# Patient Record
Sex: FEMALE | Race: WHITE | NOT HISPANIC OR LATINO | Employment: OTHER | URBAN - NONMETROPOLITAN AREA
[De-identification: names, ages, dates, MRNs, and addresses within clinical notes are randomized per-mention and may not be internally consistent; named-entity substitution may affect disease eponyms.]

---

## 2017-01-09 ENCOUNTER — TRANSFERRED RECORDS (OUTPATIENT)
Dept: HEALTH INFORMATION MANAGEMENT | Facility: OTHER | Age: 82
End: 2017-01-09

## 2017-06-12 ENCOUNTER — HISTORY (OUTPATIENT)
Dept: FAMILY MEDICINE | Facility: OTHER | Age: 82
End: 2017-06-12

## 2017-06-12 ENCOUNTER — OFFICE VISIT - GICH (OUTPATIENT)
Dept: FAMILY MEDICINE | Facility: OTHER | Age: 82
End: 2017-06-12

## 2017-06-12 DIAGNOSIS — D50.8 OTHER IRON DEFICIENCY ANEMIAS (CODE): ICD-10-CM

## 2017-06-12 DIAGNOSIS — L29.0 PRURITUS ANI: ICD-10-CM

## 2017-06-12 DIAGNOSIS — E78.00 PURE HYPERCHOLESTEROLEMIA: ICD-10-CM

## 2017-06-12 DIAGNOSIS — E28.39 OTHER PRIMARY OVARIAN FAILURE: ICD-10-CM

## 2017-06-12 DIAGNOSIS — M51.379 OTHER INTERVERTEBRAL DISC DEGENERATION, LUMBOSACRAL REGION: ICD-10-CM

## 2017-06-12 DIAGNOSIS — R29.890 LOSS OF HEIGHT: ICD-10-CM

## 2017-06-12 DIAGNOSIS — R68.89 OTHER GENERAL SYMPTOMS AND SIGNS: ICD-10-CM

## 2017-06-12 DIAGNOSIS — I10 ESSENTIAL (PRIMARY) HYPERTENSION: ICD-10-CM

## 2017-06-21 ENCOUNTER — HISTORY (OUTPATIENT)
Dept: RADIOLOGY | Facility: OTHER | Age: 82
End: 2017-06-21

## 2017-06-21 ENCOUNTER — HOSPITAL ENCOUNTER (OUTPATIENT)
Dept: RADIOLOGY | Facility: OTHER | Age: 82
End: 2017-06-21
Attending: FAMILY MEDICINE

## 2017-06-21 ENCOUNTER — AMBULATORY - GICH (OUTPATIENT)
Dept: LAB | Facility: OTHER | Age: 82
End: 2017-06-21

## 2017-06-21 DIAGNOSIS — I10 ESSENTIAL (PRIMARY) HYPERTENSION: ICD-10-CM

## 2017-06-21 DIAGNOSIS — D50.8 OTHER IRON DEFICIENCY ANEMIAS (CODE): ICD-10-CM

## 2017-06-21 DIAGNOSIS — E28.39 OTHER PRIMARY OVARIAN FAILURE: ICD-10-CM

## 2017-06-21 DIAGNOSIS — Z12.31 ENCOUNTER FOR SCREENING MAMMOGRAM FOR MALIGNANT NEOPLASM OF BREAST: ICD-10-CM

## 2017-06-21 DIAGNOSIS — M51.379 OTHER INTERVERTEBRAL DISC DEGENERATION, LUMBOSACRAL REGION: ICD-10-CM

## 2017-06-21 DIAGNOSIS — E78.00 PURE HYPERCHOLESTEROLEMIA: ICD-10-CM

## 2017-06-21 DIAGNOSIS — R68.89 OTHER GENERAL SYMPTOMS AND SIGNS: ICD-10-CM

## 2017-06-21 LAB
A/G RATIO - HISTORICAL: 1.1 (ref 1–2)
ALBUMIN SERPL-MCNC: 3.8 G/DL (ref 3.5–5.7)
ALP SERPL-CCNC: 63 IU/L (ref 34–104)
ALT (SGPT) - HISTORICAL: 19 IU/L (ref 7–52)
ANION GAP - HISTORICAL: 9 (ref 5–18)
AST SERPL-CCNC: 24 IU/L (ref 13–39)
BILIRUB SERPL-MCNC: 0.6 MG/DL (ref 0.3–1)
BUN SERPL-MCNC: 32 MG/DL (ref 7–25)
BUN/CREAT RATIO - HISTORICAL: 27
CALCIUM SERPL-MCNC: 9.9 MG/DL (ref 8.6–10.3)
CHLORIDE SERPLBLD-SCNC: 100 MMOL/L (ref 98–107)
CHOL/HDL RATIO - HISTORICAL: 3.35
CHOLESTEROL TOTAL: 201 MG/DL
CO2 SERPL-SCNC: 25 MMOL/L (ref 21–31)
CREAT SERPL-MCNC: 1.2 MG/DL (ref 0.7–1.3)
ERYTHROCYTE [DISTWIDTH] IN BLOOD BY AUTOMATED COUNT: 14 % (ref 11.5–15.5)
GFR IF NOT AFRICAN AMERICAN - HISTORICAL: 43 ML/MIN/1.73M2
GLOBULIN - HISTORICAL: 3.6 G/DL (ref 2–3.7)
GLUCOSE SERPL-MCNC: 96 MG/DL (ref 70–105)
HCT VFR BLD AUTO: 36.1 % (ref 33–51)
HDLC SERPL-MCNC: 60 MG/DL (ref 23–92)
HEMOGLOBIN: 12.1 G/DL (ref 12–16)
LDLC SERPL CALC-MCNC: 127 MG/DL
MCH RBC QN AUTO: 31.4 PG (ref 26–34)
MCHC RBC AUTO-ENTMCNC: 33.5 G/DL (ref 32–36)
MCV RBC AUTO: 94 FL (ref 80–100)
NON-HDL CHOLESTEROL - HISTORICAL: 141 MG/DL
PATIENT STATUS - HISTORICAL: ABNORMAL
PLATELET # BLD AUTO: 173 THOU/CU MM (ref 140–440)
PMV BLD: 12.2 FL (ref 6.5–11)
POTASSIUM SERPL-SCNC: 3.8 MMOL/L (ref 3.5–5.1)
PROT SERPL-MCNC: 7.4 G/DL (ref 6.4–8.9)
RED BLOOD COUNT - HISTORICAL: 3.85 MIL/CU MM (ref 4–5.2)
SODIUM SERPL-SCNC: 134 MMOL/L (ref 133–143)
TRIGL SERPL-MCNC: 72 MG/DL
TSH - HISTORICAL: 2.69 UIU/ML (ref 0.34–5.6)
WHITE BLOOD COUNT - HISTORICAL: 6.3 THOU/CU MM (ref 4.5–11)

## 2017-07-08 ENCOUNTER — COMMUNICATION - GICH (OUTPATIENT)
Dept: FAMILY MEDICINE | Facility: OTHER | Age: 82
End: 2017-07-08

## 2017-07-08 DIAGNOSIS — I10 ESSENTIAL (PRIMARY) HYPERTENSION: ICD-10-CM

## 2017-08-03 ENCOUNTER — OFFICE VISIT - GICH (OUTPATIENT)
Dept: INTERNAL MEDICINE | Facility: OTHER | Age: 82
End: 2017-08-03

## 2017-08-03 ENCOUNTER — HISTORY (OUTPATIENT)
Dept: INTERNAL MEDICINE | Facility: OTHER | Age: 82
End: 2017-08-03

## 2017-08-03 DIAGNOSIS — G89.29 OTHER CHRONIC PAIN: ICD-10-CM

## 2017-08-03 DIAGNOSIS — N18.30 CHRONIC KIDNEY DISEASE, STAGE III (MODERATE) (H): ICD-10-CM

## 2017-08-03 DIAGNOSIS — M85.89 OTHER SPECIFIED DISORDERS OF BONE DENSITY AND STRUCTURE, MULTIPLE SITES: ICD-10-CM

## 2017-08-03 DIAGNOSIS — R60.0 LOCALIZED EDEMA: ICD-10-CM

## 2017-08-03 DIAGNOSIS — M25.552 PAIN IN LEFT HIP: ICD-10-CM

## 2017-08-03 DIAGNOSIS — I10 ESSENTIAL (PRIMARY) HYPERTENSION: ICD-10-CM

## 2017-08-14 ENCOUNTER — COMMUNICATION - GICH (OUTPATIENT)
Dept: INTERNAL MEDICINE | Facility: OTHER | Age: 82
End: 2017-08-14

## 2017-08-14 DIAGNOSIS — I10 ESSENTIAL (PRIMARY) HYPERTENSION: ICD-10-CM

## 2017-09-14 ENCOUNTER — HISTORY (OUTPATIENT)
Dept: INTERNAL MEDICINE | Facility: OTHER | Age: 82
End: 2017-09-14

## 2017-09-14 ENCOUNTER — OFFICE VISIT - GICH (OUTPATIENT)
Dept: INTERNAL MEDICINE | Facility: OTHER | Age: 82
End: 2017-09-14

## 2017-09-14 DIAGNOSIS — G89.29 OTHER CHRONIC PAIN: ICD-10-CM

## 2017-09-14 DIAGNOSIS — M85.89 OTHER SPECIFIED DISORDERS OF BONE DENSITY AND STRUCTURE, MULTIPLE SITES: ICD-10-CM

## 2017-09-14 DIAGNOSIS — M25.552 PAIN IN LEFT HIP: ICD-10-CM

## 2017-09-14 DIAGNOSIS — N18.30 CHRONIC KIDNEY DISEASE, STAGE III (MODERATE) (H): ICD-10-CM

## 2017-09-14 DIAGNOSIS — I10 ESSENTIAL (PRIMARY) HYPERTENSION: ICD-10-CM

## 2017-09-14 LAB
ANION GAP - HISTORICAL: 11 (ref 5–18)
BUN SERPL-MCNC: 31 MG/DL (ref 7–25)
BUN/CREAT RATIO - HISTORICAL: 26
CALCIUM SERPL-MCNC: 10 MG/DL (ref 8.6–10.3)
CHLORIDE SERPLBLD-SCNC: 105 MMOL/L (ref 98–107)
CO2 SERPL-SCNC: 23 MMOL/L (ref 21–31)
CREAT SERPL-MCNC: 1.21 MG/DL (ref 0.7–1.3)
GFR IF NOT AFRICAN AMERICAN - HISTORICAL: 43 ML/MIN/1.73M2
GLUCOSE SERPL-MCNC: 108 MG/DL (ref 70–105)
POTASSIUM SERPL-SCNC: 4.4 MMOL/L (ref 3.5–5.1)
SODIUM SERPL-SCNC: 139 MMOL/L (ref 133–143)

## 2017-09-20 ENCOUNTER — COMMUNICATION - GICH (OUTPATIENT)
Dept: INTERNAL MEDICINE | Facility: OTHER | Age: 82
End: 2017-09-20

## 2017-09-20 ENCOUNTER — HISTORY (OUTPATIENT)
Dept: INTERNAL MEDICINE | Facility: OTHER | Age: 82
End: 2017-09-20

## 2017-09-20 DIAGNOSIS — Z79.899 OTHER LONG TERM (CURRENT) DRUG THERAPY: ICD-10-CM

## 2017-09-22 ENCOUNTER — AMBULATORY - GICH (OUTPATIENT)
Dept: LAB | Facility: OTHER | Age: 82
End: 2017-09-22

## 2017-09-22 DIAGNOSIS — Z79.899 OTHER LONG TERM (CURRENT) DRUG THERAPY: ICD-10-CM

## 2017-09-22 LAB
ANION GAP - HISTORICAL: 5 (ref 5–18)
BUN SERPL-MCNC: 33 MG/DL (ref 7–25)
BUN/CREAT RATIO - HISTORICAL: 26
CALCIUM SERPL-MCNC: 9.7 MG/DL (ref 8.6–10.3)
CHLORIDE SERPLBLD-SCNC: 108 MMOL/L (ref 98–107)
CO2 SERPL-SCNC: 25 MMOL/L (ref 21–31)
CREAT SERPL-MCNC: 1.25 MG/DL (ref 0.7–1.3)
GFR IF NOT AFRICAN AMERICAN - HISTORICAL: 41 ML/MIN/1.73M2
GLUCOSE SERPL-MCNC: 91 MG/DL (ref 70–105)
POTASSIUM SERPL-SCNC: 4.4 MMOL/L (ref 3.5–5.1)
SODIUM SERPL-SCNC: 138 MMOL/L (ref 133–143)

## 2017-11-06 ENCOUNTER — HISTORY (OUTPATIENT)
Dept: INTERNAL MEDICINE | Facility: OTHER | Age: 82
End: 2017-11-06

## 2017-11-06 ENCOUNTER — OFFICE VISIT - GICH (OUTPATIENT)
Dept: INTERNAL MEDICINE | Facility: OTHER | Age: 82
End: 2017-11-06

## 2017-11-06 DIAGNOSIS — I10 ESSENTIAL (PRIMARY) HYPERTENSION: ICD-10-CM

## 2017-11-06 DIAGNOSIS — G89.29 OTHER CHRONIC PAIN: ICD-10-CM

## 2017-11-06 DIAGNOSIS — M25.552 PAIN IN LEFT HIP: ICD-10-CM

## 2017-11-06 ASSESSMENT — PATIENT HEALTH QUESTIONNAIRE - PHQ9: SUM OF ALL RESPONSES TO PHQ QUESTIONS 1-9: 0

## 2017-11-29 ENCOUNTER — AMBULATORY - GICH (OUTPATIENT)
Dept: FAMILY MEDICINE | Facility: OTHER | Age: 82
End: 2017-11-29

## 2017-12-27 NOTE — PROGRESS NOTES
Patient Information     Patient Name MRN Sex Vicki Kaiser [id number] Female 1935      Progress Notes by Kirsten Guevara DO at 8/3/2017  9:00 AM     Author:  Kirsten Guevara DO Service:  (none) Author Type:  PHYS- Osteopathic     Filed:  8/3/2017  9:59 AM Encounter Date:  8/3/2017 Status:  Signed     :  Kirsten Guevara DO (PHYS- Osteopathic)            Chief Complaint     Patient presents with       Multiple Problems      lab results, left hip and back pain, edema bilat ankles        Subjective:   Ms. Mckeon is a 82 y.o. female  seen for several acute concerns.    She did receive results from her labs in the mail.  If there were several abnormalities that were discussed in depth today.  She did have slight elevation of her LDL at 127.  We did discuss that given her history and lack of coronary disease that this number is okay and no changes are needed.    She did have a decrease in her GFR at 43.  We did review her record and this has been chronic back to .  We did discuss chronic kidney disease in depth today.  She denies any symptoms of increased fatigue or urinary changes.  We did discuss that her hydrochlorothiazide may be contributing.    We did discuss her blood pressure medications.  She has been on blood pressure meds dating back to  or before.  She overall has had a low blood pressure here.  She also has a low pulse.  She does not check this at home.  She denies any obvious side effects from her medication.    She did recently undergo DEXA scan.  It did show osteopenia with a T score of negative 1.8.  Her fracture scores were significantly elevated.  We did discuss that medications may be indicated.  She denies any history of fracture in the past.    She does have left hip pain.  This has been worse over the last couple months.  She does have a history of arthritis.  Taking some Tylenol.  She did used to walk regularly and more recently have gotten out of this.  She has previously  "had injections into the right hip.    She  reports that she has never smoked. She has never used smokeless tobacco.    Past medical history reviewed as below:     Past Medical History:     Diagnosis  Date     ANEMIA 3/2/2011    likely related to NSAIDs, resolved.       BACK PAIN, LUMBAR, WITH RADICULOPATHY 3/2/2011    resolved after surgery      Hx of pregnancy      2, para 2 with 2 spontaneous vaginal deliveries      Scoliosis      Screening for osteoporosis  Central DEXA with normal results      Spinal stenosis    .      ROS:   Pertinent ROS was performed and was negative, including for fevers, chills, chest pain, palpitations. No other concerns, with exception of HPI above.      Objective:    /64  Pulse 56  Temp 97.8  F (36.6  C) (Tympanic)  Resp 16  Ht 1.607 m (5' 3.25\")  Wt 81.9 kg (180 lb 9 oz)  Breastfeeding? No  BMI 31.73 kg/m2  GEN: Vitals reviewed.  Patient is in no acute distress. Cooperative with exam.  HEENT: Normocephalic atraumatic.  Pupils equally round.  No scleral icterus, no conjunctival erythema. Oropharynx with no erythema or exudates. Dentition adequate.  MSK: Gait is normal.  Patient does move with ease.  No decreased range of motion.  SKIN: Warm and dry to touch.  No rash on face, arms and legs.  EXT: No clubbing or cyanosis.  No peripheral edema.     Assessment/Plan:   1. Chronic kidney disease (CKD), stage 3 (moderate)  - eGFR stable, recheck in 6 months  - electrolytes stable, check RFP, H/H, PTH, and urine albumin yearly  - monitor Vitamin D level for sufficiency  - avoid NSAIDS, need to renally dose medications    2. Hypertension  - at this time she will stop her hydrochlorothiazide.  - Blood pressure today of 116/64  Is at the goal of <150/90.  - Instructed to check BP at home.  - Cautioned patient to monitor with antibiotics, herbals and any OTC medications  - repeat creatinine in follow up  - would consider adding CCB and decrease in Beta blocker in " future    3. Osteopenia of multiple sites  - likely she will need medication.  - continue with vitamin D.  We will discuss this further in follow-up.    4. Chronic left hip pain  - Ice, elevation, gentle movement/rest as tolerated  - Tylenol as needed, avoid nonsteroidal anti-inflammatory medications  - offered PT, patient declined and will start exercising more.  We did discuss this.  She is to call should like referral to PT.  - patient is to call if she has additional problems with this or if new symptoms develop    5. Bilateral edema of lower extremity  - likely due to venous insufficiency, kidney disease may be contributing, we may need to repeat echocardiogram if she does continue to have worsening symptoms.  - If she does have worsening off of her hydrochlorothiazide I would consider as needed Lasix.  - Return/call as needed for follow-up should any new symptoms develop, for worsening of current symptoms or if symptoms do not resolve with above plan.  - Limit salt to 2000mg-2500mg daily.  Use other herbs and pepper to season food, no salt shaker, pay attention to how much cheese, canned soups and vegetables.    - Record heart rate, weight and blood pressure at various times daily and bring with to your next appt      Return in about 6 weeks (around 9/14/2017) for Recheck/Follow Up, Blood pressure, Establish care.     A total of 25 minutes spent with in face-to-face consultation of this patient with greater than 50% spent in counseling and care coordination of above listed medical problems including diagnosis, treatment options with emphasis on risks and benefits of each, prognosis and importance of compliance for each.      Patient Instructions   - Limit salt to 2000mg-2500mg daily.  Use other herbs and pepper to season food, no salt shaker, pay attention to how much cheese, canned soups and vegetables.      - elevate your legs for 45-60 minutes twice  daily    --------------------------------------------------------------------------------------------------------------------------------------    Ice every 3-4 hours, gentle exercise/rest as much as possible.    Caution with NSAIDS (ibuprofen, aspirin, naproxen, aleve, advil) due to risk for increased blood pressure, stomach pain/nausea/ulcers and kidney damage; use minimal amount necessary    The following is okay:  Acetaminophen (Tylenol) 1000mg (2- extra strength 500mg tablets or 3 regular strength 325mg tablets) three times a day as needed; Maximum of 4000mg daily    - Return/call as needed for follow-up should any new symptoms develop, for worsening of current symptoms or if symptoms do not resolve with above plan.    -------------------------------------------------------------------------------------------------------------------------------------    Stop your Hydrochlorothiazide and monitor your blood pressure.      Please check your blood pressure daily at various times, record this and bring it to your follow up appointment.  Your blood pressure goal is greater than 110/50 and less than 145/90.  Please call if it is consistently outside this range.       Index [place] All languages Related topics   High Blood Pressure: Essential Hypertension   ________________________________________________________________________  KEY POINTS    High blood pressure means that your blood pressure is higher than normal. It is called essential hypertension when no cause for it can be found.    Weight loss, changes in your diet, and exercise may be the only treatment you need. If lifestyle changes don t lower your blood pressure enough, your healthcare provider may prescribe medicine. Many people need to take 2 or more medicines to bring their blood pressure down to a healthy level.    Talk to your healthcare provider about your personal and family medical history and your lifestyle habits. This will help you know what you  "can do to lower your risk for high blood pressure.  ________________________________________________________________________  What is high blood pressure?  Blood pressure is the force of blood against artery walls as the heart pumps blood through the body. You may be told that you have high blood pressure (hypertension) if your blood pressure is higher than normal. Hypertension is called essential when no cause for it can be found. When the cause of hypertension is known, such as from kidney disease or a tumor, it is called secondary hypertension.  Blood pressure can rise and fall with exercise, rest, or emotions.    Normal resting blood pressure ranges up to 120/80 (\"120 over 80\"). The first number (120 in this example) is the pressure when the heart beats and pushes blood out to the rest of the body. The second number (80 in this example) is the pressure when the heart rests between beats.    Blood pressure is borderline high if it is 120/80 or higher but less than 140/90.    High blood pressure is 140/90 or higher for most people. If you have chronic kidney disease, 130/80 or higher is considered high blood pressure.    Why is high blood pressure a problem?  High blood pressure is a problem in many ways.    Your heart has to work harder to pump blood through your body. The added workload on the heart causes thickening of the heart muscle. Over time, the thickening damages the heart muscle so that it can no longer pump normally. This can lead to a disease called heart failure.    The higher pressure in your arteries may cause them to weaken and bleed, resulting in a stroke.    As you get older, blood vessels may become hardened. High blood pressure speeds up this process. Hardened or narrowed arteries may not be able to supply enough blood to all parts of your body.    High blood pressure may lead to atherosclerosis, which is when deposits of cholesterol, fatty substances, and blood cells clog up an artery. " Atherosclerosis is the leading cause of heart attacks. It can also cause strokes.    Your kidneys, brain, and eyes may be damaged.  You may need treatment for high blood pressure for the rest of your life. However, proper treatment can control your blood pressure and help prevent heart disease, heart attack, or stroke. It can also help prevent long-term health problems, such as heart failure, kidney failure, blindness, and dementia.  If you already have some complications, such as breathing problems or chest pain, lowering your blood pressure may make these problems less severe.    What is the cause?  There are no clear causes of essential hypertension. However, many things can increase blood pressure, such as:    Being overweight    Smoking    Eating a diet high in salt    Drinking a lot of alcohol  Other important factors include:    Race.  Americans are more likely to have high blood pressure.    Gender. Males have a greater chance of developing high blood pressure than women until age 55. After the age of 75, women are more likely to develop high blood pressure than men.    Heredity. If you have parents with high blood pressure, you are more at risk.    Age. The older you get, the more likely you are to have high blood pressure.  Also, some medicines increase blood pressure.  Stress and drinking caffeine can make blood pressure go up temporarily, but it s not clear that they have any long-term effects on blood pressure.    What are the symptoms?  You may have high blood pressure for a long time without symptoms. You may not be able to tell by the way you feel that your blood pressure is high. The only way to find out if your blood pressure is high is to have it measured. That's why it s important to have your blood pressure checked at least once a year.  When high blood pressure does cause symptoms, they may include:    Headaches    Nosebleeds    Getting tired easily    Blurred  vision    Dizziness    Lightheadedness    Feeling like your heart is racing or fluttering    Shortness of breath    Chest pain    Memory problems    Daytime sleepiness    How is it diagnosed?  Blood pressure is checked at most healthcare visits. High blood pressure is usually discovered during one of these visits. If your blood pressure is high, you will be asked to return for follow-up checks. Your healthcare provider will ask about your personal and family medical history and examine you.  Tests to look for a possible cause of high blood pressure may include:    Urine and blood tests    Chest X-ray    Electrocardiogram (ECG), which measures and records your heartbeat  You may be asked to use a portable blood-pressure measuring device, which will take your pressure at different times during day and night.    How is it treated?  If your blood pressure is borderline high, you may be able to bring it down to a normal level without medicine. Weight loss, changes in your diet, and exercise may be the only treatment you need.  If lifestyle changes don t lower your blood pressure enough, your healthcare provider may prescribe medicine. Many people need to take 2 or more medicines to bring their blood pressure down to a healthy level. It may take several weeks or months to find the best treatment for you.    How can I take care of myself?  If you have high blood pressure, there are things you can do now to take care of yourself and to prevent problems in the future:    Follow your treatment plan and know how to take your medicines.    Work with your healthcare provider to find what lifestyle changes and medicines are right for you.    Follow the directions that come with your medicine, including information about food or alcohol. Make sure you know how and when to take your medicine. Do not take more or less than you are supposed to take.    Many medicines have side effects. A side effect is a symptom or problem that is  caused by the medicine. Ask your healthcare provider or pharmacist what side effects your medicine may cause and what you should do if you have side effects. Ask if you should avoid some nonprescription medicines.    Be careful with nonprescription medicines or herbal supplements. Some can raise blood pressure. This includes diet pills, cold and pain medicines, and energy boosters. Read labels or ask your pharmacist if the medicine or supplement affects blood pressure. Some illegal drugs, like cocaine, can also affect blood pressure.    Check your blood pressure (or have it checked) as often as your provider advises. Keep a diary of the readings. A diary is also a good place to note your exercise, weight, salt intake, types of food you are eating, and your feelings. This can help you learn how these things can affect your blood pressure. Take your diary with you when you visit your provider. It may help you and your provider manage your blood pressure and adjust your medicines if needed.    Don t smoke.    Eat a healthy diet that is low in salt, saturated fat, trans fat, and cholesterol. Include lots of fruits, vegetables, and fat-free or low-fat milk and milk products.    Get regular exercise, according to your healthcare provider's advice. For example, you might walk, bike, or swim at least 30 minutes 3 to 5 times a week.    Limit the amount of alcohol you drink. Moderate drinking is up to 1 drink a day for women and up to 2 drinks for men.    Lose weight if you need to.    Try to reduce the stress in your life or learn how to deal better with situations that make you feel anxious.    Ask your healthcare provider:    How and when you will get your test results    How long it will take to recover    If there are activities you should avoid and when you can return to your normal activities    How to take care of yourself at home    What symptoms or problems you should watch for and what to do if you have  them    Make sure you know when you should come back for a checkup. Keep all appointments for provider visits or tests.    How can I help prevent high blood pressure?  You can help prevent this disease with a heart-healthy lifestyle:    Eat a healthy diet and keep a healthy weight.    Stay fit with the right kind of exercise for you.    Decrease stress.    Don t smoke.    Limit your use of alcohol.  Talk to your healthcare provider about your personal and family medical history and your lifestyle habits. This will help you know what you can do to lower your risk for high blood pressure.    Developed by [employer].  Adult Advisor 2016.3 published by [employer].  Last modified: 2016-04-18  Last reviewed: 2016-04-15  This content is reviewed periodically and is subject to change as new health information becomes available. The information is intended to inform and educate and is not a replacement for medical evaluation, advice, diagnosis or treatment by a healthcare professional.  References   Adult Advisor 2016.3 Index    Copyright   2016 [employer], a division of [employer] All rights reserved.              Index [place] Related topics   Chronic Kidney Disease   ________________________________________________________________________  KEY POINTS    Chronic kidney disease is a slow loss of kidney function over time.    Treatment may include medicine, changing your diet, dialysis, or a kidney transplant. Chronic kidney disease cannot be cured. You will have it for the rest of your life unless you have a kidney transplant.    Follow the full course of treatment prescribed by your healthcare provider. Ask your provider what medicines you need to avoid because they could damage your kidneys.  ________________________________________________________________________  What is chronic kidney disease?  Chronic kidney disease is a slow loss of kidney function over time.  The kidneys are inside your belly, on  either side of your spine just above your waist. They make urine by removing waste products, extra salt and other minerals, and water from the blood. As long as you have at least one kidney that is working, your body can filter enough blood and make enough urine to keep you healthy.  As kidney disease gets worse, you may develop kidney failure, which means:    Your body cannot get rid of wastes.    Your body cannot keep a healthy balance of water and minerals.    Your kidneys may make less urine, or no urine.  If not treated, kidney failure will cause death within a few days or weeks.  Chronic kidney disease can affect your health in other ways. For example, it can cause or worsen high blood pressure. You are also more likely to have blood vessel or heart disease, bone disease, anemia, and nutrition problems.    What is the cause?  Chronic kidney disease is more common in middle-aged and older people. It is caused by damage to the kidneys over the years by diseases such as:    High blood pressure    Diabetes    Heart or lung disease    Autoimmune disease, which is a disease that causes your body to mistakenly attack your own tissue such as with lupus    Injury to the kidneys, kidney infection, and other kidney problems  Using nonprescription painkillers, such as acetaminophen, aspirin, or ibuprofen, for a long time can also cause chronic kidney failure. A family history of chronic kidney disease is also a risk factor.    What are the symptoms?  Chronic kidney disease gets worse over time. In the early stages, you may not have any symptoms. If you do have symptoms, they may come on gradually over several months. Symptoms may include:    Feeling tired or weak    Trouble staying alert during the day    Trouble sleeping at night    Itchy, dry skin    Headaches    Loss of appetite, often with weight loss  As your kidneys get worse, other parts of your body may be damaged by excess waste and changes in the balance of  water, salt, and other minerals. Some of the common symptoms of kidney failure include:    Changes in urine, such as urinating more often, less often, or not at all    Swelling, especially in your legs, feet, or ankles    Muscle cramps or weakness    Bruising or bleeding    Skin that is yellow or a darker color than is usual for you    Unusual pain in one or both sides of your back    Nausea and vomiting    Shortness of breath    Confusion or seizures    How is it diagnosed?  Your healthcare provider will ask about your symptoms and medical history and examine you. Tests may include:     Blood and urine tests    Ultrasound scan, which uses sound waves to show pictures of the kidneys    CT scan, which uses X-rays and a computer to show detailed pictures of the kidneys    Needle biopsy, which is the removal of a small piece of tissue from your kidney with a needle      How is it treated?  Your healthcare provider may prescribe medicine to:    Treat health conditions that cause kidney disease such as diabetes, heart or lung disease, infection, or other kidney problems    Keep your blood pressure normal    Keep the balance of liquids and minerals in your body normal  You may need to change your diet. With the right diet, you can help keep kidney disease from getting worse and help prevent other problems. Follow your healthcare provider's advice carefully about what foods you should eat and how much fluid you can drink. Your provider may recommend working with a dietitian to help you plan meals that include the right foods.  If you develop kidney failure, you will need dialysis or a kidney transplant. Dialysis uses a machine to do the work of your kidneys. It removes waste products and extra water from your blood and can be life saving. Dialysis may help you live longer and improve your quality of life. Dialysis can be done in a hospital, dialysis center, or you may be able to do dialysis in your own home. Dialysis  usually needs to be done several times a week. Dialysis can be done by filtering the blood or with a tube that filters fluid from the abdomen.  Whether dialysis is a good option for you depends on the cause of your kidney disease, how well your kidneys are working, and your overall health.  If your health is good except for kidney failure, a kidney transplant may be an option. Talk with your healthcare provider about whether a transplant is an option for you.  Chronic kidney disease cannot be cured. You will have it for the rest of your life unless you have a kidney transplant. There are many choices to make about your care. Talk about the choices with your healthcare provider so that you can get all of your questions answered.     How can I take care of myself?  Follow the full course of treatment prescribed by your healthcare provider. Take all medicines exactly as directed by your provider. Ask your provider what medicines you need to avoid because they could damage your kidneys.  Other things you can do that may help include:    Eat a healthy diet. Ask your provider and dietitian what you need to eat, and what you should avoid.    Try to keep a healthy weight. If you are overweight, lose weight.    Stay fit with the right kind of exercise for you.    If you smoke, try to quit. Talk to your healthcare provider about ways to quit smoking.    If you want to drink alcohol, ask your healthcare provider how much is safe for you to drink.    Try to get at least 7 to 9 hours of sleep each night.    Keep up to date with recommended shots (immunizations). Get a flu shot every year and ask your healthcare provider about any other vaccinations you may need  Ask your provider:    How and when you will get your test results    How long it will take to recover    If there are activities you should avoid and when you can return to your normal activities    How to take care of yourself at home    What symptoms or problems you  should watch for and what to do if you have them  Make sure you know when you should come back for a checkup. Keep all appointments for provider visits or tests.    You can get more information from:    The National Kidney Foundation  014-297-8508  http://www.kidney.org  Developed by [employer].  Adult Advisor 2016.3 published by [employer].  Last modified: 2016-07-26  Last reviewed: 2016-07-25  This content is reviewed periodically and is subject to change as new health information becomes available. The information is intended to inform and educate and is not a replacement for medical evaluation, advice, diagnosis or treatment by a healthcare professional.  References   Adult Advisor 2016.3 Index    Copyright   2016 [employer] Devices, a division of [employer] All rights reserved.              CHRIS HORAN DO   8/3/2017 9:51 AM    This document was prepared using voice generated softwear. While every attempt was made for accuracy, grammatical errors may exist.

## 2017-12-27 NOTE — PROGRESS NOTES
Patient Information     Patient Name MRN Sex Vicki Kaiser [id number] Female 1935      Progress Notes by Kirsten Guevara DO at 2017  1:40 PM     Author:  Kirsten Guevara DO Service:  (none) Author Type:  PHYS- Osteopathic     Filed:  2017  2:18 PM Encounter Date:  2017 Status:  Signed     :  Kirsten Guevara DO (PHYS- Osteopathic)            Chief Complaint     Patient presents with       Follow Up      2 month hypertensive blood pressure         HPI: Ms. Mckeon is a 82 y.o. female who presents today for follow up of blood pressure.  She has been on lisinopril 10 mg daily.  She is not taking amlodipine.  Her blood pressure at home has ranged between 128/74 and 146/81.  Her heart rate at home is in the sixties.  She is also on metoprolol 25 mg twice daily.  Her blood pressure today is on the low normal side.  She denies any adverse reactions from the medication.    She was having some chronic left hip pain.  We did send her for physical therapy.  She reports that her pain overall has improved significantly.  She is not having as many problems at this time as she did prior.    She does report that she did get an upper respiratory infection after getting her flu shot.  This has resolved at this time.    She  reports that she quit smoking about 49 years ago. She has never used smokeless tobacco.    Past medical history reviewed as below:     Past Medical History:     Diagnosis  Date     Anemia 5/15/2013     Ankle fracture      Chronic left hip pain 2017     Essential hypertension      Hx of pregnancy      2, para 2 with 2 spontaneous vaginal deliveries      Mitral valve prolapse 2008    with normal echo and no residual prolapse       Osteopenia      Pure hypercholesterolemia      Renal insufficiency 2014    Stable. First noted when patient was taking NSAIDs regularly due to back pain.        Scoliosis      Spinal stenosis      Thyroid nodule 2012    Patient  "with multinodular goiter. Negative cytology and stable ultrasound findings.       .      ROS  Pertinent ROS was performed and was negative, including for fevers, chills, chest pain, shortness of breath, increased lower extremity edema, changes in bowel or bladder, blood in the stool, difficulty swallowing. No other concerns, with exception of HPI above.      EXAM:   /58  Pulse 60  Temp 97.4  F (36.3  C) (Tympanic)  Resp 20  Ht 1.613 m (5' 3.5\")  Wt 82.7 kg (182 lb 5 oz)  Breastfeeding? No  BMI 31.79 kg/m2    Estimated body mass index is 31.79 kg/(m^2) as calculated from the following:    Height as of this encounter: 1.613 m (5' 3.5\").    Weight as of this encounter: 82.7 kg (182 lb 5 oz).      GEN: Vitals reviewed.  Healthy appearing. Patient is in no acute distress. Cooperative with exam.  HEENT: Normocephalic atraumatic.  Pupils equally round.  No scleral icterus, no conjunctival erythema. Oropharynx with no erythema or exudates. Dentition adequate.  CV: Heart regular in rate and rhythm with no murmur.    LUNGS: Lungs clear to auscultation bilaterally.  Chest rise equal bilaterally.  No accessory muscle use.  ABD:  Obese.  SKIN: Warm and dry to touch.  No rash on face, arms and legs.  EXT: No clubbing or cyanosis.  No peripheral edema.  PSYCH: Mood is good.  Affect appropriate. Speech fluent. Answers questions appropriately and thought process normal.     ASSESSMENT AND PLAN:    1. Hypertension  - Blood pressure today of 112/58  is at the goal of <140/90.  - Continue current regimen at this time.  Instructed to check BP at home, she was also encouraged to bring her blood pressure cuff and compare it in nurse injection clinic.  - Cautioned patient to monitor with antibiotics, herbals and any OTC medications  - she is to continue to work on weight loss and we did discuss portion sizes, cutting out high carb and high sugar foods and regular activity.  - lisinopril (PRINIVIL; ZESTRIL) 10 mg tablet; Take 1 " tablet by mouth once daily.  Dispense: 90 tablet; Refill: 4    2. Chronic left hip pain  - resolved at this time.  She is to call if she has any recurrent symptoms.    She was reassured that it does happen that people get briefly sick after the flu shot however it is still worth getting these injections due to the potential risk for severe illness without them.         Return in about 6 months (around 5/6/2018) for Annual Exam.     Patient Instructions   Okay to take lisinopril any time of day        CHRIS HORAN DO   11/6/2017 2:15 PM    This document was prepared using voice generated softwear. While every attempt was made for accuracy, grammatical errors may exist.

## 2017-12-27 NOTE — PROGRESS NOTES
Patient Information     Patient Name MRN Sex Vicki Kaiser [id number] Female 1935      Progress Notes by Suzanne Zimmerman MD at 2017  4:39 PM     Author:  Suzanne Zimmerman MD Service:  (none) Author Type:  Physician     Filed:  2017  4:39 PM Date of Service:  2017  4:39 PM Status:  Signed     :  Suzanne Zimmerman MD (Physician)            Letter mailed to patient.

## 2017-12-28 NOTE — TELEPHONE ENCOUNTER
Patient Information     Patient Name MRN Sex Vicki Kaiser [id number] Female 1935      Telephone Encounter by Harini Long at 2017 11:17 AM     Author:  Harini Long Service:  (none) Author Type:  (none)     Filed:  2017 11:18 AM Encounter Date:  2017 Status:  Signed     :  Harini Long            Patient is coming in for lab appointment . Please place orders. Thank you.

## 2017-12-28 NOTE — TELEPHONE ENCOUNTER
Patient Information     Patient Name MRN Sex Vicki Kaiser [id number] Female 1935      Telephone Encounter by Binta Anderson at 2017  2:09 PM     Author:  Binta Anderson Service:  (none) Author Type:  (none)     Filed:  2017  2:13 PM Encounter Date:  2017 Status:  Signed     :  Binta Anderson            Patient states blood pressure Medication was stopped and was told if blood pressure went up to call and would send prescription for something else. Patient states Saturday blood pressure was 161 and felt like had band on hand, took a HCTZ and lowered 120s, and today was 121/65. Patient wondering if should have other Medication sent in.  Binta Anderson LPN .............2017  2:11 PM

## 2017-12-28 NOTE — PROGRESS NOTES
Patient Information     Patient Name MRN Sex Vicki Kaiser [id number] Female 1935      Progress Notes by Yomaira Velasquez R.T. (ARRT) at 2017  8:12 AM     Author:  Yomaira Velasquez R.T. (ARRT) Service:  (none) Author Type:  (none)     Filed:  2017  8:13 AM Date of Service:  2017  8:12 AM Status:  Signed     :  Yomaira Velasquez R.T. (ARRT) ([facility] - Registered Radiologic Technologist)            Falls Risk Criteria:    Age 65 and older or under age 4        Sensory deficits    Poor vision    Use of ambulatory aides    Impaired judgment    Unable to walk independently    Meets High Risk criteria for falls:  Yes             1.  Do you have dizziness or vertigo?    no                    2.  Do you need help standing or walking?   no                 3.  Have you fallen within the last 6 months?    no           4.  Has the patient been fasting?      no       If any risks are marked Yes, the following interventions are utilized:    Do not leave patient unattended     Assist patient in the dressing room and bathroom    Have ambulatory aides available throughout procedure    Involve patient s family if available

## 2017-12-28 NOTE — TELEPHONE ENCOUNTER
Patient Information     Patient Name MRN Vicki Boudreaux [id number] Female 1935      Telephone Encounter by Binta Anderson at 2017  2:26 PM     Author:  Binta Anderson Service:  (none) Author Type:  (none)     Filed:  2017  2:26 PM Encounter Date:  2017 Status:  Signed     :  Binta Anderson            Patient notified.   Binta Anderson LPN........2017  2:26 PM

## 2017-12-28 NOTE — PROGRESS NOTES
Patient Information     Patient Name MRN Sex Vicki Kaiser [id number] Female 1935      Progress Notes by Suzanne Zimmerman MD at 2017  4:39 PM     Author:  Suzanne Zimmerman MD Service:  (none) Author Type:  Physician     Filed:  2017  4:39 PM Date of Service:  2017  4:39 PM Status:  Signed     :  Suzanne Zimmerman MD (Physician)            Letter mailed to patient.

## 2017-12-28 NOTE — PATIENT INSTRUCTIONS
Patient Information     Patient Name MRN Vicki Boudreaux [id number] Female 1935      Patient Instructions by Kirsten Guevara DO at 2017 10:00 AM     Author:  Kirsten Guevara DO  Service:  (none) Author Type:  PHYS- Osteopathic     Filed:  2017 10:39 AM  Encounter Date:  2017 Status:  Addendum     :  Kirsten Guevara DO (PHYS- Osteopathic)        Related Notes: Original Note by Kirsten Gueavra DO (PHYS- Osteopathic) filed at 2017 10:35 AM            Consider starting medications for bone density - consider Fosamax (weekly pill) or Prolia (injection)    Recommend taking Vitamin D 1000 IU daily.    Also, Calcium 1200-1500mg daily from supplement and diet combined.  Be sure to take into account the amount of calcium you may be getting from food daily so as not to get too much calcium.    Recommend daily exercise with a focus on low-weight strengthening.  Yoga is a great way to do this.    Please let me know if you have any questions regarding this.       Index [place] Related topics   Calcium in the Diet   ________________________________________________________________________  KEY POINTS    Calcium is a mineral that is important for your heart, bones, teeth, and muscles to stay healthy.    Eating or drinking certain things can cause you to lose calcium.    You can get calcium from dairy products, calcium-fortified foods, or from supplements. If you can get enough calcium in your diet, you do not need to take calcium supplements.  ________________________________________________________________________  What is calcium?   Calcium is a mineral that is very important for:    Heart health    Bone health    Teeth    Nerves    Muscles    Blood clotting    How much calcium do I need?   Many food products list the amount of calcium per serving on the label. Food labels list calcium as a % of the Daily Value (DV) based on 1,000 mg of calcium per day. Look for foods that provide 10% or  more of the daily value for calcium.   The total amount of calcium you need, preferably from dairy foods, depends on your age and gender:      Group           Calcium/Day  --------------------------------------  Adults 19 to 50  1000 mg  Women 51 to 70   1200 mg  Men 51 to 70     1000 mg  Adults over 70   1200 mg  --------------------------------------  * mg = milligrams    What keeps me from getting enough calcium?   Here are some things that can make it harder for your body to get enough calcium:    Not getting enough vitamin D. Vitamin D increases the amount of calcium absorbed by your body. It s important to get enough sunlight to help your body make vitamin D and to choose foods that contain vitamin D. Milk contains vitamin D and some brands of cheese, yogurt, juice, and margarine have added vitamin D. Check labels for the amount of vitamin D per serving. Fish such as salmon, mackerel, and tuna are good natural sources of vitamin D. If your provider recommends that you take a calcium supplement, there are some that include vitamin D.    Too much fiber in the diet. This is more of a concern if you have low amounts of calcium in your diet. Take calcium supplements or eat calcium-fortified foods 2 hours before or after eating 100% bran products. Soaking beans in water and discarding the liquid before cooking can also help.    Soft drinks, energy drinks, tea, and coffee. People who drink these products instead of milk often don't get enough calcium.    Taking some medicines. Medicines such as some antibiotics, heartburn medicines that decrease stomach acid production, and antacids that contain aluminum can make it harder for your body to absorb calcium.  These things can cause you to lose calcium:    Eating a lot of protein foods, such as meats, poultry, and eggs. The more protein you eat, the more calcium you lose. As long as your diet is balanced and contains enough calcium, this should not be a  problem.    Eating a lot of salt. The more salt in your diet, the more calcium you lose. Limit the salt in your diet. Cutting back on salt and getting enough calcium can help lower blood pressure and help prevent fluid retention.  Milk products are one of the best sources of calcium. Calcium is also in many other foods such as vegetables, beans, nuts, seeds, and soy. However, the calcium in these foods is not absorbed as well as the calcium in milk products. Calcium has been added to some foods (fortified), which makes it easier to meet daily calcium needs, but it still can be hard for your body to get enough calcium if dairy foods are not a part of your diet.     Do I need a calcium supplement?   If you can get enough calcium in your diet, you do not need to take calcium supplements. People who get too much calcium have a higher risk for kidney stones and stroke. Men who take calcium supplements are at higher risk for a heart attack. Ask your healthcare provider if you should take calcium supplements, and which kind you should take.   Calcium supplements of 1000 mg or less per day do not prevent fractures in postmenopausal women. However, there may be some benefit from higher doses of calcium supplements. If you are a postmenopausal woman and you have never had a fracture, ask your healthcare provider if you should take calcium supplements.  You may need a supplement if you:    Have digestive problems that prevent you from absorbing calcium.    Avoid dairy products due to allergic or other reactions (such as lactose intolerance or milk allergy).    Don't eat any animal products.    Do not get enough calcium in your diet.    Are pregnant or breast-feeding.    Have osteoporosis or osteopenia (weakened bones).    Have a vitamin D deficiency.  There are many kinds of calcium supplements. The most common are calcium carbonate and calcium citrate.     Calcium carbonate is best absorbed with a meal.    Calcium citrate can  be taken on a full or empty stomach. Calcium citrate may be a better choice for older adults or younger people who have low levels of stomach acid.    Calcium phosphate, lactate, and gluconate are also well absorbed. However, the amount of calcium per pill is lower, so you may need to take many pills a day to meet your needs.  Your body absorbs calcium best if you take no more than 500 mg at a time, and take it two or more times per day as recommended by your healthcare provider. Look for calcium supplements that have the USP or Consumer Lab symbol on the label. Products with these labels have been tested to make sure they are absorbed by the body.    How can I eat the right amount of calcium?  Eat more calcium-rich foods. Here are some ideas for adding calcium to your diet.    Have low-fat or nonfat milk, cottage cheese with fruit, or yogurt for snacks.    Eat calcium-fortified breakfast cereals with rice, almond, or soy milk, or have calcium-fortified waffles or pancakes.    Cook hot cereals with milk instead of water.    Serve yogurt or milk smoothies instead of juice.    Add yogurt to lunches or use a dip made with yogurt when having a fruit snack.    Add lean shredded cheese to baked potatoes, vegetables, soups, and salads.    Use milk when making cream soups instead of water.    Serve flavored milk or hot chocolate for an evening treat.    Use Parmesan cheese topping for Italian dishes. A 2 tablespoon serving adds about 140 mg of calcium.    Serve a healthy vegetable pizza made with low-fat cheese.    Serve lean mozzarella string cheese with crackers and fruit for a snack.    Make puddings with milk.    Get plenty of exercise. Walk a mile a day if you can and do strength training exercise a few times a week. Exercise helps your body to use calcium to strengthen your bones.  Some people cannot digest milk products because their bodies lack the enzyme needed to break down milk sugar. This problem is called  lactose intolerance. If you have this problem, you can buy products such as Lactaid or Dairy Ease. These products contain lactase, which can help you digest milk products.    For more information contact:    The Academy of Nutrition and Dietetics  688.190.6422  http://www.eatright.org  Developed by [employer].  Adult Advisor 2016.3 published by [employer].  Last modified: 2015-04-17  Last reviewed: 2015-03-25  This content is reviewed periodically and is subject to change as new health information becomes available. The information is intended to inform and educate and is not a replacement for medical evaluation, advice, diagnosis or treatment by a healthcare professional.  References   Adult Advisor 2016.3 Index    Copyright   2016 [employer], a division of [employer] All rights reserved.

## 2017-12-28 NOTE — TELEPHONE ENCOUNTER
Patient Information     Patient Name MRN Vicki Boudreaux [id number] Female 1935      Telephone Encounter by Calista Patterson RN at 2017  8:14 AM     Author:  Calista Patterson RN Service:  (none) Author Type:  NURS- Registered Nurse     Filed:  2017  8:17 AM Encounter Date:  2017 Status:  Signed     :  Calista Patterson RN (NURS- Registered Nurse)            Refill request inappropriate. Filled 17 180 x 3. Pharmacy alerted. Unable to complete prescription refill per RN Medication Refill Policy.................... Calista Patterson RN ....................  2017   8:16 AM  Prescribing Provider: Samuel Zimmerman MD                      Order Date: 2017  Ordered by: SAMUEL ZIMMERMAN  Medication:metoprolol tartrate (LOPRESSOR) 25 mg tablet    Qty:180 tablet  Ref:3  Start:2017   End:              Route:Oral                  KIRA:No   Class:eRx    Sig:Take 1 tablet by mouth 2 times daily.    Pharmacy:[facility] [address] [place] [address] [address]              [address] [facility] [phone]    Refill request inappropriate. Filled 17 90 x 3. Pharmacy alerted. Unable to complete prescription refill per RN Medication Refill Policy.................... Calista Patterson RN ....................  2017   8:16 AM  Prescribing Provider: Samuel Zimmerman MD                      Order Date: 2017  Ordered by: SAMUEL ZIMMERMAN  Medication:hydroCHLOROthiazide 12.5 mg capsule    Qty:90 capsule  Ref:3  Start:2017   End:              Route:Oral                  KIRA:No   Class:eRx    Sig:Take 1 capsule by mouth once daily.    Pharmacy:[facility] [address] [place] [address] - [address] AT [address]              OF  & [facility] [phone]

## 2017-12-28 NOTE — PROGRESS NOTES
Patient Information     Patient Name MRN Sex Vicki Kaiser [id number] Female 1935      Progress Notes by Suzanne Zimmerman MD at 2017  4:39 PM     Author:  Suzanne Zimmerman MD Service:  (none) Author Type:  Physician     Filed:  2017  4:39 PM Encounter Date:  2017 Status:  Signed     :  Suzanne Zimmerman MD (Physician)            Letter mailed to patient.

## 2017-12-28 NOTE — PATIENT INSTRUCTIONS
Patient Information     Patient Name MRN Vicki Boudreaux [id number] Female 1935      Patient Instructions by Kirsten Guevara DO at 8/3/2017  9:00 AM     Author:  Kirsten Guevara DO  Service:  (none) Author Type:  PHYS- Osteopathic     Filed:  8/3/2017  9:46 AM  Encounter Date:  8/3/2017 Status:  Addendum     :  Kirsten Guevara DO (PHYS- Osteopathic)        Related Notes: Original Note by Kirsten Guevara DO (PHYS- Osteopathic) filed at 8/3/2017  9:32 AM            - Limit salt to 2000mg-2500mg daily.  Use other herbs and pepper to season food, no salt shaker, pay attention to how much cheese, canned soups and vegetables.      - elevate your legs for 45-60 minutes twice daily    --------------------------------------------------------------------------------------------------------------------------------------    Ice every 3-4 hours, gentle exercise/rest as much as possible.    Caution with NSAIDS (ibuprofen, aspirin, naproxen, aleve, advil) due to risk for increased blood pressure, stomach pain/nausea/ulcers and kidney damage; use minimal amount necessary    The following is okay:  Acetaminophen (Tylenol) 1000mg (2- extra strength 500mg tablets or 3 regular strength 325mg tablets) three times a day as needed; Maximum of 4000mg daily    - Return/call as needed for follow-up should any new symptoms develop, for worsening of current symptoms or if symptoms do not resolve with above plan.    -------------------------------------------------------------------------------------------------------------------------------------    Stop your Hydrochlorothiazide and monitor your blood pressure.      Please check your blood pressure daily at various times, record this and bring it to your follow up appointment.  Your blood pressure goal is greater than 110/50 and less than 145/90.  Please call if it is consistently outside this range.       Index [place] All languages Related topics   High Blood Pressure:  "Essential Hypertension   ________________________________________________________________________  KEY POINTS    High blood pressure means that your blood pressure is higher than normal. It is called essential hypertension when no cause for it can be found.    Weight loss, changes in your diet, and exercise may be the only treatment you need. If lifestyle changes don t lower your blood pressure enough, your healthcare provider may prescribe medicine. Many people need to take 2 or more medicines to bring their blood pressure down to a healthy level.    Talk to your healthcare provider about your personal and family medical history and your lifestyle habits. This will help you know what you can do to lower your risk for high blood pressure.  ________________________________________________________________________  What is high blood pressure?  Blood pressure is the force of blood against artery walls as the heart pumps blood through the body. You may be told that you have high blood pressure (hypertension) if your blood pressure is higher than normal. Hypertension is called essential when no cause for it can be found. When the cause of hypertension is known, such as from kidney disease or a tumor, it is called secondary hypertension.  Blood pressure can rise and fall with exercise, rest, or emotions.    Normal resting blood pressure ranges up to 120/80 (\"120 over 80\"). The first number (120 in this example) is the pressure when the heart beats and pushes blood out to the rest of the body. The second number (80 in this example) is the pressure when the heart rests between beats.    Blood pressure is borderline high if it is 120/80 or higher but less than 140/90.    High blood pressure is 140/90 or higher for most people. If you have chronic kidney disease, 130/80 or higher is considered high blood pressure.    Why is high blood pressure a problem?  High blood pressure is a problem in many ways.    Your heart has to " work harder to pump blood through your body. The added workload on the heart causes thickening of the heart muscle. Over time, the thickening damages the heart muscle so that it can no longer pump normally. This can lead to a disease called heart failure.    The higher pressure in your arteries may cause them to weaken and bleed, resulting in a stroke.    As you get older, blood vessels may become hardened. High blood pressure speeds up this process. Hardened or narrowed arteries may not be able to supply enough blood to all parts of your body.    High blood pressure may lead to atherosclerosis, which is when deposits of cholesterol, fatty substances, and blood cells clog up an artery. Atherosclerosis is the leading cause of heart attacks. It can also cause strokes.    Your kidneys, brain, and eyes may be damaged.  You may need treatment for high blood pressure for the rest of your life. However, proper treatment can control your blood pressure and help prevent heart disease, heart attack, or stroke. It can also help prevent long-term health problems, such as heart failure, kidney failure, blindness, and dementia.  If you already have some complications, such as breathing problems or chest pain, lowering your blood pressure may make these problems less severe.    What is the cause?  There are no clear causes of essential hypertension. However, many things can increase blood pressure, such as:    Being overweight    Smoking    Eating a diet high in salt    Drinking a lot of alcohol  Other important factors include:    Race.  Americans are more likely to have high blood pressure.    Gender. Males have a greater chance of developing high blood pressure than women until age 55. After the age of 75, women are more likely to develop high blood pressure than men.    Heredity. If you have parents with high blood pressure, you are more at risk.    Age. The older you get, the more likely you are to have high blood  pressure.  Also, some medicines increase blood pressure.  Stress and drinking caffeine can make blood pressure go up temporarily, but it s not clear that they have any long-term effects on blood pressure.    What are the symptoms?  You may have high blood pressure for a long time without symptoms. You may not be able to tell by the way you feel that your blood pressure is high. The only way to find out if your blood pressure is high is to have it measured. That's why it s important to have your blood pressure checked at least once a year.  When high blood pressure does cause symptoms, they may include:    Headaches    Nosebleeds    Getting tired easily    Blurred vision    Dizziness    Lightheadedness    Feeling like your heart is racing or fluttering    Shortness of breath    Chest pain    Memory problems    Daytime sleepiness    How is it diagnosed?  Blood pressure is checked at most healthcare visits. High blood pressure is usually discovered during one of these visits. If your blood pressure is high, you will be asked to return for follow-up checks. Your healthcare provider will ask about your personal and family medical history and examine you.  Tests to look for a possible cause of high blood pressure may include:    Urine and blood tests    Chest X-ray    Electrocardiogram (ECG), which measures and records your heartbeat  You may be asked to use a portable blood-pressure measuring device, which will take your pressure at different times during day and night.    How is it treated?  If your blood pressure is borderline high, you may be able to bring it down to a normal level without medicine. Weight loss, changes in your diet, and exercise may be the only treatment you need.  If lifestyle changes don t lower your blood pressure enough, your healthcare provider may prescribe medicine. Many people need to take 2 or more medicines to bring their blood pressure down to a healthy level. It may take several weeks or  months to find the best treatment for you.    How can I take care of myself?  If you have high blood pressure, there are things you can do now to take care of yourself and to prevent problems in the future:    Follow your treatment plan and know how to take your medicines.    Work with your healthcare provider to find what lifestyle changes and medicines are right for you.    Follow the directions that come with your medicine, including information about food or alcohol. Make sure you know how and when to take your medicine. Do not take more or less than you are supposed to take.    Many medicines have side effects. A side effect is a symptom or problem that is caused by the medicine. Ask your healthcare provider or pharmacist what side effects your medicine may cause and what you should do if you have side effects. Ask if you should avoid some nonprescription medicines.    Be careful with nonprescription medicines or herbal supplements. Some can raise blood pressure. This includes diet pills, cold and pain medicines, and energy boosters. Read labels or ask your pharmacist if the medicine or supplement affects blood pressure. Some illegal drugs, like cocaine, can also affect blood pressure.    Check your blood pressure (or have it checked) as often as your provider advises. Keep a diary of the readings. A diary is also a good place to note your exercise, weight, salt intake, types of food you are eating, and your feelings. This can help you learn how these things can affect your blood pressure. Take your diary with you when you visit your provider. It may help you and your provider manage your blood pressure and adjust your medicines if needed.    Don t smoke.    Eat a healthy diet that is low in salt, saturated fat, trans fat, and cholesterol. Include lots of fruits, vegetables, and fat-free or low-fat milk and milk products.    Get regular exercise, according to your healthcare provider's advice. For example, you  might walk, bike, or swim at least 30 minutes 3 to 5 times a week.    Limit the amount of alcohol you drink. Moderate drinking is up to 1 drink a day for women and up to 2 drinks for men.    Lose weight if you need to.    Try to reduce the stress in your life or learn how to deal better with situations that make you feel anxious.    Ask your healthcare provider:    How and when you will get your test results    How long it will take to recover    If there are activities you should avoid and when you can return to your normal activities    How to take care of yourself at home    What symptoms or problems you should watch for and what to do if you have them    Make sure you know when you should come back for a checkup. Keep all appointments for provider visits or tests.    How can I help prevent high blood pressure?  You can help prevent this disease with a heart-healthy lifestyle:    Eat a healthy diet and keep a healthy weight.    Stay fit with the right kind of exercise for you.    Decrease stress.    Don t smoke.    Limit your use of alcohol.  Talk to your healthcare provider about your personal and family medical history and your lifestyle habits. This will help you know what you can do to lower your risk for high blood pressure.    Developed by [employer].  Adult Advisor 2016.3 published by [employer].  Last modified: 2016-04-18  Last reviewed: 2016-04-15  This content is reviewed periodically and is subject to change as new health information becomes available. The information is intended to inform and educate and is not a replacement for medical evaluation, advice, diagnosis or treatment by a healthcare professional.  References   Adult Advisor 2016.3 Index    Copyright   2016 [employer], a division of [employer] All rights reserved.              Index [place] Related topics   Chronic Kidney Disease   ________________________________________________________________________  COLLINS  POINTS    Chronic kidney disease is a slow loss of kidney function over time.    Treatment may include medicine, changing your diet, dialysis, or a kidney transplant. Chronic kidney disease cannot be cured. You will have it for the rest of your life unless you have a kidney transplant.    Follow the full course of treatment prescribed by your healthcare provider. Ask your provider what medicines you need to avoid because they could damage your kidneys.  ________________________________________________________________________  What is chronic kidney disease?  Chronic kidney disease is a slow loss of kidney function over time.  The kidneys are inside your belly, on either side of your spine just above your waist. They make urine by removing waste products, extra salt and other minerals, and water from the blood. As long as you have at least one kidney that is working, your body can filter enough blood and make enough urine to keep you healthy.  As kidney disease gets worse, you may develop kidney failure, which means:    Your body cannot get rid of wastes.    Your body cannot keep a healthy balance of water and minerals.    Your kidneys may make less urine, or no urine.  If not treated, kidney failure will cause death within a few days or weeks.  Chronic kidney disease can affect your health in other ways. For example, it can cause or worsen high blood pressure. You are also more likely to have blood vessel or heart disease, bone disease, anemia, and nutrition problems.    What is the cause?  Chronic kidney disease is more common in middle-aged and older people. It is caused by damage to the kidneys over the years by diseases such as:    High blood pressure    Diabetes    Heart or lung disease    Autoimmune disease, which is a disease that causes your body to mistakenly attack your own tissue such as with lupus    Injury to the kidneys, kidney infection, and other kidney problems  Using nonprescription painkillers,  such as acetaminophen, aspirin, or ibuprofen, for a long time can also cause chronic kidney failure. A family history of chronic kidney disease is also a risk factor.    What are the symptoms?  Chronic kidney disease gets worse over time. In the early stages, you may not have any symptoms. If you do have symptoms, they may come on gradually over several months. Symptoms may include:    Feeling tired or weak    Trouble staying alert during the day    Trouble sleeping at night    Itchy, dry skin    Headaches    Loss of appetite, often with weight loss  As your kidneys get worse, other parts of your body may be damaged by excess waste and changes in the balance of water, salt, and other minerals. Some of the common symptoms of kidney failure include:    Changes in urine, such as urinating more often, less often, or not at all    Swelling, especially in your legs, feet, or ankles    Muscle cramps or weakness    Bruising or bleeding    Skin that is yellow or a darker color than is usual for you    Unusual pain in one or both sides of your back    Nausea and vomiting    Shortness of breath    Confusion or seizures    How is it diagnosed?  Your healthcare provider will ask about your symptoms and medical history and examine you. Tests may include:     Blood and urine tests    Ultrasound scan, which uses sound waves to show pictures of the kidneys    CT scan, which uses X-rays and a computer to show detailed pictures of the kidneys    Needle biopsy, which is the removal of a small piece of tissue from your kidney with a needle      How is it treated?  Your healthcare provider may prescribe medicine to:    Treat health conditions that cause kidney disease such as diabetes, heart or lung disease, infection, or other kidney problems    Keep your blood pressure normal    Keep the balance of liquids and minerals in your body normal  You may need to change your diet. With the right diet, you can help keep kidney disease from  getting worse and help prevent other problems. Follow your healthcare provider's advice carefully about what foods you should eat and how much fluid you can drink. Your provider may recommend working with a dietitian to help you plan meals that include the right foods.  If you develop kidney failure, you will need dialysis or a kidney transplant. Dialysis uses a machine to do the work of your kidneys. It removes waste products and extra water from your blood and can be life saving. Dialysis may help you live longer and improve your quality of life. Dialysis can be done in a hospital, dialysis center, or you may be able to do dialysis in your own home. Dialysis usually needs to be done several times a week. Dialysis can be done by filtering the blood or with a tube that filters fluid from the abdomen.  Whether dialysis is a good option for you depends on the cause of your kidney disease, how well your kidneys are working, and your overall health.  If your health is good except for kidney failure, a kidney transplant may be an option. Talk with your healthcare provider about whether a transplant is an option for you.  Chronic kidney disease cannot be cured. You will have it for the rest of your life unless you have a kidney transplant. There are many choices to make about your care. Talk about the choices with your healthcare provider so that you can get all of your questions answered.     How can I take care of myself?  Follow the full course of treatment prescribed by your healthcare provider. Take all medicines exactly as directed by your provider. Ask your provider what medicines you need to avoid because they could damage your kidneys.  Other things you can do that may help include:    Eat a healthy diet. Ask your provider and dietitian what you need to eat, and what you should avoid.    Try to keep a healthy weight. If you are overweight, lose weight.    Stay fit with the right kind of exercise for you.    If  you smoke, try to quit. Talk to your healthcare provider about ways to quit smoking.    If you want to drink alcohol, ask your healthcare provider how much is safe for you to drink.    Try to get at least 7 to 9 hours of sleep each night.    Keep up to date with recommended shots (immunizations). Get a flu shot every year and ask your healthcare provider about any other vaccinations you may need  Ask your provider:    How and when you will get your test results    How long it will take to recover    If there are activities you should avoid and when you can return to your normal activities    How to take care of yourself at home    What symptoms or problems you should watch for and what to do if you have them  Make sure you know when you should come back for a checkup. Keep all appointments for provider visits or tests.    You can get more information from:    The National Kid[employer] Foundation  [phone]  http://www.kidney.org  Developed by [employer].  Adult Advisor 2016.3 published by [employer].  Last modified: 2016-07-26  Last reviewed: 2016-07-25  This content is reviewed periodically and is subject to change as new health information becomes available. The information is intended to inform and educate and is not a replacement for medical evaluation, advice, diagnosis or treatment by a healthcare professional.  References   Adult Advisor 2016.3 Index    Copyright   2016 [employer], a division of [employer] All rights reserved.

## 2017-12-28 NOTE — PROGRESS NOTES
"Patient Information     Patient Name MRN Sex     Vicki Mckeon [id number] Female 1935      Progress Notes by Suzanne Zimmerman MD at 2017  2:15 PM     Author:  Suzanne Zimmerman MD Service:  (none) Author Type:  Physician     Filed:  2017  4:48 PM Encounter Date:  2017 Status:  Signed     :  Suzanne Zimmerman MD (Physician)            ANNUAL PHYSICAL - FEMALE    HPI: Vicki Mckeon is a 81 y.o. female who presents for a yearly exam.  Concerns include: patient with a history of back surgery and scoliosis, has noted recent height reduction. Notes \"soreness\" in her upper lumbar spine with activity. Does see a chiropractor. Patient generally noting more issues with arthritis. Notes rectal irritation/burning shortly after having a normal bowel movement, no anal leakage. No loose stools. No known hemorrhoids, bleeding.     No LMP recorded. Patient is postmenopausal.   Contraception: n/a  Risk for STI?: no  Last pap: remote  Any hx of abnormal paps:  no  FH of early CA?: n/a  Tobacco?: no  Calcium intake: yes  DEXA:   Last mammo: scheduled today  Colonoscopy:   Immunizations: up to date     Patient Active Problem List       Diagnosis  Date Noted     H/O bone density study  2016     Last DEXA  with normal bone density. No further studies planned.         Health care maintenance  2014     Renal insufficiency  2014     Stable. First noted when patient was taking NSAIDs regularly due to back pain.         ACP (advance care planning)  2014     Anemia  05/15/2013     THYROID NODULE, LEFT  2012     No further follow up on thyroid nodules planned. Patient with multinodular goiter. Negative cytology and stable ultrasound findings.           DEGENERATIVE DISC DISEASE, LUMBAR SPINE  03/10/2011     PALPITATIONS, HX OF       ALLERGIC RHINITIS       HYPERCHOLESTEROLEMIA       HYPERTENSION       MITRAL VALVE PROLAPSE, HX OF  2008     with normal echo and no residual " prolapse            Past Medical History:     Diagnosis  Date     ANEMIA 3/2/2011    likely related to NSAIDs, resolved.       BACK PAIN, LUMBAR, WITH RADICULOPATHY 3/2/2011    resolved after surgery      Hx of pregnancy      2, para 2 with 2 spontaneous vaginal deliveries      Scoliosis      Screening for osteoporosis  Central DEXA with normal results      Spinal stenosis        Past Surgical History:      Procedure  Laterality Date     ANKLE FRACTURE TREATMENT      ORIF left ankle w/ later removal of hardware        BACK SURGERY  10/2011    Back surgery, Dr. Linda, [facility]       BREAST BIOPSY      left benign       CATARACT REMOVAL      bilateral       COLONOSCOPY SCREENING  ,    F/U N/A       PELVIC LAPAROSCOPY      Laparoscopy for pelvic pain which was negative         Social History     Social History        Marital status:       Spouse name: N/A     Number of children:  N/A     Years of education:  N/A     Occupational History      Not on file.     Social History Main Topics       Smoking status: Never Smoker     Smokeless tobacco: Never Used     Alcohol use Yes     Drug use: No     Sexual activity: Yes     Other Topics  Concern     Not on file      Social History Narrative     Patient is .  She has two grown children.  She does exercise regularly.  She is retired.        Esequiel Mckeon -     Irvin Mckeon - Son, born , lives in [facility] Fabrice - Daughter, born , lives in [place]    One sister, two brothers    2 grandchildren                               Family History       Problem   Relation Age of Onset     Arthritis  Mother      rheumatoid arthritis, psoriasis       Cancer  Father      renal       Cancer-breast  Sister 66     essential tremor (treated with surgery)       Heart Disease  Brother      pacemaker placement       Other  Brother      back issues         Current Outpatient Prescriptions       Medication  Sig Dispense  "Refill     ASPIRIN LOW DOSE ORAL Take 1 Tab by mouth once daily.       cholecalciferol (VITAMIN D) 1,000 unit capsule Take 1 capsule by mouth once daily.  0     cyanocobalamin (VITAMIN B-12) 1,000 mcg tablet Take 1 tablet by mouth once daily.  0     hydrochlorothiazide 12.5 mg capsule Take 1 capsule by mouth once daily. 90 capsule 3     metoprolol tartrate (LOPRESSOR) 25 mg tablet Take 1 tablet by mouth 2 times daily. 180 tablet 3     No current facility-administered medications for this visit.      Medications have been reviewed by me and are current to the best of my knowledge and ability.       REVIEW OF SYSTEMS:  15 system ROS completed and negative other than: See HPI.    PHYSICAL EXAM:  /80  Pulse 64  Ht 1.607 m (5' 3.25\")  Wt 81.2 kg (179 lb)  BMI 31.46 kg/m2  CONSTITUTIONAL:  Alert, cooperative, NAD.  EYES: No scleral icterus.  PERRLA.  Conjunctiva clear.  ENT/MOUTH: External ears and nose normal.  TMs normal.  Moist mucous membranes. Oropharynx clear.    ENDO: No thyromegaly or thyroid nodules.  LYMPH:  No cervical or supraclavicular LA.    CARDIOVASCULAR: Regular, S1, S2.  No S3 or S4.  No murmur/gallop/rub.  No peripheral edema.  RESPIRATORY: CTA bilaterally, no wheezes, rhonchi or rales.  GI: Bowel sounds wnl.  Soft, nontender, nondistended.  No masses or HSM.  No rebound or guarding.  MSKEL: scoliosis present, previous surgical scar  INTEGUMENTARY:  Warm, dry.  No rash noted on exposed skin.  NEUROLOGIC: Facies symmetric.  Grossly normal movement and tone.  No tremor.  PSYCHIATRIC: Affect normal.  Speech fluent.  Though content linear.     ASSESSMENT/PLAN:    ICD-10-CM    1. HYPERCHOLESTEROLEMIA E78.00 LIPID PANEL   2. HYPERTENSION I10 COMPLETE METABOLIC PANEL      hydroCHLOROthiazide 12.5 mg capsule      metoprolol tartrate (LOPRESSOR) 25 mg tablet   3. Cold intolerance R68.89 TSH   4. DEGENERATIVE DISC DISEASE, LUMBAR SPINE M51.37 XR SPINE LUMBAR 3 VIEWS   5. Height loss R29.890    6. " Estrogen deficiency E28.39 XR DXA BONE DENSITY 2 SITES AXIAL   7. Other iron deficiency anemia D50.8 CBC W PLT NO DIFF   8. Rectal itching L29.0 hydrocortisone 2.5% cream     Trial of anusol for post evacuation anal discomfort. If no improvement, follow up for further exam.   Patient will return for mammogram, bone density scan, labs and XR of lumbar spine.   Strongly recommend water based physical therapy, patient will consider.   Did review importance of low impact exercise to manage arthritis symptoms.   Relevant cancer screening discussed.    Counseled on healthy diet, Calcium and vitamin D intake, and exercise.    Suzanne Zimmerman MD

## 2017-12-28 NOTE — TELEPHONE ENCOUNTER
Patient Information     Patient Name MRN Vicki Boudreaux [id number] Female 1935      Telephone Encounter by Kirsten Guevara DO at 2017  2:23 PM     Author:  Kirsten Guevara DO Service:  (none) Author Type:  PHYS- Osteopathic     Filed:  2017  2:25 PM Encounter Date:  2017 Status:  Signed     :  Kirsten Guevara DO (PHYS- Osteopathic)            I will send a new prescription for amlodipine.  She should start this daily.  If she has any side effects she should let me know.  She should record her blood pressures and bring with to her follow-up.  Her blood pressure remains uncontrolled i.e. greater than 140/90 despite the medication I will need to see her sooner then she is scheduled in September.

## 2017-12-29 NOTE — PATIENT INSTRUCTIONS
Patient Information     Patient Name MRN Sex Vicki Kaiser [id number] Female 1935      Patient Instructions by Kirsten Guevara DO at 2017  1:40 PM     Author:  Kirsten Guevara DO  Service:  (none) Author Type:  PHYS- Osteopathic     Filed:  2017  2:15 PM  Encounter Date:  2017 Status:  Addendum     :  Kirsten Guevara DO (PHYS- Osteopathic)        Related Notes: Original Note by Kirsten Guevara DO (PHYS- Osteopathic) filed at 2017  2:01 PM            Okay to take lisinopril any time of day

## 2017-12-30 NOTE — NURSING NOTE
Patient Information     Patient Name MRN Sex Vicki Kaiser [id number] Female 1935      Nursing Note by Anaih Grant at 8/3/2017  9:00 AM     Author:  Anahi Grant Service:  (none) Author Type:  (none)     Filed:  8/3/2017  9:10 AM Encounter Date:  8/3/2017 Status:  Signed     :  Anahi Grant            Patient presents to clinic for multiple issues such as edema to both ankles, review lab results, left hip and ongoing back pain rated at 5.    Anahi Grant LPN..................8/3/2017  9:06 AM

## 2017-12-30 NOTE — NURSING NOTE
Patient Information     Patient Name MRN Sex Vicki Kaiser [id number] Female 1935      Nursing Note by Bao Snyder RN at 2017  1:15 PM     Author:  Bao Snyder RN Service:  (none) Author Type:  NURS- Registered Nurse     Filed:  2017  1:33 PM Encounter Date:  2017 Status:  Signed     :  Bao Snyder RN (NURS- Registered Nurse)            AS pt sat BP went down and her reading done on her machine was 138/65, 68 and reading here was 136/62, 67. Pt did well with placement and had a correct size cuff. BP results sent to provider . Pt denies any SOB , or chest pain, or spot in vision or pulsating neck veins and tell me should she get any of these she would be seen. BAO SNYDER RN ....................  2017   1:33 PM

## 2017-12-30 NOTE — NURSING NOTE
Patient Information     Patient Name MRN Sex Vicki Kaiser [id number] Female 1935      Nursing Note by Anahi Grant at 2017  1:40 PM     Author:  Anahi Grant Service:  (none) Author Type:  (none)     Filed:  2017  1:46 PM Encounter Date:  2017 Status:  Signed     :  Anahi Grant            Patient presents to clinic for follow up with hypertension.  Anahi Grant LPN..................2017  1:41 PM

## 2017-12-30 NOTE — NURSING NOTE
Patient Information     Patient Name MRN Sex Vicki Kaiser [id number] Female 1935      Nursing Note by Mukund Anderson at 2017 10:00 AM     Author:  Mukund Anderson  Service:  (none) Author Type:  (none)     Filed:  2017 10:21 AM  Encounter Date:  2017 Status:  Addendum     :  Mukund Anderson        Related Notes: Original Note by Mukund Anderson filed at 2017 10:16 AM            Patient presents for an establish care visit and for a blood pressure check. Patient states that she has been taking her blood pressures daily with the aim to stay below 140/90. Patient brought her blood pressure log with today. Patient has taken her blood pressure medication today.     Mukund Anderson ....................  2017   10:16 AM

## 2018-01-17 PROBLEM — M85.80 OSTEOPENIA: Status: ACTIVE | Noted: 2018-01-17

## 2018-01-17 PROBLEM — N18.30 STAGE 3 CHRONIC KIDNEY DISEASE (H): Status: ACTIVE | Noted: 2017-09-14

## 2018-01-17 PROBLEM — I10 HYPERTENSION: Status: ACTIVE | Noted: 2017-09-14

## 2018-01-17 RX ORDER — ASPIRIN 81 MG/1
1 TABLET ORAL DAILY
COMMUNITY
End: 2018-04-09

## 2018-01-17 RX ORDER — HYDROCORTISONE 2.5 %
CREAM (GRAM) TOPICAL 2 TIMES DAILY
COMMUNITY
Start: 2017-06-12 | End: 2018-06-13

## 2018-01-17 RX ORDER — LISINOPRIL 10 MG/1
10 TABLET ORAL DAILY
COMMUNITY
Start: 2017-11-06 | End: 2018-06-13

## 2018-01-17 RX ORDER — METOPROLOL TARTRATE 25 MG/1
25 TABLET, FILM COATED ORAL 2 TIMES DAILY
COMMUNITY
Start: 2017-06-12 | End: 2018-04-09 | Stop reason: ALTCHOICE

## 2018-01-27 VITALS
HEART RATE: 60 BPM | DIASTOLIC BLOOD PRESSURE: 65 MMHG | HEART RATE: 64 BPM | HEIGHT: 64 IN | WEIGHT: 179 LBS | SYSTOLIC BLOOD PRESSURE: 138 MMHG | DIASTOLIC BLOOD PRESSURE: 80 MMHG | SYSTOLIC BLOOD PRESSURE: 138 MMHG | BODY MASS INDEX: 31.71 KG/M2 | TEMPERATURE: 98.5 F | HEIGHT: 63 IN | BODY MASS INDEX: 31.34 KG/M2 | WEIGHT: 183.6 LBS

## 2018-01-27 VITALS — HEART RATE: 67 BPM | DIASTOLIC BLOOD PRESSURE: 62 MMHG | SYSTOLIC BLOOD PRESSURE: 136 MMHG

## 2018-01-27 VITALS
HEIGHT: 64 IN | TEMPERATURE: 97.4 F | WEIGHT: 180.56 LBS | DIASTOLIC BLOOD PRESSURE: 64 MMHG | SYSTOLIC BLOOD PRESSURE: 116 MMHG | HEIGHT: 63 IN | BODY MASS INDEX: 31.12 KG/M2 | HEART RATE: 60 BPM | HEART RATE: 56 BPM | RESPIRATION RATE: 20 BRPM | TEMPERATURE: 97.8 F | SYSTOLIC BLOOD PRESSURE: 112 MMHG | WEIGHT: 182.31 LBS | BODY MASS INDEX: 31.99 KG/M2 | RESPIRATION RATE: 16 BRPM | DIASTOLIC BLOOD PRESSURE: 58 MMHG

## 2018-01-31 ASSESSMENT — PATIENT HEALTH QUESTIONNAIRE - PHQ9: SUM OF ALL RESPONSES TO PHQ QUESTIONS 1-9: 0

## 2018-04-09 ENCOUNTER — OFFICE VISIT (OUTPATIENT)
Dept: INTERNAL MEDICINE | Facility: OTHER | Age: 83
End: 2018-04-09
Attending: INTERNAL MEDICINE
Payer: COMMERCIAL

## 2018-04-09 VITALS
BODY MASS INDEX: 30.92 KG/M2 | DIASTOLIC BLOOD PRESSURE: 60 MMHG | SYSTOLIC BLOOD PRESSURE: 126 MMHG | TEMPERATURE: 98.5 F | WEIGHT: 181.13 LBS | RESPIRATION RATE: 18 BRPM | HEART RATE: 60 BPM | HEIGHT: 64 IN

## 2018-04-09 DIAGNOSIS — I10 BENIGN ESSENTIAL HYPERTENSION: Primary | ICD-10-CM

## 2018-04-09 DIAGNOSIS — R53.83 FATIGUE, UNSPECIFIED TYPE: ICD-10-CM

## 2018-04-09 DIAGNOSIS — M25.551 HIP PAIN, RIGHT: ICD-10-CM

## 2018-04-09 DIAGNOSIS — M85.89 OSTEOPENIA OF MULTIPLE SITES: ICD-10-CM

## 2018-04-09 PROCEDURE — G0463 HOSPITAL OUTPT CLINIC VISIT: HCPCS

## 2018-04-09 PROCEDURE — 99214 OFFICE O/P EST MOD 30 MIN: CPT | Performed by: INTERNAL MEDICINE

## 2018-04-09 ASSESSMENT — PAIN SCALES - GENERAL: PAINLEVEL: MILD PAIN (3)

## 2018-04-09 NOTE — NURSING NOTE
Patient experiencing fatigue and severe lethargy after eating meals for past 4 months  Anahi Grant LPN............4/9/2018 3:24 PM

## 2018-04-09 NOTE — PATIENT INSTRUCTIONS
"Stop Metoprolol     Please check your blood pressure daily at various times, record this and bring it to your follow up appointment.  Your blood pressure goal is greater than 110/50 and less than 140/90.  Please call if it is consistently outside this range.       High Blood Pressure: Essential Hypertension   ________________________________________________________________________  KEY POINTS    High blood pressure means that your blood pressure is higher than normal. It is called essential hypertension when no cause for it can be found.    Weight loss, changes in your diet, and exercise may be the only treatment you need. If lifestyle changes don t lower your blood pressure enough, yourhealthcare provider may prescribe medicine. Many people need to take 2 or more medicines to bring their blood pressure down to a healthy level.    Talk to your healthcare provider aboutyour personal and family medical history and your lifestyle habits. This will help you know what you can do to lower your risk for high blood pressure.  ________________________________________________________________________  What is high blood pressure?  Blood pressure is the force of bloodagainst artery walls as the heart pumps blood through the body. You may be told that you have high blood pressure (hypertension) if your blood pressure is higher than normal. Hypertension is called essential when no causefor it can be found. When the cause of hypertension is known, such as from kidney disease or a tumor, it is called secondary hypertension.  Blood pressure can rise and fall with exercise, rest, or emotions.    Normal resting blood pressure ranges up to 120/80 (\"120 over 80\"). The first number (120 in this example) is thepressure when the heart beats and pushes blood out to the rest of the body. The second number (80 in this example) is the pressure when the heart rests between beats.    Blood pressureis borderline high if it is 120/80 or higher but " less than 140/90.    High blood pressure is 140/90 or higher for most people. If you have chronic kidney disease, 130/80 or higher isconsidered high blood pressure.    Why is high blood pressure a problem?  High blood pressure is a problem in many ways.    Your heart has to work harder to pumpblood through your body. The added workload on the heart causes thickening of the heart muscle. Over time, the thickening damages the heart muscle so that it can no longer pump normally. This can lead to a disease calledheart failure.    The higher pressure in your arteries may cause them to weaken and bleed, resulting in a stroke.    As you get older, bloodvessels may become hardened. High blood pressure speeds up this process. Hardened or narrowed arteries may not be able to supply enough blood to all parts of your body.    High bloodpressure may lead to atherosclerosis, which is when deposits of cholesterol, fatty substances, and blood cells clog up an artery. Atherosclerosis is the leading cause of heart attacks. It can also cause strokes.    Your kidneys, brain, and eyes may be damaged.  You may need treatment for high blood pressure for the rest of your life.However, proper treatment can control your blood pressure and help prevent heart disease, heart attack, or stroke. It can also help prevent long-term health problems, such as heart failure, kidney failure, blindness, anddementia.  If you already have some complications, such as breathing problems or chest pain, lowering your blood pressure may make these problems less severe.    What is the cause?  There are no clear causes of essential hypertension. However, many things can increase blood pressure, such as:    Being overweight    Smoking    Eating a diet high in salt    Drinking a lot of alcohol  Other important factors include:    Race.  Americans are more likely to have high blood pressure.    Gender. Males have a greater chance of developing high blood  pressure thanwomen until age 55. After the age of 75, women are more likely to develop high blood pressure than men.    Heredity. If you have parents with high blood pressure, you are more at risk.    Age. The older you get, the more likely you are to have high blood pressure.  Also, some medicines increase bloodpressure.  Stress and drinking caffeine can make blood pressure go up temporarily, but it s not clear that they have any long-term effects on blood pressure.    What are the symptoms?  You may have high blood pressure for a long time without symptoms. You may not be able to tell by the way you feel that your bloodpressure is high. The only way to find out if your blood pressure is high is to have it measured. That's why it s important to have your blood pressure checked at least once a year.  When high blood pressure does cause symptoms, they may include:    Headaches    Nosebleeds    Getting tired easily    Blurred vision    Dizziness    Lightheadedness    Feeling like your heart is racing or fluttering    Shortness of breath    Chest pain    Memory problems    Daytime sleepiness    How is it diagnosed?  Blood pressure is checked at most healthcare visits. High blood pressure is usually discovered during one of these visits. Ifyour blood pressure is high, you will be asked to return for follow-up checks. Your healthcare provider will ask about your personal and family medical history and examine you.  Tests to look for a possible cause of highblood pressure may include:    Urine and blood tests    ChestX-ray    Electrocardiogram (ECG), which measures and records your heartbeat  You may be asked to use a portableblood-pressure measuring device, which will take your pressure at different times during day and night.    How is it treated?  If your bloodpressure is borderline high, you may be able to bring it down to a normal level without medicine. Weight loss, changes in your diet, and exercise may be the only  treatment you need.  If lifestyle changes don t lower your blood pressure enough, your healthcare provider may prescribe medicine. Many people need to take 2 or more medicines to bring their blood pressure down to a healthy level. It may takeseveral weeks or months to find the best treatment for you.    How can I take care of myself?  If you have high blood pressure, there are thingsyou can do now to take care of yourself and to prevent problems in the future:    Follow your treatmentplan and know how to take your medicines.    Work with your healthcare provider to find what lifestylechanges and medicines are right for you.    Follow the directions that come with your medicine, including information about food or alcohol. Make sure you know how and when to take yourmedicine. Do not take more or less than you are supposed to take.    Many medicines have side effects. A side effect is a symptom or problem that is caused by the medicine. Ask yourhealthcare provider or pharmacist what side effects your medicine may cause and what you should do if you have side effects. Ask if you should avoid some nonprescription medicines.    Be careful with nonprescription medicines or herbal supplements. Some can raise blood pressure. This includes diet pills, cold and pain medicines, and energy boosters. Read labels or ask your pharmacist if the medicine orsupplement affects blood pressure. Some illegal drugs, like cocaine, can also affect blood pressure.    Check your blood pressure (or have it checked) as often as your provider advises.Keep a diary of the readings. A diary is also a good place to note your exercise, weight, salt intake, types of food you are eating, and your feelings. This can help you learn how these things can affect your blood pressure.Take your diary with you when you visit your provider. It may help you and your provider manage your blood pressure and adjust your medicines if needed.    Don t smoke.    Eat a  healthy diet that is low in salt, saturated fat, trans fat, andcholesterol. Include lots of fruits, vegetables, and fat-free or low-fat milk and milk products.    Get regular exercise, according to your healthcare provider's advice. For example,you might walk, bike, or swim at least 30 minutes 3 to 5 times a week.    Limit the amount of alcohol you drink. Moderate drinking is up to 1 drink a day for women and up to 2 drinksfor men.    Lose weight if you need to.    Try to reduce the stress in your life or learn how to deal better with situations that make you feelanxious.    Ask your healthcare provider:    How and when youwill get your test results    How long it will take to recover    If there are activities you should avoid and when you can return to your normalactivities    How to take care of yourself at home    What symptoms or problems you should watch for and what to do if you have them    Make sure you know when you should come back for a checkup. Keep all appointments for provider visits or tests.    How can I help prevent high blood pressure?  You can helpprevent this disease with a heart-healthy lifestyle:    Eat a healthy diet and keep a healthy weight.    Stay fit with the right kind of exercise for you.    Decrease stress.    Don t smoke.    Limit your use of alcohol.  Talk to your healthcare provider about your personal and familymedical history and your lifestyle habits. This will help you know what you can do to lower your risk for high blood pressure.    Developed by [employer].  Adult Advisor 2016.3 published by [employer].  Lastmodified: 2016-04-18  Last reviewed: 2016-04-15  This content is reviewed periodically and is subject to change as new health information becomes available. The information is intended to inform and educate and is nota replacement for medical evaluation, advice, diagnosis or treatment by a healthcare professional.  References   Adult Advisor 2016.3 Index    Copyright    2016 [employer], a division of [employer]. All rights reserved.

## 2018-04-09 NOTE — MR AVS SNAPSHOT
After Visit Summary   4/9/2018    Vicki Mckeon    MRN: [id number]           Patient Information     Date Of Birth          1935        Visit Information        Provider Department      4/9/2018 3:20 PM Kirsten Guevara DO [facility]        Today's Diagnoses     Benign essential hypertension    -  1    Fatigue, unspecified type        Osteopenia of multiple sites        Hip pain, right          Care Instructions    Stop Metoprolol     Please check your blood pressure daily at various times, record this and bring it to your follow up appointment.  Your blood pressure goal is greater than 110/50 and less than 140/90.  Please call if it is consistently outside this range.       High Blood Pressure: Essential Hypertension   ________________________________________________________________________  KEY POINTS    High blood pressure means that your blood pressure is higher than normal. It is called essential hypertension when no cause for it can be found.    Weight loss, changes in your diet, and exercise may be the only treatment you need. If lifestyle changes don t lower your blood pressure enough, yourhealthcare provider may prescribe medicine. Many people need to take 2 or more medicines to bring their blood pressure down to a healthy level.    Talk to your healthcare provider aboutyour personal and family medical history and your lifestyle habits. This will help you know what you can do to lower your risk for high blood pressure.  ________________________________________________________________________  What is high blood pressure?  Blood pressure is the force of bloodagainst artery walls as the heart pumps blood through the body. You may be told that you have high blood pressure (hypertension) if your blood pressure is higher than normal. Hypertension is called essential when no causefor it can be found. When the cause of hypertension is known, such as from kidney disease or  "a tumor, it is called secondary hypertension.  Blood pressure can rise and fall with exercise, rest, or emotions.    Normal resting blood pressure ranges up to 120/80 (\"120 over 80\"). The first number (120 in this example) is thepressure when the heart beats and pushes blood out to the rest of the body. The second number (80 in this example) is the pressure when the heart rests between beats.    Blood pressureis borderline high if it is 120/80 or higher but less than 140/90.    High blood pressure is 140/90 or higher for most people. If you have chronic kidney disease, 130/80 or higher isconsidered high blood pressure.    Why is high blood pressure a problem?  High blood pressure is a problem in many ways.    Your heart has to work harder to pumpblood through your body. The added workload on the heart causes thickening of the heart muscle. Over time, the thickening damages the heart muscle so that it can no longer pump normally. This can lead to a disease calledheart failure.    The higher pressure in your arteries may cause them to weaken and bleed, resulting in a stroke.    As you get older, bloodvessels may become hardened. High blood pressure speeds up this process. Hardened or narrowed arteries may not be able to supply enough blood to all parts of your body.    High bloodpressure may lead to atherosclerosis, which is when deposits of cholesterol, fatty substances, and blood cells clog up an artery. Atherosclerosis is the leading cause of heart attacks. It can also cause strokes.    Your kidneys, brain, and eyes may be damaged.  You may need treatment for high blood pressure for the rest of your life.However, proper treatment can control your blood pressure and help prevent heart disease, heart attack, or stroke. It can also help prevent long-term health problems, such as heart failure, kidney failure, blindness, anddementia.  If you already have some complications, such as breathing problems or chest pain, " lowering your blood pressure may make these problems less severe.    What is the cause?  There are no clear causes of essential hypertension. However, many things can increase blood pressure, such as:    Being overweight    Smoking    Eating a diet high in salt    Drinking a lot of alcohol  Other important factors include:    Race.  Americans are more likely to have high blood pressure.    Gender. Males have a greater chance of developing high blood pressure thanwomen until age 55. After the age of 75, women are more likely to develop high blood pressure than men.    Heredity. If you have parents with high blood pressure, you are more at risk.    Age. The older you get, the more likely you are to have high blood pressure.  Also, some medicines increase bloodpressure.  Stress and drinking caffeine can make blood pressure go up temporarily, but it s not clear that they have any long-term effects on blood pressure.    What are the symptoms?  You may have high blood pressure for a long time without symptoms. You may not be able to tell by the way you feel that your bloodpressure is high. The only way to find out if your blood pressure is high is to have it measured. That's why it s important to have your blood pressure checked at least once a year.  When high blood pressure does cause symptoms, they may include:    Headaches    Nosebleeds    Getting tired easily    Blurred vision    Dizziness    Lightheadedness    Feeling like your heart is racing or fluttering    Shortness of breath    Chest pain    Memory problems    Daytime sleepiness    How is it diagnosed?  Blood pressure is checked at most healthcare visits. High blood pressure is usually discovered during one of these visits. Ifyour blood pressure is high, you will be asked to return for follow-up checks. Your healthcare provider will ask about your personal and family medical history and examine you.  Tests to look for a possible cause of highblood  pressure may include:    Urine and blood tests    ChestX-ray    Electrocardiogram (ECG), which measures and records your heartbeat  You may be asked to use a portableblood-pressure measuring device, which will take your pressure at different times during day and night.    How is it treated?  If your bloodpressure is borderline high, you may be able to bring it down to a normal level without medicine. Weight loss, changes in your diet, and exercise may be the only treatment you need.  If lifestyle changes don t lower your blood pressure enough, your healthcare provider may prescribe medicine. Many people need to take 2 or more medicines to bring their blood pressure down to a healthy level. It may takeseveral weeks or months to find the best treatment for you.    How can I take care of myself?  If you have high blood pressure, there are thingsyou can do now to take care of yourself and to prevent problems in the future:    Follow your treatmentplan and know how to take your medicines.    Work with your healthcare provider to find what lifestylechanges and medicines are right for you.    Follow the directions that come with your medicine, including information about food or alcohol. Make sure you know how and when to take yourmedicine. Do not take more or less than you are supposed to take.    Many medicines have side effects. A side effect is a symptom or problem that is caused by the medicine. Ask yourhealthcare provider or pharmacist what side effects your medicine may cause and what you should do if you have side effects. Ask if you should avoid some nonprescription medicines.    Be careful with nonprescription medicines or herbal supplements. Some can raise blood pressure. This includes diet pills, cold and pain medicines, and energy boosters. Read labels or ask your pharmacist if the medicine orsupplement affects blood pressure. Some illegal drugs, like cocaine, can also affect blood pressure.    Check your  blood pressure (or have it checked) as often as your provider advises.Keep a diary of the readings. A diary is also a good place to note your exercise, weight, salt intake, types of food you are eating, and your feelings. This can help you learn how these things can affect your blood pressure.Take your diary with you when you visit your provider. It may help you and your provider manage your blood pressure and adjust your medicines if needed.    Don t smoke.    Eat a healthy diet that is low in salt, saturated fat, trans fat, andcholesterol. Include lots of fruits, vegetables, and fat-free or low-fat milk and milk products.    Get regular exercise, according to your healthcare provider's advice. For example,you might walk, bike, or swim at least 30 minutes 3 to 5 times a week.    Limit the amount of alcohol you drink. Moderate drinking is up to 1 drink a day for women and up to 2 drinksfor men.    Lose weight if you need to.    Try to reduce the stress in your life or learn how to deal better with situations that make you feelanxious.    Ask your healthcare provider:    How and when youwill get your test results    How long it will take to recover    If there are activities you should avoid and when you can return to your normalactivities    How to take care of yourself at home    What symptoms or problems you should watch for and what to do if you have them    Make sure you know when you should come back for a checkup. Keep all appointments for provider visits or tests.    How can I help prevent high blood pressure?  You can helpprevent this disease with a heart-healthy lifestyle:    Eat a healthy diet and keep a healthy weight.    Stay fit with the right kind of exercise for you.    Decrease stress.    Don t smoke.    Limit your use of alcohol.  Talk to your healthcare provider about your personal and familymedical history and your lifestyle habits. This will help you know what you can do to lower your risk for  "high blood pressure.    Developed by [employer].  Adult Advisor 2016.3 published by [employer].  Lastmodified: 2016-04-18  Last reviewed: 2016-04-15  This content is reviewed periodically and is subject to change as new health information becomes available. The information is intended to inform and educate and is nota replacement for medical evaluation, advice, diagnosis or treatment by a healthcare professional.  References   Adult Advisor 2016.3 Index    Copyright   2016 [employer], a division of [employer]. All rights reserved.                 Follow-ups after your visit        Additional Services     PHYSICAL THERAPY REFERRAL       [facility] [place]    *This therapy referral will be filtered to a centralized scheduling office at [facility] and the patient will receive a call to schedule an appointment at a [place] location most convenient for them. *     [facility] provides Physical Therapy evaluation and treatment and many specialty services across the [place] system.  If requesting a specialty program, please choose from the list below.    If you have not heard from the scheduling office within 2 business days, please call [phone] for all locations, with the exception of [place], please call [phone] and [facility], please call [phone]  Treatment: Evaluation & Treatment    Special Programs: NA    Please be aware that coverage of these services is subject to the terms and limitations of your health insurance plan.  Call member services at your health plan with any benefit or coverage questions.      **Note to Provider:  If you are referring outside of [place] for the therapy appointment, please list the name of the location in the \"special instructions\" above, print the referral and give to the patient to schedule the appointment.                  Your next 10 appointments already scheduled     Jun 13, 2018  9:00 AM CDT   Office " "[facility] with Kirsten [facility]uevara DO   [facility]    [address]  [place] [address] [phone]           Bring a current list of meds and any records pertaining to this visit. For Physicals, please bring immunization records and any forms needing to be filled out. Please arrive 10 minutes early to complete paperwork.              Who to contact     If you have questions or need follow up information about today's clinic visit or your schedule please contact [facility] directly at [phone].  Normal or non-critical lab and imaging results will be communicated to you by [employer], letter or phone within 4 business days after the clinic has received the results. If you do not hear from us within 7 days, please contact the clinic through [employer] or phone. If you have a critical or abnormal lab result, we will notify you by phone as soon as possible.  Submit refill requests through [employer] or call your pharmacy and they will forward the refill request to us. Please allow 3 business days for your refill to be completed.          Additional Information About Your Visit        MyChart Information     [employer] lets you send messages to your doctor, view your test results, renew your prescriptions, schedule appointments and more. To sign up, go to [email or web address] . Click on \"Log in\" on the left side of the screen, which will take you to the Welcome page. Then click on \"Sign up Now\" on the right side of the page.     You will be asked to enter the access code listed below, as well as some personal information. Please follow the directions to create your username and password.     Your access code is: [email or web address]  Expires: 2018  3:55 PM     Your access code will  in 90 days. If you need help or a new code, please call your [place] clinic or [phone].        Care EveryWhere ID     This is your Care EveryWhere ID. " "This could be used by other organizations to access your [place] medical records  [id number]        Your Vitals Were     Pulse Temperature Respirations Height Breastfeeding? BMI (Body Mass Index)    60 98.5  F (36.9  C) (Tympanic) 18 5' 3.5\" (1.613 m) No 31.58 kg/m2       Blood Pressure from Last 3 Encounters:   04/09/18 126/60   11/29/17 136/62   11/06/17 112/58    Weight from Last 3 Encounters:   04/09/18 181 lb 2 oz (82.2 kg)   11/06/17 182 lb 5 oz (82.7 kg)   09/14/17 183 lb 9.6 oz (83.3 kg)              We Performed the Following     PHYSICAL THERAPY REFERRAL          Today's Medication Changes          These changes are accurate as of 4/9/18  3:55 PM.  If you have any questions, ask your nurse or doctor.               Stop taking these medicines if you haven't already. Please contact your care team if you have questions.     metoprolol tartrate 25 MG tablet   Commonly known as:  LOPRESSOR   Stopped by:  Kirsten Guevara DO                    Primary Care Provider Office Phone # Fax #    Kirsten Guevara [phone] [address] [facility] [address]        Equal Access to Services     ASHLEY SCHMIDT AH: Hadii aad ku hadasho Solauriali, waaxda luqadaha, qaybta kaalmada adeegyada, ray kauffman. So [facility] [phone].    ATENCIÓN: Si habla español, tiene a magallon disposición servicios gratuitos de asistencia lingüística. Llmaria fernanda al [phone].    We comply with applicable federal civil rights laws and [place] laws. We do not discriminate on the basis of race, color, national origin, age, disability, sex, sexual orientation, or gender identity.            Thank you!     Thank you for choosing [facility] AND [facility]  for your care. Our goal is always to provide you with excellent care. Hearing back from our patients is one way we can continue to improve our services. Please take a few minutes to complete the written survey that you may receive in the mail " after your visit with us. Thank you!             Your Updated Medication List - Protect others around you: Learn how to safely use, store and throw away your medicines at www.disposemymeds.org.          This list is accurate as of 4/9/18  3:55 PM.  Always use your most recent med list.                   Brand Name Dispense Instructions for use Diagnosis    hydrocortisone 2.5 % cream      Apply topically 2 times daily        lisinopril 10 MG tablet    PRINIVIL/ZESTRIL     Take 10 mg by mouth daily        RA VITAMIN B-12 TR 1000 MCG Tbcr   Generic drug:  cyanocobalamin      Take 1,000 mcg by mouth daily        vitamin D3 1000 UNITS Caps      Take 1,000 Units by mouth daily

## 2018-04-09 NOTE — PROGRESS NOTES
Chief Complaint   Patient presents with     Fatigue     severe lethargy after eating meals          Subjective:   Ms. Mckeon is a 82 year old female  seen for the acute concern today of if he can fatigue.  She does feel this off and on throughout the day.  She does feel like she could nap after eating a meal.  She does report that this has been going on for the last several months.  She does feel overall that she is sleeping very well.  It does not seem to matter what type of food she eats.  She denies any other symptoms such as shortness of breath, chest pain, fevers, weight changes, dry skin, dry hair, changes in bowel, decreased blood pressure or heart rate.    She does have a history of hypertension.  She is currently on lisinopril 10 mg daily and metoprolol tartrate 25 mg twice daily.  She did switch her lisinopril to taking it before bed and does feel that she is sleeping well with this.  She is no longer taking aspirin.    She does have a history of osteopenia and we have discussed medications in the past.  She is interested in possibly trying something however we did discuss waiting until we sort out her fatigue before doing this.    She has continued to have some right hip pain.  She has had similar pain on her left side and has found good benefit with physical therapy for this.  She is curious if she can repeat this for the right hip.    She  reports that she quit smoking about 50 years ago. She has never used smokeless tobacco.    Past medical history reviewed as below:     Past Medical History:   Diagnosis Date     Anemia 05/15/2013     Disorder of kidney and ureter     5/19/2014,Stable. First noted when patient was taking NSAIDs regularly due to back pain.     Essential (primary) hypertension      Nonrheumatic mitral valve prolapse     03/28/2008,with normal echo and no residual prolapse     Nontoxic single thyroid nodule     01/18/2012,Patient with multinodular goiter. Negative cytology and stable  "ultrasound findings.     Osteopenia      Other fracture of unspecified lower leg, initial encounter for closed fracture      Pain in left hip 2017     Personal history of other medical treatment (CODE)      2, para 2 with 2 spontaneous vaginal deliveries     Pure hypercholesterolemia      Scoliosis      Spinal stenosis    .      ROS:   Pertinent  ROS was performed and was negative, including for fevers, chills, chest pain, shortness of breath, increased lower extremity edema, changes in bowel or bladder, blood in the stool, difficulty swallowing, sores in the mouth. No other concerns, with exception of HPI above.      Objective:    /60 (BP Location: Right arm, Patient Position: Sitting, Cuff Size: Adult Large)  Pulse 60  Temp 98.5  F (36.9  C) (Tympanic)  Resp 18  Ht 5' 3.5\" (1.613 m)  Wt 181 lb 2 oz (82.2 kg)  Breastfeeding? No  BMI 31.58 kg/m2  GEN: Vitals reviewed.  Patient is in no acute distress. Cooperative with exam.  HEENT: Normocephalic atraumatic.  Pupils equally round.  No scleral icterus, no conjunctival erythema. Oropharynx with no erythema or exudates. Dentition adequate.  CV: Heart regular in rate and rhythm with no murmur.   LUNGS: Lungs clear to auscultation bilaterally.  Chest rise equal bilaterally.  No accessory muscle use.  SKIN: Warm and dry to touch.  No rash on face, arms and legs.  EXT: No clubbing or cyanosis.  No peripheral edema.     Assessment/Plan:   1. Benign essential hypertension  -She will need to continue to monitor her blood pressure was stopping the metoprolol.  She will continue on lisinopril and if we do need to increase the dose of this we will.  She is to call if her blood pressures are high.    2. Fatigue, unspecified type  -At this time we did discuss that her fatigue is most likely secondary to medications.  We will stop her metoprolol and monitor her blood pressure.  She will continue on her lisinopril at night.  She was informed that she needs " to let me know if there is any worsening symptoms as we may need to pursue further evaluation.  If she does not have improvement she is also to let me know.    3. Osteopenia of multiple sites  -We will wait until her fatigue is overall improved.  We will plan when I see her back in June 2 determine the appropriate medication for her    4. Hip pain, right  -Referral placed to physical therapy.  She can take Tylenol as needed.  She is to call with any questions or concerns   - PHYSICAL THERAPY REFERRAL    Follow-up in approximately 8 weeks for annual review as scheduled.      Patient Instructions   Stop Metoprolol     Please check your blood pressure daily at various times, record this and bring it to your follow up appointment.  Your blood pressure goal is greater than 110/50 and less than 140/90.  Please call if it is consistently outside this range.       High Blood Pressure: Essential Hypertension   ________________________________________________________________________  KEY POINTS    High blood pressure means that your blood pressure is higher than normal. It is called essential hypertension when no cause for it can be found.    Weight loss, changes in your diet, and exercise may be the only treatment you need. If lifestyle changes don t lower your blood pressure enough, yourhealthcare provider may prescribe medicine. Many people need to take 2 or more medicines to bring their blood pressure down to a healthy level.    Talk to your healthcare provider aboutyour personal and family medical history and your lifestyle habits. This will help you know what you can do to lower your risk for high blood pressure.  ________________________________________________________________________  What is high blood pressure?  Blood pressure is the force of bloodagainst artery walls as the heart pumps blood through the body. You may be told that you have high blood pressure (hypertension) if your blood pressure is higher than  "normal. Hypertension is called essential when no causefor it can be found. When the cause of hypertension is known, such as from kidney disease or a tumor, it is called secondary hypertension.  Blood pressure can rise and fall with exercise, rest, or emotions.    Normal resting blood pressure ranges up to 120/80 (\"120 over 80\"). The first number (120 in this example) is thepressure when the heart beats and pushes blood out to the rest of the body. The second number (80 in this example) is the pressure when the heart rests between beats.    Blood pressureis borderline high if it is 120/80 or higher but less than 140/90.    High blood pressure is 140/90 or higher for most people. If you have chronic kidney disease, 130/80 or higher isconsidered high blood pressure.    Why is high blood pressure a problem?  High blood pressure is a problem in many ways.    Your heart has to work harder to pumpblood through your body. The added workload on the heart causes thickening of the heart muscle. Over time, the thickening damages the heart muscle so that it can no longer pump normally. This can lead to a disease calledheart failure.    The higher pressure in your arteries may cause them to weaken and bleed, resulting in a stroke.    As you get older, bloodvessels may become hardened. High blood pressure speeds up this process. Hardened or narrowed arteries may not be able to supply enough blood to all parts of your body.    High bloodpressure may lead to atherosclerosis, which is when deposits of cholesterol, fatty substances, and blood cells clog up an artery. Atherosclerosis is the leading cause of heart attacks. It can also cause strokes.    Your kidneys, brain, and eyes may be damaged.  You may need treatment for high blood pressure for the rest of your life.However, proper treatment can control your blood pressure and help prevent heart disease, heart attack, or stroke. It can also help prevent long-term health problems, " such as heart failure, kidney failure, blindness, anddementia.  If you already have some complications, such as breathing problems or chest pain, lowering your blood pressure may make these problems less severe.    What is the cause?  There are no clear causes of essential hypertension. However, many things can increase blood pressure, such as:    Being overweight    Smoking    Eating a diet high in salt    Drinking a lot of alcohol  Other important factors include:    Race.  Americans are more likely to have high blood pressure.    Gender. Males have a greater chance of developing high blood pressure thanwomen until age 55. After the age of 75, women are more likely to develop high blood pressure than men.    Heredity. If you have parents with high blood pressure, you are more at risk.    Age. The older you get, the more likely you are to have high blood pressure.  Also, some medicines increase bloodpressure.  Stress and drinking caffeine can make blood pressure go up temporarily, but it s not clear that they have any long-term effects on blood pressure.    What are the symptoms?  You may have high blood pressure for a long time without symptoms. You may not be able to tell by the way you feel that your bloodpressure is high. The only way to find out if your blood pressure is high is to have it measured. That's why it s important to have your blood pressure checked at least once a year.  When high blood pressure does cause symptoms, they may include:    Headaches    Nosebleeds    Getting tired easily    Blurred vision    Dizziness    Lightheadedness    Feeling like your heart is racing or fluttering    Shortness of breath    Chest pain    Memory problems    Daytime sleepiness    How is it diagnosed?  Blood pressure is checked at most healthcare visits. High blood pressure is usually discovered during one of these visits. Ifyour blood pressure is high, you will be asked to return for follow-up checks. Your  healthcare provider will ask about your personal and family medical history and examine you.  Tests to look for a possible cause of highblood pressure may include:    Urine and blood tests    ChestX-ray    Electrocardiogram (ECG), which measures and records your heartbeat  You may be asked to use a portableblood-pressure measuring device, which will take your pressure at different times during day and night.    How is it treated?  If your bloodpressure is borderline high, you may be able to bring it down to a normal level without medicine. Weight loss, changes in your diet, and exercise may be the only treatment you need.  If lifestyle changes don t lower your blood pressure enough, your healthcare provider may prescribe medicine. Many people need to take 2 or more medicines to bring their blood pressure down to a healthy level. It may takeseveral weeks or months to find the best treatment for you.    How can I take care of myself?  If you have high blood pressure, there are thingsyou can do now to take care of yourself and to prevent problems in the future:    Follow your treatmentplan and know how to take your medicines.    Work with your healthcare provider to find what lifestylechanges and medicines are right for you.    Follow the directions that come with your medicine, including information about food or alcohol. Make sure you know how and when to take yourmedicine. Do not take more or less than you are supposed to take.    Many medicines have side effects. A side effect is a symptom or problem that is caused by the medicine. Ask yourhealthcare provider or pharmacist what side effects your medicine may cause and what you should do if you have side effects. Ask if you should avoid some nonprescription medicines.    Be careful with nonprescription medicines or herbal supplements. Some can raise blood pressure. This includes diet pills, cold and pain medicines, and energy boosters. Read labels or ask your  pharmacist if the medicine orsupplement affects blood pressure. Some illegal drugs, like cocaine, can also affect blood pressure.    Check your blood pressure (or have it checked) as often as your provider advises.Keep a diary of the readings. A diary is also a good place to note your exercise, weight, salt intake, types of food you are eating, and your feelings. This can help you learn how these things can affect your blood pressure.Take your diary with you when you visit your provider. It may help you and your provider manage your blood pressure and adjust your medicines if needed.    Don t smoke.    Eat a healthy diet that is low in salt, saturated fat, trans fat, andcholesterol. Include lots of fruits, vegetables, and fat-free or low-fat milk and milk products.    Get regular exercise, according to your healthcare provider's advice. For example,you might walk, bike, or swim at least 30 minutes 3 to 5 times a week.    Limit the amount of alcohol you drink. Moderate drinking is up to 1 drink a day for women and up to 2 drinksfor men.    Lose weight if you need to.    Try to reduce the stress in your life or learn how to deal better with situations that make you feelanxious.    Ask your healthcare provider:    How and when youwill get your test results    How long it will take to recover    If there are activities you should avoid and when you can return to your normalactivities    How to take care of yourself at home    What symptoms or problems you should watch for and what to do if you have them    Make sure you know when you should come back for a checkup. Keep all appointments for provider visits or tests.    How can I help prevent high blood pressure?  You can helpprevent this disease with a heart-healthy lifestyle:    Eat a healthy diet and keep a healthy weight.    Stay fit with the right kind of exercise for you.    Decrease stress.    Don t smoke.    Limit your use of alcohol.  Talk to your healthcare  provider about your personal and familymedical history and your lifestyle habits. This will help you know what you can do to lower your risk for high blood pressure.    Developed by [employer].  Adult Advisor 2016.3 published by [employer].  Lastmodified: 2016-04-18  Last reviewed: 2016-04-15  This content is reviewed periodically and is subject to change as new health information becomes available. The information is intended to inform and educate and is nota replacement for medical evaluation, advice, diagnosis or treatment by a healthcare professional.  References   Adult Advisor 2016.3 Index    Copyright   2016 [employer], a division of [employer]. All rights reserved.              CHRIS HORAN DO   4/9/2018 5:47 PM    This document was prepared using voice generated softwear. While every attempt was made for accuracy, grammatical errors may exist.

## 2018-04-25 ENCOUNTER — TRANSFERRED RECORDS (OUTPATIENT)
Dept: HEALTH INFORMATION MANAGEMENT | Facility: OTHER | Age: 83
End: 2018-04-25

## 2018-05-30 ENCOUNTER — TRANSFERRED RECORDS (OUTPATIENT)
Dept: HEALTH INFORMATION MANAGEMENT | Facility: OTHER | Age: 83
End: 2018-05-30

## 2018-06-13 ENCOUNTER — OFFICE VISIT (OUTPATIENT)
Dept: INTERNAL MEDICINE | Facility: OTHER | Age: 83
End: 2018-06-13
Attending: INTERNAL MEDICINE
Payer: MEDICARE

## 2018-06-13 VITALS
RESPIRATION RATE: 18 BRPM | BODY MASS INDEX: 30.4 KG/M2 | DIASTOLIC BLOOD PRESSURE: 82 MMHG | SYSTOLIC BLOOD PRESSURE: 148 MMHG | TEMPERATURE: 97.3 F | WEIGHT: 178.06 LBS | HEART RATE: 64 BPM | HEIGHT: 64 IN

## 2018-06-13 DIAGNOSIS — M85.89 OSTEOPENIA OF MULTIPLE SITES: ICD-10-CM

## 2018-06-13 DIAGNOSIS — I10 BENIGN ESSENTIAL HYPERTENSION: Primary | ICD-10-CM

## 2018-06-13 DIAGNOSIS — Z79.899 HIGH RISK MEDICATION USE: ICD-10-CM

## 2018-06-13 DIAGNOSIS — N90.89 VULVAR LESION: ICD-10-CM

## 2018-06-13 DIAGNOSIS — L98.9 SKIN LESION OF FACE: ICD-10-CM

## 2018-06-13 DIAGNOSIS — N18.30 STAGE 3 CHRONIC KIDNEY DISEASE (H): ICD-10-CM

## 2018-06-13 DIAGNOSIS — E78.5 BORDERLINE HYPERLIPIDEMIA: ICD-10-CM

## 2018-06-13 LAB
ANION GAP SERPL CALCULATED.3IONS-SCNC: 6 MMOL/L (ref 3–14)
BUN SERPL-MCNC: 32 MG/DL (ref 7–25)
CALCIUM SERPL-MCNC: 10.2 MG/DL (ref 8.6–10.3)
CHLORIDE SERPL-SCNC: 106 MMOL/L (ref 98–107)
CHOLEST SERPL-MCNC: 212 MG/DL
CO2 SERPL-SCNC: 27 MMOL/L (ref 21–31)
CREAT SERPL-MCNC: 1.16 MG/DL (ref 0.6–1.2)
GFR SERPL CREATININE-BSD FRML MDRD: 45 ML/MIN/1.7M2
GLUCOSE SERPL-MCNC: 91 MG/DL (ref 70–105)
HDLC SERPL-MCNC: 75 MG/DL (ref 23–92)
LDLC SERPL CALC-MCNC: 124 MG/DL
MAGNESIUM SERPL-MCNC: 2.2 MG/DL (ref 1.9–2.7)
NONHDLC SERPL-MCNC: 137 MG/DL
PHOSPHATE SERPL-MCNC: 3.3 MG/DL (ref 2.5–5)
POTASSIUM SERPL-SCNC: 4.5 MMOL/L (ref 3.5–5.1)
SODIUM SERPL-SCNC: 139 MMOL/L (ref 134–144)
TRIGL SERPL-MCNC: 64 MG/DL

## 2018-06-13 PROCEDURE — G0463 HOSPITAL OUTPT CLINIC VISIT: HCPCS

## 2018-06-13 PROCEDURE — 80061 LIPID PANEL: CPT | Performed by: INTERNAL MEDICINE

## 2018-06-13 PROCEDURE — 99215 OFFICE O/P EST HI 40 MIN: CPT | Performed by: INTERNAL MEDICINE

## 2018-06-13 PROCEDURE — 83735 ASSAY OF MAGNESIUM: CPT | Performed by: INTERNAL MEDICINE

## 2018-06-13 PROCEDURE — 36415 COLL VENOUS BLD VENIPUNCTURE: CPT | Performed by: INTERNAL MEDICINE

## 2018-06-13 PROCEDURE — 80048 BASIC METABOLIC PNL TOTAL CA: CPT | Performed by: INTERNAL MEDICINE

## 2018-06-13 PROCEDURE — 84100 ASSAY OF PHOSPHORUS: CPT | Performed by: INTERNAL MEDICINE

## 2018-06-13 RX ORDER — ALENDRONATE SODIUM 70 MG/1
TABLET ORAL
Qty: 12 TABLET | Refills: 1 | Status: SHIPPED | OUTPATIENT
Start: 2018-06-13 | End: 2018-10-22

## 2018-06-13 RX ORDER — LISINOPRIL 10 MG/1
10 TABLET ORAL DAILY
Qty: 90 TABLET | Refills: 4 | Status: SHIPPED | OUTPATIENT
Start: 2018-06-13 | End: 2019-06-14

## 2018-06-13 ASSESSMENT — ANXIETY QUESTIONNAIRES
3. WORRYING TOO MUCH ABOUT DIFFERENT THINGS: NOT AT ALL
5. BEING SO RESTLESS THAT IT IS HARD TO SIT STILL: NOT AT ALL
2. NOT BEING ABLE TO STOP OR CONTROL WORRYING: NOT AT ALL
IF YOU CHECKED OFF ANY PROBLEMS ON THIS QUESTIONNAIRE, HOW DIFFICULT HAVE THESE PROBLEMS MADE IT FOR YOU TO DO YOUR WORK, TAKE CARE OF THINGS AT HOME, OR GET ALONG WITH OTHER PEOPLE: NOT DIFFICULT AT ALL
GAD7 TOTAL SCORE: 0
6. BECOMING EASILY ANNOYED OR IRRITABLE: NOT AT ALL
7. FEELING AFRAID AS IF SOMETHING AWFUL MIGHT HAPPEN: NOT AT ALL
1. FEELING NERVOUS, ANXIOUS, OR ON EDGE: NOT AT ALL

## 2018-06-13 ASSESSMENT — PAIN SCALES - GENERAL: PAINLEVEL: NO PAIN (0)

## 2018-06-13 ASSESSMENT — PATIENT HEALTH QUESTIONNAIRE - PHQ9: 5. POOR APPETITE OR OVEREATING: NOT AT ALL

## 2018-06-13 NOTE — NURSING NOTE
Patient presents to clinic for medication management, discussion and review.  Anahi Grant LPN............6/13/2018 8:59 AM

## 2018-06-13 NOTE — PROGRESS NOTES
Chief Complaint   Patient presents with     Medication Follow-up     medication management, discussion and review         HPI: Ms. Mckeon is a 82 year old female who presents today for annual review of her medications.  She overall is feeling good.      She did stop her metoprolol.  She has found that overall she is feeling much better with this.  She is also sleeping better since taking her lisinopril at bedtime.  She is currently on 10 mg daily.  Her home blood pressures have been ranging between 112/62 and 132/75.  She denies any obvious side effects from the lisinopril.    She has a history of chronic kidney disease.  She is due for recheck.  She does avoid nonsteroidal anti-inflammatory medications.     She last had a DEXA scan 1 year ago.  At that time she was found to have osteopenia with a T score of -1.8 however her fracture scores were significantly elevated.  She is interested in trying medication for this at this time.  We discussed various options.    She has had borderline cholesterol in the past and would like it rechecked today.  She does try to monitor her diet overall.    She has an acute lesion on her face.  This is on the right superior forehead.  It showed up approximately 2 weeks ago.  She has been placing some creams on it with no significant improvement at this time.  She does not recall any specific injury.    She does have a history of a few lesions on her vulva.  This was previously seen by primary care and not felt to be an issue.  She would like these looked at again today.  She does not feel that they have changed at all.  She denies any bleeding, drainage or pain associated with them.    She is post menopausal.  She is up-to-date on her colonoscopy.  She is up-to-date on mammogram.  She is up-to-date on her vaccines at this time.    History is discussed and updated on 6/13/2018 with patient.  It is current to the best of my knowledge as below.    Past Medical History:   Diagnosis Date      Anemia 05/15/2013     CKD (chronic kidney disease) stage 3, GFR 30-59 ml/min 2014    Stable. First noted when patient was taking NSAIDs regularly due to back pain.     Essential (primary) hypertension      Nonrheumatic mitral valve prolapse 2008    with normal echo and no residual prolapse     Nontoxic single thyroid nodule 2012    Patient with multinodular goiter. Negative cytology and stable ultrasound findings.     Osteopenia      Other fracture of unspecified lower leg, initial encounter for closed fracture      Pain in left hip 2017     Pregnancy      2, para 2 with 2 spontaneous vaginal deliveries     Pure hypercholesterolemia      Scoliosis      Spinal stenosis         Past Surgical History:   Procedure Laterality Date     BACK SURGERY  10/2011    Back surgery, Dr. Linda, [facility]     BIOPSY BREAST Left     's, benign     CLOSED REDUCTION ANKLE  2001    ORIF left ankle w/ later removal of hardware      COLONOSCOPY      ,,F/U N/A     EXTRACAPSULAR CATARACT EXTRATION WITH INTRAOCULAR LENS IMPLANT Bilateral      LAPAROSCOPY DIAGNOSTIC (GYN)      Laparoscopy for pelvic pain which was negative         Current Outpatient Prescriptions   Medication Sig Dispense Refill     alendronate (FOSAMAX) 70 MG tablet Take 1 tablet (70 mg) by mouth with 8oz water every 7 days 30 minutes before breakfast and remain upright during this time. 12 tablet 1     Calcium Carb-Cholecalciferol (CALCIUM + D3) 600-200 MG-UNIT TABS Take 1 tablet by mouth daily       Cholecalciferol (VITAMIN D3) 1000 UNITS CAPS Take 1,000 Units by mouth daily       cyanocobalamin (RA VITAMIN B-12 TR) 1000 MCG TBCR Take 1,000 mcg by mouth daily       lisinopril (PRINIVIL/ZESTRIL) 10 MG tablet Take 1 tablet (10 mg) by mouth daily 90 tablet 4     [DISCONTINUED] lisinopril (PRINIVIL/ZESTRIL) 10 MG tablet Take 10 mg by mouth daily         Allergies   Allergen Reactions     Sulfa Drugs Other (See Comments)      Doesn't remember        Family History   Problem Relation Age of Onset     Arthritis Mother      rheumatoid arthritis, psoriasis     CANCER Father      Cancer,renal     No Known Problems Daughter      No Known Problems Son      Breast Cancer Sister 66     Cancer-breast,essential tremor (treated with surgery)     HEART DISEASE Brother      Heart Disease,pacemaker placement     Other - See Comments Brother      back issues     Parkinsonism Brother        Family Status   Relation Status     Mother  at age 68     Father  at age 77     of renal cancer     Daughter Alive     Son Alive     Sister Alive     Brother Alive     Brother Alive        Social History     Social History     Marital status:      Spouse name: N/A     Number of children: N/A     Years of education: N/A     Social History Main Topics     Smoking status: Former Smoker     Quit date: 1968     Smokeless tobacco: Never Used     Alcohol use Yes      Comment: Alcoholic Drinks/day: Occasional     Drug use: No      Comment: Drug use: No     Sexual activity: Yes     Partners: Male      Comment: Birth control method: monogamous     Other Topics Concern     None     Social History Narrative    Patient is , living with .  She has two grown children.  She does exercise regularly.  She is retired.    Esequiel Mckeon -   Irvin Mckeon - Son, born , lives in [facility] [place] - Daughter, born , lives in [place]  2 grandchildren              ROS  GEN: -fevers/-chills/-night sweats/-wt change  NEURO: -headaches/-vision changes  EARS: -hearing changes/-tinnitus  NOSE: -drainage/-congestion  MOUTH/THROAT: - sore throat/-dysphagia/-sores  LUNGS: -sob/-cough  CARDIOVASCULAR: -cp/-palpitations  GI: -pain/-diarrhea/-constipation/-bloody stools  :-dysuria/-hematuria/+ vaginal lesions present, no sores/-vaginal discharge or bleeding  ENDOCRINE: -skin or hair changes  HEMATOLOGIC/LYMPHATIC: -swollen nodes  SKIN:  "-rashes/-lesions/-breast or nipple changes  MSK/RHEUM: +joint pain/-swelling  NEURO: -weakness/-parasthesias  PSYCH: -anhedonia/-depression/-anxiety  SLEEP: -daytime somnolence         EXAM:   /82 (BP Location: Right arm, Patient Position: Sitting, Cuff Size: Adult Regular)  Pulse 64  Temp 97.3  F (36.3  C) (Tympanic)  Resp 18  Ht 5' 3.5\" (1.613 m)  Wt 178 lb 1 oz (80.8 kg)  Breastfeeding? No  BMI 31.05 kg/m2  Estimated body mass index is 31.05 kg/(m^2) as calculated from the following:    Height as of this encounter: 5' 3.5\" (1.613 m).    Weight as of this encounter: 178 lb 1 oz (80.8 kg).      GEN: Vitals reviewed. Healthy appearing. Patient is in no acute distress. Cooperative with exam.  HEENT: Normocephalic atraumatic.  Normal external eye, conjunctiva, lids, cornea with no scleral icterus or conjunctival erythema. Pupils equally round. Oropharynx with no erythema or exudates. Dentition adequate.    NECK: Supple; no thyromegaly or masses noted.  No cervical or supraclavicular lymphadenopathy.  CV: Heart regular in rate and rhythm with no murmur.    LUNGS: Lungs clear to auscultation bilaterally.  Chest rise equal bilaterally.  No accessory muscle use.  ABD:  Obese, Soft, nontender, and nondistended.  No rebound. Bowel sounds positive.  : Normal female external genitalia.  Lesions on the vulvar consistent with inclusion cyst.  No other abnormality noted.    SKIN: Warm and dry to touch.  No rash on face, arms and legs.  EXT: No clubbing or cyanosis.   Trace bilateral lower extremity peripheral edema.  NEURO: Alert and oriented to person, place, and time.  Cranial nerves II-XII grossly intact with no focal or lateralizing deficits.  Muscle tone normal.  Gait normal. No tremor. Sensation intact to light touch.  MSK: ROM of upper and lower ext symmetric and full.  PSYCH:Affect appropriate. Speech fluent. Answers questions appropriately and thought process normal.    LABS: 6/13/2018 - Personally " ordered/reviewed  Results for orders placed or performed in visit on 06/13/18   Basic metabolic panel   Result Value Ref Range    Sodium 139 134 - 144 mmol/L    Potassium 4.5 3.5 - 5.1 mmol/L    Chloride 106 98 - 107 mmol/L    Carbon Dioxide 27 21 - 31 mmol/L    Anion Gap 6 3 - 14 mmol/L    Glucose 91 70 - 105 mg/dL    Urea Nitrogen 32 (H) 7 - 25 mg/dL    Creatinine 1.16 0.60 - 1.20 mg/dL    GFR Estimate 45 (L) >60 mL/min/1.7m2    GFR Estimate If Black 54 (L) >60 mL/min/1.7m2    Calcium 10.2 8.6 - 10.3 mg/dL   Lipid Panel   Result Value Ref Range    Cholesterol 212 (H) <200 mg/dL    Triglycerides 64 <150 mg/dL    HDL Cholesterol 75 23 - 92 mg/dL    LDL Cholesterol Calculated 124 (H) <100 mg/dL    Non HDL Cholesterol 137 (H) <130 mg/dL   Phosphorus   Result Value Ref Range    Phosphorus 3.3 2.5 - 5.0 mg/dL   Magnesium   Result Value Ref Range    Magnesium 2.2 1.9 - 2.7 mg/dL             ASSESSMENT AND PLAN:    Benign essential hypertension  - Blood pressure today of 148/82  is at the goal of <150/90.  Additionally her blood pressures at home have been very well controlled.  - Continue current regimen at this time.  Instructed to check BP at home.  - Cautioned patient to monitor with antibiotics, herbals and any OTC medications  - electrolytes and renal function done and stable  - lisinopril (PRINIVIL/ZESTRIL) 10 MG tablet  Dispense: 90 tablet; Refill: 4  - Basic metabolic panel    Osteopenia of multiple sites  -We will plan at this time to start alendronate.  She is to call if she has any side effects.  - repeat Dexa in 2-3 years  - recommend Calcium 1200-1500mg daily betweendiet and supplement; handout provided on calculating this  - recommend Vitamin D 1000 IU daily  - recommend strengthening exercises  - work up for secondary causes includes normal TSH, blood sugar, malnutrition,chronic liver disease, premature menopause  - check vit D and Ca levels periodically  - start bisphosphonate if indicated; monitor renal  function  - alendronate (FOSAMAX) 70 MG tablet  Dispense: 12 tablet; Refill: 1  - Phosphorus    Stage 3 chronic kidney disease  - eGFR stable, recheck in 6 months  - electrolytes stable, check RFP, H/H, PTH, and urine albumin yearly   - monitor Vitamin D level for sufficiency  - avoid NSAIDS, need to renally dose medications  - Basic metabolic panel    Borderline hyperlipidemia  -Overall okay.  No clear indication for statin at this time given her age, comorbidities.  We will continue to monitor.  - Lipid Panel    Skin lesion of face  -Encouraged to use antibiotic ointment daily with cleaning and open air at night.  If this does continue for more than a couple more weeks she is to call and we can consider dermatology versus biopsy     High risk medication use  - Magnesium    Vulvar lesion  -Consistent with inclusion cyst.  She was reassured.  She is to call if she notices any change at any point.      Follow-up in approximately 6 months for recheck of medications and kidneys.          CHRIS HORAN,    6/13/2018 9:54 AM    This document was prepared using voice generated softwear. While every attempt was made for accuracy, spelling and grammatical errors may exist.

## 2018-06-13 NOTE — MR AVS SNAPSHOT
"              After Visit Summary   6/13/2018    Vicki Mckeon    MRN: [id number]           Patient Information     Date Of Birth          1935        Visit Information        Provider Department      6/13/2018 9:00 AM Kirsten Guevara DO [facility]        Today's Diagnoses     Benign essential hypertension    -  1    Osteopenia of multiple sites        Stage 3 chronic kidney disease        Borderline hyperlipidemia        Skin lesion of face           Follow-ups after your visit        Follow-up notes from your care team     Return in about 6 months (around 12/13/2018) for Recheck, medications, kidneys.      Your next 10 appointments already scheduled     Jun 26, 2018 10:00 AM CDT   (Arrive by 9:45 AM)   MA SCREENING BILATERAL W/ CASS with GHMA2   [facility] ([facility])    [address]  [place] [id number] [phone]           Three-dimensional (3D) mammograms are available at [place] locations in [facility], [place], [place], [facility], [place], [place], and [place]. -Health locations include [facility] in [place]. Benefits of 3D mammograms include: - Improved rate of cancer detection - Decreases your chance of having to go back for more tests, which means fewer: - \"False-positive\" results (This means that there is an abnormal area but it isn't cancer.) - Invasive testing procedures, such as a biopsy or surgery - Can provide clearer images of the breast if you have dense breast tissue. 3D mammography is an optional exam that anyone can have with a 2D mammogram. It doesn't replace or take the place of a 2D mammogram. 2D mammograms remain an effective screening test for all women.  Not all insurance companies cover the cost of a 3D mammogram. Check with your insurance.              Who to contact     If you have questions or need follow up information about today's clinic " "visit or your schedule please contact [facility] directly at [phone].  Normal or non-critical lab and imaging results will be communicated to you by MyChart, letter or phone within 4 business days after the clinic has received the results. If you do not hear from us within 7 days, please contact the clinic through [employer] or phone. If you have a critical or abnormal lab result, we will notify you by phone as soon as possible.  Submit refill requests through [employer] or call your pharmacy and they will forward the refill request to us. Please allow 3 business days for your refill to be completed.          Additional Information About Your Visit        MyChart Information     [employer] lets you send messages to your doctor, view your test results, renew your prescriptions, schedule appointments and more. To sign up, go to [email or web address] . Click on \"Log in\" on the left side of the screen, which will take you to the Welcome page. Then click on \"Sign up Now\" on the right side of the page.     You will be asked to enter the access code listed below, as well as some personal information. Please follow the directions to create your username and password.     Your access code is: [email or web address]  Expires: 2018  3:55 PM     Your access code will  in 90 days. If you need help or a new code, please call your [facility] clinic or [phone].        Care EveryWhere ID     This is your Care EveryWhere ID. This could be used by other organizations to access your [facility] medical records  [id number]        Your Vitals Were     Pulse Temperature Respirations Height Breastfeeding? BMI (Body Mass Index)    64 97.3  F (36.3  C) (Tympanic) 18 5' 3.5\" (1.613 m) No 31.05 kg/m2       Blood Pressure from Last 3 Encounters:   18 152/64   18 126/60   17 136/62    Weight from Last 3 Encounters:   18 178 lb 1 oz (80.8 kg)   18 181 lb 2 oz (82.2 kg)   17 182 lb 5 oz " (82.7 kg)              We Performed the Following     Basic metabolic panel     Lipid Panel     Phosphorus          Today's Medication Changes          These changes are accurate as of 6/13/18  9:42 AM.  If you have any questions, ask your nurse or doctor.               Start taking these medicines.        Dose/Directions    alendronate 70 MG tablet   Commonly known as:  FOSAMAX   Used for:  Osteopenia of multiple sites   Started by:  Kirsten Guevara DO        Take 1 tablet (70 mg) by mouth with 8oz water every 7 days 30 minutes before breakfast and remain upright during this time.   Quantity:  12 tablet   Refills:  1            Where to get your medicines      These medications were sent to [id number] - [place], [place] - [address] AT [address]  [address], [facility] [address]     Phone:  [phone]     alendronate 70 MG tablet    lisinopril 10 MG tablet                Primary Care Provider Office Phone # Fax #    Kirsten Guevara [phone]       [address]  [facility] [address]        Equal Access to Services     LAURA SCHMIDT AH: Hadii aad ku hadasho Soomaali, waaxda luqadaha, qaybta kaalmada adeegyada, waxanne musa hayrenetta ames . So [facility] [phone].    ATENCIÓN: Si habla español, tiene a magallon disposición servicios gratuitos de asistencia lingüística. Llame al [phone].    We comply with applicable federal civil rights laws and [place] laws. We do not discriminate on the basis of race, color, national origin, age, disability, sex, sexual orientation, or gender identity.            Thank you!     Thank you for choosing [facility]  for your care. Our goal is always to provide you with excellent care. Hearing back from our patients is one way we can continue to improve our services. Please take a few minutes to complete the written survey that you may receive in the mail after your visit with us. Thank  you!             Your Updated Medication List - Protect others around you: Learn how to safely use, store and throw away your medicines at www.disposemymeds.org.          This list is accurate as of 6/13/18  9:42 AM.  Always use your most recent med list.                   Brand Name Dispense Instructions for use Diagnosis    alendronate 70 MG tablet    FOSAMAX    12 tablet    Take 1 tablet (70 mg) by mouth with 8oz water every 7 days 30 minutes before breakfast and remain upright during this time.    Osteopenia of multiple sites       Calcium + D3 600-200 MG-UNIT Tabs      Take 1 tablet by mouth daily        lisinopril 10 MG tablet    PRINIVIL/ZESTRIL    90 tablet    Take 1 tablet (10 mg) by mouth daily    Benign essential hypertension       RA VITAMIN B-12 TR 1000 MCG Tbcr   Generic drug:  cyanocobalamin      Take 1,000 mcg by mouth daily        vitamin D3 1000 units Caps      Take 1,000 Units by mouth daily

## 2018-06-14 ASSESSMENT — PATIENT HEALTH QUESTIONNAIRE - PHQ9: SUM OF ALL RESPONSES TO PHQ QUESTIONS 1-9: 0

## 2018-06-14 ASSESSMENT — ANXIETY QUESTIONNAIRES: GAD7 TOTAL SCORE: 0

## 2018-07-02 RX ORDER — METOPROLOL TARTRATE 25 MG/1
TABLET, FILM COATED ORAL
Qty: 180 TABLET | Refills: 0 | OUTPATIENT
Start: 2018-07-02

## 2018-07-24 ENCOUNTER — TELEPHONE (OUTPATIENT)
Dept: INTERNAL MEDICINE | Facility: OTHER | Age: 83
End: 2018-07-24

## 2018-07-24 ENCOUNTER — OFFICE VISIT (OUTPATIENT)
Dept: INTERNAL MEDICINE | Facility: OTHER | Age: 83
End: 2018-07-24
Attending: NURSE PRACTITIONER
Payer: COMMERCIAL

## 2018-07-24 VITALS
WEIGHT: 176.1 LBS | SYSTOLIC BLOOD PRESSURE: 132 MMHG | BODY MASS INDEX: 30.71 KG/M2 | TEMPERATURE: 98.2 F | DIASTOLIC BLOOD PRESSURE: 72 MMHG

## 2018-07-24 DIAGNOSIS — M85.89 OSTEOPENIA OF MULTIPLE SITES: ICD-10-CM

## 2018-07-24 DIAGNOSIS — L98.9 SKIN LESION: Primary | ICD-10-CM

## 2018-07-24 PROCEDURE — G0463 HOSPITAL OUTPT CLINIC VISIT: HCPCS

## 2018-07-24 PROCEDURE — 99213 OFFICE O/P EST LOW 20 MIN: CPT | Performed by: NURSE PRACTITIONER

## 2018-07-24 ASSESSMENT — ANXIETY QUESTIONNAIRES
GAD7 TOTAL SCORE: 0
3. WORRYING TOO MUCH ABOUT DIFFERENT THINGS: NOT AT ALL
1. FEELING NERVOUS, ANXIOUS, OR ON EDGE: NOT AT ALL
2. NOT BEING ABLE TO STOP OR CONTROL WORRYING: NOT AT ALL
IF YOU CHECKED OFF ANY PROBLEMS ON THIS QUESTIONNAIRE, HOW DIFFICULT HAVE THESE PROBLEMS MADE IT FOR YOU TO DO YOUR WORK, TAKE CARE OF THINGS AT HOME, OR GET ALONG WITH OTHER PEOPLE: NOT DIFFICULT AT ALL
6. BECOMING EASILY ANNOYED OR IRRITABLE: NOT AT ALL
7. FEELING AFRAID AS IF SOMETHING AWFUL MIGHT HAPPEN: NOT AT ALL
5. BEING SO RESTLESS THAT IT IS HARD TO SIT STILL: NOT AT ALL

## 2018-07-24 ASSESSMENT — PAIN SCALES - GENERAL: PAINLEVEL: NO PAIN (0)

## 2018-07-24 ASSESSMENT — PATIENT HEALTH QUESTIONNAIRE - PHQ9: 5. POOR APPETITE OR OVEREATING: NOT AT ALL

## 2018-07-24 NOTE — PROGRESS NOTES
Subjective:  She is here today for follow-up on a skin lesion on her forehead.  She saw her primary doctor about a month ago and the lesion had been present a couple weeks.  She was directed to try Neosporin.  She did this twice daily without any improvement.  The lesion is unchanged.  It is located on the forehead and it is raised.  It does not hurt.  It does not drain and usually is dry.  She also was started on Fosamax recently for treatment of osteopenia.  She missed her dose on Saturday and is wondering if she should take it now or if it is okay for her to wait until next week.  Otherwise is tolerating Fosamax without any adverse effects.    Patient Active Problem List   Diagnosis     ACP (advance care planning)     Health care maintenance     Hypertension     Osteopenia     Stage 3 chronic kidney disease     Past Medical History:   Diagnosis Date     Anemia 05/15/2013     CKD (chronic kidney disease) stage 3, GFR 30-59 ml/min 2014    Stable. First noted when patient was taking NSAIDs regularly due to back pain.     Essential (primary) hypertension      Nonrheumatic mitral valve prolapse 2008    with normal echo and no residual prolapse     Nontoxic single thyroid nodule 2012    Patient with multinodular goiter. Negative cytology and stable ultrasound findings.     Osteopenia      Other fracture of unspecified lower leg, initial encounter for closed fracture      Pain in left hip 2017     Pregnancy      2, para 2 with 2 spontaneous vaginal deliveries     Pure hypercholesterolemia      Scoliosis      Spinal stenosis      Past Surgical History:   Procedure Laterality Date     BACK SURGERY  10/2011    Back surgery, Dr. Linda, [facility]     BIOPSY BREAST Left     1970's, benign     CLOSED REDUCTION ANKLE  2001    ORIF left ankle w/ later removal of hardware      COLONOSCOPY      ,,F/U N/A     EXTRACAPSULAR CATARACT EXTRATION WITH INTRAOCULAR LENS IMPLANT Bilateral       LAPAROSCOPY DIAGNOSTIC (GYN)      Laparoscopy for pelvic pain which was negative     Social History     Social History     Marital status:      Spouse name: N/A     Number of children: N/A     Years of education: N/A     Occupational History     Not on file.     Social History Main Topics     Smoking status: Former Smoker     Quit date: 1/1/1968     Smokeless tobacco: Never Used     Alcohol use Yes      Comment: Alcoholic Drinks/day: Occasional     Drug use: No      Comment: Drug use: No     Sexual activity: Yes     Partners: Male      Comment: Birth control method: monogamous     Other Topics Concern     Not on file     Social History Narrative    Patient is , living with .  She has two grown children.  She does exercise regularly.  She is retired.    Esequiel Mckeon -   Irvin Mckeon - Son, born 1968, lives in [facility] [place] - Daughter, born 1972, lives in [place]  2 grandchildren     Family History   Problem Relation Age of Onset     Arthritis Mother      rheumatoid arthritis, psoriasis     Cancer Father      Cancer,renal     No Known Problems Daughter      No Known Problems Son      Breast Cancer Sister 66     Cancer-breast,essential tremor (treated with surgery)     HEART DISEASE Brother      Heart Disease,pacemaker placement     Other - See Comments Brother      back issues     Parkinsonism Brother      Current Outpatient Prescriptions   Medication Sig Dispense Refill     alendronate (FOSAMAX) 70 MG tablet Take 1 tablet (70 mg) by mouth with 8oz water every 7 days 30 minutes before breakfast and remain upright during this time. 12 tablet 1     Calcium Carb-Cholecalciferol (CALCIUM + D3) 600-200 MG-UNIT TABS Take 1 tablet by mouth daily       Cholecalciferol (VITAMIN D3) 1000 UNITS CAPS Take 1,000 Units by mouth daily       cyanocobalamin (RA VITAMIN B-12 TR) 1000 MCG TBCR Take 1,000 mcg by mouth daily       lisinopril (PRINIVIL/ZESTRIL) 10 MG tablet Take 1 tablet (10 mg) by  mouth daily 90 tablet 4     Sulfa drugs      Review of Systems:  Review of Systems  See HPI    Objective:   /72 (BP Location: Right arm, Patient Position: Chair, Cuff Size: Adult Regular)  Temp 98.2  F (36.8  C) (Tympanic)  Wt 176 lb 1.6 oz (79.9 kg)  Breastfeeding? No  BMI 30.71 kg/m2  Physical Exam  Pleasant female, no acute distress.  Affect normal.  Alert and oriented ×4.  Along the left upper forehead there is a raised skin lesion that has an erythematous base and covered with scab.  This was cleansed with saline and the scab was easily lifted away.  No pustular drainage.  The lesion does have raised borders.  Neck supple and without adenopathy.  Lung fields clear to auscultation.  Cardiovascular regular rate and rhythm.    Assessment:    ICD-10-CM    1. Skin lesion L98.9 GENERAL SURG ADULT REFERRAL   2. Osteopenia of multiple sites M85.89        Plan:   Skin lesion along forehead is concerning for skin cancer.  She is referred to surgeon for removal.  In the meantime can keep the area clean and dry.  Also may take Fosamax next Saturday since she missed dose this Saturday.  Discussed potential adverse side effects from Fosamax use and if she has any problems she will discuss with PCP.    DESEAN Parker   7/24/2018  10:53 AM

## 2018-07-24 NOTE — PROGRESS NOTES
Patient Information     Patient Name  Vicki Mckeon MRN  [id number] Sex  Female   1935      Letter by Suzanne Zimmerman MD at      Author:  Suzanne Zimmerman MD Service:  (none) Author Type:  (none)    Filed:   Encounter Date:  2017 Status:  (Other)           Vicki Mckeon  [address] [place] [id number]          2017    Dear Ms. Mckeon:    Please see the copy of your lab results below:    Resulted Orders      LIPID PANEL (Collected: 2017  8:00 AM)      Result  Value Ref Range    CHOLESTEROL,TOTAL 201 (H) <200 mg/dL    TRIGLYCERIDES 72 <150 mg/dL    HDL CHOLESTEROL 60 23 - 92 mg/dL    NON-HDL CHOLESTEROL 141 <145 mg/dl    CHOL/HDL RATIO            3.35 <4.50                    LDL CHOLESTEROL 127 (H) <100 mg/dL    PATIENT STATUS            NOT GIVEN                   TSH (Collected: 2017  8:00 AM)      Result  Value Ref Range    TSH 2.69 0.34 - 5.60 uIU/mL   COMPLETE METABOLIC PANEL (Collected: 2017  8:00 AM)      Result  Value Ref Range    SODIUM 134 133 - 143 mmol/L    POTASSIUM 3.8 3.5 - 5.1 mmol/L    CHLORIDE 100 98 - 107 mmol/L    CO2,TOTAL 25 21 - 31 mmol/L    ANION GAP 9 5 - 18                    GLUCOSE 96 70 - 105 mg/dL    CALCIUM 9.9 8.6 - 10.3 mg/dL    BUN 32 (H) 7 - 25 mg/dL    CREATININE 1.20 0.70 - 1.30 mg/dL    BUN/CREAT RATIO           27                    GFR if African American 53 (L) >60 ml/min/1.73m2    GFR if not  43 (L) >60 ml/min/1.73m2    ALBUMIN 3.8 3.5 - 5.7 g/dL    PROTEIN,TOTAL 7.4 6.4 - 8.9 g/dL    GLOBULIN                  3.6 2.0 - 3.7 g/dL    A/G RATIO 1.1 1.0 - 2.0                    BILIRUBIN,TOTAL 0.6 0.3 - 1.0 mg/dL    ALK PHOSPHATASE 63 34 - 104 IU/L    ALT (SGPT) 19 7 - 52 IU/L    AST (SGOT) 24 13 - 39 IU/L   CBC W PLT NO DIFF (Collected: 2017  8:00 AM)      Result  Value Ref Range    WHITE BLOOD COUNT         6.3 4.5 - 11.0 thou/cu mm    RED BLOOD COUNT           3.85 (L) 4.00 - 5.20 mil/cu mm    HEMOGLOBIN                 12.1 12.0 - 16.0 g/dL    HEMATOCRIT                36.1 33.0 - 51.0 %    MCV                       94 80 - 100 fL    MCH                       31.4 26.0 - 34.0 pg    MCHC                      33.5 32.0 - 36.0 g/dL    RDW                       14.0 11.5 - 15.5 %    PLATELET COUNT            173 140 - 440 thou/cu mm    MPV                       12.2 (H) 6.5 - 11.0 fL     Your labs all look good.     Your back XR confirms that you have scoliosis and arthritis. There are some changes around one of the screws from your previous surgery that indicate a possibility of some loosening. This may correspond with the new symptoms that you are noting. I would continue to recommend starting with physical therapy. But if your symptoms progress, then we would refer you to meet with your back surgeon for further evaluation.    You do have some decrease in the bone density from before. Based on the calculations that are done, you could consider starting medication for this. If this is something that you would consider, please schedule an appointment so we can discuss this further.     Please don't hesitate to call if you have concerns or questions.       Sincerely,       SAMUEL CUNHA MD

## 2018-07-24 NOTE — PROGRESS NOTES
"Patient Information     Patient Name  Vicki Mckeon MRN  [id number] Sex  Female   1935      Letter by Suzanne Zimmerman MD at      Author:  Suzanne Zimmerman MD Service:  (none) Author Type:  (none)    Filed:   Encounter Date:  2017 Status:  (Other)           Vicki Mckeon  [address] [place] [id number]          2017    Dear Ms. Mckeon:    Welcome to [employer]!   Remember to activate your account quickly -  Your activation code expires in 45 days!    With [employer], you can view your health information, email your provider, schedule clinic appointments, get after visit instructions and more - online, anytime.     To activate your [employer] account, you will need:     to visit [email or web address]    your [employer] activation code: [email or web address]    Expires: 2017  2:16 PM     the last four digits of your Social Security number (SSN)     your date of birth.     Step-by-step instructions on how to set up your [employer] account are shown on the following page. If you requested access to your child/dependent s [employer] account or you have been granted access to another adult s [employer] account, instructions for viewing their information are also on the following page.     For questions or technical assistance, call [phone].    Thank you for choosing [employer] activation instructions     Activation code: [email or web address]  Expires: 2017  2:16 PM     Step 1: Go to [email or web address]     Step 2: Click on Enter your activation code.     Step 3: Type in your activation code (provided above), the last four digits of your Social Security number (SSN) and date of birth. This information is only required once. The next time you sign in to your account you will only need your username and password. If you receive an error that states \"Invalid Social Security number  or  Invalid date of birth\" that means the information we have on file does not match the information entered. " Please call [phone] for assistance.     Step 4: Choose a username that is easy for you to remember, but hard for others to guess. Your username must:     be between five and 24 characters     contain only letters and numbers (no symbols)   Once selected, your username cannot be changed.     Step 5: Choose a password. Your password must:     be at least eight characters     contain at least one uppercase letter     contain one lowercase letter     contain one number or symbol     be different than your username.     Step 6: Choose a security question. This will allow you to reset your password.     Step 7: Enter the answer to your security question. The answer cannot be the same as your password.     Step 8: Enter your email address. You will receive email notifications when new information is available in [employer]. You are now ready to start using [employer]!     If you requested proxy access for a child s or another adult s [employer] account, you will be able to access their health record by clicking on their name    Instructions for Child/Dependent Access  To view your child s record, click on your child's name under your name on the right hand side of the screen.   Instructions for Adult Proxy Access  To view your adult proxy record, click on the adult's name under your name on the right hand side of the screen.    In the event you have technical difficulties, please call   [employer] at [phone].

## 2018-07-24 NOTE — TELEPHONE ENCOUNTER
Patient was in seeing Maritza and mentioned that she had a random spell where it felt like she couldn't breathe.  Patient discussed it with Maritza and she told her to follow-up with Dr. Guevara.  Dr. Guevara isn't available until 8.24 and she is hoping to be worked in sooner then that.  Please call to discuss and advise.  Iona Woody on 7/24/2018 at 11:02 AM

## 2018-07-24 NOTE — MR AVS SNAPSHOT
After Visit Summary   7/24/2018    Vicki Mckeon    MRN: [id number]           Patient Information     Date Of Birth          1935        Visit Information        Provider Department      7/24/2018 10:40 AM Ronit Martinez NP [facility]        Today's Diagnoses     Skin lesion    -  1    Osteopenia of multiple sites           Follow-ups after your visit        Additional Services     GENERAL SURG ADULT REFERRAL       Dr Guzmán if within next 2 weeks otherwise soonest available.                  Your next 10 appointments already scheduled     Aug 14, 2018  1:50 PM CDT   PROCEDURE with Sabrina Quarles MD,   SURGERY   [facility] ([facility])    [address]  [place] [id number] [phone]            Dec 12, 2018 10:00 AM CST   SHORT with Kirsten Guevara DO   [facility]    [address]  [place] [id number] [phone]              Who to contact     If you have questions or need follow up information about today's clinic visit or your schedule please contact [facility] directly at [phone].  Normal or non-critical lab and imaging results will be communicated to you by MyChart, letter or phone within 4 business days after the clinic has received the results. If you do not hear from us within 7 days, please contact the clinic through MyChart or phone. If you have a critical or abnormal lab result, we will notify you by phone as soon as possible.  Submit refill requests through [employer] or call your pharmacy and they will forward the refill request to us. Please allow 3 business days for your refill to be completed.          Additional Information About Your Visit        Care EveryWhere ID     This is your Care EveryWhere ID. This could be used by other organizations to access your [facility]  records  [id number]        Your Vitals Were     Temperature Breastfeeding? BMI (Body Mass Index)             98.2  F (36.8  C) (Tympanic) No 30.71 kg/m2          Blood Pressure from Last 3 Encounters:   07/24/18 132/72   06/13/18 148/82   04/09/18 126/60    Weight from Last 3 Encounters:   07/24/18 79.9 kg (176 lb 1.6 oz)   06/13/18 80.8 kg (178 lb 1 oz)   04/09/18 82.2 kg (181 lb 2 oz)              We Performed the Following     GENERAL SURG ADULT REFERRAL        Primary Care Provider Office Phone # Fax #    Kirsten Guevara, [phone] [address] [facility] [address]        Equal Access to Services     ASHLEY SCHMIDT : Hadhomer mcleano [place], waaxda luqadaha, qaybta kaalmada adevamsiyayayo, ray ames . So [facility] [phone].    ATENCIÓN: Si habla español, tiene a magallon disposición servicios gratuitos de asistencia lingüística. Llame al [phone].    We comply with applicable federal civil rights laws and [place] laws. We do not discriminate on the basis of race, color, national origin, age, disability, sex, sexual orientation, or gender identity.            Thank you!     Thank you for choosing [facility] AND [facility]  for your care. Our goal is always to provide you with excellent care. Hearing back from our patients is one way we can continue to improve our services. Please take a few minutes to complete the written survey that you may receive in the mail after your visit with us. Thank you!             Your Updated Medication List - Protect others around you: Learn how to safely use, store and throw away your medicines at www.disposemymeds.org.          This list is accurate as of 7/24/18 10:57 AM.  Always use your most recent med list.                   Brand Name Dispense Instructions for use Diagnosis    alendronate 70 MG tablet    FOSAMAX    12 tablet    Take 1 tablet (70 mg) by mouth with 8oz water every 7 days 30 minutes before  breakfast and remain upright during this time.    Osteopenia of multiple sites       Calcium + D3 600-200 MG-UNIT Tabs      Take 1 tablet by mouth daily        lisinopril 10 MG tablet    PRINIVIL/ZESTRIL    90 tablet    Take 1 tablet (10 mg) by mouth daily    Benign essential hypertension       RA VITAMIN B-12 TR 1000 MCG Tbcr   Generic drug:  cyanocobalamin      Take 1,000 mcg by mouth daily        vitamin D3 1000 units Caps      Take 1,000 Units by mouth daily

## 2018-07-24 NOTE — NURSING NOTE
Patient is here for a sore on her forehead that has been present for a couple months.   Verenice Allen LPN...................7/24/2018   10:35 AM

## 2018-07-25 ENCOUNTER — OFFICE VISIT (OUTPATIENT)
Dept: INTERNAL MEDICINE | Facility: OTHER | Age: 83
End: 2018-07-25
Attending: INTERNAL MEDICINE
Payer: COMMERCIAL

## 2018-07-25 VITALS
WEIGHT: 175.5 LBS | RESPIRATION RATE: 18 BRPM | HEIGHT: 64 IN | HEART RATE: 66 BPM | DIASTOLIC BLOOD PRESSURE: 78 MMHG | BODY MASS INDEX: 29.96 KG/M2 | OXYGEN SATURATION: 94 % | TEMPERATURE: 97.7 F | SYSTOLIC BLOOD PRESSURE: 132 MMHG

## 2018-07-25 DIAGNOSIS — R06.02 SOB (SHORTNESS OF BREATH): Primary | ICD-10-CM

## 2018-07-25 DIAGNOSIS — R00.2 PALPITATIONS: ICD-10-CM

## 2018-07-25 DIAGNOSIS — R09.81 NASAL CONGESTION: ICD-10-CM

## 2018-07-25 DIAGNOSIS — I10 ESSENTIAL HYPERTENSION: ICD-10-CM

## 2018-07-25 PROCEDURE — G0463 HOSPITAL OUTPT CLINIC VISIT: HCPCS

## 2018-07-25 PROCEDURE — 99214 OFFICE O/P EST MOD 30 MIN: CPT | Performed by: INTERNAL MEDICINE

## 2018-07-25 RX ORDER — ALBUTEROL SULFATE 90 UG/1
1-2 AEROSOL, METERED RESPIRATORY (INHALATION) EVERY 4 HOURS PRN
Qty: 1 INHALER | Refills: 3 | Status: SHIPPED | OUTPATIENT
Start: 2018-07-25 | End: 2019-06-14

## 2018-07-25 ASSESSMENT — PAIN SCALES - GENERAL: PAINLEVEL: NO PAIN (0)

## 2018-07-25 ASSESSMENT — ANXIETY QUESTIONNAIRES
3. WORRYING TOO MUCH ABOUT DIFFERENT THINGS: NOT AT ALL
1. FEELING NERVOUS, ANXIOUS, OR ON EDGE: NOT AT ALL
6. BECOMING EASILY ANNOYED OR IRRITABLE: NOT AT ALL
IF YOU CHECKED OFF ANY PROBLEMS ON THIS QUESTIONNAIRE, HOW DIFFICULT HAVE THESE PROBLEMS MADE IT FOR YOU TO DO YOUR WORK, TAKE CARE OF THINGS AT HOME, OR GET ALONG WITH OTHER PEOPLE: NOT DIFFICULT AT ALL
2. NOT BEING ABLE TO STOP OR CONTROL WORRYING: NOT AT ALL
7. FEELING AFRAID AS IF SOMETHING AWFUL MIGHT HAPPEN: NOT AT ALL
GAD7 TOTAL SCORE: 0
GAD7 TOTAL SCORE: 0
5. BEING SO RESTLESS THAT IT IS HARD TO SIT STILL: NOT AT ALL

## 2018-07-25 ASSESSMENT — PATIENT HEALTH QUESTIONNAIRE - PHQ9
5. POOR APPETITE OR OVEREATING: NOT AT ALL
SUM OF ALL RESPONSES TO PHQ QUESTIONS 1-9: 0

## 2018-07-25 NOTE — PROGRESS NOTES
Chief Complaint   Patient presents with     Shortness of Breath     chest tightness, light headed          Subjective:   Ms. Mckeon is a 83 year old female  seen for the acute concern today of lightheadedness and chest tightness.  She has had 2 episodes of this.  The first was on June 24.  The second was just a few days ago.  She has had some congestion off and on but otherwise has felt okay.  It does seem that both of her occurrences occurred while at [employer].  It was hot and congested in the [employer].  She felt like she was unable to breathe as well.  She does have some rare palpitations but it only lasts a minute or so.  She has not had any nausea or vomiting.  She has had some dizziness with this.  She has been participating in bone builders class is in regular exercise and does feel good when she is doing this.  She is on lisinopril currently 10 mg daily to control her blood pressure although her blood pressure is slightly up on initial check today.  She denies any angioedema or swelling of the face.    Her medications are reviewed and she is on Fosamax for osteoporosis which was just started in June.  She is otherwise only taking some supplements along with her lisinopril.    She  reports that she quit smoking about 50 years ago. She has never used smokeless tobacco.    Past medical history reviewed as below:     Past Medical History:   Diagnosis Date     Anemia 05/15/2013     CKD (chronic kidney disease) stage 3, GFR 30-59 ml/min 05/19/2014    Stable. First noted when patient was taking NSAIDs regularly due to back pain.     Essential (primary) hypertension      Nonrheumatic mitral valve prolapse 03/28/2008    with normal echo and no residual prolapse     Nontoxic single thyroid nodule 01/18/2012    Patient with multinodular goiter. Negative cytology and stable ultrasound findings.     Osteopenia      Other fracture of unspecified lower leg, initial encounter for closed fracture      Pain in left hip 08/03/2017  "    Pregnancy      2, para 2 with 2 spontaneous vaginal deliveries     Pure hypercholesterolemia      Scoliosis      Spinal stenosis    .      ROS:   Pertinent  ROS was performed and was negative, including for fevers, chills, increased lower extremity edema, changes in bowel or bladder, blood in the stool, difficulty swallowing, sores in the mouth. No other concerns, with exception of HPI above.      Objective:    /78 (BP Location: Right arm, Patient Position: Sitting, Cuff Size: Adult Large)  Pulse 66  Temp 97.7  F (36.5  C) (Tympanic)  Resp 18  Ht 5' 3.5\" (1.613 m)  Wt 175 lb 8 oz (79.6 kg)  SpO2 94%  Breastfeeding? No  BMI 30.6 kg/m2  GEN: Vitals reviewed.  Patient is in no acute distress. Cooperative with exam.  HEENT: Normocephalic atraumatic.  Pupils equally round.  No scleral icterus, no conjunctival erythema. Oropharynx with no erythema or exudates. Dentition adequate.  CV: Heart regular in rate and rhythm with no murmur.   LUNGS: Lungs clear to auscultation bilaterally.  Chest rise equal bilaterally.  No accessory muscle use.  SKIN: Warm and dry to touch.  No rash on face, arms and legs.  EXT: No clubbing or cyanosis.   Trace bilateral lower extremity peripheral edema.     Assessment/Plan:   SOB (shortness of breath)  -Etiology is unknown at this time.  I suspect it is secondary to nasal congestion versus reactive airway given that it only occurs primarily at [employer].  She was provided an inhaler to use as needed to see if she has any improvement.  We also discussed that it may be related to her new medication as this was the only thing started in the last 2 months.  She will hold her Fosamax and see if she has any continued symptoms.  If it does appear to be the medication we will need to consider alternative treatment.  If she continues to have symptoms we will need to consider additional cardiovascular/pulmonary testing.  She is to call in approximately 2-3 weeks to update me  - " albuterol (PROAIR HFA) 108 (90 Base) MCG/ACT Inhaler  Dispense: 1 Inhaler; Refill: 3    Palpitations  -Etiology at this time is unknown.  We did discuss that it may be related to her Fosamax.  She will hold this and see how it goes.  If she continues to have symptoms we will need to consider cardiac monitoring plus/minus stress testing.    Nasal congestion  - conservative measures with OTC antihistamine as below  - Return/call as needed for follow-up should any new symptoms develop, for worsening of current symptoms or if symptoms do not resolve with above plan.    Essential hypertension  -Recheck is improved however she is to monitor her blood pressure.  At this time she will continue with her lisinopril.   If it does remain elevated we will need to consider increasing her medication      - Return/call as needed for follow-up should any new symptoms develop, for worsening of current symptoms or if symptoms do not resolve with above plan.        Patient Instructions   Hold your Fosamax for 3 weeks (7/29 and 8/5) and see if symptoms clear up    Can use inhaler if needed for symptoms    If congestion does not clear, can try one of the following:    - Loratadine (Claritin) 10mg daily for 3-4 weeks and then as needed (least sedating, least effective)  - Fexofenadine (Allegra) 180mg daily for 3-4 weeks and then as needed   - Cetirizine (Zyrtec) 10mg daily for 3-4 weeks and then as needed (most sedating,most effective)    Please note that these can take up to 3-4 weeks to see a difference.      - Return/call as needed for follow-up should any new symptoms develop, for worsening of current symptoms or if symptoms do not resolve with above plan.    Clinic : [phone]  Appointment line: [phone]            CHRIS HORAN DO   7/25/2018 5:01 PM    This document was prepared using voice generated softwear. While every attempt was made for accuracy, grammatical errors may exist.

## 2018-07-25 NOTE — MR AVS SNAPSHOT
After Visit Summary   7/25/2018    Vicki Mckeon    MRN: [id number]           Patient Information     Date Of Birth          1935        Visit Information        Provider Department      7/25/2018 11:20 AM Kirsten Guevara DO [facility]        Today's Diagnoses     SOB (shortness of breath)    -  1    Palpitations        Nasal congestion        Essential hypertension          Care Instructions    Hold your Fosamax for 3 weeks (7/29 and 8/5) and see if symptoms clear up    Can use inhaler if needed for symptoms    If congestion does not clear, can try one of the following:    - Loratadine (Claritin) 10mg daily for 3-4 weeks and then as needed (least sedating, least effective)  - Fexofenadine (Allegra) 180mg daily for 3-4 weeks and then as needed   - Cetirizine (Zyrtec) 10mg daily for 3-4 weeks and then as needed (most sedating,most effective)    Please note that these can take up to 3-4 weeks to see a difference.      - Return/call as needed for follow-up should any new symptoms develop, for worsening of current symptoms or if symptoms do not resolve with above plan.    Clinic : [phone]  Appointment line: [phone]               Follow-ups after your visit        Follow-up notes from your care team     Return if symptoms worsen or fail to improve.      Your next 10 appointments already scheduled     Aug 14, 2018  1:50 PM CDT   PROCEDURE with Sabrina Quarles MD,   SURGERY   [facility]    [address]  [facility] [id number] [phone]            Dec 12, 2018 10:00 AM CST   SHORT with Kirsten Guevara DO   [facility]    [address]  [place] [id number] [phone]              Who to contact     If you have questions or need follow up information about today's clinic visit or your schedule please contact [facility] directly at [phone].  Normal or non-critical lab and imaging results will be communicated to you by MyChart, letter or phone within 4 business days after the clinic has received the results. If you do not hear from us within 7 days, please contact the clinic through [employer] or phone. If you have a critical or abnormal lab result, we will notify you by phone as soon as possible.  Submit refill requests through [employer] or call your pharmacy and they will forward the refill request to us. Please allow 3 business days for your refill to be completed.          Additional Information About Your Visit        [employer] Information     [employer] lets you send messages to your doctor, view your test results, renew your prescriptions, schedule appointments and more. To sign up, go to [email or web address] . Click on \"Log in\" on the left side of the screen, which will take you to the Welcome page. Then click on \"Sign up Now\" on the right side of the page.     You will be asked to enter the access code listed below, as well as some personal information. Please follow the directions to create your username and password.     Your access code is: [email or web address]  Expires: 10/23/2018  8:19 AM     Your access code will  in 90 days. If you need help or a new code, please call your [place] clinic or [phone].        Care EveryWhere ID     This is your Care EveryWhere ID. This could be used by other organizations to access your [facility] medical records  [id number]        Your Vitals Were     Pulse Temperature Respirations Height Pulse Oximetry Breastfeeding?    66 97.7  F (36.5  C) (Tympanic) 18 5' 3.5\" (1.613 m) 94% No    BMI (Body Mass Index)                   30.6 kg/m2            Blood Pressure from Last 3 Encounters:   18 158/66   18 132/72   18 148/82    Weight from Last 3 Encounters:   18 175 lb 8 oz (79.6 kg)   18 176 lb 1.6 oz (79.9 kg)   18 178 lb 1 oz (80.8 " kg)              Today, you had the following     No orders found for display         Today's Medication Changes          These changes are accurate as of 7/25/18 11:53 AM.  If you have any questions, ask your nurse or doctor.               Start taking these medicines.        Dose/Directions    albuterol 108 (90 Base) MCG/ACT Inhaler   Commonly known as:  PROAIR HFA   Used for:  SOB (shortness of breath)   Started by:  Kirsten Guevara DO        Dose:  1-2 puff   Inhale 1-2 puffs into the lungs every 4 hours as needed for shortness of breath / dyspnea or wheezing   Quantity:  1 Inhaler   Refills:  3            Where to get your medicines      These medications were sent to [id number] - [place], [place] - [address]  [address] [facility] [address]     Phone:  [phone]     albuterol 108 (90 Base) MCG/ACT Inhaler                Primary Care Provider Office Phone # Fax #    Kirsten Guevara [phone]       [address] [facility] [address]        Equal Access to Services     [facility]: Hadii aad ku hadasho Soomaali, waaxda luqadaha, qaybta kaalmada adeegyada, ray ames . So [facility] [phone].    ATENCIÓN: Si habla español, tiene a magallon disposición servicios gratuitos de asistencia lingüística. Karuna al [phone].    We comply with applicable federal civil rights laws and [place] laws. We do not discriminate on the basis of race, color, national origin, age, disability, sex, sexual orientation, or gender identity.            Thank you!     Thank you for choosing [facility] AND [facility]  for your care. Our goal is always to provide you with excellent care. Hearing back from our patients is one way we can continue to improve our services. Please take a few minutes to complete the written survey that you may receive in the mail after your visit with us. Thank you!             Your Updated  Medication List - Protect others around you: Learn how to safely use, store and throw away your medicines at www.disposemymeds.org.          This list is accurate as of 7/25/18 11:53 AM.  Always use your most recent med list.                   Brand Name Dispense Instructions for use Diagnosis    albuterol 108 (90 Base) MCG/ACT Inhaler    PROAIR HFA    1 Inhaler    Inhale 1-2 puffs into the lungs every 4 hours as needed for shortness of breath / dyspnea or wheezing    SOB (shortness of breath)       alendronate 70 MG tablet    FOSAMAX    12 tablet    Take 1 tablet (70 mg) by mouth with 8oz water every 7 days 30 minutes before breakfast and remain upright during this time.    Osteopenia of multiple sites       Calcium + D3 600-200 MG-UNIT Tabs      Take 1 tablet by mouth daily        lisinopril 10 MG tablet    PRINIVIL/ZESTRIL    90 tablet    Take 1 tablet (10 mg) by mouth daily    Benign essential hypertension       RA VITAMIN B-12 TR 1000 MCG Tbcr   Generic drug:  cyanocobalamin      Take 1,000 mcg by mouth daily        vitamin D3 1000 units Caps      Take 1,000 Units by mouth daily

## 2018-07-25 NOTE — NURSING NOTE
Patient presents to clinic experiencing shortness of breath and light headedness for past month.  Anahi Grant LPN............7/25/2018 11:24 AM

## 2018-07-25 NOTE — TELEPHONE ENCOUNTER
"FYI    The symptoms started about 3 weeks ago.    It was a hot day, she was in [employer] and she had a \"spell\" where she couldn't catch her breath.  This then happened again in [employer].  It lasts only a few minutes.    Yesterday she was seeing [facility] where she had a little spell where she got very short of breath and felt weak.    Appointment made for today at 11:20    Marielle Malin LPN  7/25/2018  8:21 AM    "

## 2018-07-25 NOTE — TELEPHONE ENCOUNTER
Please call and clarify patient's symptoms, onset etc. as I just saw her a month ago    I can see her today at 1120 if my same day spot is still open.

## 2018-07-25 NOTE — PATIENT INSTRUCTIONS
Hold your Fosamax for 3 weeks (7/29 and 8/5) and see if symptoms clear up    Can use inhaler if needed for symptoms    If congestion does not clear, can try one of the following:    - Loratadine (Claritin) 10mg daily for 3-4 weeks and then as needed (least sedating, least effective)  - Fexofenadine (Allegra) 180mg daily for 3-4 weeks and then as needed   - Cetirizine (Zyrtec) 10mg daily for 3-4 weeks and then as needed (most sedating,most effective)    Please note that these can take up to 3-4 weeks to see a difference.      - Return/call as needed for follow-up should any new symptoms develop, for worsening of current symptoms or if symptoms do not resolve with above plan.    Clinic : [phone]  Appointment line: [phone]

## 2018-07-26 ASSESSMENT — ANXIETY QUESTIONNAIRES: GAD7 TOTAL SCORE: 0

## 2018-07-31 ENCOUNTER — TELEPHONE (OUTPATIENT)
Dept: INTERNAL MEDICINE | Facility: OTHER | Age: 83
End: 2018-07-31

## 2018-07-31 NOTE — TELEPHONE ENCOUNTER
Contacted the patient she states that she was seen on 7- for palpitations and what felt like hiccups. She states that  yesterday she was at [employer] and felt like she was going to pass out, got really shaky and had to head home. She states that she used her inhaler when she got home yesterday and felt a little better, but last night she did not take her lisinopril and feels pretty good this morning. She isn't sure if it could be the lisinopril making her feel this way and she reports she hasn't taken her fosamax since July 14th.  Meagan Morse, OG on 7/31/2018 at 10:14 AM

## 2018-07-31 NOTE — TELEPHONE ENCOUNTER
She can hold her lisinopril or decrease it to 5mg daily and monitor her blood pressure.  Her goal BP is < 140/90.    I would recommend follow up in 2 weeks for BP and meds.  I can see her on 8/15 at 1:40.

## 2018-07-31 NOTE — TELEPHONE ENCOUNTER
The patient was contacted and given the information below. She was also placed in Kirsten Guevara DO schedule on 8- at 1:40pm.  Meagan Morse LPN on 7/31/2018 at 1:17 PM

## 2018-07-31 NOTE — TELEPHONE ENCOUNTER
"Calling regarding recent visit and \"complications.\" wanting blood pressure medication checked.   "

## 2018-08-15 ENCOUNTER — HOSPITAL ENCOUNTER (OUTPATIENT)
Dept: GENERAL RADIOLOGY | Facility: OTHER | Age: 83
Discharge: HOME | End: 2018-08-15
Attending: INTERNAL MEDICINE | Admitting: INTERNAL MEDICINE
Payer: MEDICARE

## 2018-08-15 ENCOUNTER — OFFICE VISIT (OUTPATIENT)
Dept: INTERNAL MEDICINE | Facility: OTHER | Age: 83
End: 2018-08-15
Attending: INTERNAL MEDICINE
Payer: MEDICARE

## 2018-08-15 VITALS
BODY MASS INDEX: 31 KG/M2 | DIASTOLIC BLOOD PRESSURE: 68 MMHG | WEIGHT: 177.8 LBS | SYSTOLIC BLOOD PRESSURE: 146 MMHG | HEART RATE: 80 BPM

## 2018-08-15 DIAGNOSIS — R06.02 SOB (SHORTNESS OF BREATH): Primary | ICD-10-CM

## 2018-08-15 DIAGNOSIS — R00.2 PALPITATIONS: ICD-10-CM

## 2018-08-15 DIAGNOSIS — R06.02 SOB (SHORTNESS OF BREATH): ICD-10-CM

## 2018-08-15 DIAGNOSIS — R07.89 CHEST HEAVINESS: ICD-10-CM

## 2018-08-15 DIAGNOSIS — I10 ESSENTIAL HYPERTENSION: ICD-10-CM

## 2018-08-15 LAB — TSH SERPL DL<=0.05 MIU/L-ACNC: 1.99 IU/ML (ref 0.34–5.6)

## 2018-08-15 PROCEDURE — G0463 HOSPITAL OUTPT CLINIC VISIT: HCPCS | Mod: 25

## 2018-08-15 PROCEDURE — 99214 OFFICE O/P EST MOD 30 MIN: CPT | Mod: 25 | Performed by: INTERNAL MEDICINE

## 2018-08-15 PROCEDURE — 84443 ASSAY THYROID STIM HORMONE: CPT | Performed by: INTERNAL MEDICINE

## 2018-08-15 PROCEDURE — 93010 ELECTROCARDIOGRAM REPORT: CPT | Performed by: INTERNAL MEDICINE

## 2018-08-15 PROCEDURE — 36415 COLL VENOUS BLD VENIPUNCTURE: CPT | Performed by: INTERNAL MEDICINE

## 2018-08-15 PROCEDURE — 93005 ELECTROCARDIOGRAM TRACING: CPT | Performed by: INTERNAL MEDICINE

## 2018-08-15 PROCEDURE — G0463 HOSPITAL OUTPT CLINIC VISIT: HCPCS

## 2018-08-15 PROCEDURE — 71046 X-RAY EXAM CHEST 2 VIEWS: CPT

## 2018-08-15 ASSESSMENT — PAIN SCALES - GENERAL: PAINLEVEL: NO PAIN (0)

## 2018-08-15 NOTE — NURSING NOTE
Vicki presents to the clinic today for a follow up blood pressure check and a medication check.        Mukund Anderson LPN 08/15/18 1:41 PM

## 2018-08-15 NOTE — PROGRESS NOTES
Chief Complaint   Patient presents with     RECHECK     Follow up blood pressure and medication check         HPI: Ms. Mckeon is a 83 year old female who presents today for follow up of blood pressure and palpitations.    She does continue to have palpitations.  She is also had occasional shortness of breath.  She additionally describes some chest heaviness off and on.  She does not recall any specific pattern to this.  It does not seem to be affected by activity.  She did stop her Fosamax and has not noted any significant change off of this.  She did try using the albuterol inhaler and feels that perhaps it is a little better although she only used it once.  She denies any nausea, vomiting, left arm pain, neck pain, dizziness with her chest heaviness.    She has found that her palpitations are worse after drinking coffee.  She also has recently had a dental infection and had to have treatment for this.    Her blood pressure overall has been okay.  She is taking her lisinopril 10 mg daily.  She took it at night and did sleep better however stopped taking it for a period of time and found that her blood pressure increased.  Blood pressure readings from home have ranged between 122/70 and 146/74.  Her heart rate has been in the 70-79 range.    She  reports that she quit smoking about 50 years ago. She has never used smokeless tobacco.    Past medical history reviewed as below:     Past Medical History:   Diagnosis Date     Anemia 05/15/2013     CKD (chronic kidney disease) stage 3, GFR 30-59 ml/min 05/19/2014    Stable. First noted when patient was taking NSAIDs regularly due to back pain.     Essential (primary) hypertension      Nonrheumatic mitral valve prolapse 03/28/2008    with normal echo and no residual prolapse     Nontoxic single thyroid nodule 01/18/2012    Patient with multinodular goiter. Negative cytology and stable ultrasound findings.     Osteopenia      Other fracture of unspecified lower leg, initial  "encounter for closed fracture      Pain in left hip 2017     Pregnancy      2, para 2 with 2 spontaneous vaginal deliveries     Pure hypercholesterolemia      Scoliosis      Spinal stenosis    .  Review of Systems        ROS  Pertinent ROS was performed and was negative, including for fever, chills, increased lower extremity edema, changes in bowel or bladder, blood in the stool, difficulty swallowing, sores in the mouth. No other concerns, with exception of HPI above.      EXAM:   /68 (BP Location: Right arm, Patient Position: Sitting, Cuff Size: Adult Large)  Pulse 80  Wt 177 lb 12.8 oz (80.6 kg)  Breastfeeding? No  BMI 31 kg/m2    Estimated body mass index is 31 kg/(m^2) as calculated from the following:    Height as of 18: 5' 3.5\" (1.613 m).    Weight as of this encounter: 177 lb 12.8 oz (80.6 kg).      GEN: Vitals reviewed. Healthy appearing. Patient is in no acute distress. Cooperative with exam.  HEENT: Normocephalic atraumatic.  Pupils equally round.  No scleral icterus, no conjunctival erythema. Oropharynx with no erythema or exudates. Dentition adequate.  CV: Heart regular in rate and rhythm with no murmur.    LUNGS: Lungs clear to auscultation bilaterally.  Chest rise equal bilaterally.  No accessory muscle use.  ABD:  Obese.  SKIN: Warm and dry to touch.  No rash on face, arms and legs.  EXT: No clubbing or cyanosis.  No peripheral edema.  PSYCH: Mood is good.  Affect appropriate. Speech fluent. Answers questions appropriately and thought process normal.      ASSESSMENT AND PLAN:    SOB (shortness of breath)  -At this time given her slight improvement with her inhaler that she used once there is concerned that she has underlining COPD however cardiac disease is still possible.  Chest x-ray taken today and visualized.  No obvious abnormality.  If all of the testing is negative she may benefit from pulmonary function studies and more advanced imaging.  - XR Chest 2 Views  - Zio " "Patch Holter    Palpitations  TSH negative.  EKG done today and reviewed personally.  Normal sinus rhythm with no significant changes.  Plan at this time to pursue cardiac monitoring.  - EKG 12-lead, tracing only  - Zio Patch Holter  - TSH    Chest heaviness  -With her additional symptom of chest heaviness I do think she would benefit from a stress test.  She does believe that she can walk on a treadmill.  - Echo Stress Test With Definity    Essential hypertension  - Blood pressure today of 146/68   Is not at the goal of <140/90 with mild exacerbation.  - Continue current regimen at this time with lisinopril 10 mg daily.  Instructed to check BP at home.  - Cautioned patient to monitor with antibiotics, herbals and any OTC medications      Follow-up as needed ending above results.         Patient Instructions     * Heart Palpitations    Palpitations refers to the feeling that your heart is beating hard, fast or irregular. Some people describe it as \"pounding\" or \"skipped beats\". Palpitations may occur in persons with heart disease, but can also occur in healthy persons. Your doctor does not believe that anything dangerous is causing your symptoms at this time.  Heart-Related Causes:    Arrhythmia (a change from the heart's normal rhythm)    Disease of the heart valves  Non-Heart-Related Causes:    Certain medicines (such as asthma inhalers and decongestants)    Some herbal supplements, energy drinks and pills, and weight loss pills    Illegal stimulant drugs (such as cocaine, crank, methamphetamine, PCP)    Caffeine, alcohol and tobacco    Medical conditions such as thyroid disease, anemia, anxiety and panic disorder    Stress    Dehydration  Sometimes the cause cannot be found.  Home Care:  1. Avoid excess caffeine, alcohol, tobacco and any stimulant drugs.  Increase water intake.  2. Tell your doctor about any prescription or over-the-counter or herbal medicines you take.  Follow Up  with your doctor or as advised " by our staff.  Get Prompt Medical Attention  if any of the following occur together with palpitations:    Weakness, dizziness, light-headed or fainting    Chest pain or shortness of breath if worse    Rapid heart rate (over 120 beats per minute, at rest)    Palpitations that lasts over 20 minutes    Weakness of an arm or leg or one side of the face    Difficulty with speech or vision    4269-5613 [employer]. [address] [place] [address]. All rights reserved. This information is not intended as a substitute for professional medical care. Always follow your healthcare professional's instructions.  This information has been modified by your health care provider with permission from the publisher.              CHRIS HORAN DO   8/15/2018 5:19 PM    This document was prepared using voice generated softwear. While every attempt was made for accuracy, grammatical errors may exist.

## 2018-08-15 NOTE — PATIENT INSTRUCTIONS
"  * Heart Palpitations    Palpitations refers to the feeling that your heart is beating hard, fast or irregular. Some people describe it as \"pounding\" or \"skipped beats\". Palpitations may occur in persons with heart disease, but can also occur in healthy persons. Your doctor does not believe that anything dangerous is causing your symptoms at this time.  Heart-Related Causes:    Arrhythmia (a change from the heart's normal rhythm)    Disease of the heart valves  Non-Heart-Related Causes:    Certain medicines (such as asthma inhalers and decongestants)    Some herbal supplements, energy drinks and pills, and weight loss pills    Illegal stimulant drugs (such as cocaine, crank, methamphetamine, PCP)    Caffeine, alcohol and tobacco    Medical conditions such as thyroid disease, anemia, anxiety and panic disorder    Stress    Dehydration  Sometimes the cause cannot be found.  Home Care:  1. Avoid excess caffeine, alcohol, tobacco and any stimulant drugs.  Increase water intake.  2. Tell your doctor about any prescription or over-the-counter or herbal medicines you take.  Follow Up  with your doctor or as advised by our staff.  Get Prompt Medical Attention  if any of the following occur together with palpitations:    Weakness, dizziness, light-headed or fainting    Chest pain or shortness of breath if worse    Rapid heart rate (over 120 beats per minute, at rest)    Palpitations that lasts over 20 minutes    Weakness of an arm or leg or one side of the face    Difficulty with speech or vision    2153-2002 [employer]. [address] [place] [address]. All rights reserved. This information is not intended as a substitute for professional medical care. Always follow your healthcare professional's instructions.  This information has been modified by your health care provider with permission from the publisher.        "

## 2018-08-15 NOTE — MR AVS SNAPSHOT
"              After Visit Summary   8/15/2018    Vicki Mckeon    MRN: [id number]           Patient Information     Date Of Birth          1935        Visit Information        Provider Department      8/15/2018 1:40 PM Kirsten Guevara DO [facility]        Today's Diagnoses     SOB (shortness of breath)    -  1    Palpitations        Chest heaviness          Care Instructions      * Heart Palpitations    Palpitations refers to the feeling that your heart is beating hard, fast or irregular. Some people describe it as \"pounding\" or \"skipped beats\". Palpitations may occur in persons with heart disease, but can also occur in healthy persons. Your doctor does not believe that anything dangerous is causing your symptoms at this time.  Heart-Related Causes:    Arrhythmia (a change from the heart's normal rhythm)    Disease of the heart valves  Non-Heart-Related Causes:    Certain medicines (such as asthma inhalers and decongestants)    Some herbal supplements, energy drinks and pills, and weight loss pills    Illegal stimulant drugs (such as cocaine, crank, methamphetamine, PCP)    Caffeine, alcohol and tobacco    Medical conditions such as thyroid disease, anemia, anxiety and panic disorder    Stress    Dehydration  Sometimes the cause cannot be found.  Home Care:  1. Avoid excess caffeine, alcohol, tobacco and any stimulant drugs.  Increase water intake.  2. Tell your doctor about any prescription or over-the-counter or herbal medicines you take.  Follow Up  with your doctor or as advised by our staff.  Get Prompt Medical Attention  if any of the following occur together with palpitations:    Weakness, dizziness, light-headed or fainting    Chest pain or shortness of breath if worse    Rapid heart rate (over 120 beats per minute, at rest)    Palpitations that lasts over 20 minutes    Weakness of an arm or leg or one side of the face    Difficulty with speech or vision    [id number] [facility] " [employer]. [address] [place] [address]. All rights reserved. This information is not intended as a substitute for professional medical care. Always follow your healthcare professional's instructions.  This information has been modified by your health care provider with permission from the publisher.                Follow-ups after your visit        Follow-up notes from your care team     Return if symptoms worsen or fail to improve.      Your next 10 appointments already scheduled     Aug 16, 2018  8:50 AM CDT   PROCEDURE with John Anderson MD,   SURGERY   [facility] ([facility])    [address]  [place] [address]   [phone]            Dec 12, 2018 10:00 AM CST   SHORT with Kirsten Guevara DO   [facility]    [address]  [place] [address]   [phone]              Future tests that were ordered for you today     Open Future Orders        Priority Expected Expires Ordered    Zio Patch Holter Routine  9/29/2018 8/15/2018    TSH Routine  8/15/2019 8/15/2018    Echo Stress Test With Definity Routine  8/15/2019 8/15/2018    XR Chest 2 Views Routine 8/15/2018 8/15/2019 8/15/2018            Who to contact     If you have questions or need follow up information about today's clinic visit or your schedule please contact [facility] directly at [phone].  Normal or non-critical lab and imaging results will be communicated to you by MyChart, letter or phone within 4 business days after the clinic has received the results. If you do not hear from us within 7 days, please contact the clinic through [employer] or phone. If you have a critical or abnormal lab result, we will notify you by phone as soon as possible.  Submit refill requests through [employer] or call your pharmacy and they will forward the refill request to us. Please allow 3 business  "days for your refill to be completed.          Additional Information About Your Visit        MyChart Information     Baileyu lets you send messages to your doctor, view your test results, renew your prescriptions, schedule appointments and more. To sign up, go to [email or web address] . Click on \"Log in\" on the left side of the screen, which will take you to the Welcome page. Then click on \"Sign up Now\" on the right side of the page.     You will be asked to enter the access code listed below, as well as some personal information. Please follow the directions to create your username and password.     Your access code is: [email or web address]  Expires: 10/23/2018  8:19 AM     Your access code will  in 90 days. If you need help or a new code, please call your [place] clinic or [phone].        Care EveryWhere ID     This is your Care EveryWhere ID. This could be used by other organizations to access your [place] medical records  [id number]        Your Vitals Were     Pulse Breastfeeding? BMI (Body Mass Index)             80 No 31 kg/m2          Blood Pressure from Last 3 Encounters:   08/15/18 146/68   18 132/78   18 132/72    Weight from Last 3 Encounters:   08/15/18 177 lb 12.8 oz (80.6 kg)   18 175 lb 8 oz (79.6 kg)   18 176 lb 1.6 oz (79.9 kg)              We Performed the Following     EKG 12-lead, tracing only        Primary Care Provider Office Phone # Fax #    Kirsten CHEEMA DO Isma[place] [phone] [address] [facility] [address]        Equal Access to Services     [facility]: Hadii amina Montiel, wabobda luqadaha, qaybta kaalmaray weiss . So [facility] [phone].    ATENCIÓN: Si habla español, tiene a magallon disposición servicios gratuitos de asistencia lingüística. Llame al [phone].    We comply with applicable federal civil rights laws and [place] laws. We do not discriminate on the basis of " race, color, national origin, age, disability, sex, sexual orientation, or gender identity.            Thank you!     Thank you for choosing [facility] [place]  for your care. Our goal is always to provide you with excellent care. Hearing back from our patients is one way we can continue to improve our services. Please take a few minutes to complete the written survey that you may receive in the mail after your visit with us. Thank you!             Your Updated Medication List - Protect others around you: Learn how to safely use, store and throw away your medicines at www.disposemymeds.org.          This list is accurate as of 8/15/18  2:18 PM.  Always use your most recent med list.                   Brand Name Dispense Instructions for use Diagnosis    albuterol 108 (90 Base) MCG/ACT inhaler    PROAIR HFA    1 Inhaler    Inhale 1-2 puffs into the lungs every 4 hours as needed for shortness of breath / dyspnea or wheezing    SOB (shortness of breath)       alendronate 70 MG tablet    FOSAMAX    12 tablet    Take 1 tablet (70 mg) by mouth with 8oz water every 7 days 30 minutes before breakfast and remain upright during this time.    Osteopenia of multiple sites       Calcium + D3 600-200 MG-UNIT Tabs      Take 1 tablet by mouth daily        lisinopril 10 MG tablet    PRINIVIL/ZESTRIL    90 tablet    Take 1 tablet (10 mg) by mouth daily    Benign essential hypertension       RA VITAMIN B-12 TR 1000 MCG Tbcr   Generic drug:  cyanocobalamin      Take 1,000 mcg by mouth daily        vitamin D3 1000 units Caps      Take 1,000 Units by mouth daily

## 2018-08-15 NOTE — NURSING NOTE
"Chief Complaint   Patient presents with     RECHECK     Follow up blood pressure and medication check       Initial /68 (BP Location: Right arm, Patient Position: Sitting, Cuff Size: Adult Large)  Pulse 80  Wt 177 lb 12.8 oz (80.6 kg)  Breastfeeding? No  BMI 31 kg/m2 Estimated body mass index is 31 kg/(m^2) as calculated from the following:    Height as of 7/25/18: 5' 3.5\" (1.613 m).    Weight as of this encounter: 177 lb 12.8 oz (80.6 kg).  Medication Reconciliation: complete    Mukund Anderson LPN    "

## 2018-08-16 ENCOUNTER — OFFICE VISIT (OUTPATIENT)
Dept: SURGERY | Facility: OTHER | Age: 83
End: 2018-08-16
Attending: NURSE PRACTITIONER
Payer: MEDICARE

## 2018-08-16 VITALS
DIASTOLIC BLOOD PRESSURE: 76 MMHG | BODY MASS INDEX: 30.22 KG/M2 | HEIGHT: 64 IN | WEIGHT: 177 LBS | SYSTOLIC BLOOD PRESSURE: 132 MMHG

## 2018-08-16 DIAGNOSIS — L98.9 SKIN LESIONS: Primary | ICD-10-CM

## 2018-08-16 PROCEDURE — 11440 EXC FACE-MM B9+MARG 0.5 CM/<: CPT | Performed by: SURGERY

## 2018-08-16 PROCEDURE — 11440 EXC FACE-MM B9+MARG 0.5 CM/<: CPT

## 2018-08-16 PROCEDURE — 11642 EXC F/E/E/N/L MAL+MRG 1.1-2: CPT | Performed by: SURGERY

## 2018-08-16 PROCEDURE — 99207 HC EXC BENIGN SKIN LESION FACE/EARS 1.1-2.0 CM: CPT | Performed by: SURGERY

## 2018-08-16 PROCEDURE — 11442 EXC FACE-MM B9+MARG 1.1-2 CM: CPT

## 2018-08-16 PROCEDURE — 11642 EXC F/E/E/N/L MAL+MRG 1.1-2: CPT

## 2018-08-16 PROCEDURE — 88305 TISSUE EXAM BY PATHOLOGIST: CPT

## 2018-08-16 ASSESSMENT — PAIN SCALES - GENERAL: PAINLEVEL: NO PAIN (0)

## 2018-08-16 NOTE — PROGRESS NOTES
Procedure Note      Pre/Post Operative Diagnosis:   Forehead skin lesions X 2    Procedure:   Removal of forehead skin lesions X 2     Surgeon: John Anderson MD    Local Anesthesia: 1% lidocaine with 0.25% Marcaine with epinephrine    Indication for the procedure:  This is a 83 year old female  patient referred by Kirsten Guevara .       After explaining the risks to include bleeding, infection, recurrence or need for reexcision, and scarring the patient wished to proceed.    Procedure:   The area was prepped and draped in usual sterile fashion with ChloraPrep.   After, adequate local anesthesia, an 4 cm X 1.2 cm X 0.6 cm deep elliptical skin incision was made to encompass the 1 cm lesion with margins that was to the right of the forehead.  The skin was closed with 4-0 Monocryl and Dermabond.      The area was prepped and draped in usual sterile fashion with ChloraPrep.   After, adequate local anesthesia, an 1 cm X 0.5 cm elliptical skin incision was made to encompass the 0.4cm left forehead cm lesion.  The skin was closed with 4-0 Monocryl and Dermabond.        Plan:  The patient will be called to review the pathology. Patient will follow up if there any problems with the wound including redness or drainage.      John Anderson....................  8/16/2018   9:18 AM

## 2018-08-16 NOTE — MR AVS SNAPSHOT
After Visit Summary   8/16/2018    Vicki Mckeon    MRN: [id number]           Patient Information     Date Of Birth          1935        Visit Information        Provider Department      8/16/2018 8:50 AM John Anderson MD;   SURGERY [facility]        Care Instructions    Your incision was closed with stitches that will dissolve removed.     It is ok to remove the dressing and get the incision wet in the shower on the day after your procedure.     Don't soak in a tub, pool or lake for 5 days.  Keep out of the sun. If you have concerns, please call.   Call if you have redness, drainage or fever.          Follow-ups after your visit        Your next 10 appointments already scheduled     Dec 12, 2018 10:00 AM TEJ RIDER with Kirsten Guevara DO   [facility] ([facility])    [address]  [place] [address]   [phone]              Future tests that were ordered for you today     Open Future Orders        Priority Expected Expires Ordered    Zio Patch Holter Routine  9/29/2018 8/15/2018    Echo Stress Test With Definity Routine  8/15/2019 8/15/2018    XR Chest 2 Views Routine 8/15/2018 8/15/2019 8/15/2018            Who to contact     If you have questions or need follow up information about today's clinic visit or your schedule please contact [facility] directly at [phone].  Normal or non-critical lab and imaging results will be communicated to you by MyChart, letter or phone within 4 business days after the clinic has received the results. If you do not hear from us within 7 days, please contact the clinic through MyChart or phone. If you have a critical or abnormal lab result, we will notify you by phone as soon as possible.  Submit refill requests through [employer] or call your pharmacy and they will forward the refill request to us. Please allow 3 business days for your refill to  "be completed.          Additional Information About Your Visit        PureBrand[employer]arAppia Information     BaseKit lets you send messages to your doctor, view your test results, renew your prescriptions, schedule appointments and more. To sign up, go to [email or web address] . Click on \"Log in\" on the left side of the screen, which will take you to the Welcome page. Then click on \"Sign up Now\" on the right side of the page.     You will be asked to enter the access code listed below, as well as some personal information. Please follow the directions to create your username and password.     Your access code is: [email or web address]  Expires: 10/23/2018  8:19 AM     Your access code will  in 90 days. If you need help or a new code, please call your [place] clinic or [phone].        Care EveryWhere ID     This is your Care EveryWhere ID. This could be used by other organizations to access your [place] medical records  [id number]        Your Vitals Were     Height BMI (Body Mass Index)                5' 3.5\" (1.613 m) 30.86 kg/m2           Blood Pressure from Last 3 Encounters:   18 132/76   08/15/18 146/68   18 132/78    Weight from Last 3 Encounters:   18 177 lb (80.3 kg)   08/15/18 177 lb 12.8 oz (80.6 kg)   18 175 lb 8 oz (79.6 kg)              Today, you had the following     No orders found for display       Primary Care Provider Office Phone # Fax #    Kirsten CHEEMA DO [place] [phone] [address] [facility] [address]        Equal Access to Services     [facility] : Hadii amina rodriguez [place], waaxda luqadaha, qaybta kaalmada jet, ray ames . So [facility] [phone].    ATENCIÓN: Si habla español, tiene a magallon disposición servicios gratuitos de asistencia lingüística. Llame al [phone].    We comply with applicable federal civil rights laws and [place] laws. We do not discriminate on the basis of race, color, national " origin, age, disability, sex, sexual orientation, or gender identity.            Thank you!     Thank you for choosing [facility] AND hospitals  for your care. Our goal is always to provide you with excellent care. Hearing back from our patients is one way we can continue to improve our services. Please take a few minutes to complete the written survey that you may receive in the mail after your visit with us. Thank you!             Your Updated Medication List - Protect others around you: Learn how to safely use, store and throw away your medicines at www.disposemymeds.org.          This list is accurate as of 8/16/18  9:11 AM.  Always use your most recent med list.                   Brand Name Dispense Instructions for use Diagnosis    albuterol 108 (90 Base) MCG/ACT inhaler    PROAIR HFA    1 Inhaler    Inhale 1-2 puffs into the lungs every 4 hours as needed for shortness of breath / dyspnea or wheezing    SOB (shortness of breath)       alendronate 70 MG tablet    FOSAMAX    12 tablet    Take 1 tablet (70 mg) by mouth with 8oz water every 7 days 30 minutes before breakfast and remain upright during this time.    Osteopenia of multiple sites       Calcium + D3 600-200 MG-UNIT Tabs      Take 1 tablet by mouth daily        lisinopril 10 MG tablet    PRINIVIL/ZESTRIL    90 tablet    Take 1 tablet (10 mg) by mouth daily    Benign essential hypertension       RA VITAMIN B-12 TR 1000 MCG Tbcr   Generic drug:  cyanocobalamin      Take 1,000 mcg by mouth daily        vitamin D3 1000 units Caps      Take 1,000 Units by mouth daily

## 2018-08-16 NOTE — NURSING NOTE
"Here today with a lesion on her forehead.  This has been there since June of this year.  Eliana Lobato LPN..........8/16/2018  8:40 AM     Chief Complaint   Patient presents with     Derm Problem     lesion on forehead       Initial /76 (BP Location: Left arm, Patient Position: Sitting, Cuff Size: Adult Large)  Ht 5' 3.5\" (1.613 m)  Wt 177 lb (80.3 kg)  BMI 30.86 kg/m2 Estimated body mass index is 30.86 kg/(m^2) as calculated from the following:    Height as of this encounter: 5' 3.5\" (1.613 m).    Weight as of this encounter: 177 lb (80.3 kg).  Medication Reconciliation: complete    Eliana Lobato LPN   States she has advanced directive on file  "

## 2018-08-16 NOTE — PATIENT INSTRUCTIONS
Your incision was closed with stitches that will dissolve removed.     It is ok to remove the dressing and get the incision wet in the shower on the day after your procedure.     Don't soak in a tub, pool or lake for 5 days.  Keep out of the sun. If you have concerns, please call.   Call if you have redness, drainage or fever.

## 2018-08-16 NOTE — NURSING NOTE
TIMEOUT  Universal Protocol    A. Pre-procedure verification complete     1-relevant information / documentation available, reviewed and properly matched to the patient; 2-consent accurate and complete, 3-equipment and supplies available    B. Site marking complete     Site marked if not in continuous attendance with patient    C. TIME OUT completed   Eliana Lobato LPN..........8/16/2018  8:55 AM    Time Out was conducted just prior to starting procedure to verify the eight required elements: 1-patient identity, 2-consent accurate and complete, 3-position, 4-correct side/site marked (if applicable), 5-procedure, 6-relevant images / results properly labeled and displayed (if applicable), 7-antibiotics / irrigation fluids (if applicable), 8-safety precautions.

## 2018-08-17 ENCOUNTER — TELEPHONE (OUTPATIENT)
Dept: INTERNAL MEDICINE | Facility: OTHER | Age: 83
End: 2018-08-17

## 2018-08-17 NOTE — TELEPHONE ENCOUNTER
Patient notified of results    Notes Recorded by Kirsten Guevara DO on 8/15/2018 at 4:29 PM  Please let patient know her results have returned.  Thyroid is normal, chest x-ray is normal and EKG overall does not show any significant abnormality.  Continue with additional testing as discussed.  call if she has any questions.

## 2018-08-28 ENCOUNTER — TELEPHONE (OUTPATIENT)
Dept: CARDIOLOGY | Facility: OTHER | Age: 83
End: 2018-08-28

## 2018-08-29 ENCOUNTER — TELEPHONE (OUTPATIENT)
Dept: CARDIAC REHAB | Facility: OTHER | Age: 83
End: 2018-08-29

## 2018-08-29 NOTE — TELEPHONE ENCOUNTER
Patient called back, and a call was returned to her. She had her instructions for her stress test. Questions were answered and she was told to check in at diagnostics.

## 2018-08-30 ENCOUNTER — HOSPITAL ENCOUNTER (OUTPATIENT)
Dept: CARDIOLOGY | Facility: OTHER | Age: 83
End: 2018-08-30
Attending: INTERNAL MEDICINE
Payer: MEDICARE

## 2018-08-30 ENCOUNTER — HOSPITAL ENCOUNTER (OUTPATIENT)
Dept: RESPIRATORY THERAPY | Facility: OTHER | Age: 83
Discharge: HOME | End: 2018-08-30
Attending: INTERNAL MEDICINE | Admitting: INTERNAL MEDICINE
Payer: MEDICARE

## 2018-08-30 DIAGNOSIS — R07.89 CHEST HEAVINESS: ICD-10-CM

## 2018-08-30 DIAGNOSIS — R06.02 SOB (SHORTNESS OF BREATH): ICD-10-CM

## 2018-08-30 DIAGNOSIS — R00.2 PALPITATIONS: ICD-10-CM

## 2018-08-30 PROCEDURE — 40000275 ZZH STATISTIC RCP TIME EA 10 MIN

## 2018-08-30 PROCEDURE — 93321 DOPPLER ECHO F-UP/LMTD STD: CPT | Mod: TC

## 2018-08-30 PROCEDURE — 93016 CV STRESS TEST SUPVJ ONLY: CPT | Performed by: INTERNAL MEDICINE

## 2018-08-30 PROCEDURE — 0298T ZZC EXT ECG > 48HR TO 21 DAY REVIEW AND INTERPRETATN: CPT | Performed by: INTERNAL MEDICINE

## 2018-08-30 PROCEDURE — 93017 CV STRESS TEST TRACING ONLY: CPT

## 2018-08-30 PROCEDURE — 25500064 ZZH RX 255 OP 636: Performed by: INTERNAL MEDICINE

## 2018-08-30 PROCEDURE — 0296T ZIO PATCH HOLTER: CPT

## 2018-08-30 PROCEDURE — 93325 DOPPLER ECHO COLOR FLOW MAPG: CPT | Mod: 26 | Performed by: INTERNAL MEDICINE

## 2018-08-30 PROCEDURE — 93350 STRESS TTE ONLY: CPT | Mod: 26 | Performed by: INTERNAL MEDICINE

## 2018-08-30 PROCEDURE — 93321 DOPPLER ECHO F-UP/LMTD STD: CPT | Mod: 26 | Performed by: INTERNAL MEDICINE

## 2018-08-30 PROCEDURE — 93018 CV STRESS TEST I&R ONLY: CPT | Performed by: INTERNAL MEDICINE

## 2018-08-30 RX ADMIN — PERFLUTREN 5 ML: 6.52 INJECTION, SUSPENSION INTRAVENOUS at 15:00

## 2018-08-30 NOTE — PROGRESS NOTES
1410:  The patient arrived for a stress echo.  The procedure, risks and benefits were discussed and the consent was signed.  The patient was prepped for the stress test, and the echo sonographer did the initial images with definity for image enhancement.    arrived, and the patient biked 5 minutes and 58 seconds.  The patient tolerated the procedure.  Stress images were completed and the patient was released in stable condition.  Please see the chart for complete test results.

## 2018-10-22 ENCOUNTER — OFFICE VISIT (OUTPATIENT)
Dept: PEDIATRICS | Facility: OTHER | Age: 83
End: 2018-10-22
Attending: INTERNAL MEDICINE
Payer: COMMERCIAL

## 2018-10-22 VITALS
HEIGHT: 64 IN | BODY MASS INDEX: 29.71 KG/M2 | DIASTOLIC BLOOD PRESSURE: 72 MMHG | SYSTOLIC BLOOD PRESSURE: 140 MMHG | WEIGHT: 174 LBS | HEART RATE: 64 BPM

## 2018-10-22 DIAGNOSIS — I47.10 PAROXYSMAL SUPRAVENTRICULAR TACHYCARDIA (H): Primary | ICD-10-CM

## 2018-10-22 DIAGNOSIS — I10 WHITE COAT SYNDROME WITH HYPERTENSION: ICD-10-CM

## 2018-10-22 DIAGNOSIS — Z23 NEED FOR VACCINATION: ICD-10-CM

## 2018-10-22 PROCEDURE — G0463 HOSPITAL OUTPT CLINIC VISIT: HCPCS | Mod: 25

## 2018-10-22 PROCEDURE — 99214 OFFICE O/P EST MOD 30 MIN: CPT | Performed by: INTERNAL MEDICINE

## 2018-10-22 PROCEDURE — G0008 ADMIN INFLUENZA VIRUS VAC: HCPCS

## 2018-10-22 PROCEDURE — 90662 IIV NO PRSV INCREASED AG IM: CPT | Performed by: INTERNAL MEDICINE

## 2018-10-22 PROCEDURE — G0463 HOSPITAL OUTPT CLINIC VISIT: HCPCS

## 2018-10-22 ASSESSMENT — ANXIETY QUESTIONNAIRES
IF YOU CHECKED OFF ANY PROBLEMS ON THIS QUESTIONNAIRE, HOW DIFFICULT HAVE THESE PROBLEMS MADE IT FOR YOU TO DO YOUR WORK, TAKE CARE OF THINGS AT HOME, OR GET ALONG WITH OTHER PEOPLE: NOT DIFFICULT AT ALL
2. NOT BEING ABLE TO STOP OR CONTROL WORRYING: NOT AT ALL
1. FEELING NERVOUS, ANXIOUS, OR ON EDGE: NOT AT ALL
3. WORRYING TOO MUCH ABOUT DIFFERENT THINGS: NOT AT ALL
6. BECOMING EASILY ANNOYED OR IRRITABLE: NOT AT ALL
7. FEELING AFRAID AS IF SOMETHING AWFUL MIGHT HAPPEN: NOT AT ALL
5. BEING SO RESTLESS THAT IT IS HARD TO SIT STILL: NOT AT ALL
GAD7 TOTAL SCORE: 0

## 2018-10-22 ASSESSMENT — PATIENT HEALTH QUESTIONNAIRE - PHQ9: 5. POOR APPETITE OR OVEREATING: NOT AT ALL

## 2018-10-22 ASSESSMENT — PAIN SCALES - GENERAL: PAINLEVEL: NO PAIN (0)

## 2018-10-22 NOTE — MR AVS SNAPSHOT
After Visit Summary   10/22/2018    Vicki Mckeon    MRN: [id number]           Patient Information     Date Of Birth          1935        Visit Information        Provider Department      10/22/2018 11:30 AM Simon Lobo MD [facility]        Today's Diagnoses     Paroxysmal supraventricular tachycardia (H)    -  1    Need for vaccination        White coat syndrome with hypertension          Care Instructions      Understanding Supraventricular Tachycardia  Supraventricular tachycardia (SVT) is a type of abnormal heart rhythm that results in fast heartbeats. The heart normally beats 60 to 100 beats per minute while you are at rest and awake. With SVT, the heart beats more than 100 times a minute. It may even beat over 200 times a minute. This is caused by a problem in the electrical system of the heart. It can lessen the amount of blood pumped through the heart.  How the heart beats  A heartbeat is the rhythm of the heart as it contracts to squeeze blood through the body. It s caused by electrical signals in the heart. A beat starts when a special group of cells give off an electrical signal. These cells are in the sinoatrial (SA) node. The SA node is in the upper right chamber of your heart (atrium). The signal from the SA node travels down to the 2 lower chambers of your heart (ventricles). On the way, the signal goes through the atrioventricular (AV) node. This is a special group of cells between the atria and ventricles. From there, the signal travels to your left and right ventricles. As it travels, the signal tells nearby parts of your heart to contract. This causes your heart to pump in a coordinated way.  What is SVT?  When you have SVT, the signal to start your heartbeat doesn t come from the SA node. Instead it comes from another part of the left or right atrium. Or it comes from the AV node. Some area outside the SA node begins to fire quickly, causing  a rapid heartbeat of over 100 beats per minute or the electrical signals are caught up in an abnormal looping circuit. This shortens the time your ventricles have to fill. If your heartbeat is fast enough, your heart may be unable to pump enough blood forward to the rest of your body. The abnormal heartbeat may last for a few seconds to a few hours before your heart returns to its normal rhythm. Some SVT rhythms can last for days or weeks, or even become permanent.  Types of SVT  There are several types of SVT. They include:    Atrial fibrillation. This is most common type of SVT. The upper chambers of the heart quiver very fast instead of pumping due to disorganized electrical activity in the atria.    Atrial flutter. This type of SVT is a milder form of fibrillation. The upper chambers of the heart flutter instead of pumping normally.    Atrioventricular corry reentrant tachycardia. It occurs when you have two channels through the AV node, instead of just one. The signal goes down one channel and up the other.    Atrioventricular reciprocating tachycardia. With this condition, there is an extra connection of muscle between the atrium and the ventricle. This is known as an accessory pathway and can conduct electricity upwards and downwards. The signal goes down the AV node and back to the atrium through the accessory pathway. It then goes down the AV node again. In rare cases, this condition leads to an abnormal heart rhythm that causes sudden death. This is a congenital defect, which means you were born with it.    Atrial tachycardia. This is another common type of SVT. A small group of cells in the atria begin to fire abnormally and trigger the fast heartbeat.    Multifocal atrial tachycardia. Multiple groups of cells in your atria fire abnormally and trigger a fast heartbeat.  What causes SVT?  Some types of SVT run in families. Some people have heart problems from birth that cause SVT. High blood pressure,  heart failure, mitral valve disease, sleep apnea, thyroid problems, and heart attacks can cause SVT. Smoking, excess caffeine or alcohol, and some medicines can increase your risk of having SVT.  Symptoms of SVT  When SVT happens, you may feel no symptoms. Or you may have:    Fluttering feelings in your chest (palpitations)    A tight feeling or pain in your chest    A pulsing feeling in your neck    Dizziness    Shortness of breath    Tiredness    Fainting    Nausea  In very rare cases, SVT can cause sudden death.  Diagnosing SVT  Your primary healthcare provider may diagnose you. Or you may see a heart doctor (cardiologist). The doctor will ask about your health history. He or she will also give you a physical exam. You may also have tests. These help show what kind of SVT you have, and what may cause it. They also help check for other problems. The tests may include:    Electrocardiogram (ECG), to analyze the abnormal rhythm    Continuous heart monitors such as a holter monitor or an event recorder to watch your heart rhythm over a longer period in an attempt to catch the SVT rhythm    Blood tests, to look for various causes such as thyroid problems or electrolyte abnormalities    Chest X-ray, to check for lung problems and look at the size of your heart    Exercise stress test, to see how well your heart works under stress    Echocardiography, to check your heart structure and function    Electrophysiologic study (EPS), an invasive procedure using wires in the heart to check the heart's electrical signals and diagnose the SVT  Date Last Reviewed: 5/1/2016 2000-2017 [employer]. [address] [place] [address]. All rights reserved. This information is not intended as a substitute for professional medical care. Always follow your healthcare professional's instructions.                Follow-ups after your visit        Your next 10 appointments already scheduled     Dec 12, 2018 10:00 AM  "CST   SHORT with Kirsten Guevara, DO   [facility] ([facility])    [address]  [place] [address] [phone]              Who to contact     If you have questions or need follow up information about today's clinic visit or your schedule please contact [facility] directly at [phone].  Normal or non-critical lab and imaging results will be communicated to you by [employer], letter or phone within 4 business days after the clinic has received the results. If you do not hear from us within 7 days, please contact the clinic through [employer] or phone. If you have a critical or abnormal lab result, we will notify you by phone as soon as possible.  Submit refill requests through [employer] or call your pharmacy and they will forward the refill request to us. Please allow 3 business days for your refill to be completed.          Additional Information About Your Visit        [employer] Information     [employer] gives you secure access to your electronic health record. If you see a primary care provider, you can also send messages to your care team and make appointments. If you have questions, please call your primary care clinic.  If you do not have a primary care provider, please call [phone] and they will assist you.        Care EveryWhere ID     This is your Care EveryWhere ID. This could be used by other organizations to access your [facility] medical records  [id number]        Your Vitals Were     Pulse Height BMI (Body Mass Index)             64 5' 3.5\" (1.613 m) 30.34 kg/m2          Blood Pressure from Last 3 Encounters:   10/22/18 162/80   08/16/18 132/76   08/15/18 146/68    Weight from Last 3 Encounters:   10/22/18 174 lb (78.9 kg)   08/16/18 177 lb (80.3 kg)   08/15/18 177 lb 12.8 oz (80.6 kg)              We Performed the Following     GH IMM-  FLU VACCINE, INCREASED ANTIGEN, PRESV FREE        Primary Care Provider Office Phone # Fax " #    Kirsten Guevara [phone]       [address] [facility] [address]        Equal Access to Services     ASHLEY SCHMIDT : Hadii aad ku hadryan Montiel, gustavo jesuspeyton, more kaalice chaudhary, ray panfiloin hayaaenrique valentinevamsi adair hui kauffman. So [facility] [phone].    ATENCIÓN: Si habla español, tiene a magallon disposición servicios gratuitos de asistencia lingüística. Llame al [phone].    We comply with applicable federal civil rights laws and [place] laws. We do not discriminate on the basis of race, color, national origin, age, disability, sex, sexual orientation, or gender identity.            Thank you!     Thank you for choosing [facility]  for your care. Our goal is always to provide you with excellent care. Hearing back from our patients is one way we can continue to improve our services. Please take a few minutes to complete the written survey that you may receive in the mail after your visit with us. Thank you!             Your Updated Medication List - Protect others around you: Learn how to safely use, store and throw away your medicines at www.disposemymeds.org.          This list is accurate as of 10/22/18 11:59 AM.  Always use your most recent med list.                   Brand Name Dispense Instructions for use Diagnosis    albuterol 108 (90 Base) MCG/ACT inhaler    PROAIR HFA    1 Inhaler    Inhale 1-2 puffs into the lungs every 4 hours as needed for shortness of breath / dyspnea or wheezing    SOB (shortness of breath)       Calcium + D3 600-200 MG-UNIT Tabs      Take 1 tablet by mouth daily        lisinopril 10 MG tablet    PRINIVIL/ZESTRIL    90 tablet    Take 1 tablet (10 mg) by mouth daily    Benign essential hypertension       RA VITAMIN B-12 TR 1000 MCG Tbcr   Generic drug:  cyanocobalamin      Take 1,000 mcg by mouth daily        vitamin D3 1000 units Caps      Take 1,000 Units by mouth daily

## 2018-10-22 NOTE — NURSING NOTE
Patient presents to clinic for medication review.  Pt has questions regarding the fosamax and heart palpitations. Erin Hernandez LPN ....................  10/22/2018   11:19 AM      Medication Reconciliation: complete    Erin Hernandez LPN

## 2018-10-22 NOTE — PATIENT INSTRUCTIONS
Understanding Supraventricular Tachycardia  Supraventricular tachycardia (SVT) is a type of abnormal heart rhythm that results in fast heartbeats. The heart normally beats 60 to 100 beats per minute while you are at rest and awake. With SVT, the heart beats more than 100 times a minute. It may even beat over 200 times a minute. This is caused by a problem in the electrical system of the heart. It can lessen the amount of blood pumped through the heart.  How the heart beats  A heartbeat is the rhythm of the heart as it contracts to squeeze blood through the body. It s caused by electrical signals in the heart. A beat starts when a special group of cells give off an electrical signal. These cells are in the sinoatrial (SA) node. The SA node is in the upper right chamber of your heart (atrium). The signal from the SA node travels down to the 2 lower chambers of your heart (ventricles). On the way, the signal goes through the atrioventricular (AV) node. This is a special group of cells between the atria and ventricles. From there, the signal travels to your left and right ventricles. As it travels, the signal tells nearby parts of your heart to contract. This causes your heart to pump in a coordinated way.  What is SVT?  When you have SVT, the signal to start your heartbeat doesn t come from the SA node. Instead it comes from another part of the left or right atrium. Or it comes from the AV node. Some area outside the SA node begins to fire quickly, causing a rapid heartbeat of over 100 beats per minute or the electrical signals are caught up in an abnormal looping circuit. This shortens the time your ventricles have to fill. If your heartbeat is fast enough, your heart may be unable to pump enough blood forward to the rest of your body. The abnormal heartbeat may last for a few seconds to a few hours before your heart returns to its normal rhythm. Some SVT rhythms can last for days or weeks, or even become permanent.   Types of SVT  There are several types of SVT. They include:    Atrial fibrillation. This is most common type of SVT. The upper chambers of the heart quiver very fast instead of pumping due to disorganized electrical activity in the atria.    Atrial flutter. This type of SVT is a milder form of fibrillation. The upper chambers of the heart flutter instead of pumping normally.    Atrioventricular corry reentrant tachycardia. It occurs when you have two channels through the AV node, instead of just one. The signal goes down one channel and up the other.    Atrioventricular reciprocating tachycardia. With this condition, there is an extra connection of muscle between the atrium and the ventricle. This is known as an accessory pathway and can conduct electricity upwards and downwards. The signal goes down the AV node and back to the atrium through the accessory pathway. It then goes down the AV node again. In rare cases, this condition leads to an abnormal heart rhythm that causes sudden death. This is a congenital defect, which means you were born with it.    Atrial tachycardia. This is another common type of SVT. A small group of cells in the atria begin to fire abnormally and trigger the fast heartbeat.    Multifocal atrial tachycardia. Multiple groups of cells in your atria fire abnormally and trigger a fast heartbeat.  What causes SVT?  Some types of SVT run in families. Some people have heart problems from birth that cause SVT. High blood pressure, heart failure, mitral valve disease, sleep apnea, thyroid problems, and heart attacks can cause SVT. Smoking, excess caffeine or alcohol, and some medicines can increase your risk of having SVT.  Symptoms of SVT  When SVT happens, you may feel no symptoms. Or you may have:    Fluttering feelings in your chest (palpitations)    A tight feeling or pain in your chest    A pulsing feeling in your neck    Dizziness    Shortness of breath    Tiredness    Fainting    Nausea   In very rare cases, SVT can cause sudden death.  Diagnosing SVT  Your primary healthcare provider may diagnose you. Or you may see a heart doctor (cardiologist). The doctor will ask about your health history. He or she will also give you a physical exam. You may also have tests. These help show what kind of SVT you have, and what may cause it. They also help check for other problems. The tests may include:    Electrocardiogram (ECG), to analyze the abnormal rhythm    Continuous heart monitors such as a holter monitor or an event recorder to watch your heart rhythm over a longer period in an attempt to catch the SVT rhythm    Blood tests, to look for various causes such as thyroid problems or electrolyte abnormalities    Chest X-ray, to check for lung problems and look at the size of your heart    Exercise stress test, to see how well your heart works under stress    Echocardiography, to check your heart structure and function    Electrophysiologic study (EPS), an invasive procedure using wires in the heart to check the heart's electrical signals and diagnose the SVT  Date Last Reviewed: 5/1/2016 2000-2017 [employer]. [address] [place] [address]. All rights reserved. This information is not intended as a substitute for professional medical care. Always follow your healthcare professional's instructions.

## 2018-10-22 NOTE — PROGRESS NOTES
Subjective  Vicki Mckeon is a 83 year old female who presents for several questions.  She has been having a problem where her heart blips.  She noticed it walking across the parking lot.  Occasionally she has felt it as a brick on her chest.  The most physically active she has been lately was carrying laundry upstairs, never happened with that.  Has never happened with rest.  She has no anxiety.  She sleeps well.  She thinks it is triggered by caffeine and possibly alcohol.  She also thought it might have gotten worse when she took Fosamax so she discontinued that.  Her blood pressure is higher today but it is always higher in the clinic.  She has a history of hypertension which has been stable on lisinopril.  History of osteopenia and holding Fosamax.  Awaiting follow-up visit with Dr. Guevara in November.    Problem List/PMH: reviewed in EMR, and made relevant updates today.  Medications: reviewed in EMR, and made relevant updates today.  Allergies: reviewed in EMR, and made relevant updates today.    Social Hx:  Social History   Substance Use Topics     Smoking status: Former Smoker     Quit date: 1/1/1968     Smokeless tobacco: Never Used     Alcohol use Yes      Comment: Alcoholic Drinks/day: Occasional     Social History     Social History Narrative    Patient is , living with .  She has two grown children.  She does exercise regularly.  She is retired.    Esequiel Mckeon -   Irvin Mckeon - Son, born 1968, lives in [facility] - Daughter, born 1972, lives in [place]  2 grandchildren     I reviewed social history and made relevant updates today.    Family Hx:   Family History   Problem Relation Age of Onset     Arthritis Mother      rheumatoid arthritis, psoriasis     Cancer Father      Cancer,renal     No Known Problems Daughter      No Known Problems Son      Breast Cancer Sister 66     Cancer-breast,essential tremor (treated with surgery)     HEART DISEASE Brother      Heart  "Disease,pacemaker placement     Other - See Comments Brother      back issues     Parkinsonism Brother        Objective  Vitals: reviewed in EMR.  /72 (BP Location: Right arm, Patient Position: Sitting, Cuff Size: Adult Large)  Pulse 64  Ht 5' 3.5\" (1.613 m)  Wt 174 lb (78.9 kg)  BMI 30.34 kg/m2    Gen: Pleasant female, NAD.  HEENT: MMM, no OP erythema.   Neck: Supple, no JVD, no bruits.  CV: RRR a few skipped beats are present, then regular rhythm.  Pulm: CTAB no w/r/r  Neuro: Grossly intact  Msk: No lower extremity edema.  Skin: No concerning lesions.  Psychiatric: Normal affect and insight. Does not appear anxious or depressed.    Narrative   EKG Interpretation:    Rhythm: Normal Sinus   Rate: 63  Axis: Normal  QRS interval: Normal  Conduction: Normal  ST Segments: Normal  QT interval: Normal  APC's Present: No  VPC's Present: No    Impression: Normal EKG  When compared to previous tracing dated 2011, sinus arrhythmias new.    Kirsten Guevara DO  Internal Medicine         Results for orders placed or performed during the hospital encounter of 18   Zio Patch Holter    Narrative    [facility]  [address]  [place] [address]  2018      Patient:  Vicki Mckeon  Chart: [id number]  :  1935  Age:  83 year old  Sex:  female       Procedure:  ZioPatch Monitor.        Technician performing hook-up:  Magdalene Shay    Procedure: Extended cardiac monitor.    Findings: The patient was monitored for 7 days and 1 hour.  Minimum heart rate 47, average heart rate 75, maximum heart rate 222.  There were 4 patient triggered events, 3 had PACs present, the fourth had PACs and PVCs.  There were 6 diary entries, all complaining of skipped and irregular beats.  These showed either PACs or PVCs.  Predominant underlying rhythm was sinus.  37 supraventricular tachycardia runs occurred, the fastest lasting 6 beats with a rate of 222 bpm.  The " longest run lasted 14 seconds with an average rate of 142.  Other ectopy was rare.    Impression: Zio patch monitor showing underlying sinus rhythm with symptomatic PACs and PVCs.  Occasional nonsustained SVT.  No other abnormalities.    Jai Pena MD       Treadmill echocardiogram stress test  August 30, 2018  Interpretation Summary  Normal exercise echocardiogram without evidence of inducible ischemia.  Target heart rate was achieved. Heart rate and blood pressure response to  exercise were normal. With stress, the left ventricular ejection fraction  increased from 55-60% to greater than 65% and the left ventricular size  decreased appropriately.  Normal functional capacity. No subjective symptoms to suggest ischemia.  ECG reported seperately.  No significant valve disease on screening doppler evaluation. The aortic root  and visualized ascending aorta are normal.        Assessment    ICD-10-CM    1. Paroxysmal supraventricular tachycardia (H) I47.1    2. Need for vaccination Z23 GH IMM-  FLU VACCINE, INCREASED ANTIGEN, PRESV FREE   3. White coat syndrome with hypertension I10      Orders Placed This Encounter   Procedures     GH IMM-  FLU VACCINE, INCREASED ANTIGEN, PRESV FREE     Plan   -- Expected clinical course discussed   -- Medications and their side effects discussed   -- Hand-out provided   -- Consider trial of beta blocker   -- If symptoms persist, consider Cardiology consult   -- Follow-up with Kirsten Dozier Keith Murphy MD  Internal Medicine & Pediatrics

## 2018-10-23 ASSESSMENT — PATIENT HEALTH QUESTIONNAIRE - PHQ9: SUM OF ALL RESPONSES TO PHQ QUESTIONS 1-9: 0

## 2018-10-23 ASSESSMENT — ANXIETY QUESTIONNAIRES: GAD7 TOTAL SCORE: 0

## 2018-10-29 ENCOUNTER — MEDICAL CORRESPONDENCE (OUTPATIENT)
Dept: HEALTH INFORMATION MANAGEMENT | Facility: OTHER | Age: 83
End: 2018-10-29

## 2018-10-31 ENCOUNTER — HOSPITAL ENCOUNTER (OUTPATIENT)
Dept: MRI IMAGING | Facility: OTHER | Age: 83
Discharge: HOME | End: 2018-10-31
Attending: FAMILY MEDICINE | Admitting: FAMILY MEDICINE
Payer: MEDICARE

## 2018-10-31 DIAGNOSIS — M54.30 SCIATICA: ICD-10-CM

## 2018-10-31 DIAGNOSIS — M54.10 RADICULOPATHY: ICD-10-CM

## 2018-10-31 DIAGNOSIS — M51.369 LUMBAR DEGENERATIVE DISC DISEASE: ICD-10-CM

## 2018-10-31 DIAGNOSIS — M54.50 LUMBAR BACK PAIN: ICD-10-CM

## 2018-10-31 PROCEDURE — 72148 MRI LUMBAR SPINE W/O DYE: CPT

## 2018-12-12 ENCOUNTER — OFFICE VISIT (OUTPATIENT)
Dept: INTERNAL MEDICINE | Facility: OTHER | Age: 83
End: 2018-12-12
Attending: INTERNAL MEDICINE
Payer: MEDICARE

## 2018-12-12 VITALS
BODY MASS INDEX: 29.96 KG/M2 | TEMPERATURE: 96.8 F | DIASTOLIC BLOOD PRESSURE: 66 MMHG | WEIGHT: 175.5 LBS | SYSTOLIC BLOOD PRESSURE: 138 MMHG | HEIGHT: 64 IN | RESPIRATION RATE: 20 BRPM | HEART RATE: 72 BPM

## 2018-12-12 DIAGNOSIS — N18.30 STAGE 3 CHRONIC KIDNEY DISEASE (H): ICD-10-CM

## 2018-12-12 DIAGNOSIS — Z79.899 HIGH RISK MEDICATION USE: ICD-10-CM

## 2018-12-12 DIAGNOSIS — M85.89 OSTEOPENIA OF MULTIPLE SITES: ICD-10-CM

## 2018-12-12 DIAGNOSIS — I10 ESSENTIAL HYPERTENSION: Primary | ICD-10-CM

## 2018-12-12 DIAGNOSIS — M54.16 LUMBAR RADICULOPATHY: ICD-10-CM

## 2018-12-12 DIAGNOSIS — Z23 NEED FOR SHINGLES VACCINE: ICD-10-CM

## 2018-12-12 LAB
ANION GAP SERPL CALCULATED.3IONS-SCNC: 10 MMOL/L (ref 3–14)
BUN SERPL-MCNC: 28 MG/DL (ref 7–25)
CALCIUM SERPL-MCNC: 10.1 MG/DL (ref 8.6–10.3)
CHLORIDE SERPL-SCNC: 106 MMOL/L (ref 98–107)
CO2 SERPL-SCNC: 26 MMOL/L (ref 21–31)
CREAT SERPL-MCNC: 1.15 MG/DL (ref 0.6–1.2)
GFR SERPL CREATININE-BSD FRML MDRD: 45 ML/MIN/1.7M2
GLUCOSE SERPL-MCNC: 100 MG/DL (ref 70–105)
MAGNESIUM SERPL-MCNC: 2.2 MG/DL (ref 1.9–2.7)
PHOSPHATE SERPL-MCNC: 3.2 MG/DL (ref 2.5–5)
POTASSIUM SERPL-SCNC: 4.8 MMOL/L (ref 3.5–5.1)
SODIUM SERPL-SCNC: 142 MMOL/L (ref 134–144)

## 2018-12-12 PROCEDURE — G0463 HOSPITAL OUTPT CLINIC VISIT: HCPCS

## 2018-12-12 PROCEDURE — 80048 BASIC METABOLIC PNL TOTAL CA: CPT | Performed by: INTERNAL MEDICINE

## 2018-12-12 PROCEDURE — 36415 COLL VENOUS BLD VENIPUNCTURE: CPT | Performed by: INTERNAL MEDICINE

## 2018-12-12 PROCEDURE — 83735 ASSAY OF MAGNESIUM: CPT | Performed by: INTERNAL MEDICINE

## 2018-12-12 PROCEDURE — 84100 ASSAY OF PHOSPHORUS: CPT | Performed by: INTERNAL MEDICINE

## 2018-12-12 PROCEDURE — 99214 OFFICE O/P EST MOD 30 MIN: CPT | Performed by: INTERNAL MEDICINE

## 2018-12-12 RX ORDER — PREDNISONE 5 MG/1
TABLET ORAL
COMMUNITY
Start: 2018-11-27 | End: 2018-12-12

## 2018-12-12 RX ORDER — PREDNISONE 5 MG/1
5 TABLET ORAL DAILY
Qty: 15 TABLET | Refills: 0 | Status: SHIPPED | OUTPATIENT
Start: 2018-12-12 | End: 2018-12-27

## 2018-12-12 ASSESSMENT — PAIN SCALES - GENERAL: PAINLEVEL: NO PAIN (0)

## 2018-12-12 ASSESSMENT — MIFFLIN-ST. JEOR: SCORE: 1238.12

## 2018-12-12 NOTE — NURSING NOTE
Patient presents to clinic for 6 month follow up with medications and kidney function.    Medication Reconciliation: complete    Anahi Grant LPN

## 2018-12-12 NOTE — PROGRESS NOTES
Chief Complaint   Patient presents with     Follow Up     medication management, kidney function         HPI: Ms. Mckeon is a 83 year old female who presents today for follow up of medications and blood pressure.    Her blood pressure overall has been fairly well controlled.  She is currently on lisinopril for this.  She denies any side effects.  She did have some episodes of palpitations however they did seem to be related to her use of Fosamax.  She underwent cardiac testing that was all negative.  Heart monitor showed premature beats.  She is due for recheck of her renal function given her medications and her history of stage III chronic kidney disease.    She has stopped her Fosamax.  She is curious which she can use for her bones instead.  Her last DEXA scan was in June 2017.  At that time her fracture scores were elevated in spite her diagnosis of osteopenia.    She has been having significant low back pain.  She underwent MRI of her low back that showed impingement of several nerve roots.  This was reviewed with the patient today.  She was placed on prednisone and has found significant improvement with this.  She did notice some worsening in her symptoms when she dropped to the 2.5 mg dose.  She has a follow-up appointment in 1 week to talk with neurosurgery.  She denies any obvious side effects from the prednisone.    She would like to get the shingles vaccine.    She  reports that she quit smoking about 50 years ago. she has never used smokeless tobacco.    Past medical history reviewed as below:     Past Medical History:   Diagnosis Date     Anemia 05/15/2013     CKD (chronic kidney disease) stage 3, GFR 30-59 ml/min (H) 05/19/2014    Stable. First noted when patient was taking NSAIDs regularly due to back pain.     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 Negative cytology and stable ultrasound findings.     Osteopenia      Other fracture of unspecified lower leg, initial encounter for closed fracture      Pain in left hip 2017     Pregnancy      2, para 2 with 2 spontaneous vaginal deliveries     Pure hypercholesterolemia      Scoliosis      Spinal stenosis    .      ROS  Pertinent ROS was performed and was negative, including for fever, chills, chest pain, shortness of breath, increased lower extremity edema, changes in bowel, blood in the stool, difficulty swallowing, sores in the mouth.  She has been having some increased nocturia.  She is curious if she is getting up with back pain and having to go to the bathroom.  She has not had any urinary incontinence.  No other concerns, with exception of HPI above.      EXAM:   /66 (BP Location: Right arm, Patient Position: Sitting, Cuff Size: Adult Large)   Pulse 72   Temp 96.8  F (36  C) (Tympanic)   Resp 20   Ht 1.613 m (5' 3.5\")   Wt 79.6 kg (175 lb 8 oz)   BMI 30.60 kg/m      Estimated body mass index is 30.6 kg/m  as calculated from the following:    Height as of this encounter: 1.613 m (5' 3.5\").    Weight as of this encounter: 79.6 kg (175 lb 8 oz).      GEN: Vitals reviewed. Healthy appearing. Patient is in no acute distress. Cooperative with exam.  HEENT: Normocephalic atraumatic.  Pupils equally round.  No scleral icterus, no conjunctival erythema. Oropharynx with no erythema or exudates. Dentition adequate.  CV: Heart regular in rate and rhythm with no murmur.    LUNGS: Lungs clear to auscultation bilaterally.  Chest rise equal bilaterally.  No accessory muscle use.  ABD:  Obese.  SKIN: Warm and dry to touch.  No rash on face, arms and legs.  EXT: No clubbing or cyanosis.   Trace bilateral lower extremity peripheral edema.  MSK:  Gait is normal. ROM of lumbar flexors and extensors is slightly limited secondary to pain.  BUE and BLE muscle strength intact  PSYCH: " Mood is good.  Affect appropriate. Speech fluent. Answers questions appropriately and thought process normal.     LABS: 12/12/2018 - Personally ordered/reviewed  Results for orders placed or performed in visit on 12/12/18   Phosphorus   Result Value Ref Range    Phosphorus 3.2 2.5 - 5.0 mg/dL   Magnesium   Result Value Ref Range    Magnesium 2.2 1.9 - 2.7 mg/dL   Basic Metabolic Panel   Result Value Ref Range    Sodium 142 134 - 144 mmol/L    Potassium 4.8 3.5 - 5.1 mmol/L    Chloride 106 98 - 107 mmol/L    Carbon Dioxide 26 21 - 31 mmol/L    Anion Gap 10 3 - 14 mmol/L    Glucose 100 70 - 105 mg/dL    Urea Nitrogen 28 (H) 7 - 25 mg/dL    Creatinine 1.15 0.60 - 1.20 mg/dL    GFR Estimate 45 (L) >60 mL/min/1.7m2    GFR Estimate If Black 55 (L) >60 mL/min/1.7m2    Calcium 10.1 8.6 - 10.3 mg/dL        ASSESSMENT AND PLAN:    Essential hypertension  - Blood pressure today of 138/66   is at the goal of <140/90 with no exacerbation.  - Continue current regimen.  Instructed to check BP at home.  - Cautioned patient to monitor with antibiotics, herbals and any OTC medications  - electrolytes and renal function done and ok    Osteopenia of multiple sites  -We will avoid bisphosphonates given her palpitations with this.  Labs are obtained today and okay.  Plan to consider start of Prolia at her follow-up visit in 2 weeks.  - Phosphorus  - Magnesium    Stage 3 chronic kidney disease (H)  Stable at this time.  Continue monitoring every 6 months.    Lumbar radiculopathy  -At this time we will continue prednisone at 5 mg.  She is to stay on this until she sees neurosurgery next week.  She can decrease to 2.5 mg at that time if needed.  She is to call with any problems with the medication.  - predniSONE (DELTASONE) 5 MG tablet  Dispense: 15 tablet; Refill: 0    Need for shingles vaccine  - zoster vaccine recombinant adjuvanted (SHINGRIX) injection  Dispense: 0.5 mL; Refill: 0    High risk medication use  - Phosphorus  -  Magnesium  - Basic Metabolic Panel    Follow-up in 2 weeks after seeing neurosurgery to discuss use of prednisone.  At that time we will also consider starting Prolia.      CHRIS HORAN DO   12/12/2018 10:32 AM    This document was prepared using voice generated softwear. While every attempt was made for accuracy, grammatical errors may exist.

## 2018-12-20 ENCOUNTER — TRANSFERRED RECORDS (OUTPATIENT)
Dept: HEALTH INFORMATION MANAGEMENT | Facility: OTHER | Age: 83
End: 2018-12-20

## 2018-12-27 ENCOUNTER — OFFICE VISIT (OUTPATIENT)
Dept: INTERNAL MEDICINE | Facility: OTHER | Age: 83
End: 2018-12-27
Attending: INTERNAL MEDICINE
Payer: COMMERCIAL

## 2018-12-27 VITALS
BODY MASS INDEX: 30.24 KG/M2 | HEIGHT: 64 IN | DIASTOLIC BLOOD PRESSURE: 64 MMHG | HEART RATE: 72 BPM | WEIGHT: 177.13 LBS | TEMPERATURE: 98.2 F | SYSTOLIC BLOOD PRESSURE: 134 MMHG | RESPIRATION RATE: 16 BRPM

## 2018-12-27 DIAGNOSIS — M85.89 OSTEOPENIA OF MULTIPLE SITES: ICD-10-CM

## 2018-12-27 DIAGNOSIS — L98.9 SKIN LESION OF FACE: ICD-10-CM

## 2018-12-27 DIAGNOSIS — M54.16 LUMBAR RADICULOPATHY: Primary | ICD-10-CM

## 2018-12-27 PROCEDURE — G0463 HOSPITAL OUTPT CLINIC VISIT: HCPCS

## 2018-12-27 PROCEDURE — 99214 OFFICE O/P EST MOD 30 MIN: CPT | Performed by: INTERNAL MEDICINE

## 2018-12-27 ASSESSMENT — ANXIETY QUESTIONNAIRES
5. BEING SO RESTLESS THAT IT IS HARD TO SIT STILL: NOT AT ALL
3. WORRYING TOO MUCH ABOUT DIFFERENT THINGS: NOT AT ALL
1. FEELING NERVOUS, ANXIOUS, OR ON EDGE: NOT AT ALL
7. FEELING AFRAID AS IF SOMETHING AWFUL MIGHT HAPPEN: NOT AT ALL
2. NOT BEING ABLE TO STOP OR CONTROL WORRYING: NOT AT ALL
6. BECOMING EASILY ANNOYED OR IRRITABLE: NOT AT ALL
GAD7 TOTAL SCORE: 0
IF YOU CHECKED OFF ANY PROBLEMS ON THIS QUESTIONNAIRE, HOW DIFFICULT HAVE THESE PROBLEMS MADE IT FOR YOU TO DO YOUR WORK, TAKE CARE OF THINGS AT HOME, OR GET ALONG WITH OTHER PEOPLE: NOT DIFFICULT AT ALL

## 2018-12-27 ASSESSMENT — PAIN SCALES - GENERAL: PAINLEVEL: NO PAIN (0)

## 2018-12-27 ASSESSMENT — PATIENT HEALTH QUESTIONNAIRE - PHQ9
5. POOR APPETITE OR OVEREATING: NOT AT ALL
SUM OF ALL RESPONSES TO PHQ QUESTIONS 1-9: 0

## 2018-12-27 ASSESSMENT — MIFFLIN-ST. JEOR: SCORE: 1245.49

## 2018-12-27 NOTE — PROGRESS NOTES
Chief Complaint   Patient presents with     Follow Up     neurosurgery appt, starting Prolia         HPI: Ms. Mckeon is a 83 year old female who presents today for follow up of recent back pain.  She visited with neurosurgery who recommended injection.  They did not feel that surgery was a good option at this time given the risk.  Since I saw her last she has tapered off of her prednisone and is doing okay off of it.  She is scheduled next week for an injection.  She will also be setting up physical therapy and working with them.    She would like to go forward with Prolia injection for treatment of her osteopenia.  She has not been on this prior.  She does have a prior allergy to Fosamax.    Additionally she has a skin lesion above her right eyebrow.  It has been there over the last few weeks.  She does have a history of squamous cell carcinoma.  She follows with dermatology regularly however does not want to wait to be seen by them in March.    She  reports that she quit smoking about 51 years ago. she has never used smokeless tobacco.    Past medical history reviewed as below:     Past Medical History:   Diagnosis Date     Anemia 05/15/2013     CKD (chronic kidney disease) stage 3, GFR 30-59 ml/min (H) 2014    Stable. First noted when patient was taking NSAIDs regularly due to back pain.     Essential (primary) hypertension      Nonrheumatic mitral valve prolapse 2008    with normal echo and no residual prolapse     Nontoxic single thyroid nodule 2012    Patient with multinodular goiter. Negative cytology and stable ultrasound findings.     Osteopenia      Other fracture of unspecified lower leg, initial encounter for closed fracture      Pain in left hip 2017     Pregnancy      2, para 2 with 2 spontaneous vaginal deliveries     Pure hypercholesterolemia      Scoliosis      Spinal stenosis    .      ROS  Pertinent ROS was performed and was negative, including for fever, chills,  "chest pain, shortness of breath, increased lower extremity edema, changes in bowel or bladder, blood in the stool, difficulty swallowing, sores in the mouth. No other concerns, with exception of HPI above.      EXAM:   /64 (BP Location: Right arm, Patient Position: Sitting, Cuff Size: Adult Large)   Pulse 72   Temp 98.2  F (36.8  C) (Tympanic)   Resp 16   Ht 1.613 m (5' 3.5\")   Wt 80.3 kg (177 lb 2 oz)   BMI 30.88 kg/m      Estimated body mass index is 30.88 kg/m  as calculated from the following:    Height as of this encounter: 1.613 m (5' 3.5\").    Weight as of this encounter: 80.3 kg (177 lb 2 oz).      GEN: Vitals reviewed. Healthy appearing. Patient is in no acute distress. Cooperative with exam.  HEENT: Normocephalic atraumatic.  Pupils equally round.  No scleral icterus, no conjunctival erythema. Oropharynx with no erythema or exudates. Dentition adequate.  LUNGS: Chest rise equal bilaterally.  No accessory muscle use.  ABD:  Obese.  SKIN: Warm and dry to touch.  No rash on face, arms and legs.  Lesion above the right eyebrow, slightly erythematous, raised, possibly consistent with squamous cell carcinoma versus other.  Mildly tender to palpation.  Approximately 0.4-0.6 cm across.  EXT: No clubbing or cyanosis.   Trace bilateral lower extremity peripheral edema.  PSYCH: Mood is good.  Affect appropriate. Speech fluent. Answers questions appropriately and thought process normal.     ASSESSMENT AND PLAN:    Lumbar radiculopathy  -At this time she will go forward with injection next week.  She will continue with physical therapy.  She will call if she needs a repeat course of prednisone.  - Ice, elevation, gentle movement/restas tolerated  -Tylenol as needed  - patient is to call if she has additional problems with this or if new symptoms develop    Osteopenia of multiple sites  -She will undergo Prolia injection.  Insurance coverage will be obtained first.  She is to call with any questions.  She " will continue with this every 6-month as long as it is tolerated.  She was educated on side effects including back pain, fatigue and other.    Skin lesion of face  -Referral placed to general surgery for removal and her history of squamous cell carcinoma.  She will plan to follow-up with dermatology in March as scheduled.  - GENERAL SURG ADULT REFERRAL        CHRIS HORAN,    12/27/2018 5:19 PM    This document was prepared using voice generated softwear. While every attempt was made for accuracy, grammatical errors may exist.

## 2018-12-27 NOTE — NURSING NOTE
Patient presents to clinic for follow up after appointment with Neurosurgery at [facility].  Also, she would like to discuss starting Prolia.    Medication Reconciliation: complete    Anahi Grant LPN

## 2018-12-29 ASSESSMENT — ANXIETY QUESTIONNAIRES: GAD7 TOTAL SCORE: 0

## 2019-01-02 ENCOUNTER — ALLIED HEALTH/NURSE VISIT (OUTPATIENT)
Dept: FAMILY MEDICINE | Facility: OTHER | Age: 84
End: 2019-01-02
Attending: INTERNAL MEDICINE
Payer: MEDICARE

## 2019-01-02 DIAGNOSIS — M85.89 OSTEOPENIA OF MULTIPLE SITES: Primary | ICD-10-CM

## 2019-01-02 PROCEDURE — 25000128 H RX IP 250 OP 636: Mod: GZ | Performed by: INTERNAL MEDICINE

## 2019-01-02 PROCEDURE — 96372 THER/PROPH/DIAG INJ SC/IM: CPT

## 2019-01-02 RX ADMIN — DENOSUMAB 60 MG: 60 INJECTION SUBCUTANEOUS at 08:35

## 2019-01-02 NOTE — PROGRESS NOTES
Noted order in.    Pt education given.  Recommended 1,000 mg of calcium daily along with at least 400 international unit(s) of Vitamin D3.  Pt verbalized understanding.      Injection administered subcutaneous back of right arm .  Pt tolerated procedure well.  Pt will remain in clinic area for 20-30 minutes to monitor for reaction.  is with.  All questions answered.  Pt given insert along with pt information.    Chanda Malodnado RN  ....................  1/2/2019   8:55 AM

## 2019-01-08 ENCOUNTER — HOSPITAL ENCOUNTER (OUTPATIENT)
Dept: GENERAL RADIOLOGY | Facility: OTHER | Age: 84
Discharge: HOME | End: 2019-01-08
Admitting: FAMILY MEDICINE
Payer: MEDICARE

## 2019-01-08 DIAGNOSIS — M48.062 SPINAL STENOSIS, LUMBAR REGION, WITH NEUROGENIC CLAUDICATION: ICD-10-CM

## 2019-01-08 PROCEDURE — 64483 NJX AA&/STRD TFRM EPI L/S 1: CPT

## 2019-01-08 PROCEDURE — 25000128 H RX IP 250 OP 636: Performed by: RADIOLOGY

## 2019-01-08 PROCEDURE — 25500064 ZZH RX 255 OP 636: Performed by: RADIOLOGY

## 2019-01-08 PROCEDURE — 25000125 ZZHC RX 250: Performed by: RADIOLOGY

## 2019-01-08 RX ORDER — LIDOCAINE HYDROCHLORIDE 10 MG/ML
2 INJECTION, SOLUTION EPIDURAL; INFILTRATION; INTRACAUDAL; PERINEURAL ONCE
Status: COMPLETED | OUTPATIENT
Start: 2019-01-08 | End: 2019-01-08

## 2019-01-08 RX ORDER — DEXAMETHASONE SODIUM PHOSPHATE 10 MG/ML
10 INJECTION, SOLUTION INTRAMUSCULAR; INTRAVENOUS ONCE
Status: COMPLETED | OUTPATIENT
Start: 2019-01-08 | End: 2019-01-08

## 2019-01-08 RX ADMIN — Medication 2 ML: at 09:09

## 2019-01-08 RX ADMIN — LIDOCAINE HYDROCHLORIDE 2 ML: 10 INJECTION, SOLUTION INFILTRATION; PERINEURAL at 09:09

## 2019-01-08 RX ADMIN — LIDOCAINE HYDROCHLORIDE 2 ML: 10 INJECTION, SOLUTION EPIDURAL; INFILTRATION; INTRACAUDAL; PERINEURAL at 09:09

## 2019-01-08 RX ADMIN — DEXAMETHASONE SODIUM PHOSPHATE 10 MG: 10 INJECTION, SOLUTION INTRAMUSCULAR; INTRAVENOUS at 09:09

## 2019-01-15 ENCOUNTER — OFFICE VISIT (OUTPATIENT)
Dept: SURGERY | Facility: OTHER | Age: 84
End: 2019-01-15
Attending: SURGERY
Payer: MEDICARE

## 2019-01-15 VITALS — BODY MASS INDEX: 30.69 KG/M2 | WEIGHT: 176 LBS | DIASTOLIC BLOOD PRESSURE: 84 MMHG | SYSTOLIC BLOOD PRESSURE: 126 MMHG

## 2019-01-15 DIAGNOSIS — L98.9 SKIN LESION OF FACE: Primary | ICD-10-CM

## 2019-01-15 PROCEDURE — G0463 HOSPITAL OUTPT CLINIC VISIT: HCPCS | Mod: 25

## 2019-01-15 PROCEDURE — 17000 DESTRUCT PREMALG LESION: CPT | Mod: 51 | Performed by: SURGERY

## 2019-01-15 PROCEDURE — 27210282 ZZH ADHESIVE DERMABOND SKIN: Performed by: SURGERY

## 2019-01-15 PROCEDURE — 88305 TISSUE EXAM BY PATHOLOGIST: CPT

## 2019-01-15 PROCEDURE — 11440 EXC FACE-MM B9+MARG 0.5 CM/<: CPT | Mod: XS | Performed by: SURGERY

## 2019-01-15 PROCEDURE — 17003 DESTRUCT PREMALG LES 2-14: CPT | Performed by: SURGERY

## 2019-01-15 PROCEDURE — 17000 DESTRUCT PREMALG LESION: CPT | Performed by: SURGERY

## 2019-01-15 ASSESSMENT — PAIN SCALES - GENERAL: PAINLEVEL: NO PAIN (0)

## 2019-01-15 NOTE — NURSING NOTE
"Chief Complaint   Patient presents with     Lesion Removal     from forehead       Initial /84 (BP Location: Right arm, Patient Position: Sitting, Cuff Size: Adult Large)   Wt 79.8 kg (176 lb)   BMI 30.69 kg/m   Estimated body mass index is 30.69 kg/m  as calculated from the following:    Height as of 12/27/18: 1.613 m (5' 3.5\").    Weight as of this encounter: 79.8 kg (176 lb).  Medication Reconciliation: complete  TIMEOUT  Universal Protocol    A. Pre-procedure verification complete: yes   1-relevant information / documentation available, reviewed and properly matched to the patient; 2-consent accurate and complete, 3-equipment and supplies available    B. Site marking complete: N/A  Site marked if not in continuous attendance with patient    C. TIME OUT completed:  Yes  Time Out was conducted just prior to starting procedure to verify the eight required elements: 1-patient identity, 2-consent accurate and complete, 3-position, 4-correct side/site marked (if applicable), 5-procedure, 6-relevant images / results properly labeled and displayed (if applicable), 7-antibiotics / irrigation fluids (if applicable), 8-safety precautions.    Marielle Malin LPN  "

## 2019-01-15 NOTE — PATIENT INSTRUCTIONS
Your incision was closed with stitches that will dissolve.      The stitches will take about 6 weeks to fully dissolve.      The wound is covered with a glue.  Do not put any gel (ie: neosporin, polysporin) on the wound.    It is ok to get the incision wet in the shower on the day after your procedure.     Don't soak in a tub, pool or lake for 5 days.

## 2019-01-15 NOTE — PROGRESS NOTES
Procedure Note      Pre/Post Operative Diagnosis:   Right eyebrow skin lesion, right lateral infraorbital skin lesions     Procedure:   Removal of right eyebrow skin lesion,  Cryoablation of right lateral infraorbital skin lesions     Surgeon: John Anderson MD    Local Anesthesia: 1% lidocaine with 0.25% Marcaine with epinephrine    Indication for the procedure:  This is a 83 year old female  patient referred by Kirsten Guevara with a eyebrow skin lesion and other small lesions compatible with actinic keratosis.       After explaining the risks to include bleeding, infection, recurrence or need for reexcision, and scarring the patient wished to proceed.    We discussed cryo therapy of the lesions. We specifically discussed the risks of infection, discoloration and the possible need for furthertreatments. The patient expressed understanding and the patient wishes to proceed. Informed consent paperwork was completed.     Procedure:   The area was prepped and draped in usual sterile fashion with ChloraPrep.   After, adequate local anesthesia, an 1.2 cm X 0.5 cm elliptical skin incision was made to encompass the  0.5 cm lesion.  The skin was closed with 4-0 Monocryl and Dermabond.        The area of the skin lesions (3) just lateral and inferior to the right eye was treated with liquid nitrogen for 3 freeze thaw cycles. The patient tolerated the procedure with no immediately apparent complications.    Plan:  The patient will be called to review the pathology. Patient will follow up if there any problems with the wound including redness or drainage.     We reviewed discharge instructions. The patient will call for any concerns. The patient will follow up in 3-4 weeks PRN  for a recheck of the area. The patient denies questions at this time.        John Anderson....................  1/15/2019   10:10 AM

## 2019-01-28 ENCOUNTER — OFFICE VISIT (OUTPATIENT)
Dept: INTERNAL MEDICINE | Facility: OTHER | Age: 84
End: 2019-01-28
Attending: INTERNAL MEDICINE
Payer: COMMERCIAL

## 2019-01-28 VITALS
WEIGHT: 176.25 LBS | DIASTOLIC BLOOD PRESSURE: 72 MMHG | HEART RATE: 82 BPM | BODY MASS INDEX: 30.73 KG/M2 | SYSTOLIC BLOOD PRESSURE: 132 MMHG

## 2019-01-28 DIAGNOSIS — M54.16 LUMBAR RADICULOPATHY: Primary | ICD-10-CM

## 2019-01-28 PROCEDURE — G0463 HOSPITAL OUTPT CLINIC VISIT: HCPCS

## 2019-01-28 PROCEDURE — 99213 OFFICE O/P EST LOW 20 MIN: CPT | Performed by: INTERNAL MEDICINE

## 2019-01-28 ASSESSMENT — PAIN SCALES - GENERAL: PAINLEVEL: MODERATE PAIN (5)

## 2019-01-28 ASSESSMENT — PATIENT HEALTH QUESTIONNAIRE - PHQ9: SUM OF ALL RESPONSES TO PHQ QUESTIONS 1-9: 0

## 2019-01-28 NOTE — PATIENT INSTRUCTIONS
Ice every 3-4 hours, gentle exercise/rest as much as possible.    Caution with NSAIDS (ibuprofen, aspirin, naproxen, aleve, advil) due to risk for increased blood pressure,stomach pain/nausea/ulcers and kidney damage; use minimal amount necessary    OK to take Acetaminophen (Tylenol) 1000mg (2- extra strength 500mg tablets or 3 regular strength 325mg tablets) three times a day; Maximum of 4000mg daily    - Return/call as needed for follow-up should any new symptoms develop, for worsening of current symptoms or if symptoms do notresolve with above plan.

## 2019-01-28 NOTE — PROGRESS NOTES
Chief Complaint   Patient presents with     Follow Up         HPI: Ms. Mckeon is a 83 year old female who presents today for follow up of recent lumbar injection.    She reports that she only had about 2 days of benefit from this.  She is curious what else can be done.  She is continued with physical therapy.  She got her best benefit from prednisone however we discussed that this is not an option long-term.  She has found some improvement with 1000 mg of Tylenol.  She has been putting some heat on the back.  She reports she continues to have some radiculopathy particularly at night.  Her pain does keep her from sleeping well.    She  reports that she quit smoking about 51 years ago. she has never used smokeless tobacco.    Past medical history reviewed as below:     Past Medical History:   Diagnosis Date     Anemia 05/15/2013     CKD (chronic kidney disease) stage 3, GFR 30-59 ml/min (H) 2014    Stable. First noted when patient was taking NSAIDs regularly due to back pain.     Essential (primary) hypertension      Nonrheumatic mitral valve prolapse 2008    with normal echo and no residual prolapse     Nontoxic single thyroid nodule 2012    Patient with multinodular goiter. Negative cytology and stable ultrasound findings.     Osteopenia      Other fracture of unspecified lower leg, initial encounter for closed fracture      Pain in left hip 2017     Pregnancy      2, para 2 with 2 spontaneous vaginal deliveries     Pure hypercholesterolemia      Scoliosis      Spinal stenosis      Squamous cell carcinoma    .      ROS  Pertinent ROS was performed and was negative, including for fever, chills.  No other concerns, with exception of HPI above.      EXAM:   /72 (BP Location: Right arm, Patient Position: Sitting, Cuff Size: Adult Regular)   Pulse 82   Wt 79.9 kg (176 lb 4 oz)   BMI 30.73 kg/m      Estimated body mass index is 30.73 kg/m  as calculated from the following:    Height  "as of 12/27/18: 1.613 m (5' 3.5\").    Weight as of this encounter: 79.9 kg (176 lb 4 oz).      GEN: Vitals reviewed. Healthy appearing. Patient is in no acute distress. Cooperative with exam.  HEENT: Normocephalic atraumatic.  Pupils equally round.  No scleral icterus, no conjunctival erythema.  MSK:  Gait is normal. ROM of lumber flexors and extensors is limited secondary to pain.  ROM with rotation and side bending is omitted secondary to pain.  Pain to palpation ofparaspinal muscles on lumbar region.  No pain to palpation of spine directly.  BUE and BLE sensation is intact.  Notable muscle spasm and hypertrophy in the right lumbar paraspinal muscles.  BUE and BLE muscle strength intact  EXT: No clubbing or cyanosis.  No peripheral edema.  PSYCH: Mood is good.  Affect appropriate. Speech fluent. Answers questions appropriately and thought process normal.     ASSESSMENT AND PLAN:    Lumbar radiculopathy  - Ice, elevation, gentle movement/restas tolerated  - Tylenol as needed, caution with nonsteroidal anti-inflammatory medications  -Continue with physical therapy.  -Plan for imaging consultation to discuss potential options for alternative injections that may improve pain control  - patient is to call if she has additional problems with this or if new symptoms develop  - Imaging Consultation    Follow-up with me as needed for continued problems.     Patient Instructions   Ice every 3-4 hours, gentle exercise/rest as much as possible.    Caution with NSAIDS (ibuprofen, aspirin, naproxen, aleve, advil) due to risk for increased blood pressure,stomach pain/nausea/ulcers and kidney damage; use minimal amount necessary    OK to take Acetaminophen (Tylenol) 1000mg (2- extra strength 500mg tablets or 3 regular strength 325mg tablets) three times a day; Maximum of 4000mg daily    - Return/call as needed for follow-up should any new symptoms develop, for worsening of current symptoms or if symptoms do notresolve with above " plan.          CHRIS HORAN DO   1/28/2019 8:43 AM    This document was prepared using voice generated softwear. While every attempt was made for accuracy, grammatical errors may exist.

## 2019-01-28 NOTE — NURSING NOTE
Patient presents to clinic today for a follow up on back injection. She states it didn't work.     No LMP recorded. Patient is postmenopausal.  Medication Reconciliation: complete    Luann Woody LPN  1/28/2019 8:24 AM

## 2019-03-11 ENCOUNTER — DOCUMENTATION ONLY (OUTPATIENT)
Dept: OTHER | Facility: CLINIC | Age: 84
End: 2019-03-11

## 2019-03-19 ENCOUNTER — HOSPITAL ENCOUNTER (OUTPATIENT)
Dept: GENERAL RADIOLOGY | Facility: OTHER | Age: 84
Discharge: HOME | End: 2019-03-19
Attending: INTERNAL MEDICINE | Admitting: INTERNAL MEDICINE
Payer: MEDICARE

## 2019-03-19 DIAGNOSIS — M54.16 LUMBAR RADICULOPATHY: ICD-10-CM

## 2019-03-19 PROCEDURE — 62323 NJX INTERLAMINAR LMBR/SAC: CPT

## 2019-03-19 PROCEDURE — 25500064 ZZH RX 255 OP 636: Performed by: RADIOLOGY

## 2019-03-19 PROCEDURE — 25000125 ZZHC RX 250: Performed by: RADIOLOGY

## 2019-03-19 RX ORDER — METHYLPREDNISOLONE ACETATE 80 MG/ML
80 INJECTION, SUSPENSION INTRA-ARTICULAR; INTRALESIONAL; INTRAMUSCULAR; SOFT TISSUE ONCE
Status: COMPLETED | OUTPATIENT
Start: 2019-03-19 | End: 2019-03-19

## 2019-03-19 RX ORDER — LIDOCAINE HYDROCHLORIDE 10 MG/ML
2 INJECTION, SOLUTION EPIDURAL; INFILTRATION; INTRACAUDAL; PERINEURAL ONCE
Status: COMPLETED | OUTPATIENT
Start: 2019-03-19 | End: 2019-03-19

## 2019-03-19 RX ORDER — LIDOCAINE HYDROCHLORIDE 10 MG/ML
2 INJECTION, SOLUTION INFILTRATION; PERINEURAL ONCE
Status: COMPLETED | OUTPATIENT
Start: 2019-03-19 | End: 2019-03-19

## 2019-03-19 RX ADMIN — IOHEXOL 2 ML: 240 INJECTION, SOLUTION INTRATHECAL; INTRAVASCULAR; INTRAVENOUS; ORAL at 09:59

## 2019-03-19 RX ADMIN — METHYLPREDNISOLONE ACETATE 80 MG: 80 INJECTION, SUSPENSION INTRA-ARTICULAR; INTRALESIONAL; INTRAMUSCULAR; SOFT TISSUE at 10:07

## 2019-03-19 RX ADMIN — LIDOCAINE HYDROCHLORIDE 2 ML: 10 INJECTION, SOLUTION EPIDURAL; INFILTRATION; INTRACAUDAL; PERINEURAL at 10:07

## 2019-03-19 RX ADMIN — LIDOCAINE HYDROCHLORIDE 2 ML: 10 INJECTION, SOLUTION INFILTRATION; PERINEURAL at 10:07

## 2019-04-15 ENCOUNTER — TELEPHONE (OUTPATIENT)
Dept: INTERNAL MEDICINE | Facility: OTHER | Age: 84
End: 2019-04-15

## 2019-04-16 NOTE — TELEPHONE ENCOUNTER
Contacted the patient and gave her a appointment time of 1 pm on 4-.  Meagan Morse LPN on 4/16/2019 at 2:58 PM

## 2019-04-16 NOTE — TELEPHONE ENCOUNTER
Patient called back. She briefly stepped outside and missed call. Patient advised she will be by phone remainder of day.     Afua Taylor on 4/16/2019 at 2:49 PM

## 2019-04-16 NOTE — TELEPHONE ENCOUNTER
Tried to call patient and no answer and no voicemail to leave a message.  Meagan Morse LPN on 4/16/2019 at 9:33 AM

## 2019-04-16 NOTE — TELEPHONE ENCOUNTER
Called patient's phone number, phone rang, no voicemail box to leave voicemail. Patient can only be seen the week of 4/22 per [facility]. Okay to use any same day apt opening the week of 4/22.    Loreto Linton LPN............. April 16, 2019 2:23 PM

## 2019-04-22 ENCOUNTER — OFFICE VISIT (OUTPATIENT)
Dept: INTERNAL MEDICINE | Facility: OTHER | Age: 84
End: 2019-04-22
Attending: INTERNAL MEDICINE
Payer: MEDICARE

## 2019-04-22 ENCOUNTER — HOSPITAL ENCOUNTER (OUTPATIENT)
Dept: GENERAL RADIOLOGY | Facility: OTHER | Age: 84
Discharge: HOME | End: 2019-04-22
Attending: INTERNAL MEDICINE | Admitting: INTERNAL MEDICINE
Payer: MEDICARE

## 2019-04-22 VITALS
HEART RATE: 80 BPM | BODY MASS INDEX: 34.04 KG/M2 | DIASTOLIC BLOOD PRESSURE: 76 MMHG | TEMPERATURE: 98 F | RESPIRATION RATE: 16 BRPM | SYSTOLIC BLOOD PRESSURE: 136 MMHG | WEIGHT: 173.4 LBS | OXYGEN SATURATION: 98 % | HEIGHT: 60 IN

## 2019-04-22 DIAGNOSIS — M70.62 TROCHANTERIC BURSITIS OF BOTH HIPS: ICD-10-CM

## 2019-04-22 DIAGNOSIS — M62.89 MUSCLE TIGHTNESS: ICD-10-CM

## 2019-04-22 DIAGNOSIS — M70.61 TROCHANTERIC BURSITIS OF BOTH HIPS: ICD-10-CM

## 2019-04-22 DIAGNOSIS — M25.551 BILATERAL HIP PAIN: ICD-10-CM

## 2019-04-22 DIAGNOSIS — M25.552 BILATERAL HIP PAIN: ICD-10-CM

## 2019-04-22 DIAGNOSIS — M54.16 LUMBAR RADICULOPATHY: ICD-10-CM

## 2019-04-22 DIAGNOSIS — M25.552 BILATERAL HIP PAIN: Primary | ICD-10-CM

## 2019-04-22 DIAGNOSIS — M25.551 BILATERAL HIP PAIN: Primary | ICD-10-CM

## 2019-04-22 PROCEDURE — G0463 HOSPITAL OUTPT CLINIC VISIT: HCPCS

## 2019-04-22 PROCEDURE — 73523 X-RAY EXAM HIPS BI 5/> VIEWS: CPT

## 2019-04-22 PROCEDURE — 99214 OFFICE O/P EST MOD 30 MIN: CPT | Performed by: INTERNAL MEDICINE

## 2019-04-22 PROCEDURE — G0463 HOSPITAL OUTPT CLINIC VISIT: HCPCS | Mod: 25

## 2019-04-22 RX ORDER — PREDNISONE 20 MG/1
20 TABLET ORAL DAILY
Qty: 5 TABLET | Refills: 0 | Status: SHIPPED | OUTPATIENT
Start: 2019-04-22 | End: 2019-06-14

## 2019-04-22 ASSESSMENT — MIFFLIN-ST. JEOR: SCORE: 1178.6

## 2019-04-22 ASSESSMENT — PAIN SCALES - GENERAL: PAINLEVEL: MODERATE PAIN (5)

## 2019-04-22 NOTE — PROGRESS NOTES
Chief Complaint   Patient presents with     Back Pain          Subjective:   Ms. Mckeon is a 83 year old female  seen for the acute concern today of ongoing issues with bilateral hip pain.  She is also had some muscle tightness.    A recent history of lumbar radiculopathy.  She feels that after her most recent injection that she had improvement in this and is able to sleep better.  She has not noted any change otherwise.  Her pain does seem to come and go.  She has been taking Tylenol as needed for this.  She is curious about a repeat course of prednisone as she did have significant improvement with this.  She reports a significant amount of muscle tightness along the right hip and leg.  She also has some pain on palpation of her left superior hip.    In addition to the muscle tightness, hip pain and radiculopathy she also has trochanteric bursitis.  In the past she has had injections for this and is curious if she can have repeats.    She  reports that she quit smoking about 51 years ago. She has never used smokeless tobacco.    Past medical history reviewed as below:     Past Medical History:   Diagnosis Date     Anemia 05/15/2013     CKD (chronic kidney disease) stage 3, GFR 30-59 ml/min (H) 2014    Stable. First noted when patient was taking NSAIDs regularly due to back pain.     Essential (primary) hypertension      Nonrheumatic mitral valve prolapse 2008    with normal echo and no residual prolapse     Nontoxic single thyroid nodule 2012    Patient with multinodular goiter. Negative cytology and stable ultrasound findings.     Osteopenia      Other fracture of unspecified lower leg, initial encounter for closed fracture      Pain in left hip 2017     Pregnancy      2, para 2 with 2 spontaneous vaginal deliveries     Pure hypercholesterolemia      Scoliosis      Spinal stenosis      Squamous cell carcinoma    .      ROS:   Pertinent  ROS was performed and was negative, including  "for fevers, chills, chest pain, shortness of breath, increased lower extremity edema, changes in bowel or bladder, blood in the stool, difficulty swallowing, sores in the mouth. No other concerns, with exception of HPI above.      Objective:    /76 (BP Location: Right arm, Patient Position: Sitting, Cuff Size: Adult Regular)   Pulse 80   Temp 98  F (36.7  C) (Tympanic)   Resp 16   Ht 1.533 m (5' 0.35\")   Wt 78.7 kg (173 lb 6.4 oz)   SpO2 98%   Breastfeeding? No   BMI 33.47 kg/m    GEN: Vitals reviewed.  Patient is in no acute distress. Cooperative with exam.  HEENT: Normocephalic atraumatic.  Pupils equally round.  No scleral icterus, no conjunctival erythema.   CV: Heart regular in rate and rhythm with no murmur.   LUNGS: Lungs clear to auscultation bilaterally.  Chest rise equal bilaterally.  No accessory muscle use.  SKIN: Warm and dry to touch.  No rash on face, arms and legs.  EXT: No clubbing or cyanosis.  No peripheral edema.  MSK:  Gait is normal. ROM of lumbar flexors and extensors is limited.  ROM with rotation and side bending is limited.  Pain to palpation ofparaspinal muscles on bilateral lumbar region.  No pain to palpation of spine directly.  BUE and BLE sensation is intact.  Notable muscle spasm and hypertrophy in the lumbar paraspinal muscles.  BUE and BLE muscle strength intact.  Pain to palpation of the bilateral trochanteric bursa.  Pain along the left iliac crest is noted with palpation.       Assessment/Plan:   Bilateral hip pain  -At this time we will have her do some physical therapy.  She can continue with her Tylenol as needed.  X-rays were obtained today to look at the specific areas of hip pain and additional interventions will be recommended as appropriate.  - XR Pelvis and Hip Bilateral 2 Views  - PHYSICAL THERAPY REFERRAL    Muscle tightness  Recommend physical therapy.  She is to call with any worsening.  - XR Pelvis and Hip Bilateral 2 Views  - PHYSICAL THERAPY " REFERRAL    Lumbar radiculopathy  At this time we will give her a 5-day course of prednisone.  She is to call with any side effects.  - PHYSICAL THERAPY REFERRAL  - predniSONE (DELTASONE) 20 MG tablet  Dispense: 5 tablet; Refill: 0    Trochanteric bursitis of both hips  Orders placed for repeat injections.  - XR Joint Injection Major Left  - XR Joint Injection Major Right      - Return/call as needed for follow-up should any new symptoms develop, for worsening of current symptoms or if symptoms do not resolve with above plan.    Return if symptoms worsen or fail to improve.     CHRIS HORAN DO   4/22/2019 1:31 PM    This document was prepared using voice generated softwear. While every attempt was made for accuracy, grammatical errors may exist.

## 2019-04-22 NOTE — NURSING NOTE
Patient presents to the clinic for back pain.     Medication Reconciliation: complete   Loreto Linton LPN............. April 22, 2019 12:54 PM

## 2019-04-30 ENCOUNTER — HOSPITAL ENCOUNTER (OUTPATIENT)
Dept: GENERAL RADIOLOGY | Facility: OTHER | Age: 84
Discharge: HOME | End: 2019-04-30
Attending: INTERNAL MEDICINE | Admitting: INTERNAL MEDICINE
Payer: MEDICARE

## 2019-04-30 ENCOUNTER — HOSPITAL ENCOUNTER (OUTPATIENT)
Dept: GENERAL RADIOLOGY | Facility: OTHER | Age: 84
End: 2019-04-30
Attending: INTERNAL MEDICINE
Payer: MEDICARE

## 2019-04-30 DIAGNOSIS — M70.62 TROCHANTERIC BURSITIS OF BOTH HIPS: ICD-10-CM

## 2019-04-30 DIAGNOSIS — M70.61 TROCHANTERIC BURSITIS OF BOTH HIPS: ICD-10-CM

## 2019-04-30 PROCEDURE — 25500064 ZZH RX 255 OP 636: Performed by: RADIOLOGY

## 2019-04-30 PROCEDURE — 25000128 H RX IP 250 OP 636: Performed by: RADIOLOGY

## 2019-04-30 PROCEDURE — 20610 DRAIN/INJ JOINT/BURSA W/O US: CPT | Mod: 50

## 2019-04-30 PROCEDURE — 25000125 ZZHC RX 250: Performed by: RADIOLOGY

## 2019-04-30 RX ORDER — LIDOCAINE HYDROCHLORIDE 10 MG/ML
4 INJECTION, SOLUTION INFILTRATION; PERINEURAL ONCE
Status: COMPLETED | OUTPATIENT
Start: 2019-04-30 | End: 2019-04-30

## 2019-04-30 RX ORDER — BUPIVACAINE HYDROCHLORIDE 5 MG/ML
6 INJECTION, SOLUTION EPIDURAL; INTRACAUDAL ONCE
Status: COMPLETED | OUTPATIENT
Start: 2019-04-30 | End: 2019-04-30

## 2019-04-30 RX ORDER — TRIAMCINOLONE ACETONIDE 40 MG/ML
80 INJECTION, SUSPENSION INTRA-ARTICULAR; INTRAMUSCULAR ONCE
Status: COMPLETED | OUTPATIENT
Start: 2019-04-30 | End: 2019-04-30

## 2019-04-30 RX ADMIN — TRIAMCINOLONE ACETONIDE 80 MG: 40 INJECTION, SUSPENSION INTRA-ARTICULAR; INTRAMUSCULAR at 09:14

## 2019-04-30 RX ADMIN — LIDOCAINE HYDROCHLORIDE 4 ML: 10 INJECTION, SOLUTION INFILTRATION; PERINEURAL at 09:14

## 2019-04-30 RX ADMIN — IOHEXOL 4 ML: 240 INJECTION, SOLUTION INTRATHECAL; INTRAVASCULAR; INTRAVENOUS; ORAL at 09:14

## 2019-04-30 RX ADMIN — BUPIVACAINE HYDROCHLORIDE 6 ML: 5 INJECTION, SOLUTION EPIDURAL; INTRACAUDAL; PERINEURAL at 09:15

## 2019-05-15 ENCOUNTER — TRANSFERRED RECORDS (OUTPATIENT)
Dept: HEALTH INFORMATION MANAGEMENT | Facility: OTHER | Age: 84
End: 2019-05-15

## 2019-05-31 ENCOUNTER — TELEPHONE (OUTPATIENT)
Dept: INTERNAL MEDICINE | Facility: OTHER | Age: 84
End: 2019-05-31

## 2019-05-31 DIAGNOSIS — E78.5 BORDERLINE HYPERLIPIDEMIA: ICD-10-CM

## 2019-05-31 DIAGNOSIS — N18.30 STAGE 3 CHRONIC KIDNEY DISEASE (H): ICD-10-CM

## 2019-05-31 DIAGNOSIS — I10 ESSENTIAL HYPERTENSION: Primary | ICD-10-CM

## 2019-06-13 ENCOUNTER — HOSPITAL ENCOUNTER (OUTPATIENT)
Dept: MAMMOGRAPHY | Facility: OTHER | Age: 84
Discharge: HOME | End: 2019-06-13
Attending: INTERNAL MEDICINE | Admitting: INTERNAL MEDICINE
Payer: MEDICARE

## 2019-06-13 DIAGNOSIS — Z12.31 VISIT FOR SCREENING MAMMOGRAM: ICD-10-CM

## 2019-06-13 DIAGNOSIS — I10 ESSENTIAL HYPERTENSION: ICD-10-CM

## 2019-06-13 DIAGNOSIS — N18.30 STAGE 3 CHRONIC KIDNEY DISEASE (H): ICD-10-CM

## 2019-06-13 DIAGNOSIS — E78.5 BORDERLINE HYPERLIPIDEMIA: ICD-10-CM

## 2019-06-13 LAB
ALBUMIN SERPL-MCNC: 4.2 G/DL (ref 3.5–5.7)
ALP SERPL-CCNC: 39 U/L (ref 34–104)
ALT SERPL W P-5'-P-CCNC: 21 U/L (ref 7–52)
ANION GAP SERPL CALCULATED.3IONS-SCNC: 7 MMOL/L (ref 3–14)
AST SERPL W P-5'-P-CCNC: 23 U/L (ref 13–39)
BILIRUB SERPL-MCNC: 0.5 MG/DL (ref 0.3–1)
BUN SERPL-MCNC: 25 MG/DL (ref 7–25)
CALCIUM SERPL-MCNC: 10 MG/DL (ref 8.6–10.3)
CHLORIDE SERPL-SCNC: 104 MMOL/L (ref 98–107)
CHOLEST SERPL-MCNC: 216 MG/DL
CO2 SERPL-SCNC: 27 MMOL/L (ref 21–31)
CREAT SERPL-MCNC: 1.22 MG/DL (ref 0.6–1.2)
ERYTHROCYTE [DISTWIDTH] IN BLOOD BY AUTOMATED COUNT: 15.2 % (ref 10–15)
GFR SERPL CREATININE-BSD FRML MDRD: 42 ML/MIN/{1.73_M2}
GLUCOSE SERPL-MCNC: 106 MG/DL (ref 70–105)
HCT VFR BLD AUTO: 38.6 % (ref 35–47)
HDLC SERPL-MCNC: 86 MG/DL (ref 23–92)
HGB BLD-MCNC: 12.1 G/DL (ref 11.7–15.7)
LDLC SERPL CALC-MCNC: 116 MG/DL
MCH RBC QN AUTO: 31.1 PG (ref 26.5–33)
MCHC RBC AUTO-ENTMCNC: 31.3 G/DL (ref 31.5–36.5)
MCV RBC AUTO: 99 FL (ref 78–100)
NONHDLC SERPL-MCNC: 130 MG/DL
PLATELET # BLD AUTO: 211 10E9/L (ref 150–450)
POTASSIUM SERPL-SCNC: 4.4 MMOL/L (ref 3.5–5.1)
PROT SERPL-MCNC: 7.6 G/DL (ref 6.4–8.9)
RBC # BLD AUTO: 3.89 10E12/L (ref 3.8–5.2)
SODIUM SERPL-SCNC: 138 MMOL/L (ref 134–144)
TRIGL SERPL-MCNC: 72 MG/DL
WBC # BLD AUTO: 7.2 10E9/L (ref 4–11)

## 2019-06-13 PROCEDURE — 80053 COMPREHEN METABOLIC PANEL: CPT | Performed by: INTERNAL MEDICINE

## 2019-06-13 PROCEDURE — 80061 LIPID PANEL: CPT | Performed by: INTERNAL MEDICINE

## 2019-06-13 PROCEDURE — 83735 ASSAY OF MAGNESIUM: CPT | Mod: ZL | Performed by: INTERNAL MEDICINE

## 2019-06-13 PROCEDURE — 36415 COLL VENOUS BLD VENIPUNCTURE: CPT | Performed by: INTERNAL MEDICINE

## 2019-06-13 PROCEDURE — 85027 COMPLETE CBC AUTOMATED: CPT | Performed by: INTERNAL MEDICINE

## 2019-06-13 PROCEDURE — 77063 BREAST TOMOSYNTHESIS BI: CPT

## 2019-06-14 ENCOUNTER — OFFICE VISIT (OUTPATIENT)
Dept: INTERNAL MEDICINE | Facility: OTHER | Age: 84
End: 2019-06-14
Attending: INTERNAL MEDICINE
Payer: COMMERCIAL

## 2019-06-14 VITALS
BODY MASS INDEX: 30.26 KG/M2 | HEART RATE: 67 BPM | OXYGEN SATURATION: 95 % | WEIGHT: 170.8 LBS | DIASTOLIC BLOOD PRESSURE: 62 MMHG | RESPIRATION RATE: 16 BRPM | SYSTOLIC BLOOD PRESSURE: 132 MMHG | HEIGHT: 63 IN

## 2019-06-14 DIAGNOSIS — N18.30 STAGE 3 CHRONIC KIDNEY DISEASE (H): ICD-10-CM

## 2019-06-14 DIAGNOSIS — Z79.899 HIGH RISK MEDICATION USE: ICD-10-CM

## 2019-06-14 DIAGNOSIS — Z00.00 ENCOUNTER FOR MEDICARE ANNUAL WELLNESS EXAM: Primary | ICD-10-CM

## 2019-06-14 DIAGNOSIS — I10 BENIGN ESSENTIAL HYPERTENSION: ICD-10-CM

## 2019-06-14 DIAGNOSIS — R41.3 MEMORY PROBLEM: ICD-10-CM

## 2019-06-14 LAB — MAGNESIUM SERPL-MCNC: 2.3 MG/DL (ref 1.9–2.7)

## 2019-06-14 PROCEDURE — G0438 PPPS, INITIAL VISIT: HCPCS | Performed by: INTERNAL MEDICINE

## 2019-06-14 PROCEDURE — G0463 HOSPITAL OUTPT CLINIC VISIT: HCPCS

## 2019-06-14 RX ORDER — BIOTIN 10000 MCG
10 CAPSULE ORAL DAILY
COMMUNITY
End: 2019-07-24

## 2019-06-14 RX ORDER — LISINOPRIL 10 MG/1
10 TABLET ORAL DAILY
Qty: 90 TABLET | Refills: 4 | Status: SHIPPED | OUTPATIENT
Start: 2019-06-14 | End: 2020-01-09

## 2019-06-14 RX ORDER — OMEGA-3 FATTY ACIDS/FISH OIL 300-1000MG
1000 CAPSULE ORAL DAILY
COMMUNITY
End: 2019-07-24

## 2019-06-14 ASSESSMENT — PAIN SCALES - GENERAL: PAINLEVEL: NO PAIN (0)

## 2019-06-14 ASSESSMENT — MIFFLIN-ST. JEOR: SCORE: 1208.87

## 2019-06-14 NOTE — PROGRESS NOTES
"     Medicare Wellness Visit   Ms. Mckeon is a 83 year old female who presents today who presents for Preventive Visit.    Are you in the first 12 months of your Medicare coverage?  No      Health Risk Assessment     Do you feel safe in your environment? Yes    Physical Health:    In general, how would you rate your overall physical health? excellent    Outside of work, how many days during the week do you exercise? 2-3 days/week    Outside of work, approximately how many minutes a day do you exercise?greater than 60 minutes    If you drink alcohol do you typically have >3 drinks per day or >7 drinks per week? No    Do you usually eat at least 4 servings of fruit and vegetables a day, include whole grains & fiber and avoid regularly eating high fat or \"junk\" foods? Yes    Do you have any problems taking medications regularly?  No    Do you have any side effects from medications? Patient states she thinks her medications make her tired.     Needs assistance for the following daily activities: no assistance needed    Which of the following safety concerns are present in your home?  none identified     Hearing impairment: Yes, Patient currently has hearing aids    In the past 6 months, have you been bothered by leaking of urine? yes      Screening       Fall risk  Fallen 2 or more times in the past year?: No  Any fall with injury in the past year?: No    Cognitive Screening   1) Repeat 3 items (Leader, Season, Table)    2) Clock draw: NORMAL  3) 3 item recall: Recalls NO objects   Results: 0 items recalled: PROBABLE COGNITIVE IMPAIRMENT, **INFORM PROVIDER**    Mini-CogTM Copyright [employer]. Licensed by the author for use in [facility]; reprinted with permission (greg@.[facility]). All rights reserved.      Memory issues discussed.  Additional work-up and evaluation offered.    Do you have sleep apnea, excessive snoring or daytime drowsiness?: no      Medical Record Update       Reviewed and updated as needed " "this visit by clinical staff  Tobacco  Allergies  Meds  Med Hx  Surg Hx  Fam Hx  Soc Hx        Reviewed and updated as needed this visit by Provider  Tobacco  Allergies  Meds  Problems  Med Hx  Surg Hx  Fam Hx  Soc Hx        Patient overall has been doing well.  She has a history of hypertension.  Current providers sharing in care for this patient include:   Patient Care Team:  Kirsten Guevara DO as PCP - General  Kirsten Guevara DO as Assigned PCP     Subjective:     Patient overall has been doing well.  She has a history of hypertension.  She has not been checking her blood pressure at home as her machine has not been working.  She denies any signs of high or low blood pressure including headaches, vision changes, chest pain, fatigue, dizziness with standing.  She is currently on lisinopril 10 mg daily.    She has a history of chronic kidney disease and is due for recheck.    Review of Systems  GEN: -fevers/-chills/-night sweats/-wt change  MOUTH/THROAT: - sore throat/-dysphagia/-sores  LUNGS: -sob/-cough  CARDIOVASCULAR: -cp/-palpitations  ABD: -pain/-diarrhea/-constipation/-bloody stools  : -change in bladder -dysuria/-hematuria/-sores/-vaginal discharge or bleeding  ENDOCRINE: -skin or hair changes  HEMATOLOGIC/LYMPHATIC: -swollen nodes/-easy bruising  SKIN: -rashes/-lesions  MSK/RHEUM: +joint pain/-swelling/  NEURO: +weakness/+parasthesias  PSYCH: -anhedonia/-depression/-anxiety        OBJECTIVE:   /62 (BP Location: Right arm, Patient Position: Sitting, Cuff Size: Adult Regular)   Pulse 67   Resp 16   Ht 1.6 m (5' 3\")   Wt 77.5 kg (170 lb 12.8 oz)   SpO2 95%   Breastfeeding? No   BMI 30.26 kg/m      Estimated body mass index is 30.26 kg/m  as calculated from the following:    Height as of this encounter: 1.6 m (5' 3\").    Weight as of this encounter: 77.5 kg (170 lb 12.8 oz).     Physical Exam  GENERAL APPEARANCE: healthy, alert and no distress  EYES: Eyes grossly normal to " inspection, PERRL and conjunctivae and sclerae normal  HENT: mouth without ulcers or lesions, oropharynx clear and oral mucous membranes moist  NECK: no adenopathy, no asymmetry, masses, or scars and thyroid normal to palpation  RESP: lungs clear to auscultation - no rales, rhonchi or wheezes  CV: regular rate and rhythm, normal S1 S2, no S3 or S4, no murmur, click or rub, no peripheral edema and peripheral pulses strong  ABDOMEN: nondistended  MS: no musculoskeletal defects are noted and gait is age appropriate without ataxia  SKIN: no suspicious lesions or rashes  NEURO: Normal strength and tone, sensory exam grossly normal, mentation intact and speech normal  PSYCH: mentation appears normal and affect normal/bright    Labs reviewed in EPIC    ASSESSMENT / PLAN:   Encounter for Medicare annual wellness exam    Benign essential hypertension  - Blood pressure today of 132/62   Is at the goal of <140/90 with no exacerbation.  - Continue current regimen at this time.  Instructed to check BP at home.  - Cautioned patient to monitor with antibiotics, herbals and any OTC medications  - electrolytes and renal function done and ok  - lisinopril (PRINIVIL/ZESTRIL) 10 MG tablet  Dispense: 90 tablet; Refill: 4    Stage 3 chronic kidney disease (H)  - recheck renal function for stability  - Basic Metabolic Panel  - Magnesium    High risk medication use  - Basic Metabolic Panel  - Magnesium    Memory problem  - offered additional appt for follow up and evaluation      End of Life Planning     Do you have a Health Care Directive? Yes: Patient states has Advance Directive and will bring in a copy to clinic.    End of Life Planning:  Patient currently has an advanced directive: Yes.  Practitioner is supportive of decision.    Preventative Care Guidelines and Health Counseling     COUNSELING:  Reviewed preventive health counseling, as reflected in patient instructions       Good sleep       Healthy diet/nutrition        Immunizations    Up to date     BP Screening:   Last 3 BP Readings:    BP Readings from Last 3 Encounters:   06/14/19 132/62   04/22/19 136/76   01/28/19 132/72       The following was recommended to the patient:  Re-screen BP within a year and recommended lifestyle modifications     Body mass index is 30.26 kg/m . Weight management plan: Discussed healthy diet and exercise guidelines    Appropriate preventive services were discussed with this patient, including applicable screening as appropriate for cardiovascular disease, diabetes, osteopenia/osteoporosis, and glaucoma.  As appropriate for age/gender, discussed screening for colorectal cancer, prostate cancer, breast cancer, and cervical cancer. Checklist reviewing preventive services available has been given to the patient.    Reviewed patients plan of care and provided an AVS. The Basic Care Plan (routine screening as documented in Health Maintenance) for patient meets the Care Plan requirement. This Care Plan has been established and reviewed with the Patient and any available family members.    Counseling Resources:  ATP IV Guidelines  Pooled Cohorts Equation Calculator  Breast Cancer Risk Calculator  FRAX Risk Assessment  ICSI Preventive Guidelines  Dietary Guidelines for Americans, 2010  USDA's MyPlate  ASA Prophylaxis  Lung CA Screening    Kirsten Guevara DO  6/14/2019  9:23 AM  [facility]

## 2019-06-14 NOTE — PATIENT INSTRUCTIONS
Consider stopping biotin, omega 3, calcium    Continue Vitamin b12 and Vitamin D    Consider moving Lisinopril to night       Patient Education   Personalized Prevention Plan  You are due for the preventive services outlined below.  Your care team is available to assist you in scheduling these services.  If you have already completed any of these items, please share that information with your care team to update in your medical record.  Health Maintenance Due   Topic Date Due     Annual Wellness Visit  06/28/2002

## 2019-06-27 ENCOUNTER — TELEPHONE (OUTPATIENT)
Dept: INTERNAL MEDICINE | Facility: OTHER | Age: 84
End: 2019-06-27

## 2019-06-27 DIAGNOSIS — M70.62 TROCHANTERIC BURSITIS, LEFT HIP: Primary | ICD-10-CM

## 2019-06-27 NOTE — TELEPHONE ENCOUNTER
Called and verified patients full name and . Patient stated that she called [facility] to schedule injection, but an order needs to be placed. (I believe correct) Ordering is pending. Patient stated it is just the left hip that is bothering her at this time.       Alison Hills LPN on 2019 at 8:35 AM

## 2019-07-03 ENCOUNTER — HOSPITAL ENCOUNTER (OUTPATIENT)
Dept: GENERAL RADIOLOGY | Facility: OTHER | Age: 84
Discharge: HOME | End: 2019-07-03
Attending: INTERNAL MEDICINE | Admitting: INTERNAL MEDICINE
Payer: MEDICARE

## 2019-07-03 DIAGNOSIS — M70.62 TROCHANTERIC BURSITIS, LEFT HIP: ICD-10-CM

## 2019-07-03 DIAGNOSIS — M54.50 LOW BACK PAIN: ICD-10-CM

## 2019-07-03 PROCEDURE — 25000128 H RX IP 250 OP 636: Performed by: RADIOLOGY

## 2019-07-03 PROCEDURE — 25000125 ZZHC RX 250: Performed by: RADIOLOGY

## 2019-07-03 PROCEDURE — 25500064 ZZH RX 255 OP 636: Performed by: RADIOLOGY

## 2019-07-03 PROCEDURE — 62323 NJX INTERLAMINAR LMBR/SAC: CPT

## 2019-07-03 RX ORDER — BUPIVACAINE HYDROCHLORIDE 5 MG/ML
2 INJECTION, SOLUTION PERINEURAL ONCE
Status: DISCONTINUED | OUTPATIENT
Start: 2019-07-03 | End: 2019-07-04 | Stop reason: HOSPADM

## 2019-07-03 RX ORDER — METHYLPREDNISOLONE ACETATE 80 MG/ML
80 INJECTION, SUSPENSION INTRA-ARTICULAR; INTRALESIONAL; INTRAMUSCULAR; SOFT TISSUE ONCE
Status: COMPLETED | OUTPATIENT
Start: 2019-07-03 | End: 2019-07-03

## 2019-07-03 RX ORDER — LIDOCAINE HYDROCHLORIDE 10 MG/ML
10 INJECTION, SOLUTION INFILTRATION; PERINEURAL ONCE
Status: COMPLETED | OUTPATIENT
Start: 2019-07-03 | End: 2019-07-03

## 2019-07-03 RX ORDER — TRIAMCINOLONE ACETONIDE 40 MG/ML
40 INJECTION, SUSPENSION INTRA-ARTICULAR; INTRAMUSCULAR ONCE
Status: DISCONTINUED | OUTPATIENT
Start: 2019-07-03 | End: 2019-07-04 | Stop reason: HOSPADM

## 2019-07-03 RX ORDER — LIDOCAINE HYDROCHLORIDE 10 MG/ML
1 INJECTION, SOLUTION EPIDURAL; INFILTRATION; INTRACAUDAL; PERINEURAL ONCE
Status: COMPLETED | OUTPATIENT
Start: 2019-07-03 | End: 2019-07-03

## 2019-07-03 RX ADMIN — IOHEXOL 2 ML: 240 INJECTION, SOLUTION INTRATHECAL; INTRAVASCULAR; INTRAVENOUS; ORAL at 11:01

## 2019-07-03 RX ADMIN — LIDOCAINE HYDROCHLORIDE 2 ML: 10 INJECTION, SOLUTION INFILTRATION; PERINEURAL at 11:00

## 2019-07-03 RX ADMIN — METHYLPREDNISOLONE ACETATE 80 MG: 80 INJECTION, SUSPENSION INTRA-ARTICULAR; INTRALESIONAL; INTRAMUSCULAR; SOFT TISSUE at 11:32

## 2019-07-03 RX ADMIN — LIDOCAINE HYDROCHLORIDE 2 ML: 10 INJECTION, SOLUTION EPIDURAL; INFILTRATION; INTRACAUDAL; PERINEURAL at 11:32

## 2019-07-05 ENCOUNTER — ALLIED HEALTH/NURSE VISIT (OUTPATIENT)
Dept: INTERNAL MEDICINE | Facility: OTHER | Age: 84
End: 2019-07-05
Payer: COMMERCIAL

## 2019-07-05 DIAGNOSIS — M85.89 OSTEOPENIA OF MULTIPLE SITES: Primary | ICD-10-CM

## 2019-07-05 NOTE — PROGRESS NOTES
Pt has up coming appointment for her Prolia injection but does not have a therapy plan for it. Please place plan thank you Zo Snyder RN on 7/5/2019 at 3:16 PM

## 2019-07-10 ENCOUNTER — OFFICE VISIT (OUTPATIENT)
Dept: FAMILY MEDICINE | Facility: OTHER | Age: 84
End: 2019-07-10
Attending: NURSE PRACTITIONER
Payer: COMMERCIAL

## 2019-07-10 VITALS
BODY MASS INDEX: 29.26 KG/M2 | HEART RATE: 76 BPM | RESPIRATION RATE: 20 BRPM | TEMPERATURE: 98 F | HEIGHT: 64 IN | DIASTOLIC BLOOD PRESSURE: 72 MMHG | WEIGHT: 171.4 LBS | SYSTOLIC BLOOD PRESSURE: 138 MMHG

## 2019-07-10 DIAGNOSIS — M46.1 SACROILIITIS (H): ICD-10-CM

## 2019-07-10 DIAGNOSIS — Z92.241 STATUS POST EPIDURAL STEROID INJECTION: Primary | ICD-10-CM

## 2019-07-10 PROCEDURE — 99214 OFFICE O/P EST MOD 30 MIN: CPT | Performed by: FAMILY MEDICINE

## 2019-07-10 PROCEDURE — G0463 HOSPITAL OUTPT CLINIC VISIT: HCPCS

## 2019-07-10 RX ORDER — ALBUTEROL SULFATE 90 UG/1
AEROSOL, METERED RESPIRATORY (INHALATION)
COMMUNITY
Start: 2018-08-07 | End: 2020-01-09

## 2019-07-10 RX ORDER — PREDNISONE 20 MG/1
40 TABLET ORAL DAILY
Qty: 6 TABLET | Refills: 0 | Status: SHIPPED | OUTPATIENT
Start: 2019-07-10 | End: 2019-07-24

## 2019-07-10 RX ORDER — TRAMADOL HYDROCHLORIDE 50 MG/1
50 TABLET ORAL EVERY 6 HOURS PRN
Qty: 10 TABLET | Refills: 0 | Status: SHIPPED | OUTPATIENT
Start: 2019-07-10 | End: 2019-07-24

## 2019-07-10 ASSESSMENT — MIFFLIN-ST. JEOR: SCORE: 1214.53

## 2019-07-10 ASSESSMENT — PAIN SCALES - GENERAL: PAINLEVEL: MODERATE PAIN (4)

## 2019-07-10 NOTE — NURSING NOTE
Patient presents to the clinic for lower left hip pain that started a week ago. Patient says she got an epidural injection in that area last week and has taken tylenol for treatment.  Medication Reconciliation: complete    Joanie Webber, CMA

## 2019-07-10 NOTE — PATIENT INSTRUCTIONS
Continue ice, stretching, and rest.    Take Prednisone for 3 days and tramadol if needed.    Watch for increased pain, redness around the injection site, weakness, or increased tingling sensations. If these occur, or if you have a fever and feel ill, you should be seen on a same-day basis.

## 2019-07-10 NOTE — PROGRESS NOTES
"  SUBJECTIVE:   Vicki Mckeon is a 84 year old female who presents to clinic today for the following health issues: L hip girdle pain    HPI  84-yo woman who had a lumbar intralaminar epidural injection at L5-S1 on 7/3/19. The following day she noticed pain in the L buttock, intermittently associated tingling sensation \"like something is moving in there\". No radiation of either pain or paresthesia into the leg. No weakness.  She has seen her chiropractor twice, most recently this morning, and notices a little improvement. Has taken Tylenol and OTC topicals with some improvement. Better with sitting, worse with moving around and pressure on the area.     Past Medical History:   Diagnosis Date     Anemia 05/15/2013     CKD (chronic kidney disease) stage 3, GFR 30-59 ml/min (H) 2014    Stable. First noted when patient was taking NSAIDs regularly due to back pain.     Essential (primary) hypertension      Nonrheumatic mitral valve prolapse 2008    with normal echo and no residual prolapse     Nontoxic single thyroid nodule 2012    Patient with multinodular goiter. Negative cytology and stable ultrasound findings.     Osteopenia      Other fracture of unspecified lower leg, initial encounter for closed fracture      Pain in left hip 2017     Pregnancy      2, para 2 with 2 spontaneous vaginal deliveries     Pure hypercholesterolemia      Scoliosis      Spinal stenosis     with nerve root impingement     Squamous cell carcinoma       Past Surgical History:   Procedure Laterality Date     BACK SURGERY  10/2011    Back surgery, Dr. Linda, [facility]     BIOPSY BREAST Left     1970's, benign     CLOSED REDUCTION ANKLE  2001    ORIF left ankle w/ later removal of hardware      COLONOSCOPY      ,,F/U N/A     EXTRACAPSULAR CATARACT EXTRATION WITH INTRAOCULAR LENS IMPLANT Bilateral      LAPAROSCOPY DIAGNOSTIC (GYN)      Laparoscopy for pelvic pain which was negative       Review of " "Systems   General: denies F/C, has had some night sweats for about the past month, not previously  CV: no chest pain or palpitations  Resp: no cough, URI, or congestion  : no dysuria, frequency, urgency    OBJECTIVE:     /72 (BP Location: Right arm, Patient Position: Sitting, Cuff Size: Adult Regular)   Pulse 76   Temp 98  F (36.7  C) (Tympanic)   Resp 20   Ht 1.613 m (5' 3.5\")   Wt 77.7 kg (171 lb 6.4 oz)   BMI 29.89 kg/m    Body mass index is 29.89 kg/m .     Physical Exam  General: Alert, pleasant woman who appears comfortable sitting in the chair.  She is able to get on and off the exam table without assistance, although with mild difficulty  Heart: Regular rate and rhythm, no murmur heard  Lungs: Clear with good air exchange  Musculoskeletal: She has scoliosis with thoracic convexity left, lumbar convexity right.  Right iliac crest elevated compared to left.  She has a well-healed lumbar laminectomy scar.  There is no tenderness or erythema over the spinous processes or interspinous spaces.  There is mild tenderness over the paraspinal musculature on the left at approximately the L4-L5 level.  More marked tenderness over the left SI joint.  No tenderness over the trochanteric bursa, sciatic notch, or ischial tuberosity.  Strength, sensation to light touch, and DTRs are all normal in both lower extremities.    Diagnostic Test Results:  none     ASSESSMENT/PLAN:     1. Status post epidural steroid injection  With no evidence of secondary infection or spinal abscess.  Question post injection inflammatory reaction.  Will treat with a short course of 3 days of prednisone.  Tramadol as needed for pain.  Discussed that this should spontaneously resolve within the next few days.  If not resolving or worsening will need to recheck.  - predniSONE (DELTASONE) 20 MG tablet; Take 2 tablets (40 mg) by mouth daily for 3 days  Dispense: 6 tablet; Refill: 0  - traMADol (ULTRAM) 50 MG tablet; Take 1 tablet (50 mg) " by mouth every 6 hours as needed for severe pain  Dispense: 10 tablet; Refill: 0    2. Sacroiliitis (H)  Of note, 1 week ago at the time of her injection it was document to that she had no SI tenderness.  Will treat as above and monitor for improvement versus worsening.  - predniSONE (DELTASONE) 20 MG tablet; Take 2 tablets (40 mg) by mouth daily for 3 days  Dispense: 6 tablet; Refill: 0  - traMADol (ULTRAM) 50 MG tablet; Take 1 tablet (50 mg) by mouth every 6 hours as needed for severe pain  Dispense: 10 tablet; Refill: 0    Medical Decision Making:    Differential Diagnosis:  Back Pain: myofascial low back strain, spinal cord infection and sacroiliitis    Serious Comorbid Conditions:  Adult:  Hypertension, chronic kidney disease    PLAN:    MS Injury/Pain  ice, rest, stretching; short course of prednisone for 3 days, tramadol given for acute pain because of her history of NSAID related kidney disease.    Followup:    If not improving or if condition worsens, follow up with your Primary Care Provider    I explained my diagnostic considerations and recommendations to patient who voiced understanding and agreement with the treatment plan. All questions were answered. We discussed potential side effects of any prescribed or recommended therapies, as well as expectations for response to treatments.      ELOISE Stuart MD  7/10/2019 at 10:52 AM  [facility]

## 2019-07-17 ENCOUNTER — TELEPHONE (OUTPATIENT)
Dept: INTERNAL MEDICINE | Facility: OTHER | Age: 84
End: 2019-07-17

## 2019-07-17 ENCOUNTER — ALLIED HEALTH/NURSE VISIT (OUTPATIENT)
Dept: FAMILY MEDICINE | Facility: OTHER | Age: 84
End: 2019-07-17
Attending: INTERNAL MEDICINE
Payer: MEDICARE

## 2019-07-17 DIAGNOSIS — M85.80 OSTEOPENIA, UNSPECIFIED LOCATION: Primary | ICD-10-CM

## 2019-07-17 PROCEDURE — 96372 THER/PROPH/DIAG INJ SC/IM: CPT

## 2019-07-17 PROCEDURE — 25000128 H RX IP 250 OP 636: Performed by: INTERNAL MEDICINE

## 2019-07-17 RX ADMIN — DENOSUMAB 60 MG: 60 INJECTION SUBCUTANEOUS at 13:30

## 2019-07-17 NOTE — TELEPHONE ENCOUNTER
Patient states that [facility] told her if she needed to be seen to just stop in and [facility] would work her in.  Patient is here having a prolia injection with injection nurse and would like to be worked in with [facility] after that.  She will stop by unit 3 check in.  Please let this writer know if she will be seen.  Suzanne Woody on 7/17/2019 at 1:23 PM

## 2019-07-17 NOTE — TELEPHONE ENCOUNTER
This writer pulled patient back from sitting in the waiting room. Patient states that she was recently seen in  and would like a Follow-up with Kirsten Guevara DO. Patient placed in schedule.   Loreto Linton LPN............. July 17, 2019 2:20 PM

## 2019-07-17 NOTE — PROGRESS NOTES
Verified name and date of birth .(Prolia) administered as ordered . Patient reports no concerns with previous injections . Patient  instructed  to wait 15 min post injection in the lobby and to report any symptoms of a reaction to nursing . Pt left ambulatory.  Zo Snyder RN on 7/17/2019 at 1:33 PM

## 2019-07-24 ENCOUNTER — OFFICE VISIT (OUTPATIENT)
Dept: INTERNAL MEDICINE | Facility: OTHER | Age: 84
End: 2019-07-24
Attending: INTERNAL MEDICINE
Payer: COMMERCIAL

## 2019-07-24 VITALS
HEIGHT: 64 IN | RESPIRATION RATE: 16 BRPM | HEART RATE: 62 BPM | DIASTOLIC BLOOD PRESSURE: 62 MMHG | SYSTOLIC BLOOD PRESSURE: 132 MMHG | BODY MASS INDEX: 29.19 KG/M2 | OXYGEN SATURATION: 94 % | WEIGHT: 171 LBS | TEMPERATURE: 98 F

## 2019-07-24 DIAGNOSIS — M54.16 LUMBAR RADICULOPATHY: Primary | ICD-10-CM

## 2019-07-24 PROCEDURE — 99213 OFFICE O/P EST LOW 20 MIN: CPT | Performed by: INTERNAL MEDICINE

## 2019-07-24 PROCEDURE — G0463 HOSPITAL OUTPT CLINIC VISIT: HCPCS

## 2019-07-24 RX ORDER — PREDNISONE 10 MG/1
TABLET ORAL
Qty: 15 TABLET | Refills: 0 | Status: SHIPPED | OUTPATIENT
Start: 2019-07-24 | End: 2019-08-29

## 2019-07-24 ASSESSMENT — PAIN SCALES - GENERAL: PAINLEVEL: MODERATE PAIN (4)

## 2019-07-24 ASSESSMENT — MIFFLIN-ST. JEOR: SCORE: 1212.71

## 2019-07-24 NOTE — NURSING NOTE
Patient presents to the clinic for low back pain.     Medication Reconciliation: complete   Loreto Linton LPN............. July 24, 2019 12:49 PM

## 2019-07-24 NOTE — PROGRESS NOTES
Chief Complaint   Patient presents with     Back Pain         HPI: Ms. Mckeon is a 84 year old female who presents today for follow up of back pain.    She has had ongoing issues with lumbar radiculopathy over the last 8 months.  She has tried multiple modalities including chiropractics, physical therapy, medications.  She has tried some CBD oil which has helped and she has not noted any side effects.  She has had repeat injections which have had minimal benefit.    She reports she continues to have rare paresthesias at night, no weakness, no change in bowel or bladder.  Some pain before bowel movement with increased pressure.    She has had her best benefit with prednisone.  She is interested in going back to the neurosurgeon at this time.    She  reports that she quit smoking about 51 years ago. She has never used smokeless tobacco.    Past medical history reviewed as below:     Past Medical History:   Diagnosis Date     Anemia 05/15/2013     CKD (chronic kidney disease) stage 3, GFR 30-59 ml/min (H) 2014    Stable. First noted when patient was taking NSAIDs regularly due to back pain.     Essential (primary) hypertension      Nonrheumatic mitral valve prolapse 2008    with normal echo and no residual prolapse     Nontoxic single thyroid nodule 2012    Patient with multinodular goiter. Negative cytology and stable ultrasound findings.     Osteopenia      Other fracture of unspecified lower leg, initial encounter for closed fracture      Pain in left hip 2017     Pregnancy      2, para 2 with 2 spontaneous vaginal deliveries     Pure hypercholesterolemia      Scoliosis      Spinal stenosis     with nerve root impingement     Squamous cell carcinoma    .      ROS  Pertinent ROS was performed and was negative, including for fever, chills, chest pain, shortness of breath, increased lower extremity edema, changes in bowel or bladder, blood in the stool, difficulty swallowing, sores in the  "mouth. No other concerns, with exception of HPI above.      EXAM:   /62 (BP Location: Right arm, Patient Position: Sitting, Cuff Size: Adult Regular)   Pulse 62   Temp 98  F (36.7  C) (Tympanic)   Resp 16   Ht 1.613 m (5' 3.5\")   Wt 77.6 kg (171 lb)   SpO2 94%   Breastfeeding? No   BMI 29.82 kg/m      Estimated body mass index is 29.82 kg/m  as calculated from the following:    Height as of this encounter: 1.613 m (5' 3.5\").    Weight as of this encounter: 77.6 kg (171 lb).      GEN: Vitals reviewed. Healthy appearing. Patient is in no acute distress. Cooperative with exam.  HEENT: Normocephalic atraumatic.  Pupils equally round.  No scleral icterus, no conjunctival erythema.   SKIN: Warm and dry to touch.  No rash on face, arms and legs.  EXT: No clubbing or cyanosis.   Trace bilateral lower extremity peripheral edema.  PSYCH: Mood is good.  Affect appropriate. Speech fluent. Answers questions appropriately and thought process normal.     ASSESSMENT AND PLAN:    Lumbar radiculopathy  -At this time she will follow-up with neurosurgery as scheduled.  She will be provided a prednisone taper that will last up until then.  She is to call if she has any worsening symptoms, changes in bowel or bladder or other concerns.  - predniSONE (DELTASONE) 10 MG tablet  Dispense: 15 tablet; Refill: 0         Return if symptoms worsen or fail to improve.        CHRIS HORAN DO   7/24/2019 1:02 PM    This document was prepared using voice generated softwear. While every attempt was made for accuracy, grammatical errors may exist.           "

## 2019-08-05 ENCOUNTER — TRANSFERRED RECORDS (OUTPATIENT)
Dept: HEALTH INFORMATION MANAGEMENT | Facility: OTHER | Age: 84
End: 2019-08-05

## 2019-08-13 ENCOUNTER — MYC MEDICAL ADVICE (OUTPATIENT)
Dept: INTERNAL MEDICINE | Facility: OTHER | Age: 84
End: 2019-08-13

## 2019-08-14 NOTE — TELEPHONE ENCOUNTER
SK-Pt wants work in appointment for pre-op. Would not schedule with me. Please call. Thank you.  Daphney Monroy

## 2019-08-14 NOTE — TELEPHONE ENCOUNTER
Called and spoke with patient after proper verification. Patient placed in schedule for Pre-op.     Loreto Linton LPN............. August 14, 2019 3:21 PM

## 2019-08-18 ENCOUNTER — OFFICE VISIT (OUTPATIENT)
Dept: FAMILY MEDICINE | Facility: OTHER | Age: 84
End: 2019-08-18
Attending: PHYSICIAN ASSISTANT
Payer: COMMERCIAL

## 2019-08-18 VITALS
BODY MASS INDEX: 29.49 KG/M2 | HEART RATE: 81 BPM | WEIGHT: 172.7 LBS | SYSTOLIC BLOOD PRESSURE: 126 MMHG | OXYGEN SATURATION: 99 % | HEIGHT: 64 IN | DIASTOLIC BLOOD PRESSURE: 66 MMHG | TEMPERATURE: 97.9 F | RESPIRATION RATE: 18 BRPM

## 2019-08-18 DIAGNOSIS — M48.061 SPINAL STENOSIS OF LUMBAR REGION, UNSPECIFIED WHETHER NEUROGENIC CLAUDICATION PRESENT: Primary | ICD-10-CM

## 2019-08-18 PROCEDURE — 99214 OFFICE O/P EST MOD 30 MIN: CPT | Performed by: FAMILY MEDICINE

## 2019-08-18 PROCEDURE — G0463 HOSPITAL OUTPT CLINIC VISIT: HCPCS

## 2019-08-18 RX ORDER — TRAMADOL HYDROCHLORIDE 50 MG/1
50 TABLET ORAL EVERY 6 HOURS PRN
Qty: 15 TABLET | Refills: 0 | Status: SHIPPED | OUTPATIENT
Start: 2019-08-18 | End: 2019-08-29

## 2019-08-18 ASSESSMENT — PAIN SCALES - GENERAL: PAINLEVEL: MODERATE PAIN (4)

## 2019-08-18 ASSESSMENT — MIFFLIN-ST. JEOR: SCORE: 1220.42

## 2019-08-18 NOTE — PROGRESS NOTES
Chief complaint back pain and desire for pain management help    HPI: Pleasant 84-year-old lady presents ambulatory complaining of severe left-sided low back pain.  She is scheduled for surgery at the [facility] in the [facility] area in mid September.  She has had one previous fusion surgery and is going to have another level below the L2-L3 level.  She is not on narcotics and she prefers not to go on any narcotics.  She did have benefit from tramadol and a burst of steroids in the recent past prescribed by her family doctor.  She is not having any bowel or bladder disturbances with this chronic recurring back pain.  Difficult for her to sleep.  Sometimes the pain goes from her left SI region over to the right hip region.  She states she has spinal stenosis and that is why she is going to have a second fusion surgery.    Past medical history see medical record to be noteworthy for her history of chronic kidney disease hypertension and PST    Allergies: Becomes nauseated from medications and does not like the way she feels on oxycodone or hydrocodone    Review of systems patient denying any cardiopulmonary complaints she has not had any GI or  symptoms with regular bowel movements and urinary pressure but no voiding difficulties remainder the review negative    Physical examination the lady does not appear to be in any acute severe pain but becomes tearful when discussing it.  Head neck exam grossly normal  Chest clear  Cardiovascular regular rate and murmur  Abdomen negative  Orthopedic neurological exam and back exam demonstrates some tenderness in the left SI region forward flexion is a little uncomfortable, negative straight leg raising common does not appear to have any sensorimotor deficits to lower extremities    Assessment is acute on chronic low back pain from lumbar spinal stenosis and probable degenerative joint disease    Plan will be tramadol to take 50 mg 3 times daily which is been  quite helpful for her and discussed using steroids and if she has adequate response to tramadol will save the prednisone for a different time but if she does not get better within the next 4872 hours suggested contacting her primary care provider with consideration of a burst of steroids again.  And if becomes acutely worse in any way to return immediately to be rechecked.

## 2019-08-18 NOTE — NURSING NOTE
Patient has not been feeling well for 1 year. Their symptoms are on and off back pain. Pt is looking for pain management till she can see her PCP on 8/29/19. Pt is also having back surgery on 9/18/19.   Dang Craig LPN....................  8/18/2019   2:58 PM

## 2019-08-19 DIAGNOSIS — I10 BENIGN ESSENTIAL HYPERTENSION: ICD-10-CM

## 2019-08-23 RX ORDER — LISINOPRIL 10 MG/1
TABLET ORAL
Qty: 90 TABLET | OUTPATIENT
Start: 2019-08-23

## 2019-08-23 NOTE — TELEPHONE ENCOUNTER
Routing refill request to provider for review/approval because:  Labs out of range:  Creatinine    LOV: 7/24/19  Fadumo Dobson RN on 8/23/2019 at 7:58 AM

## 2019-08-29 ENCOUNTER — OFFICE VISIT (OUTPATIENT)
Dept: INTERNAL MEDICINE | Facility: OTHER | Age: 84
End: 2019-08-29
Attending: INTERNAL MEDICINE
Payer: MEDICARE

## 2019-08-29 VITALS
DIASTOLIC BLOOD PRESSURE: 70 MMHG | OXYGEN SATURATION: 96 % | WEIGHT: 171.6 LBS | HEIGHT: 64 IN | SYSTOLIC BLOOD PRESSURE: 156 MMHG | RESPIRATION RATE: 16 BRPM | HEART RATE: 76 BPM | TEMPERATURE: 98.4 F | BODY MASS INDEX: 29.3 KG/M2

## 2019-08-29 DIAGNOSIS — M54.16 LUMBAR RADICULOPATHY: ICD-10-CM

## 2019-08-29 DIAGNOSIS — I10 ESSENTIAL HYPERTENSION: ICD-10-CM

## 2019-08-29 DIAGNOSIS — N18.9 CHRONIC KIDNEY DISEASE, UNSPECIFIED CKD STAGE: ICD-10-CM

## 2019-08-29 DIAGNOSIS — Z01.818 PREOP GENERAL PHYSICAL EXAM: Primary | ICD-10-CM

## 2019-08-29 DIAGNOSIS — L57.0 AK (ACTINIC KERATOSIS): ICD-10-CM

## 2019-08-29 DIAGNOSIS — I47.10 PAROXYSMAL SUPRAVENTRICULAR TACHYCARDIA (H): ICD-10-CM

## 2019-08-29 LAB
ANION GAP SERPL CALCULATED.3IONS-SCNC: 10 MMOL/L (ref 3–14)
APTT PPP: 31 SEC (ref 22–37)
BUN SERPL-MCNC: 27 MG/DL (ref 7–25)
CALCIUM SERPL-MCNC: 10.6 MG/DL (ref 8.6–10.3)
CHLORIDE SERPL-SCNC: 103 MMOL/L (ref 98–107)
CO2 SERPL-SCNC: 24 MMOL/L (ref 21–31)
CREAT SERPL-MCNC: 1.25 MG/DL (ref 0.6–1.2)
ERYTHROCYTE [DISTWIDTH] IN BLOOD BY AUTOMATED COUNT: 13.7 % (ref 10–15)
GFR SERPL CREATININE-BSD FRML MDRD: 41 ML/MIN/{1.73_M2}
GLUCOSE SERPL-MCNC: 109 MG/DL (ref 70–105)
HCT VFR BLD AUTO: 37.4 % (ref 35–47)
HGB BLD-MCNC: 12 G/DL (ref 11.7–15.7)
MCH RBC QN AUTO: 32.2 PG (ref 26.5–33)
MCHC RBC AUTO-ENTMCNC: 32.1 G/DL (ref 31.5–36.5)
MCV RBC AUTO: 100 FL (ref 78–100)
PLATELET # BLD AUTO: 227 10E9/L (ref 150–450)
POTASSIUM SERPL-SCNC: 4.8 MMOL/L (ref 3.5–5.1)
PROTHROMBIN TIME: 11 S (ref 11.9–15.2)
RBC # BLD AUTO: 3.73 10E12/L (ref 3.8–5.2)
SODIUM SERPL-SCNC: 137 MMOL/L (ref 134–144)
WBC # BLD AUTO: 9.4 10E9/L (ref 4–11)

## 2019-08-29 PROCEDURE — 85610 PROTHROMBIN TIME: CPT | Mod: ZL | Performed by: INTERNAL MEDICINE

## 2019-08-29 PROCEDURE — 36415 COLL VENOUS BLD VENIPUNCTURE: CPT | Mod: ZL | Performed by: INTERNAL MEDICINE

## 2019-08-29 PROCEDURE — G0463 HOSPITAL OUTPT CLINIC VISIT: HCPCS

## 2019-08-29 PROCEDURE — 17000 DESTRUCT PREMALG LESION: CPT | Performed by: INTERNAL MEDICINE

## 2019-08-29 PROCEDURE — 85027 COMPLETE CBC AUTOMATED: CPT | Mod: ZL | Performed by: INTERNAL MEDICINE

## 2019-08-29 PROCEDURE — 99214 OFFICE O/P EST MOD 30 MIN: CPT | Mod: 25 | Performed by: INTERNAL MEDICINE

## 2019-08-29 PROCEDURE — 85730 THROMBOPLASTIN TIME PARTIAL: CPT | Mod: ZL | Performed by: INTERNAL MEDICINE

## 2019-08-29 PROCEDURE — 93005 ELECTROCARDIOGRAM TRACING: CPT | Performed by: INTERNAL MEDICINE

## 2019-08-29 PROCEDURE — 80048 BASIC METABOLIC PNL TOTAL CA: CPT | Mod: ZL | Performed by: INTERNAL MEDICINE

## 2019-08-29 PROCEDURE — 93010 ELECTROCARDIOGRAM REPORT: CPT | Mod: XU | Performed by: INTERNAL MEDICINE

## 2019-08-29 PROCEDURE — G0463 HOSPITAL OUTPT CLINIC VISIT: HCPCS | Mod: 25

## 2019-08-29 RX ORDER — HYDROCODONE BITARTRATE AND ACETAMINOPHEN 5; 325 MG/1; MG/1
1 TABLET ORAL EVERY 4 HOURS PRN
Qty: 25 TABLET | Refills: 0 | Status: SHIPPED | OUTPATIENT
Start: 2019-08-29 | End: 2019-08-30

## 2019-08-29 ASSESSMENT — PAIN SCALES - GENERAL: PAINLEVEL: SEVERE PAIN (6)

## 2019-08-29 ASSESSMENT — MIFFLIN-ST. JEOR: SCORE: 1215.43

## 2019-08-29 NOTE — PATIENT INSTRUCTIONS
PREOP RECOMMENDATIONS:  -- Hold aspirin for 7 days before surgery  -- Hold Advil/ibuprofen, Aleve/naproxen, for 1 days prior to surgery  -- Hold vitamins and supplements for 2 weeks prior to surgery  -- Do not hold your other meds the morning of surgery  -- Okay to use Tylenol (Acetaminophen)  -- No food or drink after midnight the night before surgery    --Take 1 Hydrocodone/Acetaminophen 1-2 times daily as needed for pain;  Max dose of Acetaminophen 4000mg daily    Please call our office and the surgical services with any change in condition or new symptoms that develop between today and your surgical date, in particular if you have anynew chest pain, shortness of breath, swelling or fevers.       Before Your Surgery      Call your surgeon if there is any change in your health. This includes signs of a cold or flu (such as a sore throat, runny nose, cough, rash or fever).    Do not smoke, drink alcohol or take over the counter medicine (unless your surgeon or primary care doctor tells you to) for the 24 hours before and after surgery.    If you take prescribed drugs: Follow your doctor s orders about which medicines to take and which to stop until after surgery.    Eating and drinking prior to surgery: follow the instructions from your surgeon    Take a shower or bath the night before surgery. Use the soap your surgeon gave you to gently clean your skin. If you do not have soap from your surgeon, use your regular soap. Do not shave or scrub the surgery site.  Wear clean pajamas and have clean sheets on your bed.

## 2019-08-29 NOTE — PROGRESS NOTES
[facility] AND [facility]  [address]  [place] [address]  [phone]  Dept: [phone]    PRE-OP EVALUATION:  Today's date: 2019    Vicki Mckeon (: 1935) presents for pre-operative evaluation assessment as requested by Dr. Jimmie Linda MD.  She requires evaluation and anesthesia risk assessment prior to undergoing surgery/procedure for treatment of Anterior Lumbar Interbody Fusion, Levels L4-S1 .    Proposed Surgery/ Procedure: Anterior Lumbar Interbody Fusion  Date of Surgery/ Procedure: 2019  Time of Surgery/ Procedure: 930  Hospital/Surgical Facility: [facility]  Fax number for surgical facility: [phone]  Primary Physician: Kirsten Guevara  Type of Anesthesia Anticipated: to be determined    Patient has a Health Care Directive or Living Will:  YES , currently on file    1. NO - Do you have a history of heart attack, stroke, stent, bypass or surgery on an artery in the head, neck, heart or legs?  2. NO - Do you ever have any pain or discomfort in your chest?  3. NO - Do you have a history of  Heart Failure?  4. NO - Are you troubled by shortness of breath when: walking on the level, up a slight hill or at night?  5. NO - Do you currently have a cold, bronchitis or other respiratory infection?  6. NO - Do you have a cough, shortness of breath or wheezing?  7. NO - Do you sometimes get pains in the calves of your legs when you walk?  8. NO - Do you or anyone in your family have previous history of blood clots?  9. NO - Do you or does anyone in your family have a serious bleeding problem such as prolonged bleeding following surgeries or cuts?  10. YES - Have you ever had problems with anemia or been told to take iron pills? In the past, can't exactly remember when  11. NO - Have you had any abnormal blood loss such as black, tarry or bloody stools, or abnormal vaginal bleeding?  12. NO - Have you ever had a blood transfusion?  13. NO - Have you  or any of your relatives ever had problems with anesthesia?  14. NO - Do you have sleep apnea, excessive snoring or daytime drowsiness?  15. NO - Do you have any prosthetic heart valves?  16. NO - Do you have prosthetic joints?  17. NO - Is there any chance that you may be pregnant?      HPI:     HPI related to upcoming procedure: Patient has had ongoing issues with lumbar radiculopathy over the last 9 months.  She has tried multiple modalities to help with this however has had minimal success.  She is planning for surgical intervention with Dr. Arora.    She has a history of hypertension.  Her blood pressure is just slightly elevated today.  She has not been checking it at home.  She is having some pain ongoing with her back.  She is on lisinopril 10 mg daily and denies any side effects from this medication.    She has a history of chronic kidney disease.  She denies any new symptoms.    She has had paroxysmal supraventricular tachycardia in the past however recently has not had any symptoms or concerns.    She has one lesion on her right neck that she would like looked at and treated with liquid nitrogen if indicated.      MEDICAL HISTORY:      Past Medical History:   Diagnosis Date     Anemia 05/15/2013     CKD (chronic kidney disease) stage 3, GFR 30-59 ml/min (H) 2014    Stable. First noted when patient was taking NSAIDs regularly due to back pain.     Essential (primary) hypertension      Nonrheumatic mitral valve prolapse 2008    with normal echo and no residual prolapse     Nontoxic single thyroid nodule 2012    Patient with multinodular goiter. Negative cytology and stable ultrasound findings.     Osteopenia      Other fracture of unspecified lower leg, initial encounter for closed fracture      Pain in left hip 2017     Pregnancy      2, para 2 with 2 spontaneous vaginal deliveries     Pure hypercholesterolemia      Scoliosis      Spinal stenosis     with nerve root  impingement     Squamous cell carcinoma      Past Surgical History:   Procedure Laterality Date     BACK SURGERY  10/2011    Back surgery, Dr. Linda, [facility]     BIOPSY BREAST Left     , benign     CLOSED REDUCTION ANKLE  2001    ORIF left ankle w/ later removal of hardware      COLONOSCOPY      ,,F/U N/A     EXTRACAPSULAR CATARACT EXTRATION WITH INTRAOCULAR LENS IMPLANT Bilateral      LAPAROSCOPY DIAGNOSTIC (GYN)      Laparoscopy for pelvic pain which was negative     Current Outpatient Medications   Medication Sig Dispense Refill     Cholecalciferol (VITAMIN D3) 1000 UNITS CAPS Take 1,000 Units by mouth daily       cyanocobalamin (RA VITAMIN B-12 TR) 1000 MCG TBCR Take 1,000 mcg by mouth daily       lisinopril (PRINIVIL/ZESTRIL) 10 MG tablet Take 1 tablet (10 mg) by mouth daily 90 tablet 4     VENTOLIN  (90 Base) MCG/ACT inhaler        OTC products: None, except as noted above    Allergies   Allergen Reactions     Fosamax [Alendronic Acid]      palpitations     Sulfa Drugs Other (See Comments)     Doesn't remember      Latex Allergy: NO    Social History     Tobacco Use     Smoking status: Former Smoker     Last attempt to quit: 1968     Years since quittin.6     Smokeless tobacco: Never Used   Substance Use Topics     Alcohol use: Yes     Comment: Alcoholic Drinks/day: Occasional     History   Drug Use No     Comment: Drug use: No       REVIEW OF SYSTEMS:   ROS  GEN: -fevers/-chills/-night sweats/-wt change  NEURO: -headaches/-vision changes  EARS: -hearing changes/-tinnitus  NOSE: +drainage/-congestion  MOUTH/THROAT: - sore throat/-dysphagia/-sores  LUNGS: -sob/-cough  CARDIOVASCULAR: -cp/+rare palpitations/-lower ext edema  GI: -pain/-changes in bowel/-bloody stools  : -changes in bladder  ENDOCRINE: +skin or hair changes - mild hair loss  HEMATOLOGIC/LYMPHATIC: -swollen nodes/-easy bruising  SKIN: -rashes/+lesions - right neck  MSK/RHEUM: -+occasional oint  "pain/-swelling/-falls  NEURO: -weakness/+rare parasthesias  PSYCH: -anhedonia/-depression/-anxiety      EXAM:   BP (!) 156/70 (BP Location: Right arm, Patient Position: Sitting, Cuff Size: Adult Regular)   Pulse 76   Temp 98.4  F (36.9  C) (Tympanic)   Resp 16   Ht 1.613 m (5' 3.5\")   Wt 77.8 kg (171 lb 9.6 oz)   SpO2 96%   Breastfeeding? No   BMI 29.92 kg/m      GENERAL APPEARANCE: healthy, alert and no distress     EYES: EOMI, PERRL     HENT: nose and mouth without ulcers or lesions     NECK: no adenopathy, no asymmetry, masses, or scars and thyroid normal to palpation     RESP: lungs clear to auscultation - no rales, rhonchi or wheezes     CV: regular rates and rhythm, normal S1 S2, no S3 or S4 and no murmur, click or rub, no edema     ABDOMEN:  soft, nontender, no HSM or masses and bowel sounds normal     MS: extremities normal- no gross deformities noted, no evidence of inflammation in joints, FROM in all extremities.     SKIN: Erythematous scaly lesion noted on the right neck.  No other concerning lesions or rashes     NEURO: Normal strength and tone, sensory exam grossly normal, mentation intact and speech normal     PSYCH: mentation appears normal. and affect normal/bright     LYMPHATICS: No cervical adenopathy    DIAGNOSTICS:     LABS: Personally ordered/reviewed  Results for orders placed or performed in visit on 08/29/19   APTT   Result Value Ref Range    PTT 31 22 - 37 sec   Protime-INR   Result Value Ref Range    Protime 11.0 (L) 11.9 - 15.2 s   CBC W PLT No Diff   Result Value Ref Range    WBC 9.4 4.0 - 11.0 10e9/L    RBC Count 3.73 (L) 3.8 - 5.2 10e12/L    Hemoglobin 12.0 11.7 - 15.7 g/dL    Hematocrit 37.4 35.0 - 47.0 %     78 - 100 fl    MCH 32.2 26.5 - 33.0 pg    MCHC 32.1 31.5 - 36.5 g/dL    RDW 13.7 10.0 - 15.0 %    Platelet Count 227 150 - 450 10e9/L   Basic Metabolic Panel   Result Value Ref Range    Sodium 137 134 - 144 mmol/L    Potassium 4.8 3.5 - 5.1 mmol/L    Chloride 103 98 " - 107 mmol/L    Carbon Dioxide 24 21 - 31 mmol/L    Anion Gap 10 3 - 14 mmol/L    Glucose 109 (H) 70 - 105 mg/dL    Urea Nitrogen 27 (H) 7 - 25 mg/dL    Creatinine 1.25 (H) 0.60 - 1.20 mg/dL    GFR Estimate 41 (L) >60 mL/min/[1.73_m2]    GFR Estimate If Black 50 (L) >60 mL/min/[1.73_m2]    Calcium 10.6 (H) 8.6 - 10.3 mg/dL     IMPRESSION:   Reason for surgery/procedure: Lumbar back pain  Diagnosis/reason for consult: multiple medical issues including SVT, HTN, CKD.    The proposed surgical procedure is considered INTERMEDIATE risk.    REVISED CARDIAC RISK INDEX  The patient has the following serious cardiovascular risks for perioperative complications such as (MI, PE, VFib and 3  AV Block):  No serious cardiac risks    INTERPRETATION: 1 risks: Class II (low risk - 0.9% complication rate)    The patient has the following additional risks for perioperative complications:  No identified additional risks      ICD-10-CM    1. Preop general physical exam Z01.818 EKG 12-lead, tracing only     Protime-INR     APTT     APTT     Protime-INR   2. Lumbar radiculopathy M54.16  HYDROcodone-acetaminophen (NORCO) 5-325 MG tablet   3. Paroxysmal supraventricular tachycardia (H) I47.1 EKG 12-lead, tracing only   4. Essential hypertension I10 Basic Metabolic Panel     EKG 12-lead, tracing only     Basic Metabolic Panel   5. Chronic kidney disease, unspecified CKD stage  N18.9 CBC W PLT No Diff     Protime-INR     APTT     APTT     Protime-INR     CBC W PLT No Diff   6. AK (actinic keratosis) L57.0 DESTRUCT PREMALIGNANT LESION, FIRST     Labs checked today and overall stable with continued to decrease GFR.  She is noted to have a mild increase in her calcium.  It is recommended that she stop any calcium supplementation.  We will plan to recheck with next labs.  She is interested in an alternative medication for her back pain leading up to surgery.  She will plan to try low-dose hydrocodone.  She is to let me know if she has any side  effects.    For her cardiac issues, hypertension, medications will be renewed.  No new concerns at this time.  Renal function is rechecked.      Erythematous scaly lesion is noted on her right neck.  This is treated with liquid nitrogen today.  3 freeze thaw cycles.  She does not report any immediate complications.        RECOMMENDATIONS:     --Consult hospital rounder / IM to assist post-op medical management    --Patient is to take all scheduled medications on the day of surgery EXCEPT for modifications listed in AVS    APPROVAL GIVEN to proceed with proposed procedure, without further diagnostic evaluation       Signed Electronically by: Kirsten Guevara DO    Copy of this evaluation report is provided to requesting physician.    [place] Preop Guidelines    Revised Cardiac Risk Index

## 2019-08-29 NOTE — NURSING NOTE
This patient presents today for a Preoperative exam for this procedure:  Anterior Lumbar Interbody Fusion, Levels L4 to S1  Date of Surgery:  09/18/2019  Surgeon:  Jana Linda MD  Facility:  [facility]  Fax:  [phone]    Loreto Linton LPN............. August 29, 2019 12:51 PM

## 2019-08-30 ENCOUNTER — TELEPHONE (OUTPATIENT)
Dept: INTERNAL MEDICINE | Facility: OTHER | Age: 84
End: 2019-08-30

## 2019-08-30 NOTE — TELEPHONE ENCOUNTER
Patient called regarding pain control.  She has not done well with her hydrocodone.  It will be discontinued and she will dispose of it properly.  We discussed the option of prednisone however she was informed that it can lead to decreased healing with surgery.  She would like to avoid it at this time due to that.  She will try Tylenol as needed along with some CBD oil which has worked for her in the past.  She will call if she has any worsening issues.

## 2019-08-30 NOTE — TELEPHONE ENCOUNTER
Called and spoke with patient after proper verification. Patient states she filled her rx that she got from her apt yesterday (norco), but states that it is increase her pain instead of helping with it. Patient states that Kirsten Guevara, DO and her talked about prednisone and she knows that that helps due to being on it in the past. Patient is wondering  If a new rx for this can be sent in. Please advise.     Loreto Linton LPN............. August 30, 2019 2:31 PM

## 2019-09-18 ENCOUNTER — TRANSFERRED RECORDS (OUTPATIENT)
Dept: HEALTH INFORMATION MANAGEMENT | Facility: OTHER | Age: 84
End: 2019-09-18

## 2019-09-30 ENCOUNTER — TRANSFERRED RECORDS (OUTPATIENT)
Dept: HEALTH INFORMATION MANAGEMENT | Facility: OTHER | Age: 84
End: 2019-09-30

## 2019-10-04 ENCOUNTER — RESULTS ONLY (OUTPATIENT)
Dept: LAB | Age: 84
End: 2019-10-04

## 2019-10-04 LAB
ANION GAP SERPL CALCULATED.3IONS-SCNC: 8 MMOL/L (ref 3–14)
BASOPHILS # BLD AUTO: 0 10E9/L (ref 0–0.2)
BASOPHILS NFR BLD AUTO: 0.4 %
BUN SERPL-MCNC: 17 MG/DL (ref 7–25)
CALCIUM SERPL-MCNC: 8.4 MG/DL (ref 8.6–10.3)
CHLORIDE SERPL-SCNC: 106 MMOL/L (ref 98–107)
CO2 SERPL-SCNC: 23 MMOL/L (ref 21–31)
CREAT SERPL-MCNC: 0.96 MG/DL (ref 0.6–1.2)
DIFFERENTIAL METHOD BLD: ABNORMAL
EOSINOPHIL # BLD AUTO: 0.3 10E9/L (ref 0–0.7)
EOSINOPHIL NFR BLD AUTO: 2.8 %
ERYTHROCYTE [DISTWIDTH] IN BLOOD BY AUTOMATED COUNT: 17.7 % (ref 10–15)
GFR SERPL CREATININE-BSD FRML MDRD: 56 ML/MIN/{1.73_M2}
GLUCOSE SERPL-MCNC: 99 MG/DL (ref 70–105)
HCT VFR BLD AUTO: 28.8 % (ref 35–47)
HGB BLD-MCNC: 9 G/DL (ref 11.7–15.7)
IMM GRANULOCYTES # BLD: 0.1 10E9/L (ref 0–0.4)
IMM GRANULOCYTES NFR BLD: 0.6 %
LYMPHOCYTES # BLD AUTO: 1.3 10E9/L (ref 0.8–5.3)
LYMPHOCYTES NFR BLD AUTO: 12.9 %
MCH RBC QN AUTO: 29.2 PG (ref 26.5–33)
MCHC RBC AUTO-ENTMCNC: 31.3 G/DL (ref 31.5–36.5)
MCV RBC AUTO: 94 FL (ref 78–100)
MONOCYTES # BLD AUTO: 1 10E9/L (ref 0–1.3)
MONOCYTES NFR BLD AUTO: 10.7 %
NEUTROPHILS # BLD AUTO: 7 10E9/L (ref 1.6–8.3)
NEUTROPHILS NFR BLD AUTO: 72.6 %
PLATELET # BLD AUTO: 212 10E9/L (ref 150–450)
POTASSIUM SERPL-SCNC: 4.1 MMOL/L (ref 3.5–5.1)
RBC # BLD AUTO: 3.08 10E12/L (ref 3.8–5.2)
SODIUM SERPL-SCNC: 137 MMOL/L (ref 134–144)
WBC # BLD AUTO: 9.7 10E9/L (ref 4–11)

## 2019-10-08 ENCOUNTER — NURSING HOME VISIT (OUTPATIENT)
Dept: GERIATRICS | Facility: OTHER | Age: 84
End: 2019-10-08
Attending: INTERNAL MEDICINE
Payer: COMMERCIAL

## 2019-10-08 DIAGNOSIS — N18.30 STAGE 3 CHRONIC KIDNEY DISEASE (H): ICD-10-CM

## 2019-10-08 DIAGNOSIS — K25.4 GASTROINTESTINAL HEMORRHAGE ASSOCIATED WITH GASTRIC ULCER: ICD-10-CM

## 2019-10-08 DIAGNOSIS — R00.0 TACHYCARDIA: ICD-10-CM

## 2019-10-08 DIAGNOSIS — M54.9 BACK PAIN WITH HISTORY OF SPINAL SURGERY: ICD-10-CM

## 2019-10-08 DIAGNOSIS — Z09 HOSPITAL DISCHARGE FOLLOW-UP: Primary | ICD-10-CM

## 2019-10-08 DIAGNOSIS — Z98.890 BACK PAIN WITH HISTORY OF SPINAL SURGERY: ICD-10-CM

## 2019-10-08 DIAGNOSIS — I10 ESSENTIAL HYPERTENSION: ICD-10-CM

## 2019-10-08 DIAGNOSIS — R60.0 BILATERAL LOWER EXTREMITY EDEMA: ICD-10-CM

## 2019-10-08 PROCEDURE — 99309 SBSQ NF CARE MODERATE MDM 30: CPT | Performed by: INTERNAL MEDICINE

## 2019-10-09 ENCOUNTER — RESULTS ONLY (OUTPATIENT)
Dept: LAB | Age: 84
End: 2019-10-09

## 2019-10-09 ENCOUNTER — HOSPITAL ENCOUNTER (OUTPATIENT)
Dept: ULTRASOUND IMAGING | Facility: OTHER | Age: 84
Discharge: HOME | End: 2019-10-09
Attending: INTERNAL MEDICINE | Admitting: INTERNAL MEDICINE
Payer: MEDICARE

## 2019-10-09 DIAGNOSIS — R79.89 ELEVATED D-DIMER: ICD-10-CM

## 2019-10-09 DIAGNOSIS — M79.661 RIGHT CALF PAIN: Primary | ICD-10-CM

## 2019-10-09 DIAGNOSIS — M79.661 RIGHT CALF PAIN: ICD-10-CM

## 2019-10-09 LAB
ANION GAP SERPL CALCULATED.3IONS-SCNC: 7 MMOL/L (ref 3–14)
BUN SERPL-MCNC: 16 MG/DL (ref 7–25)
CALCIUM SERPL-MCNC: 8.6 MG/DL (ref 8.6–10.3)
CHLORIDE SERPL-SCNC: 107 MMOL/L (ref 98–107)
CO2 SERPL-SCNC: 25 MMOL/L (ref 21–31)
CREAT SERPL-MCNC: 1.06 MG/DL (ref 0.6–1.2)
D DIMER PPP DDU-MCNC: 2946 NG/ML D-DU (ref 0–230)
ERYTHROCYTE [DISTWIDTH] IN BLOOD BY AUTOMATED COUNT: 16.8 % (ref 10–15)
GFR SERPL CREATININE-BSD FRML MDRD: 50 ML/MIN/{1.73_M2}
GLUCOSE SERPL-MCNC: 111 MG/DL (ref 70–105)
HCT VFR BLD AUTO: 27.8 % (ref 35–47)
HGB BLD-MCNC: 8.6 G/DL (ref 11.7–15.7)
MCH RBC QN AUTO: 29.3 PG (ref 26.5–33)
MCHC RBC AUTO-ENTMCNC: 30.9 G/DL (ref 31.5–36.5)
MCV RBC AUTO: 95 FL (ref 78–100)
PLATELET # BLD AUTO: 308 10E9/L (ref 150–450)
POTASSIUM SERPL-SCNC: 4.1 MMOL/L (ref 3.5–5.1)
RBC # BLD AUTO: 2.94 10E12/L (ref 3.8–5.2)
SODIUM SERPL-SCNC: 139 MMOL/L (ref 134–144)
WBC # BLD AUTO: 7.3 10E9/L (ref 4–11)

## 2019-10-09 PROCEDURE — 93971 EXTREMITY STUDY: CPT | Mod: RT

## 2019-10-09 NOTE — PROGRESS NOTES
Chief Complaint   Patient presents with     Hospital F/U         HPI: Ms. Mckeon is a 84 year old female who presents today for follow up of recent hospitalization.    She was recently hospitalized back surgery on September 18.  She had a uneventful surgery and hospital stay at that time.  She was noted to have a drop in her hemoglobin at the time postop to 7.6 however it improved to 8 prior to discharge.  She had improvement in her pain after her surgery.  She then went to a skilled nursing facility for physical therapy postop.  Approximately 8 days after admitting there she started developing more lightheadedness, dyspnea on exertion.  She noted dark stools.  She was sent to the ER where she was found to have a decrease in her hemoglobin to 6.5 with an elevated white blood cell count.  She was transfused 4 units of blood.  Following her 4 units her hemoglobin decreased again to 8.1.    She underwent EGD which showed esophagitis and 2 duodenal ulcers.  H. pylori was negative.  However pathology was reviewed and it did show candidal esophagitis.    She was discharged on October 3 locally to a skilled nursing.  I was called the following day due to some tachycardia during physical therapy.  Labs were repeated and showed an improvement of her hemoglobin to 9.  She was asymptomatic otherwise.    Today she tells me she is feeling better than she has.  She has continued to have some elevated pulse with therapy however it has only been up to approximately 110 per reports.  She has not had any blood in her stool or black stools.  She continues on Protonix twice daily.  She was on steroids however these were discontinued.  She has been started on fluconazole for 14 days.  She has had some elevated blood pressures in the 140 systolic range.  She is on Prinivil 10 mg daily.  She is no longer requiring any pain medications other than Tylenol.    She has had some lower extremity edema after which she received.  She has been  trying to do some ankle pumps.  She is not currently wearing compression stockings as she does not have any.  She has had some discomfort in her right calf pain however it has improved.  She believes it is likely from physical therapy.    Vitals are reviewed.  Blood pressure ranged from 137/60 -150/59 over the last couple days.  Oxygen saturations have been in the high 90s and have continued like this with activity.  She has been afebrile.  Heart rate has ranged between 84 and 103 recorded.  Her weight has decreased approximately 10 pounds however according to our records here she is still 6 pounds up from her preop weight.    She does have a history of chronic kidney disease stage III.  Most recent renal testing is stable overall.    She  reports that she quit smoking about 51 years ago. She has never used smokeless tobacco.    Past medical history reviewed as below:     Past Medical History:   Diagnosis Date     Anemia 05/15/2013     CKD (chronic kidney disease) stage 3, GFR 30-59 ml/min (H) 2014    Stable. First noted when patient was taking NSAIDs regularly due to back pain.     Essential (primary) hypertension      Nonrheumatic mitral valve prolapse 2008    with normal echo and no residual prolapse     Nontoxic single thyroid nodule 2012    Patient with multinodular goiter. Negative cytology and stable ultrasound findings.     Osteopenia      Other fracture of unspecified lower leg, initial encounter for closed fracture      Pain in left hip 2017     Pregnancy      2, para 2 with 2 spontaneous vaginal deliveries     Pure hypercholesterolemia      Scoliosis      Spinal stenosis     with nerve root impingement     Squamous cell carcinoma    .      ROS  Pertinent ROS was performed and was negative, including for fever, chills, chest pain, changes in bowel or bladder, blood in the stool currently, difficulty swallowing, sores in the mouth. No other concerns, with exception of HPI  "above.      EXAM:   See above    Estimated body mass index is 29.92 kg/m  as calculated from the following:    Height as of 8/29/19: 1.613 m (5' 3.5\").    Weight as of 8/29/19: 77.8 kg (171 lb 9.6 oz).      GEN: Vitals reviewed. Healthy appearing. Patient is in no acute distress. Cooperative with exam.  HEENT: Normocephalic atraumatic.  Pupils equally round.  No scleral icterus, no conjunctival erythema. Oropharynx with no erythema or exudates. Dentition adequate.  CV: Heart regular in rate and rhythm with no murmur.    LUNGS: Lungs clear to auscultation bilaterally.  Chest rise equal bilaterally.  No accessory muscle use.  ABD:  Nondistended  SKIN: Warm and dry to touch.  No rash on face, arms and legs.  EXT: No clubbing or cyanosis.   1-2+ bilateral lower extremity peripheral edema.  Minimal tenderness to palpation of the right posterior calf.  No tenderness on the left.  PSYCH: Mood is good.  Affect appropriate. Speech fluent. Answers questions appropriately and thought process normal.     ASSESSMENT AND PLAN:    Hospital discharge follow-up  -Outside records are reviewed and summarized above.    Essential hypertension  -Although blood pressure is elevated at this time it is likely secondary to anemia and deconditioning.  We will continue to monitor and adjust blood pressure medications as needed.    Stage 3 chronic kidney disease (H)  -Recheck is stable overall.  Continue to monitor.      Tachycardia  - Likely secondary to anemia.  Continue to monitor.  We did discuss possibilities of atrial fibrillation and pulmonary emboli.  D-dimer is obtained and elevated, ultrasound of the right lower extremity is obtained and is negative.  Elevated d-dimer is likely related to recent surgery.  However if she continues to have tachycardia and is noted to have any decrease in oxygenation we will pursue CT of the chest.  Nursing is instructed to obtain an EKG for any irregular heart rhythms or elevated heart rates.  We will " consider cardiac monitor in the future if it persists.    Gastrointestinal hemorrhage associated with gastric ulcer  -Improved at this time.  Continue on twice daily Protonix.  It is recommended by her surgeons that he is to have a repeat EGD in 3 months to follow-up any complications such as stricture and because of the size of the ulcer.  She would like to get that locally.  We will plan to place orders for her to have this completed at the end of December.    Back pain with history of spinal surgery  Minimal at this time.  Continue Tylenol as needed.    Bilateral lower extremity edema  -Likely secondary to fluids.  She was encouraged to get up and moving as much as possible.  She will be provided compression stockings.  She was encouraged to watch her salt intake and elevate her legs.        CHRIS HORAN, DO   10/9/2019 8:45 AM    This document was prepared using voice generated softwear. While every attempt was made for accuracy, grammatical errors may exist.

## 2019-10-17 DIAGNOSIS — D50.9 IRON DEFICIENCY ANEMIA, UNSPECIFIED IRON DEFICIENCY ANEMIA TYPE: Primary | ICD-10-CM

## 2019-10-17 DIAGNOSIS — N18.9 CHRONIC KIDNEY DISEASE, UNSPECIFIED CKD STAGE: ICD-10-CM

## 2019-10-17 DIAGNOSIS — L57.0 AK (ACTINIC KERATOSIS): ICD-10-CM

## 2019-10-17 RX ORDER — FERROUS SULFATE 325(65) MG
325 TABLET ORAL
Qty: 90 TABLET | Refills: 1 | Status: SHIPPED | OUTPATIENT
Start: 2019-10-17 | End: 2019-10-25 | Stop reason: SINTOL

## 2019-10-21 ENCOUNTER — TELEPHONE (OUTPATIENT)
Dept: INTERNAL MEDICINE | Facility: OTHER | Age: 84
End: 2019-10-21

## 2019-10-21 DIAGNOSIS — M54.16 LUMBAR RADICULOPATHY: Primary | ICD-10-CM

## 2019-10-21 DIAGNOSIS — Z98.890 S/P SPINAL SURGERY: ICD-10-CM

## 2019-10-22 ENCOUNTER — TELEPHONE (OUTPATIENT)
Dept: INTERNAL MEDICINE | Facility: OTHER | Age: 84
End: 2019-10-22

## 2019-10-22 ENCOUNTER — TRANSFERRED RECORDS (OUTPATIENT)
Dept: HEALTH INFORMATION MANAGEMENT | Facility: OTHER | Age: 84
End: 2019-10-22

## 2019-10-22 DIAGNOSIS — K26.4 GASTROINTESTINAL HEMORRHAGE ASSOCIATED WITH DUODENAL ULCER: Primary | ICD-10-CM

## 2019-10-22 NOTE — TELEPHONE ENCOUNTER
Patient called in regards to a appointment that  said was set up on Friday 10/25/2019 @ 2:40. However I do not see this on her appointment desk. Can you please call her back in regards to this. Thank You!

## 2019-10-22 NOTE — TELEPHONE ENCOUNTER
Patient is asking the lodge at [facility]. Patient will call back if needed. Nakita Salas LPN .............10/22/2019  11:15 AM

## 2019-10-23 RX ORDER — PANTOPRAZOLE SODIUM 40 MG/1
40 TABLET, DELAYED RELEASE ORAL 2 TIMES DAILY
Qty: 180 TABLET | Refills: 0 | Status: SHIPPED | OUTPATIENT
Start: 2019-10-23 | End: 2019-12-20

## 2019-10-23 NOTE — TELEPHONE ENCOUNTER
Patient was to be scheduled on 10/25 at 2:40 to follow up anemia after [facility] discharge with labs prior to my visit .  If she is able to make it, please place her in my schedule.  Order for protonix sent in for 40mg BID (see other phone note).

## 2019-10-23 NOTE — TELEPHONE ENCOUNTER
Called patient and verified name and date of birth.informed her of appointment and put in schedule.  Lisbeth Alicia LPN on 10/23/2019 at 8:42 AM

## 2019-10-25 ENCOUNTER — OFFICE VISIT (OUTPATIENT)
Dept: INTERNAL MEDICINE | Facility: OTHER | Age: 84
End: 2019-10-25
Attending: INTERNAL MEDICINE
Payer: MEDICARE

## 2019-10-25 VITALS
BODY MASS INDEX: 29.64 KG/M2 | RESPIRATION RATE: 18 BRPM | TEMPERATURE: 97.8 F | WEIGHT: 170 LBS | SYSTOLIC BLOOD PRESSURE: 132 MMHG | DIASTOLIC BLOOD PRESSURE: 68 MMHG | HEART RATE: 68 BPM

## 2019-10-25 DIAGNOSIS — Z23 NEED FOR INFLUENZA VACCINATION: ICD-10-CM

## 2019-10-25 DIAGNOSIS — D50.0 IRON DEFICIENCY ANEMIA DUE TO CHRONIC BLOOD LOSS: Primary | ICD-10-CM

## 2019-10-25 DIAGNOSIS — Z98.890 S/P SPINAL SURGERY: ICD-10-CM

## 2019-10-25 DIAGNOSIS — N18.9 CHRONIC KIDNEY DISEASE, UNSPECIFIED CKD STAGE: ICD-10-CM

## 2019-10-25 DIAGNOSIS — K26.4 GASTROINTESTINAL HEMORRHAGE ASSOCIATED WITH DUODENAL ULCER: ICD-10-CM

## 2019-10-25 DIAGNOSIS — N18.30 STAGE 3 CHRONIC KIDNEY DISEASE (H): ICD-10-CM

## 2019-10-25 DIAGNOSIS — D50.0 IRON DEFICIENCY ANEMIA DUE TO CHRONIC BLOOD LOSS: ICD-10-CM

## 2019-10-25 DIAGNOSIS — D50.9 IRON DEFICIENCY ANEMIA, UNSPECIFIED IRON DEFICIENCY ANEMIA TYPE: ICD-10-CM

## 2019-10-25 LAB
ANION GAP SERPL CALCULATED.3IONS-SCNC: 8 MMOL/L (ref 3–14)
BUN SERPL-MCNC: 21 MG/DL (ref 7–25)
CALCIUM SERPL-MCNC: 9.4 MG/DL (ref 8.6–10.3)
CHLORIDE SERPL-SCNC: 106 MMOL/L (ref 98–107)
CO2 SERPL-SCNC: 25 MMOL/L (ref 21–31)
CREAT SERPL-MCNC: 1.07 MG/DL (ref 0.6–1.2)
ERYTHROCYTE [DISTWIDTH] IN BLOOD BY AUTOMATED COUNT: 16.7 % (ref 10–15)
FERRITIN SERPL-MCNC: 225 NG/ML (ref 23.9–336.2)
GFR SERPL CREATININE-BSD FRML MDRD: 49 ML/MIN/{1.73_M2}
GLUCOSE SERPL-MCNC: 132 MG/DL (ref 70–105)
HCT VFR BLD AUTO: 30.9 % (ref 35–47)
HGB BLD-MCNC: 9.5 G/DL (ref 11.7–15.7)
MCH RBC QN AUTO: 28.8 PG (ref 26.5–33)
MCHC RBC AUTO-ENTMCNC: 30.7 G/DL (ref 31.5–36.5)
MCV RBC AUTO: 94 FL (ref 78–100)
PLATELET # BLD AUTO: 338 10E9/L (ref 150–450)
POTASSIUM SERPL-SCNC: 3.8 MMOL/L (ref 3.5–5.1)
RBC # BLD AUTO: 3.3 10E12/L (ref 3.8–5.2)
SODIUM SERPL-SCNC: 139 MMOL/L (ref 134–144)
WBC # BLD AUTO: 9.2 10E9/L (ref 4–11)

## 2019-10-25 PROCEDURE — 90662 IIV NO PRSV INCREASED AG IM: CPT

## 2019-10-25 PROCEDURE — 83540 ASSAY OF IRON: CPT | Mod: ZL | Performed by: INTERNAL MEDICINE

## 2019-10-25 PROCEDURE — 82728 ASSAY OF FERRITIN: CPT | Mod: ZL | Performed by: INTERNAL MEDICINE

## 2019-10-25 PROCEDURE — G0008 ADMIN INFLUENZA VIRUS VAC: HCPCS

## 2019-10-25 PROCEDURE — 85027 COMPLETE CBC AUTOMATED: CPT | Mod: ZL | Performed by: INTERNAL MEDICINE

## 2019-10-25 PROCEDURE — 80048 BASIC METABOLIC PNL TOTAL CA: CPT | Mod: ZL | Performed by: INTERNAL MEDICINE

## 2019-10-25 PROCEDURE — 36415 COLL VENOUS BLD VENIPUNCTURE: CPT | Mod: ZL | Performed by: INTERNAL MEDICINE

## 2019-10-25 PROCEDURE — G0463 HOSPITAL OUTPT CLINIC VISIT: HCPCS | Mod: 25

## 2019-10-25 PROCEDURE — G0463 HOSPITAL OUTPT CLINIC VISIT: HCPCS

## 2019-10-25 PROCEDURE — 99214 OFFICE O/P EST MOD 30 MIN: CPT | Performed by: INTERNAL MEDICINE

## 2019-10-25 PROCEDURE — 83550 IRON BINDING TEST: CPT | Mod: ZL | Performed by: INTERNAL MEDICINE

## 2019-10-25 RX ORDER — HEPARIN SODIUM,PORCINE 10 UNIT/ML
5 VIAL (ML) INTRAVENOUS
Status: CANCELLED | OUTPATIENT
Start: 2019-10-25

## 2019-10-25 RX ORDER — HEPARIN SODIUM (PORCINE) LOCK FLUSH IV SOLN 100 UNIT/ML 100 UNIT/ML
5 SOLUTION INTRAVENOUS
Status: CANCELLED | OUTPATIENT
Start: 2019-10-25

## 2019-10-25 ASSESSMENT — PATIENT HEALTH QUESTIONNAIRE - PHQ9: SUM OF ALL RESPONSES TO PHQ QUESTIONS 1-9: 0

## 2019-10-25 NOTE — NURSING NOTE
"The patient is here today to have a follow up visit on her lab work and post surgery.  Meagan Morse LPN on 10/25/2019 at 2:50 PM  Chief Complaint   Patient presents with     RECHECK       Initial There were no vitals taken for this visit. Estimated body mass index is 29.92 kg/m  as calculated from the following:    Height as of 8/29/19: 1.613 m (5' 3.5\").    Weight as of 8/29/19: 77.8 kg (171 lb 9.6 oz).  Medication Reconciliation: complete    Meagan Morse LPN    "

## 2019-10-25 NOTE — PROGRESS NOTES
Chief Complaint   Patient presents with     RECHECK         HPI: Ms. Mckeon is a 84 year old female who presents today for follow up of anemia and recent GI bleed.    She overall has been doing well at home.  She has not had any recurrent bleeding.  She denies any blood in the stool or black stools.  She did stop iron due to stomach upset.  Continues to be SOB, fatigued.  She is due for recheck today.    She has a history of chronic kidney disease.  She denies any new urinary symptoms.    She has been doing well after spinal surgery.  She has not had any ongoing pain.  She does feel that she is getting around the house okay although feels generalized weakness.  She is working with physical therapy.    Would like a flu shot today.    She  reports that she quit smoking about 51 years ago. She has never used smokeless tobacco.    Past medical history reviewed as below:     Past Medical History:   Diagnosis Date     Anemia 05/15/2013     CKD (chronic kidney disease) stage 3, GFR 30-59 ml/min (H) 2014    Stable. First noted when patient was taking NSAIDs regularly due to back pain.     Essential (primary) hypertension      Nonrheumatic mitral valve prolapse 2008    with normal echo and no residual prolapse     Nontoxic single thyroid nodule 2012    Patient with multinodular goiter. Negative cytology and stable ultrasound findings.     Osteopenia      Other fracture of unspecified lower leg, initial encounter for closed fracture      Pain in left hip 2017     Pregnancy      2, para 2 with 2 spontaneous vaginal deliveries     Pure hypercholesterolemia      Scoliosis      Spinal stenosis     with nerve root impingement     Squamous cell carcinoma    .      ROS  Pertinent ROS was performed and was negative, including for fever, chills, chest pain, shortness of breath, increased lower extremity edema, changes in bowel or bladder, blood in the stool, difficulty swallowing, sores in the mouth. No  "other concerns, with exception of HPI above.      EXAM:   /68 (BP Location: Right arm, Patient Position: Sitting, Cuff Size: Adult Regular)   Pulse 68   Temp 97.8  F (36.6  C)   Resp 18   Wt 77.1 kg (170 lb)   Breastfeeding? No   BMI 29.64 kg/m      Estimated body mass index is 29.64 kg/m  as calculated from the following:    Height as of 8/29/19: 1.613 m (5' 3.5\").    Weight as of this encounter: 77.1 kg (170 lb).      GEN: Vitals reviewed. Healthy appearing. Patient is in no acute distress. Cooperative with exam.  HEENT: Normocephalic atraumatic.  Pupils equally round.  No scleral icterus, no conjunctival erythema. Oropharynx with no erythema or exudates. Dentition adequate.  NECK: Supple; no thyromegaly.  No neck, cervical LAD.   CV: Heart regular in rate and rhythm with no murmur.    LUNGS: Lungs clear to auscultation bilaterally.  Chest rise equal bilaterally.  No accessory muscle use.  ABD:  Nondistended  SKIN: Warm and dry to touch.  No rash on face, arms and legs.  EXT: No clubbing or cyanosis.  Trace to 1+ bilateral peripheral edema.  PSYCH: Mood is good.  Affect appropriate. Speech fluent. Answers questions appropriately and thought process normal.     ASSESSMENT AND PLAN:    Iron deficiency anemia due to chronic blood loss  -At this time given her low iron we will put in orders for iron infusions.  She is to call if she has any issues with this.  - Ferritin  - Iron Binding Panel    Stage 3 chronic kidney disease (H)  Plan for iron infusions.  We will plan for recheck of her renal function when I see her in follow-up.    Gastrointestinal hemorrhage associated with duodenal ulcer  -Overall no recurrent signs of bleeding.  Per recommendations from a gastroenterology she will need a repeat EGD in December.  We will place orders for this.    S/P spinal surgery  -Overall she is doing well.  She is to call if she has any worsening symptoms.    Need for influenza vaccination  Vaccine " given.      Return in about 10 weeks (around 1/3/2020) for Recheck.      CHRIS HORAN DO   10/25/2019 3:01 PM    This document was prepared using voice generated softwear. While every attempt was made for accuracy, grammatical errors may exist.

## 2019-10-26 LAB
IRON SATN MFR SERPL: 10 % (ref 20–55)
IRON SERPL-MCNC: 32 UG/DL (ref 50–212)
TIBC SERPL-MCNC: 315 UG/DL (ref 245–400)
UIBC (UNSATURATED): 283 MG/DL

## 2019-10-27 ENCOUNTER — MYC MEDICAL ADVICE (OUTPATIENT)
Dept: INTERNAL MEDICINE | Facility: OTHER | Age: 84
End: 2019-10-27

## 2019-10-27 DIAGNOSIS — K26.4 GASTROINTESTINAL HEMORRHAGE ASSOCIATED WITH DUODENAL ULCER: Primary | ICD-10-CM

## 2019-10-28 NOTE — TELEPHONE ENCOUNTER
Contacted the patient she reports that she is due for a follow up endoscopy in December. The patient had one done at [facility] on 9- and this was the doctors recommendation. The patient reports that Kirsten Guevara DO stated the patient could have the follow up one done here, but a referral needs to be placed. Can a order be signed?  Meagan Morse LPN on 10/28/2019 at 8:57 AM

## 2019-11-01 ENCOUNTER — HOSPITAL ENCOUNTER (OUTPATIENT)
Dept: INFUSION THERAPY | Facility: OTHER | Age: 84
Discharge: HOME | End: 2019-11-01
Attending: INTERNAL MEDICINE | Admitting: INTERNAL MEDICINE
Payer: MEDICARE

## 2019-11-01 ENCOUNTER — TELEPHONE (OUTPATIENT)
Dept: SURGERY | Facility: OTHER | Age: 84
End: 2019-11-01

## 2019-11-01 VITALS — RESPIRATION RATE: 16 BRPM | HEART RATE: 66 BPM | DIASTOLIC BLOOD PRESSURE: 79 MMHG | SYSTOLIC BLOOD PRESSURE: 148 MMHG

## 2019-11-01 DIAGNOSIS — D50.0 IRON DEFICIENCY ANEMIA DUE TO CHRONIC BLOOD LOSS: Primary | ICD-10-CM

## 2019-11-01 DIAGNOSIS — N18.30 STAGE 3 CHRONIC KIDNEY DISEASE (H): ICD-10-CM

## 2019-11-01 PROCEDURE — 25000128 H RX IP 250 OP 636: Performed by: INTERNAL MEDICINE

## 2019-11-01 PROCEDURE — 96365 THER/PROPH/DIAG IV INF INIT: CPT

## 2019-11-01 PROCEDURE — 25800030 ZZH RX IP 258 OP 636: Performed by: INTERNAL MEDICINE

## 2019-11-01 RX ORDER — HEPARIN SODIUM,PORCINE 10 UNIT/ML
5 VIAL (ML) INTRAVENOUS
Status: CANCELLED | OUTPATIENT
Start: 2019-11-03

## 2019-11-01 RX ORDER — HEPARIN SODIUM (PORCINE) LOCK FLUSH IV SOLN 100 UNIT/ML 100 UNIT/ML
5 SOLUTION INTRAVENOUS
Status: CANCELLED | OUTPATIENT
Start: 2019-11-03

## 2019-11-01 RX ADMIN — IRON SUCROSE 200 MG: 20 INJECTION, SOLUTION INTRAVENOUS at 13:17

## 2019-11-01 RX ADMIN — SODIUM CHLORIDE 250 ML: 9 INJECTION, SOLUTION INTRAVENOUS at 13:17

## 2019-11-01 NOTE — PROGRESS NOTES
Infusion Nursing Note:  Vicki Mckeon presents today for venofer.    Patient seen by provider today: No   present during visit today: Not Applicable.    Note: N/A.    Intravenous Access:  Peripheral IV placed.    Treatment Conditions:  Not Applicable.      Post Infusion Assessment:  Patient tolerated infusion without incident.  Patient observed for 30 minutes post new medication per protocol.  Site patent and intact, free from redness, edema or discomfort.  No evidence of extravasations.  Access discontinued per protocol.       Discharge Plan:   Discharge instructions reviewed with: Patient.  Patient and/or family verbalized understanding of discharge instructions and all questions answered.  Copy of AVS reviewed with patient and/or family.  Patient will return monday for next appointment.  Patient discharged in stable condition accompanied by: self and .  Departure Mode: Ambulatory.    Angelica Harris RN

## 2019-11-01 NOTE — TELEPHONE ENCOUNTER
Screening Questions for the Scheduling of Screening Colonoscopies   (If Colonoscopy is diagnostic, Provider should review the chart before scheduling.)  Are you younger than 50 or older than 80?  YES   Do you take aspirin or fish oil?  NO  (if yes, tell patient to stop 1 week prior to Colonoscopy)  Do you take warfarin (Coumadin), clopidogrel (Plavix), apixaban (Eliquis), dabigatram (Pradaxa), rivaroxaban (Xarelto) or any blood thinner? NO   Do you use oxygen at home?  NO   Do you have kidney disease? NO   Are you on dialysis? NO   Have you had a stroke or heart attack in the last year? NO   Have you had a stent in your heart or any blood vessel in the last year? NO   Have you had a transplant of any organ? NO   Have you had a colonoscopy or upper endoscopy (EGD) before? YES - EGD          When?  09/2019  Date of scheduled EGD   12/11/2019  [facility]

## 2019-11-04 ENCOUNTER — HOSPITAL ENCOUNTER (OUTPATIENT)
Dept: INFUSION THERAPY | Facility: OTHER | Age: 84
Discharge: HOME | End: 2019-11-04
Attending: INTERNAL MEDICINE | Admitting: INTERNAL MEDICINE
Payer: MEDICARE

## 2019-11-04 ENCOUNTER — TELEPHONE (OUTPATIENT)
Dept: INTERNAL MEDICINE | Facility: OTHER | Age: 84
End: 2019-11-04

## 2019-11-04 VITALS
DIASTOLIC BLOOD PRESSURE: 58 MMHG | HEART RATE: 80 BPM | TEMPERATURE: 96 F | SYSTOLIC BLOOD PRESSURE: 160 MMHG | RESPIRATION RATE: 18 BRPM

## 2019-11-04 DIAGNOSIS — D50.0 IRON DEFICIENCY ANEMIA DUE TO CHRONIC BLOOD LOSS: Primary | ICD-10-CM

## 2019-11-04 DIAGNOSIS — N18.30 STAGE 3 CHRONIC KIDNEY DISEASE (H): ICD-10-CM

## 2019-11-04 PROCEDURE — 25800030 ZZH RX IP 258 OP 636: Performed by: INTERNAL MEDICINE

## 2019-11-04 PROCEDURE — 96365 THER/PROPH/DIAG IV INF INIT: CPT

## 2019-11-04 PROCEDURE — 25000128 H RX IP 250 OP 636: Performed by: INTERNAL MEDICINE

## 2019-11-04 RX ORDER — HEPARIN SODIUM,PORCINE 10 UNIT/ML
5 VIAL (ML) INTRAVENOUS
Status: CANCELLED | OUTPATIENT
Start: 2019-11-05

## 2019-11-04 RX ORDER — HEPARIN SODIUM (PORCINE) LOCK FLUSH IV SOLN 100 UNIT/ML 100 UNIT/ML
5 SOLUTION INTRAVENOUS
Status: CANCELLED | OUTPATIENT
Start: 2019-11-05

## 2019-11-04 RX ADMIN — IRON SUCROSE 200 MG: 20 INJECTION, SOLUTION INTRAVENOUS at 13:58

## 2019-11-04 NOTE — PROGRESS NOTES
Infusion Nursing Note:  Vicki Mckeon presents today for venofer 2/5.    Patient seen by provider today: No   present during visit today: Not Applicable.    Note: N/A.    Intravenous Access:  Peripheral IV placed.    Treatment Conditions:  Not Applicable.      Post Infusion Assessment:  Patient tolerated infusion without incident.  Site patent and intact, free from redness, edema or discomfort.  No evidence of extravasations.  Access discontinued per protocol.       Discharge Plan:   Copy of AVS reviewed with patient and/or family.  Patient will return 11/6/19 for next appointment.  Patient discharged in stable condition accompanied by: self.  Departure Mode: Ambulatory.    Lori Harris RN

## 2019-11-04 NOTE — TELEPHONE ENCOUNTER
Called and spoke with patient after proper verification. Informed patient of below message. Patient stated understanding and no further questions at this time.     Loreto Linton LPN............. November 4, 2019 3:35 PM

## 2019-11-04 NOTE — TELEPHONE ENCOUNTER
Patient has last iron infusion appt set for 11/11/19. Has future lab orders in computer for Hgb, Ferritin, and BMP. Patient wondering how long after last infusion  she should wait to have labs rechecked. She was wondering if ok to do with last infusion appt or if she should wait longer as its hard to get here for patient. Please advise, thank you Lori Harris RN on 11/4/2019 at 2:44 PM

## 2019-11-06 ENCOUNTER — HOSPITAL ENCOUNTER (OUTPATIENT)
Dept: INFUSION THERAPY | Facility: OTHER | Age: 84
Discharge: HOME | End: 2019-11-06
Attending: INTERNAL MEDICINE | Admitting: INTERNAL MEDICINE
Payer: MEDICARE

## 2019-11-06 DIAGNOSIS — D50.0 IRON DEFICIENCY ANEMIA DUE TO CHRONIC BLOOD LOSS: Primary | ICD-10-CM

## 2019-11-06 DIAGNOSIS — N18.30 STAGE 3 CHRONIC KIDNEY DISEASE (H): ICD-10-CM

## 2019-11-06 PROCEDURE — 25000128 H RX IP 250 OP 636: Performed by: INTERNAL MEDICINE

## 2019-11-06 PROCEDURE — 25800030 ZZH RX IP 258 OP 636: Performed by: INTERNAL MEDICINE

## 2019-11-06 PROCEDURE — 96365 THER/PROPH/DIAG IV INF INIT: CPT

## 2019-11-06 RX ORDER — HEPARIN SODIUM (PORCINE) LOCK FLUSH IV SOLN 100 UNIT/ML 100 UNIT/ML
5 SOLUTION INTRAVENOUS
Status: CANCELLED | OUTPATIENT
Start: 2019-11-08

## 2019-11-06 RX ORDER — HEPARIN SODIUM,PORCINE 10 UNIT/ML
5 VIAL (ML) INTRAVENOUS
Status: CANCELLED | OUTPATIENT
Start: 2019-11-08

## 2019-11-06 RX ADMIN — IRON SUCROSE 200 MG: 20 INJECTION, SOLUTION INTRAVENOUS at 14:25

## 2019-11-06 NOTE — PROGRESS NOTES
Infusion Nursing Note:  Vicki Mckeon presents today for Venofer 3/5.    Patient seen by provider today: No   present during visit today: Not Applicable.    Note: N/A.    Intravenous Access:  Peripheral IV placed to right antecubital space with brisk blood return    Treatment Conditions:  Not Applicable.      Post Infusion Assessment:  Patient tolerated infusion without incident.  Blood return noted pre and post infusion.  Site patent and intact, free from redness, edema or discomfort.  No evidence of extravasations.  Access discontinued per protocol.       Discharge Plan:   Declined Copy of AVS reviewed with patient and/or family.  Patient will return 11/8 for next appointment.  Patient discharged in stable condition accompanied by: self and .  Departure Mode: Ambulatory with 4 wheeled walker.    Nita Duval RN

## 2019-11-07 ENCOUNTER — TRANSFERRED RECORDS (OUTPATIENT)
Dept: HEALTH INFORMATION MANAGEMENT | Facility: OTHER | Age: 84
End: 2019-11-07

## 2019-11-08 ENCOUNTER — HOSPITAL ENCOUNTER (OUTPATIENT)
Dept: INFUSION THERAPY | Facility: OTHER | Age: 84
Discharge: HOME | End: 2019-11-08
Attending: INTERNAL MEDICINE | Admitting: INTERNAL MEDICINE
Payer: MEDICARE

## 2019-11-08 VITALS
HEART RATE: 73 BPM | RESPIRATION RATE: 18 BRPM | SYSTOLIC BLOOD PRESSURE: 150 MMHG | TEMPERATURE: 97.6 F | DIASTOLIC BLOOD PRESSURE: 53 MMHG

## 2019-11-08 DIAGNOSIS — D50.0 IRON DEFICIENCY ANEMIA DUE TO CHRONIC BLOOD LOSS: Primary | ICD-10-CM

## 2019-11-08 DIAGNOSIS — N18.30 STAGE 3 CHRONIC KIDNEY DISEASE (H): ICD-10-CM

## 2019-11-08 PROCEDURE — 25000128 H RX IP 250 OP 636: Performed by: INTERNAL MEDICINE

## 2019-11-08 PROCEDURE — 25800030 ZZH RX IP 258 OP 636: Performed by: INTERNAL MEDICINE

## 2019-11-08 PROCEDURE — 96365 THER/PROPH/DIAG IV INF INIT: CPT

## 2019-11-08 RX ORDER — HEPARIN SODIUM (PORCINE) LOCK FLUSH IV SOLN 100 UNIT/ML 100 UNIT/ML
5 SOLUTION INTRAVENOUS
Status: CANCELLED | OUTPATIENT
Start: 2019-11-10

## 2019-11-08 RX ORDER — HEPARIN SODIUM,PORCINE 10 UNIT/ML
5 VIAL (ML) INTRAVENOUS
Status: CANCELLED | OUTPATIENT
Start: 2019-11-10

## 2019-11-08 RX ORDER — HEPARIN SODIUM (PORCINE) LOCK FLUSH IV SOLN 100 UNIT/ML 100 UNIT/ML
5 SOLUTION INTRAVENOUS
Status: DISCONTINUED | OUTPATIENT
Start: 2019-11-08 | End: 2019-11-09 | Stop reason: HOSPADM

## 2019-11-08 RX ORDER — HEPARIN SODIUM,PORCINE 10 UNIT/ML
5 VIAL (ML) INTRAVENOUS
Status: DISCONTINUED | OUTPATIENT
Start: 2019-11-08 | End: 2019-11-08 | Stop reason: CLARIF

## 2019-11-08 RX ADMIN — IRON SUCROSE 200 MG: 20 INJECTION, SOLUTION INTRAVENOUS at 14:10

## 2019-11-08 NOTE — PROGRESS NOTES
Infusion Nursing Note:  Vicki Mckeon presents today for Venofer 4/5.    Patient seen by provider today: No   present during visit today: Not Applicable.    Note: Verbalized she is starting to feel better..    Intravenous Access:  Peripheral IV placed to right antecubital space.    Treatment Conditions:  Not Applicable.      Post Infusion Assessment:  Patient tolerated infusion without incident.       Discharge Plan:    Patient will return 11/11/2019 for next appointment.  Patient discharged in stable condition accompanied by: self.  Departure Mode: Ambulatory with wheeled walker..    Nita Duval RN

## 2019-11-11 ENCOUNTER — HOSPITAL ENCOUNTER (OUTPATIENT)
Dept: INFUSION THERAPY | Facility: OTHER | Age: 84
Discharge: HOME | End: 2019-11-11
Attending: INTERNAL MEDICINE | Admitting: INTERNAL MEDICINE
Payer: MEDICARE

## 2019-11-11 DIAGNOSIS — D50.0 IRON DEFICIENCY ANEMIA DUE TO CHRONIC BLOOD LOSS: Primary | ICD-10-CM

## 2019-11-11 DIAGNOSIS — N18.30 STAGE 3 CHRONIC KIDNEY DISEASE (H): ICD-10-CM

## 2019-11-11 PROCEDURE — 25800030 ZZH RX IP 258 OP 636: Performed by: INTERNAL MEDICINE

## 2019-11-11 PROCEDURE — 96365 THER/PROPH/DIAG IV INF INIT: CPT

## 2019-11-11 PROCEDURE — 25000128 H RX IP 250 OP 636: Performed by: INTERNAL MEDICINE

## 2019-11-11 RX ORDER — HEPARIN SODIUM,PORCINE 10 UNIT/ML
5 VIAL (ML) INTRAVENOUS
Status: CANCELLED | OUTPATIENT
Start: 2019-11-12

## 2019-11-11 RX ORDER — HEPARIN SODIUM (PORCINE) LOCK FLUSH IV SOLN 100 UNIT/ML 100 UNIT/ML
5 SOLUTION INTRAVENOUS
Status: CANCELLED | OUTPATIENT
Start: 2019-11-12

## 2019-11-11 RX ADMIN — IRON SUCROSE 200 MG: 20 INJECTION, SOLUTION INTRAVENOUS at 14:13

## 2019-11-11 NOTE — PROGRESS NOTES
Infusion Nursing Note:  Vicki Mckeon presents today for venofer.    Patient seen by provider today: No   present during visit today: Not Applicable.    Note: N/A.    Intravenous Access:  Peripheral IV placed.    Treatment Conditions:  Not Applicable.      Post Infusion Assessment:  Patient tolerated infusion without incident.  Blood return noted pre and post infusion.  Site patent and intact, free from redness, edema or discomfort.  No evidence of extravasations.  Access discontinued per protocol.       Discharge Plan:   Discharge instructions reviewed with: Patient.  Patient and/or family verbalized understanding of discharge instructions and all questions answered.  Patient discharged in stable condition accompanied by: self.  Departure Mode: Ambulatory.    Angelica Harris RN

## 2019-11-14 ENCOUNTER — OFFICE VISIT (OUTPATIENT)
Dept: INTERNAL MEDICINE | Facility: OTHER | Age: 84
End: 2019-11-14
Attending: INTERNAL MEDICINE
Payer: COMMERCIAL

## 2019-11-14 VITALS
BODY MASS INDEX: 30.11 KG/M2 | TEMPERATURE: 98.1 F | HEIGHT: 63 IN | RESPIRATION RATE: 16 BRPM | DIASTOLIC BLOOD PRESSURE: 62 MMHG | HEART RATE: 74 BPM | OXYGEN SATURATION: 98 % | SYSTOLIC BLOOD PRESSURE: 140 MMHG

## 2019-11-14 DIAGNOSIS — M70.61 TROCHANTERIC BURSITIS OF RIGHT HIP: Primary | ICD-10-CM

## 2019-11-14 PROCEDURE — 99213 OFFICE O/P EST LOW 20 MIN: CPT | Performed by: INTERNAL MEDICINE

## 2019-11-14 PROCEDURE — G0463 HOSPITAL OUTPT CLINIC VISIT: HCPCS

## 2019-11-14 ASSESSMENT — PAIN SCALES - GENERAL: PAINLEVEL: MODERATE PAIN (4)

## 2019-11-14 NOTE — H&P (VIEW-ONLY)
Chief Complaint   Patient presents with     RECHECK          Subjective:   Ms. Mckeon is a 84 year old female  seen for the acute concern today of pain along the right greater trochanter.    She reports that this started 1.5 weeks ago.  No trauma or falls.  She has had prior issues with this.  She had imaging done back in  that showed enthesophytes along the greater trochanters.  She last had an injection with Dr. Donnie Alvarado on .  This was significantly beneficial for her.  She denied any issues with the injection and then.    She overall is doing well after her recent surgery and GI bleed.  She feels she is recovering well.  She will be due for labs coming up in approximately 10 days.    She  reports that she quit smoking about 51 years ago. She has never used smokeless tobacco.    Past medical history reviewed as below:     Past Medical History:   Diagnosis Date     Anemia 05/15/2013     CKD (chronic kidney disease) stage 3, GFR 30-59 ml/min (H) 2014    Stable. First noted when patient was taking NSAIDs regularly due to back pain.     Essential (primary) hypertension      Nonrheumatic mitral valve prolapse 2008    with normal echo and no residual prolapse     Nontoxic single thyroid nodule 2012    Patient with multinodular goiter. Negative cytology and stable ultrasound findings.     Osteopenia      Other fracture of unspecified lower leg, initial encounter for closed fracture      Pain in left hip 2017     Pregnancy      2, para 2 with 2 spontaneous vaginal deliveries     Pure hypercholesterolemia      Scoliosis      Spinal stenosis     with nerve root impingement     Squamous cell carcinoma    .      ROS:   Pertinent  ROS was performed and was negative, including for fevers, chills.  No other concerns, with exception of HPI above.      Objective:    BP (!) 140/62 (BP Location: Right arm, Patient Position: Sitting, Cuff Size: Adult Regular)   Pulse 74   Temp  "98.1  F (36.7  C) (Tympanic)   Resp 16   Ht 1.6 m (5' 3\")   SpO2 98%   Breastfeeding No   BMI 30.11 kg/m    GEN: Vitals reviewed.  Patient is in no acute distress. Cooperative with exam.  HEENT: Normocephalic atraumatic.  Pupils equally round.  No scleral icterus, no conjunctival erythema.    LUNGS: Chest rise equal bilaterally.  No accessory muscle use.  SKIN: Warm and dry to touch.  No rash on face, arms and legs.  EXT: No clubbing or cyanosis.  No peripheral edema.  Significant tenderness to palpation along the right greater trochanter.  Gait overall is normal with walker.     Assessment/Plan:   Trochanteric bursitis of right hip  At this time we will set her up for a bursa injection.  She is to call if she has any issues otherwise.  If she has persistent symptoms we can consider physical therapy.  - XR Joint Injection Major Right      - Return/call as needed for follow-up should any new symptoms develop, for worsening of current symptoms or if symptoms do not resolve with above plan.     CHRIS HORAN DO   11/14/2019 1:15 PM    This document was prepared using voice generated softwear. While every attempt was made for accuracy, grammatical errors may exist.  "

## 2019-11-14 NOTE — NURSING NOTE
Patient presents to the clinic for right leg pain.     Medication Reconciliation: complete   Loreto Linton LPN............. November 14, 2019 12:57 PM

## 2019-11-15 ENCOUNTER — HOSPITAL ENCOUNTER (OUTPATIENT)
Dept: GENERAL RADIOLOGY | Facility: OTHER | Age: 84
Discharge: HOME | End: 2019-11-15
Attending: INTERNAL MEDICINE | Admitting: INTERNAL MEDICINE
Payer: MEDICARE

## 2019-11-15 DIAGNOSIS — M70.61 TROCHANTERIC BURSITIS OF RIGHT HIP: ICD-10-CM

## 2019-11-15 PROCEDURE — 25000128 H RX IP 250 OP 636: Performed by: RADIOLOGY

## 2019-11-15 PROCEDURE — 20610 DRAIN/INJ JOINT/BURSA W/O US: CPT | Mod: RT

## 2019-11-15 PROCEDURE — 25000125 ZZHC RX 250: Performed by: RADIOLOGY

## 2019-11-15 RX ORDER — LIDOCAINE HYDROCHLORIDE 10 MG/ML
2 INJECTION, SOLUTION EPIDURAL; INFILTRATION; INTRACAUDAL; PERINEURAL ONCE
Status: COMPLETED | OUTPATIENT
Start: 2019-11-15 | End: 2019-11-15

## 2019-11-15 RX ORDER — TRIAMCINOLONE ACETONIDE 40 MG/ML
40 INJECTION, SUSPENSION INTRA-ARTICULAR; INTRAMUSCULAR ONCE
Status: COMPLETED | OUTPATIENT
Start: 2019-11-15 | End: 2019-11-15

## 2019-11-15 RX ORDER — BUPIVACAINE HYDROCHLORIDE 5 MG/ML
3 INJECTION, SOLUTION EPIDURAL; INTRACAUDAL ONCE
Status: COMPLETED | OUTPATIENT
Start: 2019-11-15 | End: 2019-11-15

## 2019-11-15 RX ADMIN — LIDOCAINE HYDROCHLORIDE 2 ML: 10 INJECTION, SOLUTION INFILTRATION; PERINEURAL at 14:24

## 2019-11-15 RX ADMIN — TRIAMCINOLONE ACETONIDE 40 MG: 40 INJECTION, SUSPENSION INTRA-ARTICULAR; INTRAMUSCULAR at 14:24

## 2019-11-15 RX ADMIN — BUPIVACAINE HYDROCHLORIDE 3 ML: 5 INJECTION, SOLUTION EPIDURAL; INTRACAUDAL at 14:24

## 2019-11-21 ENCOUNTER — TELEPHONE (OUTPATIENT)
Dept: INTERNAL MEDICINE | Facility: OTHER | Age: 84
End: 2019-11-21

## 2019-11-21 DIAGNOSIS — Z98.890 S/P SPINAL SURGERY: ICD-10-CM

## 2019-11-21 DIAGNOSIS — M85.80 OSTEOPENIA, UNSPECIFIED LOCATION: Primary | ICD-10-CM

## 2019-11-21 DIAGNOSIS — M70.61 TROCHANTERIC BURSITIS OF RIGHT HIP: ICD-10-CM

## 2019-11-21 NOTE — TELEPHONE ENCOUNTER
After proper verification, patient stated that her pain on the right side of her thigh is now her hip, knee and ankle. She stated that she did have a cortisone injection last week but that it only helped for a day. Patient stated that she is not getting any sleep because she cannot get comfortable while laying down. She would like to know if there is anything she can do to get some relief?  Susie Salgado LPN on 11/21/2019 at 2:36 PM

## 2019-11-22 NOTE — TELEPHONE ENCOUNTER
Called and spoke with patient after proper verification. Informed patient of below message. Patient states she will try the Naproxen, but would also like to start PT. Order has been placed, please review and sign.     Loreto Linton LPN............. November 22, 2019 9:36 AM

## 2019-11-25 DIAGNOSIS — N18.30 STAGE 3 CHRONIC KIDNEY DISEASE (H): ICD-10-CM

## 2019-11-25 DIAGNOSIS — D50.0 IRON DEFICIENCY ANEMIA DUE TO CHRONIC BLOOD LOSS: ICD-10-CM

## 2019-11-25 LAB
ANION GAP SERPL CALCULATED.3IONS-SCNC: 9 MMOL/L (ref 3–14)
BUN SERPL-MCNC: 25 MG/DL (ref 7–25)
CALCIUM SERPL-MCNC: 10.1 MG/DL (ref 8.6–10.3)
CHLORIDE SERPL-SCNC: 105 MMOL/L (ref 98–107)
CO2 SERPL-SCNC: 25 MMOL/L (ref 21–31)
CREAT SERPL-MCNC: 1.13 MG/DL (ref 0.6–1.2)
FERRITIN SERPL-MCNC: 554 NG/ML (ref 23.9–336.2)
GFR SERPL CREATININE-BSD FRML MDRD: 46 ML/MIN/{1.73_M2}
GLUCOSE SERPL-MCNC: 93 MG/DL (ref 70–105)
HGB BLD-MCNC: 11.5 G/DL (ref 11.7–15.7)
POTASSIUM SERPL-SCNC: 4.2 MMOL/L (ref 3.5–5.1)
SODIUM SERPL-SCNC: 139 MMOL/L (ref 134–144)

## 2019-11-25 PROCEDURE — 82728 ASSAY OF FERRITIN: CPT | Mod: ZL | Performed by: INTERNAL MEDICINE

## 2019-11-25 PROCEDURE — 80048 BASIC METABOLIC PNL TOTAL CA: CPT | Mod: ZL | Performed by: INTERNAL MEDICINE

## 2019-11-25 PROCEDURE — 36415 COLL VENOUS BLD VENIPUNCTURE: CPT | Mod: ZL | Performed by: INTERNAL MEDICINE

## 2019-11-25 PROCEDURE — 85018 HEMOGLOBIN: CPT | Mod: ZL | Performed by: INTERNAL MEDICINE

## 2019-12-03 ENCOUNTER — TRANSFERRED RECORDS (OUTPATIENT)
Dept: HEALTH INFORMATION MANAGEMENT | Facility: OTHER | Age: 84
End: 2019-12-03

## 2019-12-07 DIAGNOSIS — K26.4 GASTROINTESTINAL HEMORRHAGE ASSOCIATED WITH DUODENAL ULCER: ICD-10-CM

## 2019-12-09 RX ORDER — PANTOPRAZOLE SODIUM 40 MG/1
TABLET, DELAYED RELEASE ORAL
Qty: 60 TABLET | OUTPATIENT
Start: 2019-12-09

## 2019-12-09 NOTE — TELEPHONE ENCOUNTER
protonix refilled on 10/23/19 #180 to [facility]  [facility] requesting.  Called and spoke with [facility] and they will call and transfer.  Fadumo Dobson RN on 12/9/2019 at 11:04 AM

## 2019-12-11 ENCOUNTER — ANESTHESIA EVENT (OUTPATIENT)
Dept: SURGERY | Facility: OTHER | Age: 84
End: 2019-12-11
Payer: MEDICARE

## 2019-12-11 ENCOUNTER — HOSPITAL ENCOUNTER (OUTPATIENT)
Facility: OTHER | Age: 84
Discharge: HOME | End: 2019-12-11
Attending: SURGERY | Admitting: SURGERY
Payer: MEDICARE

## 2019-12-11 ENCOUNTER — ANESTHESIA (OUTPATIENT)
Dept: SURGERY | Facility: OTHER | Age: 84
End: 2019-12-11
Payer: MEDICARE

## 2019-12-11 VITALS
BODY MASS INDEX: 30.11 KG/M2 | DIASTOLIC BLOOD PRESSURE: 66 MMHG | SYSTOLIC BLOOD PRESSURE: 161 MMHG | TEMPERATURE: 98.4 F | HEART RATE: 62 BPM | RESPIRATION RATE: 16 BRPM | WEIGHT: 170 LBS | OXYGEN SATURATION: 91 %

## 2019-12-11 PROCEDURE — 25000125 ZZHC RX 250: Performed by: NURSE ANESTHETIST, CERTIFIED REGISTERED

## 2019-12-11 PROCEDURE — 25000125 ZZHC RX 250: Performed by: SURGERY

## 2019-12-11 PROCEDURE — 43235 EGD DIAGNOSTIC BRUSH WASH: CPT | Performed by: NURSE ANESTHETIST, CERTIFIED REGISTERED

## 2019-12-11 PROCEDURE — 43235 EGD DIAGNOSTIC BRUSH WASH: CPT | Performed by: SURGERY

## 2019-12-11 PROCEDURE — 40000010 ZZH STATISTIC ANES STAT CODE-CRNA PER MINUTE: Performed by: SURGERY

## 2019-12-11 PROCEDURE — 99100 ANES PT EXTEME AGE<1 YR&>70: CPT | Performed by: NURSE ANESTHETIST, CERTIFIED REGISTERED

## 2019-12-11 PROCEDURE — 25000132 ZZH RX MED GY IP 250 OP 250 PS 637: Performed by: SURGERY

## 2019-12-11 PROCEDURE — 25800030 ZZH RX IP 258 OP 636: Performed by: SURGERY

## 2019-12-11 PROCEDURE — 25000128 H RX IP 250 OP 636: Performed by: NURSE ANESTHETIST, CERTIFIED REGISTERED

## 2019-12-11 RX ORDER — SIMETHICONE
LIQUID (ML) MISCELLANEOUS PRN
Status: DISCONTINUED | OUTPATIENT
Start: 2019-12-11 | End: 2019-12-11 | Stop reason: HOSPADM

## 2019-12-11 RX ORDER — LIDOCAINE 40 MG/G
CREAM TOPICAL
Status: DISCONTINUED | OUTPATIENT
Start: 2019-12-11 | End: 2019-12-11 | Stop reason: HOSPADM

## 2019-12-11 RX ORDER — SODIUM CHLORIDE, SODIUM LACTATE, POTASSIUM CHLORIDE, CALCIUM CHLORIDE 600; 310; 30; 20 MG/100ML; MG/100ML; MG/100ML; MG/100ML
INJECTION, SOLUTION INTRAVENOUS CONTINUOUS
Status: DISCONTINUED | OUTPATIENT
Start: 2019-12-11 | End: 2019-12-11 | Stop reason: HOSPADM

## 2019-12-11 RX ORDER — PROPOFOL 10 MG/ML
INJECTION, EMULSION INTRAVENOUS PRN
Status: DISCONTINUED | OUTPATIENT
Start: 2019-12-11 | End: 2019-12-11

## 2019-12-11 RX ORDER — NALOXONE HYDROCHLORIDE 0.4 MG/ML
.1-.4 INJECTION, SOLUTION INTRAMUSCULAR; INTRAVENOUS; SUBCUTANEOUS
Status: DISCONTINUED | OUTPATIENT
Start: 2019-12-11 | End: 2019-12-11 | Stop reason: HOSPADM

## 2019-12-11 RX ORDER — PROPOFOL 10 MG/ML
INJECTION, EMULSION INTRAVENOUS CONTINUOUS PRN
Status: DISCONTINUED | OUTPATIENT
Start: 2019-12-11 | End: 2019-12-11

## 2019-12-11 RX ORDER — FLUMAZENIL 0.1 MG/ML
0.2 INJECTION, SOLUTION INTRAVENOUS
Status: DISCONTINUED | OUTPATIENT
Start: 2019-12-11 | End: 2019-12-11 | Stop reason: HOSPADM

## 2019-12-11 RX ADMIN — PROPOFOL 60 MG: 10 INJECTION, EMULSION INTRAVENOUS at 09:33

## 2019-12-11 RX ADMIN — PROPOFOL 120 MCG/KG/MIN: 10 INJECTION, EMULSION INTRAVENOUS at 09:33

## 2019-12-11 RX ADMIN — SODIUM CHLORIDE, POTASSIUM CHLORIDE, SODIUM LACTATE AND CALCIUM CHLORIDE: 600; 310; 30; 20 INJECTION, SOLUTION INTRAVENOUS at 09:25

## 2019-12-11 NOTE — INTERVAL H&P NOTE
I saw and examined Vicki Mckeon.  I have reviewed the history and physical and find no changes to the patient's medical status or condition with the exceptions noted below.   Vicki Mckeon has history of duodenal ulcers likely due to stress of back surgery and NSAIDs. This is a follow up exam today. She has been on PPi therapy.   The technical details of EGD with possible biopsy were discussed with the patient along with the risks and benefits to include bleeding, perforation and aspiration. Vicki Mckeon demonstrated understanding and is willing to proceed.       Akash Meléndez MD   9:12 AM 12/11/2019

## 2019-12-11 NOTE — DISCHARGE INSTRUCTIONS
[facility]  Adult Discharge Orders & Instructions         For 12 hours after surgery  1. Get plenty of rest.  A responsible adult must stay with you for at least 12 hours after you leave the hospital.   2. You may feel lightheaded.  IF so, sit for a few minutes before standing.  Have someone help you get up.   3. You may have a slight fever. Call the doctor if your fever is over 101 F (38.3 C) (taken under the tongue) or lasts longer than 24 hours.  4. You may have a dry mouth, a sore throat, muscle aches or trouble sleeping.  These should go away after 24 hours.  5. Do not make important or legal decisions.  6.   Do not drive or use heavy equipment.  If you have weakness or tingling, don't drive or use heavy equipment until this feeling goes away.    To contact a doctor, call   [phone]

## 2019-12-11 NOTE — ANESTHESIA PREPROCEDURE EVALUATION
Anesthesia Pre-Procedure Evaluation    Patient: Vicki Mckeon   MRN: [id number] : 1935          Preoperative Diagnosis: Gastrointestinal hemorrhage associated with duodenal ulcer [K26.4]    Procedure(s):  ESOPHAGOGASTRODUODENOSCOPY (EGD)    Past Medical History:   Diagnosis Date     Anemia 05/15/2013     CKD (chronic kidney disease) stage 3, GFR 30-59 ml/min (H) 2014    Stable. First noted when patient was taking NSAIDs regularly due to back pain.     Essential (primary) hypertension      Nonrheumatic mitral valve prolapse 2008    with normal echo and no residual prolapse     Nontoxic single thyroid nodule 2012    Patient with multinodular goiter. Negative cytology and stable ultrasound findings.     Osteopenia      Other fracture of unspecified lower leg, initial encounter for closed fracture      Pain in left hip 2017     Pregnancy      2, para 2 with 2 spontaneous vaginal deliveries     Pure hypercholesterolemia      Scoliosis      Spinal stenosis     with nerve root impingement     Squamous cell carcinoma      Past Surgical History:   Procedure Laterality Date     BACK SURGERY  10/2011    Back surgery, Dr. Linda, [facility]     BACK SURGERY  2019    [facility]     BACK SURGERY  10/2011     BIOPSY BREAST Left     s, benign     CLOSED REDUCTION ANKLE  2001    ORIF left ankle w/ later removal of hardware      COLONOSCOPY      ,,F/U N/A     EXTRACAPSULAR CATARACT EXTRATION WITH INTRAOCULAR LENS IMPLANT Bilateral      LAPAROSCOPY DIAGNOSTIC (GYN)      Laparoscopy for pelvic pain which was negative       Anesthesia Evaluation     . Pt has had prior anesthetic. Type: General and MAC           ROS/MED HX    ENT/Pulmonary:       Neurologic:  - neg neurologic ROS     Cardiovascular:     (+) Dyslipidemia, hypertension----. : . . . :. .       METS/Exercise Tolerance: Comment: Still doing therapy,wears a back brace, uses cane s/p back surgery in  september 3 - Able to walk 1-2 blocks without stopping   Hematologic:     (+) Anemia, -      Musculoskeletal: Comment: Scoliosis, spinal stenosis, Surgery 9/19        GI/Hepatic: Comment: Hx of duodenol ulcer 9/19 EGD         Renal/Genitourinary:     (+) chronic renal disease, type: CRI, Pt does not require dialysis, Pt has no history of transplant,       Endo:     (+) thyroid problem .      Psychiatric:  - neg psychiatric ROS       Infectious Disease:  - neg infectious disease ROS       Malignancy:      - no malignancy   Other:    - neg other ROS                      Physical Exam  Normal systems: pulmonary    Airway   Mallampati: III  TM distance: >3 FB  Neck ROM: full  Comment: Short TMD, successful easy intubation with back surgery    Dental     Cardiovascular   Rhythm and rate: regular and normal    PE comment: Hx of PSVT, EKG NSR, denies symptoms    Pulmonary             Lab Results   Component Value Date    WBC 9.2 10/25/2019    HGB 11.5 (L) 11/25/2019    HCT 30.9 (L) 10/25/2019     10/25/2019     11/25/2019    POTASSIUM 4.2 11/25/2019    CHLORIDE 105 11/25/2019    CO2 25 11/25/2019    BUN 25 11/25/2019    CR 1.13 11/25/2019    GLC 93 11/25/2019    WEN 10.1 11/25/2019    PHOS 3.2 12/12/2018    MAG 2.3 06/13/2019    ALBUMIN 4.2 06/13/2019    PROTTOTAL 7.6 06/13/2019    ALT 21 06/13/2019    AST 23 06/13/2019    ALKPHOS 39 06/13/2019    BILITOTAL 0.5 06/13/2019    PTT 31 08/29/2019    T4 1.00 11/28/2014       Preop Vitals  BP Readings from Last 3 Encounters:   11/14/19 (!) 140/62   11/08/19 (!) 150/53   11/04/19 (!) 160/58    Pulse Readings from Last 3 Encounters:   11/14/19 74   11/08/19 73   11/04/19 80      Resp Readings from Last 3 Encounters:   11/14/19 16   11/08/19 18   11/04/19 18    SpO2 Readings from Last 3 Encounters:   11/14/19 98%   08/29/19 96%   08/18/19 99%      Temp Readings from Last 1 Encounters:   11/14/19 98.1  F (36.7  C) (Tympanic)    Ht Readings from Last 1 Encounters:  "  11/14/19 1.6 m (5' 3\")      Wt Readings from Last 1 Encounters:   10/25/19 77.1 kg (170 lb)    Estimated body mass index is 30.11 kg/m  as calculated from the following:    Height as of 11/14/19: 1.6 m (5' 3\").    Weight as of 10/25/19: 77.1 kg (170 lb).       Anesthesia Plan      History & Physical Review      ASA Status:  3 .    NPO Status:  > 6 hours    Plan for MAC with Propofol induction. Reason for MAC:  Chronic cardiopulmonary disease (G9)    Hx of PONV, received zofran with last EGD      Postoperative Care      Consents  Anesthetic plan, risks, benefits and alternatives discussed with:  Patient..                 LEONARDA DARNELL CRNA  "

## 2019-12-11 NOTE — ANESTHESIA CARE TRANSFER NOTE
Patient: Vicki Mckeon    Procedure(s):  ESOPHAGOGASTRODUODENOSCOPY (EGD)    Diagnosis: Gastrointestinal hemorrhage associated with duodenal ulcer [K26.4]  Diagnosis Additional Information: No value filed.    Anesthesia Type:   MAC     Note:  Airway :Nasal Cannula (Patient spont. breathing well. transported with nasal cannula on 2L O2, patient cont. O2 at 2L during hand-off)  Patient transferred to:Phase II  Handoff Report: Identifed the Patient, Identified the Reponsible Provider, Reviewed the pertinent medical history, Discussed the surgical course, Reviewed Intra-OP anesthesia mangement and issues during anesthesia, Set expectations for post-procedure period and Allowed opportunity for questions and acknowledgement of understanding      Vitals: (Last set prior to Anesthesia Care Transfer)    CRNA VITALS  12/11/2019 0912 - 12/11/2019 0946      12/11/2019             Pulse:  73    Ht Rate:  73    SpO2:  97 %    Resp Rate (set):  10                Electronically Signed By: David Kellerman, APRN CRNA  December 11, 2019  9:46 AM

## 2019-12-11 NOTE — OP NOTE
PROCEDURE NOTE    DATE OF SERVICE: 12/11/2019    SURGEON: MASSIEL Meléndez MD     PRE-OP DIAGNOSIS:  follow-up ulcer     POST-OP DIAGNOSIS:  Normal exam     PROCEDURE:   EGD    ASSISTANT:  Alessandroulator: Tamika Dorantes RN  Scrub Person: Nelda Dotson  2nd Scrub: Rosa English SUPRIYA  Pre-Op Nurse: Adriana Quarles RN  Post-Op Nurse: Payton Yeh RN    ANESTHESIA:  MAC                            Monitor Anesthesia CareCRNA Independent: Kellerman, David, APRN CRNA    INDICATION FOR THE PROCEDURE: Vicki Mckeon is a 84 year old female. The patient presents with history of duodenal ulcers. I explained to the patient the risks, benefits and alternatives to diagnostic EGD for evaluating history of duodenal ulcers. We specifically discussed the risks of bleeding, infection,perforation and the risks of sedation. The patient's questions were answered and the patient wished to proceed. Informed consent paperwork was completed.    PROCEDURE:The patient was taken to the endoscopy suite. Appropriate monitors were attached. The patient was placed in the left lateral decubitus position. Bite block was positioned.Timeout was performed confirming the patient's identity and procedure to be performed. After appropriate sedation was confirmed, the flexible endoscope was advanced into the oropharynx. The posterior oropharynx appeared grossly normal. The scope was advanced into the proximal esophagus. The esophagus was insufflated with air. The scope was advanced under direct visualization. No acute abnormalities of the esophagus were noted. The scope was advanced into the stomach. The scope was advanced through the pylorus into the duodenal bulb. The bulb and distal duodenum appeared grossly normal.  The scope was withdrawn back into the stomach.The scope was retroflexed and the GE junction inspected. No abnormalities were noted.  The scope was returned to a neutral position and the stomach was decompressed. The scope was  withdrawn to the GE junction and there is a sharp z line noted. The mucosa of the esophagus was inspected while withdrawing the scope. No abnormalities were noted. The scope was withdrawn from the patient. The bite block was removed. The patient tolerated the procedure with no immediately apparent complication. The patient was taken to recovery instable condition.     ESTIMATED BLOOD LOSS: none    COMPLICATIONS:  None    TISSUE REMOVED:  No    RECOMMEND:    Ok to taper / stop PPI  Avoid NSAIDs, alcohol, smoking, limit stress as able       MASSIEL Meléndez MD

## 2019-12-11 NOTE — ANESTHESIA POSTPROCEDURE EVALUATION
Patient: Vicki Mckeon    Procedure(s):  ESOPHAGOGASTRODUODENOSCOPY (EGD)    Diagnosis:Gastrointestinal hemorrhage associated with duodenal ulcer [K26.4]  Diagnosis Additional Information: No value filed.    Anesthesia Type:  MAC    Note:  Anesthesia Post Evaluation    Patient location during evaluation: Phase 2  Patient participation: Able to fully participate in evaluation  Level of consciousness: awake and alert  Pain management: adequate  Airway patency: patent  Cardiovascular status: acceptable  Respiratory status: acceptable  Hydration status: acceptable  PONV: none             Last vitals:  Vitals:    12/04/19 1007 12/11/19 0841 12/11/19 0944   BP: (!) 181/78  139/66   Pulse: 64  71   Resp: 16  16   Temp: 98.1  F (36.7  C)  98.4  F (36.9  C)   SpO2:  100% 91%         Electronically Signed By: LEONARDA DARNELL CRNA  December 11, 2019  10:37 AM

## 2019-12-12 DIAGNOSIS — K26.4 GASTROINTESTINAL HEMORRHAGE ASSOCIATED WITH DUODENAL ULCER: ICD-10-CM

## 2019-12-12 DIAGNOSIS — Z98.890 S/P SPINAL SURGERY: Primary | ICD-10-CM

## 2019-12-20 ENCOUNTER — HOSPITAL ENCOUNTER (OUTPATIENT)
Dept: GENERAL RADIOLOGY | Facility: OTHER | Age: 84
Discharge: HOME | End: 2019-12-20
Attending: INTERNAL MEDICINE | Admitting: INTERNAL MEDICINE
Payer: MEDICARE

## 2019-12-20 ENCOUNTER — OFFICE VISIT (OUTPATIENT)
Dept: INTERNAL MEDICINE | Facility: OTHER | Age: 84
End: 2019-12-20
Attending: INTERNAL MEDICINE
Payer: MEDICARE

## 2019-12-20 VITALS
HEART RATE: 73 BPM | BODY MASS INDEX: 30.12 KG/M2 | HEIGHT: 63 IN | SYSTOLIC BLOOD PRESSURE: 130 MMHG | WEIGHT: 170 LBS | OXYGEN SATURATION: 99 % | RESPIRATION RATE: 16 BRPM | DIASTOLIC BLOOD PRESSURE: 74 MMHG | TEMPERATURE: 98.2 F

## 2019-12-20 DIAGNOSIS — Z98.890 S/P SPINAL SURGERY: ICD-10-CM

## 2019-12-20 DIAGNOSIS — M70.61 TROCHANTERIC BURSITIS OF RIGHT HIP: ICD-10-CM

## 2019-12-20 DIAGNOSIS — K26.4 GASTROINTESTINAL HEMORRHAGE ASSOCIATED WITH DUODENAL ULCER: Primary | ICD-10-CM

## 2019-12-20 DIAGNOSIS — R00.2 PALPITATIONS: ICD-10-CM

## 2019-12-20 PROCEDURE — G0463 HOSPITAL OUTPT CLINIC VISIT: HCPCS

## 2019-12-20 PROCEDURE — G0463 HOSPITAL OUTPT CLINIC VISIT: HCPCS | Mod: 25

## 2019-12-20 PROCEDURE — 99214 OFFICE O/P EST MOD 30 MIN: CPT | Performed by: INTERNAL MEDICINE

## 2019-12-20 PROCEDURE — 72100 X-RAY EXAM L-S SPINE 2/3 VWS: CPT

## 2019-12-20 ASSESSMENT — PAIN SCALES - GENERAL: PAINLEVEL: NO PAIN (0)

## 2019-12-20 ASSESSMENT — MIFFLIN-ST. JEOR: SCORE: 1200.24

## 2019-12-20 NOTE — PROGRESS NOTES
Chief Complaint   Patient presents with     RECHECK         HPI: Ms. Mckeon is a 84 year old female who presents today for follow up of spinal surgery and recent EGD.    She underwent spinal fusion back in September.  She has been wearing her back brace with any activity since then.  She did come in today for follow-up of her back surgery as it is difficult for her to travel to the [facility] for follow-up with neurosurgery.  X-ray was ordered and reviewed.  No obvious abnormality.    She developed a duodenal ulcer following surgery and required hospitalization with transfusion.  She recently had an EGD to follow-up and it did not show any abnormality.  She has tapered off of her Protonix.    She has been having some increase in palpitations.  She gets some episodes that last a few seconds.  During these episodes she feels some tightness in her chest along with mild shortness of breath.  She reports that these have been occurring most days.    She continues to have pain in bursa but better some with PT. she is interested in a few more sessions.    She  reports that she quit smoking about 52 years ago. She has never used smokeless tobacco.    Past medical history reviewed as below:     Past Medical History:   Diagnosis Date     Anemia 05/15/2013     CKD (chronic kidney disease) stage 3, GFR 30-59 ml/min (H) 2014    Stable. First noted when patient was taking NSAIDs regularly due to back pain.     Essential (primary) hypertension      Nonrheumatic mitral valve prolapse 2008    with normal echo and no residual prolapse     Nontoxic single thyroid nodule 2012    Patient with multinodular goiter. Negative cytology and stable ultrasound findings.     Osteopenia      Other fracture of unspecified lower leg, initial encounter for closed fracture      Pain in left hip 2017     Pregnancy      2, para 2 with 2 spontaneous vaginal deliveries     Pure hypercholesterolemia      Scoliosis      Spinal  "stenosis     with nerve root impingement     Squamous cell carcinoma    .      ROS  Pertinent ROS was performed and was negative, including for fever, chills, chest pain, increased lower extremity edema, changes in bowel or bladder, blood in the stool, difficulty swallowing, sores in the mouth. No other concerns, with exception of HPI above.      EXAM:   /74 (BP Location: Right arm, Patient Position: Sitting, Cuff Size: Adult Regular)   Pulse 73   Temp 98.2  F (36.8  C) (Tympanic)   Resp 16   Ht 1.6 m (5' 3\")   Wt 77.1 kg (170 lb)   SpO2 99%   Breastfeeding No   BMI 30.11 kg/m      Estimated body mass index is 30.11 kg/m  as calculated from the following:    Height as of this encounter: 1.6 m (5' 3\").    Weight as of this encounter: 77.1 kg (170 lb).      GEN: Vitals reviewed. Healthy appearing. Patient is in no acute distress. Cooperative with exam.  HEENT: Normocephalic atraumatic.  Pupils equally round.  No scleral icterus, no conjunctival erythema. Oropharynx with no erythema or exudates. Dentition adequate.  NECK: Supple; no thyromegaly.  No neck, cervical LAD.   CV: Heart regular in rate and rhythm with no murmur.    LUNGS: Lungs clear to auscultation bilaterally.  Chest rise equal bilaterally.  No accessory muscle use.  ABD: Nondistended  SKIN: Warm and dry to touch.  No rash on face, arms and legs.  EXT: No clubbing or cyanosis.  No peripheral edema.  MSK: Back brace is in place.  Range of motion overall is okay other than that limited by the brace.  PSYCH: Mood is good.  Affect appropriate. Speech fluent. Answers questions appropriately and thought process normal.     ASSESSMENT AND PLAN:    Gastrointestinal hemorrhage associated with duodenal ulcer  -Resolved at this time.  She is to call if she has any new symptoms.    S/P spinal surgery  -We will need to send her imaging to [facility] for review.  She is to continue with the back brace until she hears further.  "     Palpitations  -At this time cardiac monitor would be recommended however with her use of the back brace this is difficult to accomplish.  We will wait to hear back from the [place] regarding the back brace and once this is no longer needed pursue cardiac monitor.  Was encouraged to come in through the ER should she have any worsening issues at any point.    Trochanteric bursitis of right hip  -Continue physical therapy.  Call if worsening issues.  - PHYSICAL THERAPY REFERRAL         Return in about 6 months (around 6/20/2020) for Annual Review.      CHRIS HORAN,    12/20/2019 11:39 AM    This document was prepared using voice generated softwear. While every attempt was made for accuracy, grammatical errors may exist.

## 2019-12-20 NOTE — NURSING NOTE
Patient presents to the clinic for follow-up from back surgery.     Medication Reconciliation: complete   Loreto Linton LPN............. December 20, 2019 11:01 AM

## 2019-12-23 ENCOUNTER — TELEPHONE (OUTPATIENT)
Dept: INTERNAL MEDICINE | Facility: OTHER | Age: 84
End: 2019-12-23

## 2019-12-23 ENCOUNTER — MEDICAL CORRESPONDENCE (OUTPATIENT)
Dept: INTERNAL MEDICINE | Facility: OTHER | Age: 84
End: 2019-12-23

## 2019-12-30 ENCOUNTER — TELEPHONE (OUTPATIENT)
Dept: INTERNAL MEDICINE | Facility: OTHER | Age: 84
End: 2019-12-30

## 2019-12-30 NOTE — TELEPHONE ENCOUNTER
[facility] did receive the images from recent xray.  Dr. Nazario will return to the office next week and once he reviews this-will help determine when patient can start to reduce wearing her back brace.      Vy Sandra LPN 12/30/2019 3:22 PM

## 2020-01-02 ENCOUNTER — TELEPHONE (OUTPATIENT)
Dept: INTERNAL MEDICINE | Facility: OTHER | Age: 85
End: 2020-01-02

## 2020-01-03 ENCOUNTER — TRANSFERRED RECORDS (OUTPATIENT)
Dept: HEALTH INFORMATION MANAGEMENT | Facility: OTHER | Age: 85
End: 2020-01-03

## 2020-01-07 ENCOUNTER — TELEPHONE (OUTPATIENT)
Dept: INTERNAL MEDICINE | Facility: OTHER | Age: 85
End: 2020-01-07

## 2020-01-07 NOTE — TELEPHONE ENCOUNTER
I am okay with her stopping the lisinopril if she feels that is contributing.  She should monitor her blood pressure and we may need to start an alternative medication.  If she is no longer wearing the back brace I would recommend that we consider getting the heart monitor.  I can either place the order for this or if she would like to have a visit with me prior I can see her on Thursday at 420.  Just let me know what she would prefer.

## 2020-01-07 NOTE — TELEPHONE ENCOUNTER
Called and spoke with patient after proper verification. Patient states that she wanted Kirsten Guevara, DO to know that she is still having the chest heaviness that was discussed in the last OV note. States it never went away. Patient states she is admit that it's from the lisinopril, denies a cough. Patient states just this morning she felt some palpitations/heavyness in the morning with her coffee, toast and orange juice. States the bulk of it is at the base of the neck/top of the chest and feels heavy. Patient would like to know what Kirsten Guevara, DO thinks. Please advise.     Loreto Linton LPN............. January 7, 2020 11:23 AM

## 2020-01-07 NOTE — TELEPHONE ENCOUNTER
Pt states she would like to talk to Loreto about symptoms she has, states that it has to do with her chest but denies chest pain.

## 2020-01-07 NOTE — TELEPHONE ENCOUNTER
After patients last name and  verified the message below was given to patient. Pt states she would like to come in on Thursday at 4:20 to discuss with Dr Guevara.  I called reception and booked pt in the time requested by Dr. Guevara.  Kourtney Huber LPN on 2020 at 4:15 PM

## 2020-01-09 ENCOUNTER — OFFICE VISIT (OUTPATIENT)
Dept: INTERNAL MEDICINE | Facility: OTHER | Age: 85
End: 2020-01-09
Attending: INTERNAL MEDICINE
Payer: MEDICARE

## 2020-01-09 VITALS
HEART RATE: 78 BPM | DIASTOLIC BLOOD PRESSURE: 84 MMHG | OXYGEN SATURATION: 98 % | RESPIRATION RATE: 20 BRPM | SYSTOLIC BLOOD PRESSURE: 144 MMHG

## 2020-01-09 DIAGNOSIS — D50.9 IRON DEFICIENCY ANEMIA, UNSPECIFIED IRON DEFICIENCY ANEMIA TYPE: ICD-10-CM

## 2020-01-09 DIAGNOSIS — R00.2 PALPITATIONS: ICD-10-CM

## 2020-01-09 DIAGNOSIS — I10 ESSENTIAL HYPERTENSION: ICD-10-CM

## 2020-01-09 DIAGNOSIS — E83.52 HYPERCALCEMIA: ICD-10-CM

## 2020-01-09 DIAGNOSIS — R07.89 CHEST HEAVINESS: Primary | ICD-10-CM

## 2020-01-09 DIAGNOSIS — R94.4 DECREASED GFR: ICD-10-CM

## 2020-01-09 LAB
ALBUMIN SERPL-MCNC: 4.3 G/DL (ref 3.5–5.7)
ALP SERPL-CCNC: 59 U/L (ref 34–104)
ALT SERPL W P-5'-P-CCNC: 12 U/L (ref 7–52)
ANION GAP SERPL CALCULATED.3IONS-SCNC: 8 MMOL/L (ref 3–14)
AST SERPL W P-5'-P-CCNC: 19 U/L (ref 13–39)
BASOPHILS # BLD AUTO: 0 10E9/L (ref 0–0.2)
BASOPHILS NFR BLD AUTO: 0.4 %
BILIRUB SERPL-MCNC: 0.4 MG/DL (ref 0.3–1)
BUN SERPL-MCNC: 29 MG/DL (ref 7–25)
CALCIUM SERPL-MCNC: 10.5 MG/DL (ref 8.6–10.3)
CHLORIDE SERPL-SCNC: 103 MMOL/L (ref 98–107)
CO2 SERPL-SCNC: 27 MMOL/L (ref 21–31)
CREAT SERPL-MCNC: 1.29 MG/DL (ref 0.6–1.2)
DIFFERENTIAL METHOD BLD: ABNORMAL
EOSINOPHIL # BLD AUTO: 0.3 10E9/L (ref 0–0.7)
EOSINOPHIL NFR BLD AUTO: 2.7 %
ERYTHROCYTE [DISTWIDTH] IN BLOOD BY AUTOMATED COUNT: 17.6 % (ref 10–15)
GFR SERPL CREATININE-BSD FRML MDRD: 40 ML/MIN/{1.73_M2}
GLUCOSE SERPL-MCNC: 107 MG/DL (ref 70–105)
HCT VFR BLD AUTO: 38.2 % (ref 35–47)
HGB BLD-MCNC: 12 G/DL (ref 11.7–15.7)
IMM GRANULOCYTES # BLD: 0 10E9/L (ref 0–0.4)
IMM GRANULOCYTES NFR BLD: 0.3 %
LYMPHOCYTES # BLD AUTO: 3.1 10E9/L (ref 0.8–5.3)
LYMPHOCYTES NFR BLD AUTO: 32.6 %
MCH RBC QN AUTO: 29.6 PG (ref 26.5–33)
MCHC RBC AUTO-ENTMCNC: 31.4 G/DL (ref 31.5–36.5)
MCV RBC AUTO: 94 FL (ref 78–100)
MONOCYTES # BLD AUTO: 0.7 10E9/L (ref 0–1.3)
MONOCYTES NFR BLD AUTO: 7.4 %
NEUTROPHILS # BLD AUTO: 5.4 10E9/L (ref 1.6–8.3)
NEUTROPHILS NFR BLD AUTO: 56.6 %
PLATELET # BLD AUTO: 211 10E9/L (ref 150–450)
POTASSIUM SERPL-SCNC: 4.1 MMOL/L (ref 3.5–5.1)
PROT SERPL-MCNC: 7.8 G/DL (ref 6.4–8.9)
RBC # BLD AUTO: 4.05 10E12/L (ref 3.8–5.2)
SODIUM SERPL-SCNC: 138 MMOL/L (ref 134–144)
TROPONIN I SERPL-MCNC: 14 PG/ML
TSH SERPL DL<=0.05 MIU/L-ACNC: 2.25 IU/ML (ref 0.34–5.6)
WBC # BLD AUTO: 9.6 10E9/L (ref 4–11)

## 2020-01-09 PROCEDURE — 93010 ELECTROCARDIOGRAM REPORT: CPT | Performed by: INTERNAL MEDICINE

## 2020-01-09 PROCEDURE — 80053 COMPREHEN METABOLIC PANEL: CPT | Mod: ZL | Performed by: INTERNAL MEDICINE

## 2020-01-09 PROCEDURE — 99214 OFFICE O/P EST MOD 30 MIN: CPT | Performed by: INTERNAL MEDICINE

## 2020-01-09 PROCEDURE — 84443 ASSAY THYROID STIM HORMONE: CPT | Mod: ZL | Performed by: INTERNAL MEDICINE

## 2020-01-09 PROCEDURE — 85025 COMPLETE CBC W/AUTO DIFF WBC: CPT | Mod: ZL | Performed by: INTERNAL MEDICINE

## 2020-01-09 PROCEDURE — G0463 HOSPITAL OUTPT CLINIC VISIT: HCPCS

## 2020-01-09 PROCEDURE — 36415 COLL VENOUS BLD VENIPUNCTURE: CPT | Mod: ZL | Performed by: INTERNAL MEDICINE

## 2020-01-09 PROCEDURE — 93005 ELECTROCARDIOGRAM TRACING: CPT | Performed by: INTERNAL MEDICINE

## 2020-01-09 PROCEDURE — 84484 ASSAY OF TROPONIN QUANT: CPT | Mod: ZL | Performed by: INTERNAL MEDICINE

## 2020-01-09 RX ORDER — AMLODIPINE BESYLATE 5 MG/1
5 TABLET ORAL DAILY
Qty: 30 TABLET | Refills: 1 | Status: SHIPPED | OUTPATIENT
Start: 2020-01-09 | End: 2020-02-13

## 2020-01-09 RX ORDER — AMLODIPINE BESYLATE 5 MG/1
TABLET ORAL
Qty: 90 TABLET | OUTPATIENT
Start: 2020-01-09

## 2020-01-09 ASSESSMENT — PAIN SCALES - GENERAL: PAINLEVEL: NO PAIN (0)

## 2020-01-09 NOTE — TELEPHONE ENCOUNTER
Amlodipine refilled on 1/9/2020 #30 x 1 refills to [facility].  Fadumo Dobson RN on 1/9/2020 at 4:21 PM

## 2020-01-09 NOTE — NURSING NOTE
Patient presents to the clinic for follow-up on heart palpitations.     Medication Reconciliation: complete   Loreto Linton LPN............. January 9, 2020 11:24 AM

## 2020-01-09 NOTE — PROGRESS NOTES
Chief Complaint   Patient presents with     RECHECK          Subjective:   Ms. Mckeon is a 84 year old female  seen for the acute concern today of chest heaviness.    She is feeling chest heaviness with increased heart rate and in her throat.  No shortness of breath.  Couple episode of chest pain that lasts a few seconds.  She does not feel it is too bad.  No radiation.  No nausea.  She did have one episode of fleeting dizziness with chest heaviness a couple days ago.    She has concerns that her lisinopril may contributing.  She is concerned that her blood pressure would go up if she stopped her lisinopril so she has continued it until today.    She has had decreased energy at times when she has symptoms.  Sometimes worse after eating.  She recently had an ulcer with a GI bleed and went off of her Protonix in December.    She  reports that she quit smoking about 52 years ago. She has never used smokeless tobacco.    Past medical history reviewed as below:     Past Medical History:   Diagnosis Date     Anemia 05/15/2013     CKD (chronic kidney disease) stage 3, GFR 30-59 ml/min (H) 2014    Stable. First noted when patient was taking NSAIDs regularly due to back pain.     Essential (primary) hypertension      Nonrheumatic mitral valve prolapse 2008    with normal echo and no residual prolapse     Nontoxic single thyroid nodule 2012    Patient with multinodular goiter. Negative cytology and stable ultrasound findings.     Osteopenia      Other fracture of unspecified lower leg, initial encounter for closed fracture      Pain in left hip 2017     Pregnancy      2, para 2 with 2 spontaneous vaginal deliveries     Pure hypercholesterolemia      Scoliosis      Spinal stenosis     with nerve root impingement     Squamous cell carcinoma    .      ROS:   Pertinent  ROS was performed and was negative, including for fevers, chills, shortness of breath, increased lower extremity edema, changes in  bowel or bladder, blood in the stool, difficulty swallowing, sores in the mouth. No other concerns, with exception of HPI above.      Objective:    BP (!) 144/84 (BP Location: Right arm, Patient Position: Sitting, Cuff Size: Adult Regular)   Pulse 78   Resp 20   SpO2 98%   GEN: Vitals reviewed.  Patient is in no acute distress. Cooperative with exam.  HEENT: Normocephalic atraumatic.  Pupils equally round.  No scleral icterus, no conjunctival erythema. Oropharynx with no erythema or exudates. Dentition adequate.  NECK: Supple; no thyromegaly.  No neck, cervical LAD.    CV: Heart regular in rate and rhythm with no murmur.   LUNGS: Lungs clear to auscultation bilaterally.  Chest rise equal bilaterally.  No accessory muscle use.  SKIN: Warm and dry to touch.  No rash on face, arms and legs.  EXT: No clubbing or cyanosis.  No peripheral edema.     Assessment/Plan:   Chest heaviness  Etiology is unknown.  EKG today is normal.  Labs are pending.  At this time we will stop her lisinopril and restart her Protonix daily.  She will see me back in 2 to 3 weeks and if her symptoms are not improved we will need to consider further testing.  She was encouraged to come in through the ER or call and be seen if she has any new or worsening symptoms.  - EKG 12-lead, tracing only  - Troponin GH  - TSH Reflex GH  - Comprehensive Metabolic Panel  - CBC and Differential    Iron deficiency anemia, unspecified iron deficiency anemia type  Recheck CBC today is pending.  Restart Protonix.  - TSH Reflex GH    Palpitations  Etiology is unknown.  Will check labs.  May require cardiac monitor in the future  - TSH Reflex GH    Essential hypertension  At this time we will stop her lisinopril.  We will replace it with amlodipine.  She is to call with any side effects.  - amLODIPine (NORVASC) 5 MG tablet  Dispense: 30 tablet; Refill: 1      - Return/call as needed for follow-up should any new symptoms develop, for worsening of current symptoms or  if symptoms do not resolve with above plan.    Return in about 3 weeks (around 1/30/2020) for Recheck.     CHRIS GUEVARA, DO   1/9/2020 11:43 AM    This document was prepared using voice generated softwear. While every attempt was made for accuracy, grammatical errors may exist.    ADDENDUM:  1/9/2020   5:55 PM    Labs reviewed and calcium is elevated.  Renal function is slightly worse than it was prior.  Will add on additional labs including PTH, vitamin D and SPEP if able.    Chris Guevara, DO

## 2020-01-09 NOTE — PATIENT INSTRUCTIONS
Restart protonix once daily prior to food until you hear otherwise from me    Stop the Lisinopril    Start new BP med Amlodipine    See me back in 3 weeks.

## 2020-01-30 ENCOUNTER — HOSPITAL ENCOUNTER (OUTPATIENT)
Dept: GENERAL RADIOLOGY | Facility: OTHER | Age: 85
Discharge: HOME | End: 2020-01-30
Attending: INTERNAL MEDICINE | Admitting: INTERNAL MEDICINE
Payer: MEDICARE

## 2020-01-30 ENCOUNTER — OFFICE VISIT (OUTPATIENT)
Dept: INTERNAL MEDICINE | Facility: OTHER | Age: 85
End: 2020-01-30
Attending: INTERNAL MEDICINE
Payer: MEDICARE

## 2020-01-30 VITALS
SYSTOLIC BLOOD PRESSURE: 138 MMHG | DIASTOLIC BLOOD PRESSURE: 62 MMHG | HEART RATE: 72 BPM | WEIGHT: 166.4 LBS | BODY MASS INDEX: 29.48 KG/M2 | RESPIRATION RATE: 16 BRPM | TEMPERATURE: 97.9 F

## 2020-01-30 DIAGNOSIS — E83.52 HYPERCALCEMIA: ICD-10-CM

## 2020-01-30 DIAGNOSIS — R06.02 SOB (SHORTNESS OF BREATH): ICD-10-CM

## 2020-01-30 DIAGNOSIS — R07.89 CHEST HEAVINESS: ICD-10-CM

## 2020-01-30 DIAGNOSIS — K26.4 GASTROINTESTINAL HEMORRHAGE ASSOCIATED WITH DUODENAL ULCER: ICD-10-CM

## 2020-01-30 DIAGNOSIS — N18.30 STAGE 3 CHRONIC KIDNEY DISEASE (H): ICD-10-CM

## 2020-01-30 DIAGNOSIS — R07.89 CHEST HEAVINESS: Primary | ICD-10-CM

## 2020-01-30 DIAGNOSIS — I10 ESSENTIAL HYPERTENSION: ICD-10-CM

## 2020-01-30 DIAGNOSIS — Z79.899 HIGH RISK MEDICATION USE: ICD-10-CM

## 2020-01-30 LAB
ANION GAP SERPL CALCULATED.3IONS-SCNC: 7 MMOL/L (ref 3–14)
BUN SERPL-MCNC: 25 MG/DL (ref 7–25)
CALCIUM SERPL-MCNC: 10.7 MG/DL (ref 8.6–10.3)
CHLORIDE SERPL-SCNC: 102 MMOL/L (ref 98–107)
CO2 SERPL-SCNC: 27 MMOL/L (ref 21–31)
CREAT SERPL-MCNC: 1.21 MG/DL (ref 0.6–1.2)
GFR SERPL CREATININE-BSD FRML MDRD: 43 ML/MIN/{1.73_M2}
GLUCOSE SERPL-MCNC: 109 MG/DL (ref 70–105)
HGB BLD-MCNC: 12.1 G/DL (ref 11.7–15.7)
MAGNESIUM SERPL-MCNC: 1.9 MG/DL (ref 1.9–2.7)
POTASSIUM SERPL-SCNC: 4.7 MMOL/L (ref 3.5–5.1)
SODIUM SERPL-SCNC: 136 MMOL/L (ref 134–144)

## 2020-01-30 PROCEDURE — 83735 ASSAY OF MAGNESIUM: CPT | Mod: ZL | Performed by: INTERNAL MEDICINE

## 2020-01-30 PROCEDURE — 71046 X-RAY EXAM CHEST 2 VIEWS: CPT

## 2020-01-30 PROCEDURE — 80048 BASIC METABOLIC PNL TOTAL CA: CPT | Mod: ZL | Performed by: INTERNAL MEDICINE

## 2020-01-30 PROCEDURE — G0463 HOSPITAL OUTPT CLINIC VISIT: HCPCS | Mod: 25

## 2020-01-30 PROCEDURE — 99214 OFFICE O/P EST MOD 30 MIN: CPT | Performed by: INTERNAL MEDICINE

## 2020-01-30 PROCEDURE — G0463 HOSPITAL OUTPT CLINIC VISIT: HCPCS

## 2020-01-30 PROCEDURE — 85018 HEMOGLOBIN: CPT | Mod: ZL | Performed by: INTERNAL MEDICINE

## 2020-01-30 PROCEDURE — 36415 COLL VENOUS BLD VENIPUNCTURE: CPT | Mod: ZL | Performed by: INTERNAL MEDICINE

## 2020-01-30 RX ORDER — PANTOPRAZOLE SODIUM 40 MG/1
40 TABLET, DELAYED RELEASE ORAL DAILY
COMMUNITY
Start: 2020-01-30 | End: 2020-02-13

## 2020-01-30 ASSESSMENT — PAIN SCALES - GENERAL: PAINLEVEL: NO PAIN (1)

## 2020-01-30 NOTE — PROGRESS NOTES
Chief Complaint   Patient presents with     Follow Up     testing         HPI: Ms. Mckeon is a 84 year old female who presents today for follow up of chest heaviness.    She was seen a few weeks ago with concerns for chest heaviness.  She was started back on her Protonix as she had a recent ulceration.  She has noted some improvement after the Protonix although she has not had complete resolution.  She still has some feelings of fatigue.  She has chest heaviness with exertion.  She denies any outright shortness of breath.  She reports that her symptoms typically are worse with coffee and red wine.  She also has noted that she seems to get full faster.  She denies any blood in the stool.  She denies any black stools.  She has not had any fever or chills.  She has not had any significant cardiac symptoms otherwise including lower extremity edema, palpitations or orthopnea.    She was noted when I saw her left to have a decrease in her GFR.  She was instructed to increase her water intake however has not been doing this regularly.  She was also noted at the time to have a minimal increase in calcium.  She is not taking any calcium supplementation.    She was taken off of her lisinopril as she felt that this was contributing some.  Her blood pressure overall has been controlled.  Her home blood pressure today was 129 systolic.    She has had a recent upper respiratory infection.  She has not had any significant sore throat although is having a cough with this.  It has been going on just over a week.    She  reports that she quit smoking about 52 years ago. She has never used smokeless tobacco.    Past medical history reviewed as below:     Past Medical History:   Diagnosis Date     Anemia 05/15/2013     CKD (chronic kidney disease) stage 3, GFR 30-59 ml/min (H) 05/19/2014    Stable. First noted when patient was taking NSAIDs regularly due to back pain.     Essential (primary) hypertension      Nonrheumatic mitral valve  "prolapse 2008    with normal echo and no residual prolapse     Nontoxic single thyroid nodule 2012    Patient with multinodular goiter. Negative cytology and stable ultrasound findings.     Osteopenia      Other fracture of unspecified lower leg, initial encounter for closed fracture      Pain in left hip 2017     Pregnancy      2, para 2 with 2 spontaneous vaginal deliveries     Pure hypercholesterolemia      Scoliosis      Spinal stenosis     with nerve root impingement     Squamous cell carcinoma    .      ROS  Pertinent ROS was performed and was negative, including for fever, chills, increased lower extremity edema, changes in bowel or bladder, blood in the stool, difficulty swallowing, sores in the mouth. No other concerns, with exception of HPI above.      EXAM:   /62 (BP Location: Right arm, Patient Position: Sitting, Cuff Size: Adult Regular)   Pulse 72   Temp 97.9  F (36.6  C) (Temporal)   Resp 16   Wt 75.5 kg (166 lb 6.4 oz)   Breastfeeding No   BMI 29.48 kg/m      Estimated body mass index is 29.48 kg/m  as calculated from the following:    Height as of 19: 1.6 m (5' 3\").    Weight as of this encounter: 75.5 kg (166 lb 6.4 oz).      GEN: Vitals reviewed. Healthy appearing. Patient is in no acute distress. Cooperative with exam.  HEENT: Normocephalic atraumatic.  Pupils equally round.  No scleral icterus, no conjunctival erythema. Oropharynx with no erythema or exudates. Dentition adequate.  NECK: Supple; no thyromegaly.  No neck, cervical LAD.   CV: Heart regular in rate and rhythm with no murmur.    LUNGS: Lungs clear to auscultation bilaterally.  Breath sounds are diminished throughout.  Chest rise equal bilaterally.  No accessory muscle use.  ABD: Nondistended.  SKIN: Warm and dry to touch.  No rash on face, arms and legs.  EXT: No clubbing or cyanosis.  No peripheral edema.  PSYCH: Mood is good.  Affect appropriate. Speech fluent. Answers questions " appropriately and thought process normal.     ASSESSMENT AND PLAN:    Chest heaviness  Etiology is unknown.  We discussed that it may reflect gastric symptoms given some of her history however it also may be underlying cardiopulmonary disease.  At this time we will increase her Protonix to twice daily as she had some improvement with starting this and see how she does after 2 weeks.  If she continues to have symptoms we will plan for stress test and/or further cardiac testing.  - XR Chest 2 Views    Gastrointestinal hemorrhage associated with duodenal ulcer  Given her recent history of duodenal ulcer and anemia we will recheck her hemoglobin with her ongoing symptoms of chest heaviness and shortness of breath.  - Hemoglobin    Hypercalcemia  Continue to monitor periodically.    Essential hypertension  Pressure overall is controlled off the lisinopril.  Continue amlodipine at this time.    SOB (shortness of breath)  Etiology is unknown.  We will obtain a chest x-ray today given her recent upper respiratory infection.  We discussed that it may be related to cardiac function and we may need to consider testing in the future.  She is to call if she has any significant change in symptoms or worsening.  - XR Chest 2 Views    Stage 3 chronic kidney disease (H)  -Renal function is slightly improved.  She is to work hard on increased water intake over the next couple weeks.  - Magnesium  - Basic Metabolic Panel    High risk medication use  - Magnesium  - Basic Metabolic Panel                 Return in about 2 weeks (around 2/13/2020) for Recheck.      CHRIS HORAN,    1/30/2020 11:26 AM    This document was prepared using voice generated softwear. While every attempt was made for accuracy, grammatical errors may exist.

## 2020-01-30 NOTE — NURSING NOTE
"Chief Complaint   Patient presents with     Follow Up     testing       Initial There were no vitals taken for this visit. Estimated body mass index is 30.11 kg/m  as calculated from the following:    Height as of 12/20/19: 1.6 m (5' 3\").    Weight as of 12/20/19: 77.1 kg (170 lb).  Medication Reconciliation: complete    Harini Byrd LPN  "

## 2020-02-07 DIAGNOSIS — I10 ESSENTIAL HYPERTENSION: ICD-10-CM

## 2020-02-07 RX ORDER — AMLODIPINE BESYLATE 5 MG/1
TABLET ORAL
Qty: 90 TABLET | OUTPATIENT
Start: 2020-02-07

## 2020-02-07 NOTE — TELEPHONE ENCOUNTER
Called and spoke with Kd at [facility] and informed of amlodipine refilled on 1/9/2020 #30 x 1 refills.  Kd states that patient just refilled last 30 day supply and was looking to get a 90 day supply .    Inform Kd this will be addressed at appointment on 2/13/2020  Fadumo Dobson RN on 2/7/2020 at 2:23 PM

## 2020-02-07 NOTE — TELEPHONE ENCOUNTER
Patient should have 1 refill left and is following up with me on the 13th to discuss this specific medication.

## 2020-02-07 NOTE — TELEPHONE ENCOUNTER
Routing refill request to provider for review/approval because:  New medication     LOV: 1/30/2020 appointment noted on 2/13/2020  Fadumo Dobson RN on 2/7/2020 at 11:38 AM

## 2020-02-13 ENCOUNTER — OFFICE VISIT (OUTPATIENT)
Dept: INTERNAL MEDICINE | Facility: OTHER | Age: 85
End: 2020-02-13
Attending: INTERNAL MEDICINE
Payer: COMMERCIAL

## 2020-02-13 VITALS
DIASTOLIC BLOOD PRESSURE: 72 MMHG | HEART RATE: 68 BPM | RESPIRATION RATE: 16 BRPM | BODY MASS INDEX: 29.59 KG/M2 | WEIGHT: 167 LBS | HEIGHT: 63 IN | SYSTOLIC BLOOD PRESSURE: 138 MMHG | TEMPERATURE: 98.2 F

## 2020-02-13 DIAGNOSIS — K26.4 GASTROINTESTINAL HEMORRHAGE ASSOCIATED WITH DUODENAL ULCER: ICD-10-CM

## 2020-02-13 DIAGNOSIS — H90.3 BILATERAL SENSORINEURAL HEARING LOSS: ICD-10-CM

## 2020-02-13 DIAGNOSIS — L57.0 ACTINIC KERATOSIS: ICD-10-CM

## 2020-02-13 DIAGNOSIS — I10 ESSENTIAL HYPERTENSION: Primary | ICD-10-CM

## 2020-02-13 PROCEDURE — G0463 HOSPITAL OUTPT CLINIC VISIT: HCPCS | Mod: 25

## 2020-02-13 PROCEDURE — 99214 OFFICE O/P EST MOD 30 MIN: CPT | Mod: 25 | Performed by: INTERNAL MEDICINE

## 2020-02-13 PROCEDURE — 17000 DESTRUCT PREMALG LESION: CPT | Performed by: INTERNAL MEDICINE

## 2020-02-13 PROCEDURE — G0463 HOSPITAL OUTPT CLINIC VISIT: HCPCS

## 2020-02-13 RX ORDER — AMLODIPINE BESYLATE 5 MG/1
5 TABLET ORAL DAILY
Qty: 90 TABLET | Refills: 1 | Status: SHIPPED | OUTPATIENT
Start: 2020-02-13 | End: 2020-06-15

## 2020-02-13 RX ORDER — PANTOPRAZOLE SODIUM 40 MG/1
40 TABLET, DELAYED RELEASE ORAL 2 TIMES DAILY
Qty: 60 TABLET | Refills: 1 | Status: SHIPPED | OUTPATIENT
Start: 2020-02-13 | End: 2020-02-17

## 2020-02-13 ASSESSMENT — PAIN SCALES - GENERAL: PAINLEVEL: NO PAIN (0)

## 2020-02-13 ASSESSMENT — MIFFLIN-ST. JEOR: SCORE: 1186.51

## 2020-02-13 NOTE — PROGRESS NOTES
Chief Complaint   Patient presents with     RECHECK     Chest Heaviness         HPI: Ms. Mckeon is a 84 year old female who presents today for follow up of multiple issues.    Patient has been followed over the last couple months for several issues including hypertension, recent gastrointestinal hemorrhage with duodenal ulcer, and spinal surgery.    When she was seen last she was having some chest pressure.  She was restarted on her proton pump inhibitor twice daily and has noted some improvement.  She reports however that it is not completely gone.  She has been trying to watch her diet overall also.    She has a history of hypertension and was concerned initially that her lisinopril was contributing to the chest discomfort.  She was switched to amlodipine.  Her blood pressure has been controlled on this.  She denies any obvious side effects.    She is having some hearing issues and does need a referral to audiology.    She has a skin lesion on her right neck that is present.  It was previously sprayed but has returned.  She would like this reviewed and retreated today.    She  reports that she quit smoking about 52 years ago. She has never used smokeless tobacco.    Past medical history reviewed as below:     Past Medical History:   Diagnosis Date     Anemia 05/15/2013     CKD (chronic kidney disease) stage 3, GFR 30-59 ml/min (H) 2014    Stable. First noted when patient was taking NSAIDs regularly due to back pain.     Essential (primary) hypertension      Nonrheumatic mitral valve prolapse 2008    with normal echo and no residual prolapse     Nontoxic single thyroid nodule 2012    Patient with multinodular goiter. Negative cytology and stable ultrasound findings.     Osteopenia      Other fracture of unspecified lower leg, initial encounter for closed fracture      Pain in left hip 2017     Pregnancy      2, para 2 with 2 spontaneous vaginal deliveries     Pure hypercholesterolemia   "    Scoliosis      Spinal stenosis     with nerve root impingement     Squamous cell carcinoma    .      ROS  Pertinent ROS was performed and was negative, including for fever, chills, shortness of breath, increased lower extremity edema, changes in bowel or bladder, blood in the stool, difficulty swallowing, sores in the mouth. No other concerns, with exception of HPI above.      EXAM:   /72 (BP Location: Right arm, Patient Position: Sitting, Cuff Size: Adult Regular)   Pulse 68   Temp 98.2  F (36.8  C) (Oral)   Resp 16   Ht 1.6 m (5' 2.99\")   Wt 75.8 kg (167 lb)   Breastfeeding No   BMI 29.59 kg/m      Estimated body mass index is 29.59 kg/m  as calculated from the following:    Height as of this encounter: 1.6 m (5' 2.99\").    Weight as of this encounter: 75.8 kg (167 lb).      GEN: Vitals reviewed. Healthy appearing. Patient is in no acute distress. Cooperative with exam.  HEENT: Normocephalic atraumatic.  Pupils equally round.  No scleral icterus, no conjunctival erythema. Oropharynx with no erythema or exudates. Dentition adequate.  NECK: Supple; no thyromegaly.  No neck, cervical LAD.   CV: Heart regular in rate and rhythm with no murmur.    LUNGS: Lungs clear to auscultation bilaterally.  Chest rise equal bilaterally.  No accessory muscle use.  ABD: Nondistended  SKIN: Warm and dry to touch.  No rash on face, arms and legs.  Scaly protruding lesion on the right neck approximately 5 to 6 mm across.  EXT: No clubbing or cyanosis.  No peripheral edema.  PSYCH: Mood is good.  Affect appropriate. Speech fluent. Answers questions appropriately and thought process normal.     ASSESSMENT AND PLAN:    Essential hypertension  -Continue with amlodipine 5 mg daily.  She is to call with any issues with the medication.  - amLODIPine (NORVASC) 5 MG tablet  Dispense: 90 tablet; Refill: 1    Bilateral sensorineural hearing loss  Referral placed audiology.  - AUDIOLOGY ADULT REFERRAL    Gastrointestinal " hemorrhage associated with duodenal ulcer  -At this time she will continue with twice daily dosing for 1 more month and then as long as her symptoms have resolved she will switch to once daily dosing of her Protonix.  She likely will need to continue on this longer-term.  She is to call with any side effects.  - pantoprazole (PROTONIX) 40 MG EC tablet  Dispense: 60 tablet; Refill: 1    Actinic keratosis  Liquid nitrogen applied to one lesion on the right neck with 3 freeze thaw cycles.  No immediate complications.  - DESTRUCT PREMALIGNANT LESION, FIRST       Return in about 4 months (around 6/13/2020).      CHRIS HORAN, DO   2/13/2020 11:49 AM    This document was prepared using voice generated softwear. While every attempt was made for accuracy, grammatical errors may exist.

## 2020-02-13 NOTE — NURSING NOTE
"Patient presents to the clinic today to follow up chest heaviness.  Patient states she is feeling better.  Danielle Self LPN 2/13/2020   11:41 AM    Chief Complaint   Patient presents with     RECHECK     Chest Heaviness       Initial /72 (BP Location: Right arm, Patient Position: Sitting, Cuff Size: Adult Regular)   Pulse 68   Temp 98.2  F (36.8  C) (Oral)   Resp 16   Ht 1.6 m (5' 2.99\")   Wt 75.8 kg (167 lb)   Breastfeeding No   BMI 29.59 kg/m   Estimated body mass index is 29.59 kg/m  as calculated from the following:    Height as of this encounter: 1.6 m (5' 2.99\").    Weight as of this encounter: 75.8 kg (167 lb).  Medication Reconciliation: complete  Danielle Self LPN    "

## 2020-02-13 NOTE — PATIENT INSTRUCTIONS
Patient Education       Iron    Wheat germ    Dried fruits    Nuts and seeds    Whole grain and fortified breads and cereals    Dried beans, lentils, and split peas    Leafy, dark-green vegetables    Eggs       Date Last Reviewed: 8/1/2017 2000-2019 [employer]. [address] [place] [id number]. All rights reserved. This information is not intended as a substitute for professional medical care. Always follow your healthcare professional's instructions.

## 2020-02-15 DIAGNOSIS — K26.4 GASTROINTESTINAL HEMORRHAGE ASSOCIATED WITH DUODENAL ULCER: ICD-10-CM

## 2020-02-17 RX ORDER — PANTOPRAZOLE SODIUM 40 MG/1
TABLET, DELAYED RELEASE ORAL
Qty: 180 TABLET | Refills: 0 | Status: SHIPPED | OUTPATIENT
Start: 2020-02-17 | End: 2020-03-16

## 2020-02-17 NOTE — TELEPHONE ENCOUNTER
"Requested Prescriptions   Pending Prescriptions Disp Refills     pantoprazole (PROTONIX) 40 MG EC tablet [Pharmacy Med Name: PANTOPRAZOLE 40MG TABLETS] 180 tablet      Sig: TAKE 1 TABLET BY MOUTH 2 TIMES DAILY FOR 1 MONTH AND THEN DAILY       PPI Protocol Passed - 2/15/2020  9:22 AM        Passed - Not on Clopidogrel (unless Pantoprazole ordered)        Passed - No diagnosis of osteoporosis on record        Passed - Recent (12 mo) or future (30 days) visit within the authorizing provider's specialty     Patient has had an office visit with the authorizing provider or a provider within the authorizing providers department within the previous 12 mos or has a future within next 30 days. See \"Patient Info\" tab in inbasket, or \"Choose Columns\" in Meds & Orders section of the refill encounter.              Passed - Medication is active on med list        Passed - Patient is age 18 or older        Passed - No active pregnacy on record        Passed - No positive pregnancy test in past 12 months        lov 02/13/19  Prescription approved per [facility] Refill Protocol.    "

## 2020-02-24 ENCOUNTER — TRANSFERRED RECORDS (OUTPATIENT)
Dept: HEALTH INFORMATION MANAGEMENT | Facility: OTHER | Age: 85
End: 2020-02-24

## 2020-03-09 ENCOUNTER — TELEPHONE (OUTPATIENT)
Dept: INTERNAL MEDICINE | Facility: OTHER | Age: 85
End: 2020-03-09

## 2020-03-09 PROBLEM — K92.2 GI BLEED: Status: ACTIVE | Noted: 2019-09-30

## 2020-03-09 PROBLEM — M48.061 SPINAL STENOSIS OF LUMBAR REGION WITHOUT NEUROGENIC CLAUDICATION: Status: ACTIVE | Noted: 2019-09-18

## 2020-03-09 NOTE — TELEPHONE ENCOUNTER
Patient states that she diagnosed herself.  Patient states she went to [facility] and feels as though its her esophagus that is bothering her and she has changed diet and feels much better.   Patient will call if she needs an appointment. Nakita Salas LPN .............3/9/2020  10:24 AM

## 2020-03-12 ENCOUNTER — TRANSFERRED RECORDS (OUTPATIENT)
Dept: HEALTH INFORMATION MANAGEMENT | Facility: OTHER | Age: 85
End: 2020-03-12

## 2020-03-15 DIAGNOSIS — K26.4 GASTROINTESTINAL HEMORRHAGE ASSOCIATED WITH DUODENAL ULCER: ICD-10-CM

## 2020-03-16 RX ORDER — PANTOPRAZOLE SODIUM 40 MG/1
TABLET, DELAYED RELEASE ORAL
Qty: 90 TABLET | Refills: 0 | Status: SHIPPED | OUTPATIENT
Start: 2020-03-16 | End: 2020-06-15

## 2020-03-16 NOTE — TELEPHONE ENCOUNTER
Contacted patient and she is doing well with taking the daily dosing. She is taking it at night. Refill is pending.  Devi Martinez LPN, LPN  3/16/2020  9:16 AM

## 2020-03-16 NOTE — TELEPHONE ENCOUNTER
Please call and see if patient is taking pantoprazole once daily at this time and doing ok?  If so, please set up Rx refill and I will send in additional supply.

## 2020-06-15 ENCOUNTER — OFFICE VISIT (OUTPATIENT)
Dept: INTERNAL MEDICINE | Facility: OTHER | Age: 85
End: 2020-06-15
Attending: INTERNAL MEDICINE
Payer: MEDICARE

## 2020-06-15 ENCOUNTER — ALLIED HEALTH/NURSE VISIT (OUTPATIENT)
Dept: FAMILY MEDICINE | Facility: OTHER | Age: 85
End: 2020-06-15
Attending: INTERNAL MEDICINE
Payer: MEDICARE

## 2020-06-15 ENCOUNTER — HOSPITAL ENCOUNTER (OUTPATIENT)
Dept: MAMMOGRAPHY | Facility: OTHER | Age: 85
End: 2020-06-15
Attending: INTERNAL MEDICINE
Payer: MEDICARE

## 2020-06-15 VITALS
OXYGEN SATURATION: 90 % | SYSTOLIC BLOOD PRESSURE: 132 MMHG | DIASTOLIC BLOOD PRESSURE: 70 MMHG | RESPIRATION RATE: 16 BRPM | BODY MASS INDEX: 28.31 KG/M2 | TEMPERATURE: 97.4 F | HEART RATE: 72 BPM | WEIGHT: 165.8 LBS | HEIGHT: 64 IN

## 2020-06-15 VITALS
WEIGHT: 165.8 LBS | RESPIRATION RATE: 18 BRPM | SYSTOLIC BLOOD PRESSURE: 150 MMHG | HEART RATE: 90 BPM | HEIGHT: 64 IN | TEMPERATURE: 98.3 F | DIASTOLIC BLOOD PRESSURE: 70 MMHG | BODY MASS INDEX: 28.31 KG/M2

## 2020-06-15 DIAGNOSIS — Z23 NEED FOR SHINGLES VACCINE: ICD-10-CM

## 2020-06-15 DIAGNOSIS — Z12.31 VISIT FOR SCREENING MAMMOGRAM: ICD-10-CM

## 2020-06-15 DIAGNOSIS — R07.89 FEELING OF CHEST TIGHTNESS: ICD-10-CM

## 2020-06-15 DIAGNOSIS — E83.52 HYPERCALCEMIA: ICD-10-CM

## 2020-06-15 DIAGNOSIS — M85.80 OSTEOPENIA, UNSPECIFIED LOCATION: Primary | ICD-10-CM

## 2020-06-15 DIAGNOSIS — K26.4 GASTROINTESTINAL HEMORRHAGE ASSOCIATED WITH DUODENAL ULCER: ICD-10-CM

## 2020-06-15 DIAGNOSIS — I10 ESSENTIAL HYPERTENSION: ICD-10-CM

## 2020-06-15 DIAGNOSIS — N18.30 STAGE 3 CHRONIC KIDNEY DISEASE (H): ICD-10-CM

## 2020-06-15 DIAGNOSIS — Z23 NEED FOR DIPHTHERIA-TETANUS-PERTUSSIS (TDAP) VACCINE: ICD-10-CM

## 2020-06-15 DIAGNOSIS — R10.31 RIGHT GROIN PAIN: ICD-10-CM

## 2020-06-15 DIAGNOSIS — Z00.00 ENCOUNTER FOR MEDICARE ANNUAL WELLNESS EXAM: Primary | ICD-10-CM

## 2020-06-15 PROBLEM — K92.2 GI BLEED: Status: RESOLVED | Noted: 2019-09-30 | Resolved: 2020-06-15

## 2020-06-15 LAB
CALCIUM SERPL-MCNC: 10.5 MG/DL (ref 8.6–10.3)
CREAT SERPL-MCNC: 1.39 MG/DL (ref 0.6–1.2)
ERYTHROCYTE [DISTWIDTH] IN BLOOD BY AUTOMATED COUNT: 14.5 % (ref 10–15)
GFR SERPL CREATININE-BSD FRML MDRD: 36 ML/MIN/{1.73_M2}
HCT VFR BLD AUTO: 41.1 % (ref 35–47)
HGB BLD-MCNC: 12.8 G/DL (ref 11.7–15.7)
MCH RBC QN AUTO: 30.2 PG (ref 26.5–33)
MCHC RBC AUTO-ENTMCNC: 31.1 G/DL (ref 31.5–36.5)
MCV RBC AUTO: 97 FL (ref 78–100)
PHOSPHATE SERPL-MCNC: 3.8 MG/DL (ref 2.5–5)
PLATELET # BLD AUTO: 180 10E9/L (ref 150–450)
RBC # BLD AUTO: 4.24 10E12/L (ref 3.8–5.2)
WBC # BLD AUTO: 8.3 10E9/L (ref 4–11)

## 2020-06-15 PROCEDURE — G0463 HOSPITAL OUTPT CLINIC VISIT: HCPCS | Mod: 25

## 2020-06-15 PROCEDURE — 85027 COMPLETE CBC AUTOMATED: CPT | Mod: ZL | Performed by: INTERNAL MEDICINE

## 2020-06-15 PROCEDURE — 84165 PROTEIN E-PHORESIS SERUM: CPT | Mod: ZL | Performed by: INTERNAL MEDICINE

## 2020-06-15 PROCEDURE — 84100 ASSAY OF PHOSPHORUS: CPT | Mod: ZL | Performed by: INTERNAL MEDICINE

## 2020-06-15 PROCEDURE — 82310 ASSAY OF CALCIUM: CPT | Mod: ZL | Performed by: INTERNAL MEDICINE

## 2020-06-15 PROCEDURE — 96372 THER/PROPH/DIAG INJ SC/IM: CPT

## 2020-06-15 PROCEDURE — 36415 COLL VENOUS BLD VENIPUNCTURE: CPT | Mod: ZL | Performed by: INTERNAL MEDICINE

## 2020-06-15 PROCEDURE — 99214 OFFICE O/P EST MOD 30 MIN: CPT | Mod: 25 | Performed by: INTERNAL MEDICINE

## 2020-06-15 PROCEDURE — G0439 PPPS, SUBSEQ VISIT: HCPCS | Performed by: INTERNAL MEDICINE

## 2020-06-15 PROCEDURE — 77067 SCR MAMMO BI INCL CAD: CPT

## 2020-06-15 PROCEDURE — G0463 HOSPITAL OUTPT CLINIC VISIT: HCPCS

## 2020-06-15 PROCEDURE — 25000128 H RX IP 250 OP 636: Performed by: INTERNAL MEDICINE

## 2020-06-15 PROCEDURE — 82565 ASSAY OF CREATININE: CPT | Mod: ZL | Performed by: INTERNAL MEDICINE

## 2020-06-15 PROCEDURE — 00000402 ZZHCL STATISTIC TOTAL PROTEIN: Mod: ZL | Performed by: INTERNAL MEDICINE

## 2020-06-15 RX ORDER — FAMOTIDINE 20 MG/1
20 TABLET, FILM COATED ORAL DAILY
Qty: 90 TABLET | Refills: 3 | Status: SHIPPED | OUTPATIENT
Start: 2020-06-15 | End: 2021-06-07

## 2020-06-15 RX ORDER — AMLODIPINE BESYLATE 5 MG/1
5 TABLET ORAL DAILY
Qty: 90 TABLET | Refills: 3 | Status: SHIPPED | OUTPATIENT
Start: 2020-06-15 | End: 2021-06-21 | Stop reason: ALTCHOICE

## 2020-06-15 RX ORDER — CLOSTRIDIUM TETANI TOXOID ANTIGEN (FORMALDEHYDE INACTIVATED), CORYNEBACTERIUM DIPHTHERIAE TOXOID ANTIGEN (FORMALDEHYDE INACTIVATED), BORDETELLA PERTUSSIS TOXOID ANTIGEN (GLUTARALDEHYDE INACTIVATED), BORDETELLA PERTUSSIS FILAMENTOUS HEMAGGLUTININ ANTIGEN (FORMALDEHYDE INACTIVATED), BORDETELLA PERTUSSIS PERTACTIN ANTIGEN, AND BORDETELLA PERTUSSIS FIMBRIAE 2/3 ANTIGEN 5; 2; 2.5; 5; 3; 5 [LF]/.5ML; [LF]/.5ML; UG/.5ML; UG/.5ML; UG/.5ML; UG/.5ML
0.5 INJECTION, SUSPENSION INTRAMUSCULAR ONCE
Qty: 0.5 ML | Refills: 0 | Status: SHIPPED | OUTPATIENT
Start: 2020-06-15 | End: 2020-06-15

## 2020-06-15 RX ORDER — PANTOPRAZOLE SODIUM 40 MG/1
TABLET, DELAYED RELEASE ORAL
Qty: 90 TABLET | Refills: 3 | Status: SHIPPED | OUTPATIENT
Start: 2020-06-15 | End: 2021-06-21

## 2020-06-15 RX ADMIN — DENOSUMAB 60 MG: 60 INJECTION SUBCUTANEOUS at 09:54

## 2020-06-15 ASSESSMENT — MIFFLIN-ST. JEOR
SCORE: 1197.06
SCORE: 1197.06

## 2020-06-15 ASSESSMENT — PAIN SCALES - GENERAL: PAINLEVEL: NO PAIN (0)

## 2020-06-15 NOTE — PATIENT INSTRUCTIONS
Patient Education   Personalized Prevention Plan  You are due for the preventive services outlined below.  Your care team is available to assist you in scheduling these services.  If you have already completed any of these items, please share that information with your care team to update in your medical record.  Health Maintenance Due   Topic Date Due     Zoster (Shingles) Vaccine (2 of 3) 07/26/2012     Annual Wellness Visit  06/14/2020       Add Famotidine 20mg every morning 30 minutes before breakfast.      Try to increase water intake    Caution with NSAIDS  (ibuprofen, aspirin, naproxen, aleve, advil) due to risk for increased blood pressure, stomach pain/nausea/ulcers and kidney damage; use minimal amount necessary

## 2020-06-15 NOTE — PROGRESS NOTES
Specialty Injection: Prolia  Verified patient's name and . Patient stated reason for visit today is to receive Prolia injection. Lab draw for Calcium, Creatinine, and Phosphorus required per Prolia Therapy Plan. Patient brought to lab for blood draw. Calcium 10.5 mg/dL. Creatinine 1.39 mg/dL. Phosphorus 3.8 mg/dL. Order parameters met. Reviewed Prolia Patient Guide and Prolia Medication Guide with patient; both handouts given to patient to take home. Patient stated taking vitamin D supplements. Patient denied any concerns with previous Prolia injection(s). Prolia prepared and administered subcutaneous as ordered. Administration of medication documented in MAR (see MAR for further information regarding dose, lot #, NDC #, expiration date). Patient also provided with appointment reminder card for next injection in 6 months. Patient left clinic room ambulatory for next appointment in the clinic today.       Danielle FREEDMANN, RN on 6/15/2020 at 9:56 AM

## 2020-06-15 NOTE — PROGRESS NOTES
"Medicare Wellness Visit   Ms. Mckeon is a 84 year old female who presents today who presents for Preventive Visit.    Are you in the first 12 months of your Medicare coverage?  No    Health Risk Assessment     Do you feel safe in your environment? Yes    Physical Health:   In general, how would you rate your overall physical health? good  Outside of work, how many days during the week do you exercise? 4-5 days/week   Outside of work, approximately how many minutes a day do you exercise?45-60 minutes  If you drink alcohol do you typically have >3 drinks per day or >7 drinks per week? No  Do you usually eat at least 4 servings of fruit and vegetables a day, include whole grains & fiber and avoid regularly eating high fat or \"junk\" foods? Yes   Do you have any problems taking medications regularly? No   Do you have any side effects from medications? none  Needs assistance for the following daily activities: no assistance needed  Which of the following safety concerns are present in your home? none identified   Hearing impairment: Yes, pt has hearing aids   In the past 6 months, have you been bothered by leaking of urine? No    Have you ever done Advance Care Planning? (For example, a Health Directive, POLST, or a discussion with a medical provider or your loved ones about your wishes): Yes, advance care planning is on file.       Screening     Fall risk:   Fallen 2 or more times in the past year?: No   Any fall with injury in the past year?: No     Cognitive Screenin) Repeat 3 items (Leader, Season, Table)    2) Clock draw: NORMAL   3) 3 item recall: Recalls 3 objects   Results: 3 items recalled: COGNITIVE IMPAIRMENT LESS LIKELY   Mini-CogTM Copyright ARNALDO Hines. Licensed by the author for use in [facility]; reprinted with permission (greg@.[email or web address]). All rights reserved.   Do you have sleep apnea, excessive snoring or daytime drowsiness?: yes, pt states she could fall asleep after she eats lunch every " day       Medical Record Update     Reviewed and updated as needed this visit by clinical staff  Tobacco  Allergies  Meds  Med Hx  Surg Hx  Fam Hx  Soc Hx      Reviewed and updated as needed this visit by Provider  Tobacco  Allergies  Meds  Problems  Med Hx  Surg Hx  Fam Hx  Soc Hx            Current providers sharing in care for this patient include:   Patient Care Team:  Kirsten Guevara DO as PCP - General  Kirsten Guevara DO as Assigned PCP     Subjective:   Overall she is doing okay.  She has a history of essential hypertension.  She is on amlodipine for this.  She denies any side effects.    She has several other concerns today.    She continues to have some tightness her chest.  This started after she had an EGD in December and was taken off of her Protonix.  She was started back on them shortly afterward and did have improvement however has had some residual symptoms.  She is currently taking Protonix 40 mg daily.    She has continued to have right groin pain and is curious if this is from her back vs something else.  This dates back to last fall.  She has tried some PT in the past.  She is curious if any further evaluation is needed.    She has been noted to have hypercalcemia on her labs prior.  She does have stage III chronic kidney disease.  She has never been assessed for multiple myeloma.  She is on vitamin D supplementation.      Review of Systems   GEN: -fevers/-chills/-night sweats/-wt change  NEURO: -headaches/-vision changes  EARS: -hearing changes  NOSE: -drainage/-congestion  MOUTH/THROAT: - sore throat/-dysphagia/-sores  LUNGS: -sob/-cough  CARDIOVASCULAR: -cp/-palpitations  ABD: -pain/-change in bowels/-bloody stools/-black stools  : -change in bladder/-sores/-vaginal discharge or bleeding  HEMATOLOGIC/LYMPHATIC: -swollen nodes  SKIN: -rashes/-lesions  MSK/RHEUM: +joint pain/-joint swelling  NEURO: -weakness/-parasthesias  PSYCH: -depression/-anxiety          OBJECTIVE:     Vitals:  "   06/15/20 1004 06/15/20 1020   BP: (!) 140/70 132/70   BP Location: Right arm Right arm   Patient Position: Sitting Sitting   Cuff Size: Adult Regular Adult Regular   Pulse: 72    Resp: 16    Temp: 97.4  F (36.3  C)    TempSrc: Tympanic    SpO2: 90%    Weight: 75.2 kg (165 lb 12.8 oz)    Height: 1.626 m (5' 4\")         Estimated body mass index is 28.46 kg/m  as calculated from the following:    Height as of this encounter: 1.626 m (5' 4\").    Weight as of this encounter: 75.2 kg (165 lb 12.8 oz).     Physical Exam  GEN: Vitals reviewed. Healthy appearing. Patient is in no acute distress. Cooperative with exam.  HEENT: Normocephalic atraumatic.  Eyes grossly normal to inspection.  No discharge or erythema, or obvious scleral/conjunctival abnormalities.   CV: Heart regular in rate and rhythm with no murmur.    LUNGS: No audible wheeze, cough, or visible cyanosis.  No visible retractions or increased work of breathing.  Lungs clear to auscultation bilaterally.    ABD:  nondistended   SKIN: Warm and dry to touch.  Visible skin clear. No significant rash, abnormal pigmentation or lesions.  EXT: No clubbing or cyanosis.  No peripheral edema.  NEURO: Alert and oriented to person, place, and time.  Cranial nerves II-XII grossly intact with no focal or lateralizing deficits.  Muscle tone normal.  Gait normal. No tremor.   MSK: ROM of upper and lower ext symmetric and full.  PSYCH: Mood is good.  Mentation appears normal, affect normal/bright, judgement and insight intact, normal speech and appearance well-groomed.      Labs reviewed in EPIC    ASSESSMENT / PLAN:     Encounter for Medicare annual wellness exam    Gastrointestinal hemorrhage associated with duodenal ulcer  -At this time given her ongoing symptoms we will start Pepcid in the evening opposite Protonix.  She is encouraged to take both of these before meals.  Hemoglobin is checked and stable.  She is to call with ongoing issues.  - famotidine (PEPCID) 20 MG " tablet  Dispense: 90 tablet; Refill: 3  - pantoprazole (PROTONIX) 40 MG EC tablet  Dispense: 90 tablet; Refill: 3  - CBC W PLT No Diff  - CBC W PLT No Diff    Essential hypertension  - Blood pressure today of 132/70   is at the goal of <140/90 with no exacerbation.  - Continue current regimen.  Instructed to check BP at home.  - Cautioned patient to monitor with antibiotics, herbals and any OTC medications  - electrolytes and renal function done and ok  - amLODIPine (NORVASC) 5 MG tablet  Dispense: 90 tablet; Refill: 3    Stage 3 chronic kidney disease (H)  Overall stable.  Continue to monitor.  She has been urinating at night but does not necessarily feel this is different than normal.    Hypercalcemia  Calcium is borderline.  SPEP is added on a normal.  She has not been taking any Tums.  She denies any other symptoms.  - Protein electrophoresis  - Protein electrophoresis    Need for diphtheria-tetanus-pertussis (Tdap) vaccine  Rx given  - Tdap, tetanus-diphtheria-acell pertussis, (ADACEL) 5-2-15.5 LF-MCG/0.5 injection  Dispense: 0.5 mL; Refill: 0    Need for shingles vaccine  Rx given  - zoster vaccine recombinant adjuvanted (SHINGRIX) injection  Dispense: 0.5 mL; Refill: 0    Right groin pain  -I suspect this is secondary to hip OA.  We discussed options including injection, referral to orthopedics, repeat physical therapy versus other.  She is encouraged to continue to be active.  She is to call if she is interested in any of the above referrals.    Feeling of chest tightness  -Given the improvement of this with Protonix I suspect it is secondary to ulceration and gastritis however if she has worsening symptoms or persistent symptoms despite Pepcid we may need to consider further cardiopulmonary testing.  She is to let me know if this is the case.      End of Life Planning     Do you have a Health Care Directive? Yes, advance care planning is on file.    End of Life Planning:  Patient currently has an advanced  directive: Yes.  Practitioner is supportive of decision.    Preventative Care Guidelines and Health Counseling     COUNSELING:  Reviewed preventive health counseling  Special attention given to:   Preventative screening  Regular exercise  Healthy diet/nutrition  Bladder control   Immunizations     132/70   BP Screening:   Last 3 BP Readings:    BP Readings from Last 3 Encounters:   06/15/20 132/70   06/15/20 (!) 150/70   02/13/20 138/72       The following was recommended to the patient:  Re-screen BP within a year and recommended lifestyle modifications    Body mass index is 28.46 kg/m .         Appropriate preventive services were discussed with this patient, including applicable screening as appropriate for cardiovascular disease, diabetes, osteopenia/osteoporosis, and glaucoma.  As appropriate for age/gender, discussed screening for colorectal cancer, prostate cancer, breast cancer, and cervical cancer. Checklist reviewing preventive services available has been given to the patient.    Reviewed patients plan of care and provided an AVS. The Basic Care Plan (routine screening as documented in Health Maintenance) for patient meets the Care Plan requirement. This Care Plan has been established and reviewed with the Patient and any available family members.    Counseling Resources:  ATP IV Guidelines  Pooled Cohorts Equation Calculator  Breast Cancer Risk Calculator  FRAX Risk Assessment  ICSI Preventive Guidelines  Dietary Guidelines for Americans, 2010  [employer]'s MyPlate  ASA Prophylaxis  Lung CA Screening    Kirsten Guevara DO  6/15/2020  10:50 AM  [facility]

## 2020-06-16 LAB
ALBUMIN SERPL ELPH-MCNC: 4.2 G/DL (ref 3.7–5.1)
ALPHA1 GLOB SERPL ELPH-MCNC: 0.3 G/DL (ref 0.2–0.4)
ALPHA2 GLOB SERPL ELPH-MCNC: 0.9 G/DL (ref 0.5–0.9)
B-GLOBULIN SERPL ELPH-MCNC: 1 G/DL (ref 0.6–1)
GAMMA GLOB SERPL ELPH-MCNC: 1.4 G/DL (ref 0.7–1.6)
M PROTEIN SERPL ELPH-MCNC: 0 G/DL
PROT PATTERN SERPL ELPH-IMP: NORMAL

## 2020-07-30 ENCOUNTER — TELEPHONE (OUTPATIENT)
Dept: INTERNAL MEDICINE | Facility: OTHER | Age: 85
End: 2020-07-30

## 2020-07-30 DIAGNOSIS — M54.16 LUMBAR RADICULOPATHY: ICD-10-CM

## 2020-07-30 DIAGNOSIS — M48.061 SPINAL STENOSIS OF LUMBAR REGION WITHOUT NEUROGENIC CLAUDICATION: Primary | ICD-10-CM

## 2020-07-30 NOTE — TELEPHONE ENCOUNTER
Reason for call: Request for a referral.    Referral requested for what concern?  PT for back    Have you already been seen by the specialty you need the referral for?  no    If yes, Date:   ,  Location:   ,  Provider:       If no,  Where do you want to go?   [facility]    Additional comments:       Preferred method for responding to this message: Telephone Call    Phone number patient can be reached at? Home number on file [phone] (home)    If we can't reach you directly, may we leave a detailed response at the number you provided? Yes

## 2020-08-13 ENCOUNTER — HOSPITAL ENCOUNTER (OUTPATIENT)
Dept: PHYSICAL THERAPY | Facility: OTHER | Age: 85
Setting detail: THERAPIES SERIES
End: 2020-08-13
Attending: INTERNAL MEDICINE
Payer: MEDICARE

## 2020-08-13 DIAGNOSIS — M54.16 LUMBAR RADICULOPATHY: ICD-10-CM

## 2020-08-13 DIAGNOSIS — M48.061 SPINAL STENOSIS OF LUMBAR REGION WITHOUT NEUROGENIC CLAUDICATION: ICD-10-CM

## 2020-08-13 PROCEDURE — 97161 PT EVAL LOW COMPLEX 20 MIN: CPT | Mod: GP

## 2020-08-13 PROCEDURE — 97110 THERAPEUTIC EXERCISES: CPT | Mod: GP

## 2020-08-13 NOTE — PROGRESS NOTES
08/13/20 1500   General Information   Type of Visit Initial OP Ortho PT Evaluation   Start of Care Date 08/13/20   Referring Physician Kirsten Guevara, DO    Patient/Family Goals Statement To move better   Orders Evaluate and Treat   Date of Order 07/30/20   Certification Required? Yes   Medical Diagnosis Spinal stenosis of lumbar region without neurogenic claudication M48.061,  Lumbar radiculopathy M54.16    Surgical/Medical history reviewed Yes   Precautions/Limitations no known precautions/limitations   General Information Comments Goes by Keily       Present No   Body Part(s)   Body Part(s) Lumbar Spine/SI   Presentation and Etiology   Pertinent history of current problem (include personal factors and/or comorbidities that impact the POC) Patient is a 85 year old female referred to physical therapy with low back pain. She reports that she had two back surgeries. The first one was for a fusion in 2011. The second surgery was done 9/18/2019 for a fusion for T12-S1. She reports that she had a follow up in June this year and another one in 9/2020. Reports that she currently does not have any restrictions. She has been trying to walk moer with her walker and her walking sticks. She reports that she is feeling pretty good, but she just isn't walking around and moving around like she used to. She reports that her legs are weaker and not as strong as she would like. She does get pain into the right LE/hip after she uses it for quite some time. Reports taht after her surgery, she ended up developing two ulcers and ended up in the hospital. Spent 1 month away from home.    Impairments D. Decreased ROM;F. Decreased strength and endurance;H. Impaired gait   Functional Limitations perform activities of daily living;perform desired leisure / sports activities   Symptom Location Low back and bilateral LE's   How/Where did it occur Other  (With surgery)   Onset date of current episode/exacerbation 09/19/19    Chronicity New   Pain rating (0-10 point scale) Best (/10);Worst (/10)   Best (/10) 1   Worst (/10) 5   Pain quality B. Dull;C. Aching   Frequency of pain/symptoms B. Intermittent   Pain/symptoms are: Worse during the day   Pain/symptoms exacerbated by A. Sitting;B. Walking;D. Carrying;G. Certain positions   Pain/symptoms eased by E. Changing positions;I. OTC medication(s)   Progression of symptoms since onset: Improved   Prior Level of Function   Prior Level of Function-Mobility Independent   Prior Level of Function-ADLs Independent   Current Level of Function   Current Community Support Family/friend caregiver   Living environment House/[facility]   Home/community accessibility Lives with . Has stairs to the basement but notes that she doesn't have issues with this. Has railings on both sides.    Current equipment-Gait/Locomotion Front wheeled walker;Other  (Walking sticks)   Current equipment-ADL Shower/tub chair   Fall Risk Screen   Fall screen completed by PT   Have you fallen 2 or more times in the past year? No   Have you fallen and had an injury in the past year? No   Is patient a fall risk? No   Lumbar Spine/SI Objective Findings   Observation Patient resting comfortably in chair with no apparent distress   Integumentary Has right LE edema when compared to her left. She reports that this is been present since surgery but has improved   Posture Protracted shoulders, slouched   Gait/Locomotion Slower gait speed with lack of push off bilaterally. Wider KATIE   Flexion ROM Not tested at this time   Extension ROM Not tested at this time   Lumbar ROM Comment Gentle rotation stating that she doesn't have any pain with this   Hip Screen Scour: negative   Hip Flexion (L2) Strength 4/5   Hip Abduction Strength 5/5   Hip Adduction Strength 5/5   Knee Flexion Strength 5/5   Knee Extension (L3) Strength 5/5   Ankle Dorsiflexion (L4) Strength 5/5   Hamstring Flexibility Min limited bilaterally   Hip Flexor  Flexibility Min limited bilaterally   Piriformis Flexibility Min limited bilaterally   Slump Test No major increase in symptoms in sitting   Spring Test N/a   Segmental Mobility Not tested   Sensation Testing Intact to light touch   Neurological Testing Comments Mild increase in sciatic tension in supine position on right side compared to left.    Palpation Discomfort noted in right posterior hip and gluteal muscles   Planned Therapy Interventions   Planned Therapy Interventions balance training;gait training;joint mobilization;manual therapy;neuromuscular re-education;stretching;strengthening;ROM   Planned Modality Interventions   Planned Modality Interventions Cryotherapy;Hot packs;Ultrasound   Clinical Impression   Criteria for Skilled Therapeutic Interventions Met yes, treatment indicated   PT Diagnosis Impaired mobility, decreased strength and endurance, low back pain, generalized weakness   Influenced by the following impairments stiffness, discomfort, weakness   Functional limitations due to impairments prolonged activity, lifting, walking   Clinical Presentation Stable/Uncomplicated   Clinical Presentation Rationale clinical judgement   Clinical Decision Making (Complexity) Low complexity   Therapy Frequency 2 times/Week   Predicted Duration of Therapy Intervention (days/wks) 8 weeks   Risk & Benefits of therapy have been explained Yes   Patient, Family & other staff in agreement with plan of care Yes   Clinical Impression Comments Signs and symptoms consistent with generalized weakness after a lumbar fusion surgery. Surgery was back in september of 2019. She developed ulcers when at a SNF afterwards and was back in the hospital for some time. [place]  where she did rehab. No longer using those exercises at home and feels she is weak and not moving around as she used to. Notes weakness primarily in her hips with stiffness in her LE's. She would benefit from skilled PT services in order to  reduce her discomfort and improve her overall mobility, strength, endurance and function   Education Assessment   Barriers to Learning No barriers   ORTHO GOALS   PT Ortho Eval Goals 1;2;3;4   Ortho Goal 1   Goal Identifier ADL's   Goal Description Patient will be able to complete all dressing, bathing, and grooming activities with little to no increase in pain in order to improve her ability to complete ADL's   Target Date 09/10/20   Ortho Goal 2   Goal Identifier Housework   Goal Description Patient will be able to complete all indoor housework with little to no increae in low back pain in order to improve her function around the home.    Target Date 09/10/20   Ortho Goal 3   Goal Identifier Ambulation   Goal Description Patient will be able to ambulate longer than 10 minutes with little to no increase in low back pain and no rest breaks in order to improve her mobility around the community   Target Date 10/08/20   Ortho Goal 4   Goal Identifier Yardwork   Goal Description Patient will be able to complete all outside yard work with little to no increase in her low back pain in order to improve her function around her home   Target Date 10/08/20   Total Evaluation Time   PT Eval, Low Complexity Minutes (55174) 40   Therapy Certification   Certification date from 08/13/20   Certification date to 10/08/20   Medical Diagnosis Spinal stenosis of lumbar region without neurogenic claudication M48.061,  Lumbar radiculopathy M54.16

## 2020-08-13 NOTE — PROGRESS NOTES
[facility]          OUTPATIENT PHYSICAL THERAPY ORTHOPEDIC EVALUATION  PLAN OF TREATMENT FOR OUTPATIENT REHABILITATION  (COMPLETE FOR INITIAL CLAIMS ONLY)  Patient's Last Name, First Name, M.I.  YOB: 1935  Vicki Mckeon  SEGUNDO    Provider s Name:  [facility]   Medical Record No.  [id number]   Start of Care Date:  08/13/20   Onset Date:  09/19/19   Type:     _X__PT   ___OT   ___SLP Medical Diagnosis:  Spinal stenosis of lumbar region without neurogenic claudication M48.061,  Lumbar radiculopathy M54.16      PT Diagnosis:  Impaired mobility, decreased strength and endurance, low back pain, generalized weakness   Visits from SOC:  1      _________________________________________________________________________________  Plan of Treatment/Functional Goals:  balance training, gait training, joint mobilization, manual therapy, neuromuscular re-education, stretching, strengthening, ROM     Cryotherapy, Hot packs, Ultrasound     Goals  Goal Identifier: ADL's  Goal Description: Patient will be able to complete all dressing, bathing, and grooming activities with little to no increase in pain in order to improve her ability to complete ADL's  Target Date: 09/10/20    Goal Identifier: Housework  Goal Description: Patient will be able to complete all indoor housework with little to no increae in low back pain in order to improve her function around the home.   Target Date: 09/10/20    Goal Identifier: Ambulation  Goal Description: Patient will be able to ambulate longer than 10 minutes with little to no increase in low back pain and no rest breaks in order to improve her mobility around the community  Target Date: 10/08/20    Goal Identifier: Yardwork  Goal Description: Patient will be able to complete all outside yard work with little to no increase in her low back pain in order to improve her function around her home  Target Date: 10/08/20      Therapy Frequency:  2  times/Week  Predicted Duration of Therapy Intervention:  8 weeks    Jason Dodson, PT                 I CERTIFY THE NEED FOR THESE SERVICES FURNISHED UNDER        THIS PLAN OF TREATMENT AND WHILE UNDER MY CARE     (Physician co-signature of this document indicates review and certification of the therapy plan).                       Certification Date From:  08/13/20   Certification Date To:  10/08/20    Referring Provider:  Kirsten Guevara, DO     Initial Assessment        See Epic Evaluation Start of Care Date: 08/13/20

## 2020-08-17 ENCOUNTER — HOSPITAL ENCOUNTER (OUTPATIENT)
Dept: PHYSICAL THERAPY | Facility: OTHER | Age: 85
Setting detail: THERAPIES SERIES
End: 2020-08-17
Attending: INTERNAL MEDICINE
Payer: MEDICARE

## 2020-08-17 PROCEDURE — 97110 THERAPEUTIC EXERCISES: CPT | Mod: GP

## 2020-08-19 ENCOUNTER — HOSPITAL ENCOUNTER (OUTPATIENT)
Dept: PHYSICAL THERAPY | Facility: OTHER | Age: 85
Setting detail: THERAPIES SERIES
End: 2020-08-19
Attending: INTERNAL MEDICINE
Payer: MEDICARE

## 2020-08-19 PROCEDURE — 97110 THERAPEUTIC EXERCISES: CPT | Mod: GP

## 2020-08-24 ENCOUNTER — HOSPITAL ENCOUNTER (OUTPATIENT)
Dept: PHYSICAL THERAPY | Facility: OTHER | Age: 85
Setting detail: THERAPIES SERIES
End: 2020-08-24
Attending: INTERNAL MEDICINE
Payer: MEDICARE

## 2020-08-24 PROCEDURE — 97110 THERAPEUTIC EXERCISES: CPT | Mod: GP

## 2020-08-26 ENCOUNTER — HOSPITAL ENCOUNTER (OUTPATIENT)
Dept: PHYSICAL THERAPY | Facility: OTHER | Age: 85
Setting detail: THERAPIES SERIES
End: 2020-08-26
Attending: INTERNAL MEDICINE
Payer: MEDICARE

## 2020-08-26 PROCEDURE — 97110 THERAPEUTIC EXERCISES: CPT | Mod: GP

## 2020-09-01 ENCOUNTER — HOSPITAL ENCOUNTER (OUTPATIENT)
Dept: PHYSICAL THERAPY | Facility: OTHER | Age: 85
Setting detail: THERAPIES SERIES
End: 2020-09-01
Attending: INTERNAL MEDICINE
Payer: MEDICARE

## 2020-09-01 PROCEDURE — 97110 THERAPEUTIC EXERCISES: CPT | Mod: GP

## 2020-09-03 ENCOUNTER — HOSPITAL ENCOUNTER (OUTPATIENT)
Dept: PHYSICAL THERAPY | Facility: OTHER | Age: 85
Setting detail: THERAPIES SERIES
End: 2020-09-03
Attending: INTERNAL MEDICINE
Payer: MEDICARE

## 2020-09-03 PROCEDURE — 97110 THERAPEUTIC EXERCISES: CPT | Mod: GP

## 2020-09-09 ENCOUNTER — HOSPITAL ENCOUNTER (OUTPATIENT)
Dept: PHYSICAL THERAPY | Facility: OTHER | Age: 85
Setting detail: THERAPIES SERIES
End: 2020-09-09
Attending: INTERNAL MEDICINE
Payer: MEDICARE

## 2020-09-09 PROCEDURE — 97110 THERAPEUTIC EXERCISES: CPT | Mod: GP

## 2020-09-15 ENCOUNTER — TELEPHONE (OUTPATIENT)
Dept: INTERNAL MEDICINE | Facility: OTHER | Age: 85
End: 2020-09-15

## 2020-09-15 DIAGNOSIS — M25.551 HIP PAIN, RIGHT: Primary | ICD-10-CM

## 2020-09-15 NOTE — TELEPHONE ENCOUNTER
She would like to repeat the right hip injection. Has been through PT and her back feels pretty good, but her hip is still bothering her a bit. Last one was done in November.  Blank Aranda CMA([facility])..................9/15/2020   1:28 PM

## 2020-09-15 NOTE — TELEPHONE ENCOUNTER
Reason for call: Request for a referral.    Referral requested for what concern?  Injection in hip    Have you already been seen by the specialty you need the referral for?      If yes, Date:   ,  Location:   ,  Provider:       If no,  Where do you want to go?   [facility] at [facility]    Additional comments:       Preferred method for responding to this message: Telephone Call    Phone number patient can be reached at? Home number on file [phone] (home)    If we can't reach you directly, may we leave a detailed response at the number you provided? Yes

## 2020-09-17 ENCOUNTER — TRANSFERRED RECORDS (OUTPATIENT)
Dept: HEALTH INFORMATION MANAGEMENT | Facility: OTHER | Age: 85
End: 2020-09-17

## 2020-09-22 ENCOUNTER — HOSPITAL ENCOUNTER (OUTPATIENT)
Dept: GENERAL RADIOLOGY | Facility: OTHER | Age: 85
Discharge: HOME | End: 2020-09-22
Attending: INTERNAL MEDICINE | Admitting: INTERNAL MEDICINE
Payer: MEDICARE

## 2020-09-22 DIAGNOSIS — M25.551 HIP PAIN, RIGHT: ICD-10-CM

## 2020-09-22 PROCEDURE — 25000125 ZZHC RX 250: Performed by: RADIOLOGY

## 2020-09-22 PROCEDURE — 25000128 H RX IP 250 OP 636: Performed by: RADIOLOGY

## 2020-09-22 PROCEDURE — 25500064 ZZH RX 255 OP 636: Performed by: RADIOLOGY

## 2020-09-22 PROCEDURE — 20610 DRAIN/INJ JOINT/BURSA W/O US: CPT | Mod: RT

## 2020-09-22 RX ORDER — BUPIVACAINE HYDROCHLORIDE 5 MG/ML
3 INJECTION, SOLUTION EPIDURAL; INTRACAUDAL ONCE
Status: COMPLETED | OUTPATIENT
Start: 2020-09-22 | End: 2020-09-22

## 2020-09-22 RX ORDER — TRIAMCINOLONE ACETONIDE 40 MG/ML
40 INJECTION, SUSPENSION INTRA-ARTICULAR; INTRAMUSCULAR ONCE
Status: COMPLETED | OUTPATIENT
Start: 2020-09-22 | End: 2020-09-22

## 2020-09-22 RX ORDER — LIDOCAINE HYDROCHLORIDE 10 MG/ML
2 INJECTION, SOLUTION INFILTRATION; PERINEURAL ONCE
Status: COMPLETED | OUTPATIENT
Start: 2020-09-22 | End: 2020-09-22

## 2020-09-22 RX ADMIN — IOHEXOL 2 ML: 240 INJECTION, SOLUTION INTRATHECAL; INTRAVASCULAR; INTRAVENOUS; ORAL at 10:08

## 2020-09-22 RX ADMIN — BUPIVACAINE HYDROCHLORIDE 3 ML: 5 INJECTION, SOLUTION EPIDURAL; INTRACAUDAL at 10:07

## 2020-09-22 RX ADMIN — LIDOCAINE HYDROCHLORIDE 2 ML: 10 INJECTION, SOLUTION INFILTRATION; PERINEURAL at 10:07

## 2020-09-22 RX ADMIN — TRIAMCINOLONE ACETONIDE 40 MG: 40 INJECTION, SUSPENSION INTRA-ARTICULAR; INTRAMUSCULAR at 10:07

## 2020-11-09 NOTE — PROGRESS NOTES
Outpatient Physical Therapy Discharge Note     Patient: Vicki Mckeon  : 1935    Beginning/End Dates of Reporting Period:  2020 to 2020    Referring Provider: DO Kyree Capps Diagnosis: Impaired mobility, decreased strength and endurance, low back pain, generalized weakness     Client Self Report: Contines to do well. Does have some discomfort in the hop from time to time but overall continues to progress well. Has an appointment with the surgeon next week. She is comfortable with today being her last visit however would like to keep her chart open for a few weeks in case she needs to get back into therapy.     Objective Measurements:  Objective Measure: Subjective pain rating  Details: 0/10     Goals:  Goal Identifier ADL's   Goal Description Patient will be able to complete all dressing, bathing, and grooming activities with little to no increase in pain in order to improve her ability to complete ADL's   Target Date 09/10/20   Date Met      Progress:     Goal Identifier Housework   Goal Description Patient will be able to complete all indoor housework with little to no increae in low back pain in order to improve her function around the home.    Target Date 09/10/20   Date Met      Progress:     Goal Identifier Ambulation   Goal Description Patient will be able to ambulate longer than 10 minutes with little to no increase in low back pain and no rest breaks in order to improve her mobility around the community   Target Date 10/08/20   Date Met      Progress:     Goal Identifier Yardwork   Goal Description Patient will be able to complete all outside yard work with little to no increase in her low back pain in order to improve her function around her home   Target Date 10/08/20   Date Met      Progress:     Progress Toward Goals:   Unable to assess progress towards goals as discharge is unplanned.     Plan:  Discharge from therapy.    Discharge:    Reason for Discharge: Patient  did not call back to schedule more visits. Appropriate for discharge at this time.     Equipment Issued: none    Discharge Plan: Follow up with physician as needed.

## 2020-12-08 NOTE — PROGRESS NOTES
"Nursing Notes:   Susie Salgado LPN  12/9/2020 10:22 AM  Signed  Chief Complaint   Patient presents with     RECHECK     kidney labs   Patient presents to the clinic today for a lab follow up for kidney issues.    Medication Reconciliation: completed   Susie Salgado LPN  12/9/2020 10:09 AM      Nursing note reviewed with patient.  Accuracy and completeness verified.    SUBJECTIVE:                                                      Vicki Mckeon is a 85 year old year old female patient who presents to the clinic today for follow up exam and lab work. She is due for her Prolia injection but cannot receive that until after 15th due to insurance.   She also has complaints of increased pain in her thoracic region which goes up into her shoulder blades.  BUSHRA Score:  BUSHRA-7 SCORE 7/25/2018 10/22/2018 12/27/2018   Total Score 0 0 0     PHQ-2 Score:     PHQ-2 ( 1999 [employer]) 12/9/2020 6/15/2020   Q1: Little interest or pleasure in doing things 0 0   Q2: Feeling down, depressed or hopeless 0 0   PHQ-2 Score 0 0     Problem List/PMH: Reviewed in EMR, and made relevant updates today.  Medications: Reviewed in EMR, and made relevant updates today.  Allergies: Reviewed in EMR, and made relevant updates today.    Current Code Status:  Full Code    REVIEW OF SYSTEMS:    Review of Systems   Musculoskeletal: Positive for arthralgias, back pain (thoracic back up into her shoulder blades) and myalgias (right hip down into her thigh).        Chronic aches and pains   All other systems reviewed and are negative.      OBJECTIVE:                                                      EXAM:     /70 (BP Location: Right arm, Patient Position: Sitting, Cuff Size: Adult Regular)   Pulse 62   Temp 97.2  F (36.2  C) (Tympanic)   Resp 16   Ht 1.613 m (5' 3.5\")   Wt 75.8 kg (167 lb)   BMI 29.12 kg/m      Current Pain Score: No Pain (0)     Physical Exam  Vitals signs and nursing note reviewed.   Constitutional:       Appearance: Normal " appearance.   HENT:      Head: Normocephalic and atraumatic.      Nose: Nose normal.      Mouth/Throat:      Mouth: Mucous membranes are moist.      Pharynx: Oropharynx is clear.   Eyes:      Extraocular Movements: Extraocular movements intact.      Conjunctiva/sclera: Conjunctivae normal.      Pupils: Pupils are equal, round, and reactive to light.   Neck:      Musculoskeletal: Normal range of motion.   Cardiovascular:      Rate and Rhythm: Normal rate and regular rhythm.      Heart sounds: Normal heart sounds.   Pulmonary:      Effort: Pulmonary effort is normal.      Breath sounds: Normal breath sounds.   Abdominal:      General: Bowel sounds are normal.   Musculoskeletal:      Thoracic back: She exhibits pain.        Back:       Comments: She is kyphotic   Skin:     General: Skin is warm and dry.   Neurological:      Mental Status: She is oriented to person, place, and time. Mental status is at baseline.   Psychiatric:         Mood and Affect: Mood normal.         Behavior: Behavior normal.         Thought Content: Thought content normal.         Judgment: Judgment normal.     Diagnostics Completed at this Visit:  Results for orders placed or performed during the hospital encounter of 12/09/20   XR Thoracic Lumbar Standing 2 Views     Status: None    Narrative    XR THORACIC LUMBAR STANDING 2 VW    HISTORY: Chronic bilateral thoracic back pain; Chronic bilateral  thoracic back pain .    COMPARISON: 8/5/2019.    TECHNIQUE: 2 views of the thoracolumbar spine as ordered. This exam  covers from L5 to T6, unlike the prior outside standing complete spine  images.    FINDINGS:    Thoracolumbosacral fusion hardware with iliac screws is partially  visualized. Broad failure is seen, with a fracture of the holley on the  right at L4. The left holley does not engage the S1 screw, unclear if by  design or due to hardware failure. CT may better assess hardware.    Thoracolumbar dextroscoliosis is improved when compared to  8/5/2019,  estimated at 29 degrees.      Impression    IMPRESSION:     Fusion hollye hardware failure.      JAYJAY CATALAN MD   Results for orders placed or performed in visit on 12/09/20   Magnesium     Status: None   Result Value Ref Range    Magnesium 2.0 1.9 - 2.7 mg/dL   Calcium     Status: None   Result Value Ref Range    Calcium 10.3 8.6 - 10.3 mg/dL   Phosphorus     Status: None   Result Value Ref Range    Phosphorus 3.5 2.5 - 5.0 mg/dL        ASSESSMENT/PLAN:                                                      Chronic obstructive pulmonary disease, unspecified COPD type (H)  Continue to monitor.    Essential hypertension  Stable in clinic today.    Paroxysmal supraventricular tachycardia (H)  No recent episodes. Continue to monitor.    White coat syndrome with hypertension  Stable in clinic today.    Osteopenia, unspecified location  Schedule Prolia shot for after 15th.  - Calcium  - Phosphorus  - Magnesium  - PHYSICAL THERAPY REFERRAL    Actinic keratosis  Continue to monitor.    Stage 3 chronic kidney disease, unspecified whether stage 3a or 3b CKD  - Basic metabolic panel, BUN slightly elevated at 29, creatinine slightly elevated at 1.27, GFR is 40    Iron deficiency anemia due to chronic blood loss      Spinal stenosis of lumbar region without neurogenic claudication  - Calcium, within normal range  - Phosphorus, within normal range  - Magnesium, within normal range  - Basic metabolic panel  - PHYSICAL THERAPY REFERRAL    Sacroiliitis (H)  This is most likely the cause of her right hip pain and right thigh pain. Encouraged her to do her prior stretches she was given by PT.    Chronic bilateral thoracic back pain  Will get imaging today to evaluate. I think her posture could be part of the problem, I also feel that Physical Therapy would be beneficial to her to work on stretching, strengthening and improved posture.  - XR Thoracic Lumbar Standing 2 Views, Thoracolumbosacral fusion hardware with iliac  screws is partially visualized. Broad failure is seen, with a fracture of the holley on the right at L4. The left holley does not engage the S1 screw, unclear if by design or due to hardware failure. CT may better assess hardware. Thoracolumbar dextroscoliosis is improved when compared to 8/5/2019, estimated at 29 degrees.  - PHYSICAL THERAPY REFERRAL    I explained my diagnostic considerations and recommendations to the patient, who voiced understanding and agreement with the treatment plan. All questions were answered. We discussed potential side effects of any prescribed or recommended therapies, as well as expectations for response to treatments.     Return in about 6 months (around 6/9/2021) for Medicare Wellness Visit.       LEONARDA Mckinney, AGNP-C  Internal Medicine  [facility]    12/09/2020 12:44 PM      Disclaimer:  This note consists of words and symbols derived from keyboarding, dictation, or using voice recognition software. As a result, there may be errors in the script that have gone undetected. Please consider this when interpreting information found in this note. Please contact me if there are any concerns regarding the accuracy of the dictation.

## 2020-12-08 NOTE — PATIENT INSTRUCTIONS
PROLIA  Risk Evaluation and Mitigation Strategy Best Practice Advisory    In May 2015, the FDA required an update to the PROLIA  (denosumab) REMS (Risk Evaluation and Mitigation Strategy) to inform both providers and patients about potential serious risks related to PROLIA .    These potential serious risks include:    Hypocalcemia    Osteonecrosis of the jaw    Atypical femoral fractures    Serious Infections    Dermatologic reactions    To ensure compliance with these requirements [facility] has developed a workflow for those patients who will receive PROLIA  at clinic.  This workflow includes insurance verification, patient scheduling, patient education, and documentation. Registered Nurses in the clinic should have completed eLearning related to the REMS and the clinic workflow.  Refer to them to initiate this process.  This workflow does not need to be executed if the patient is to receive PROLIA  injection at [facility].       The  has put together a Patient Education Toolkit.  Copies of these handouts may be available your clinic. Patients must receive the Patient Brochure and Medication Guide when receiving injection at clinic.  These will be attached to the injection ordered from [facility] [employer] to the clinic. Links to handouts:  Patient Counseling Chart for Healthcare Providers  Patient Brochure  Prescribing Information  Medication Guide    If you are having trouble accessing the above links, these documents can be found at: http://[email or web address]    The role of healthcare provider includes reviewing patient education materials with the patient, advising patients to seek medical attention if they have signs or symptoms of one of the serious risks, and provide patients a copy of the Patient Brochure and Medication Guide when receiving their injection.      Due to the risk of hypocalcemia the Prescribing  Information for PROLIA  recommends that calcium and mineral levels (magnesium and phosphorus) be checked within 14 days of injection for those predisposed to hypocalcemia.

## 2020-12-09 ENCOUNTER — TELEPHONE (OUTPATIENT)
Dept: INTERNAL MEDICINE | Facility: OTHER | Age: 85
End: 2020-12-09

## 2020-12-09 ENCOUNTER — HOSPITAL ENCOUNTER (OUTPATIENT)
Dept: GENERAL RADIOLOGY | Facility: OTHER | Age: 85
End: 2020-12-09
Attending: NURSE PRACTITIONER
Payer: MEDICARE

## 2020-12-09 ENCOUNTER — OFFICE VISIT (OUTPATIENT)
Dept: INTERNAL MEDICINE | Facility: OTHER | Age: 85
End: 2020-12-09
Attending: NURSE PRACTITIONER
Payer: MEDICARE

## 2020-12-09 VITALS
RESPIRATION RATE: 16 BRPM | WEIGHT: 167 LBS | HEART RATE: 62 BPM | TEMPERATURE: 97.2 F | DIASTOLIC BLOOD PRESSURE: 70 MMHG | HEIGHT: 64 IN | BODY MASS INDEX: 28.51 KG/M2 | SYSTOLIC BLOOD PRESSURE: 128 MMHG

## 2020-12-09 DIAGNOSIS — G89.29 CHRONIC BILATERAL THORACIC BACK PAIN: ICD-10-CM

## 2020-12-09 DIAGNOSIS — I10 WHITE COAT SYNDROME WITH HYPERTENSION: ICD-10-CM

## 2020-12-09 DIAGNOSIS — I10 ESSENTIAL HYPERTENSION: Primary | ICD-10-CM

## 2020-12-09 DIAGNOSIS — M48.061 SPINAL STENOSIS OF LUMBAR REGION WITHOUT NEUROGENIC CLAUDICATION: ICD-10-CM

## 2020-12-09 DIAGNOSIS — D50.0 IRON DEFICIENCY ANEMIA DUE TO CHRONIC BLOOD LOSS: ICD-10-CM

## 2020-12-09 DIAGNOSIS — N18.30 STAGE 3 CHRONIC KIDNEY DISEASE, UNSPECIFIED WHETHER STAGE 3A OR 3B CKD (H): ICD-10-CM

## 2020-12-09 DIAGNOSIS — I47.10 PAROXYSMAL SUPRAVENTRICULAR TACHYCARDIA (H): ICD-10-CM

## 2020-12-09 DIAGNOSIS — M46.1 SACROILIITIS (H): ICD-10-CM

## 2020-12-09 DIAGNOSIS — M54.6 CHRONIC BILATERAL THORACIC BACK PAIN: ICD-10-CM

## 2020-12-09 DIAGNOSIS — J44.9 CHRONIC OBSTRUCTIVE PULMONARY DISEASE, UNSPECIFIED COPD TYPE (H): ICD-10-CM

## 2020-12-09 DIAGNOSIS — M85.80 OSTEOPENIA, UNSPECIFIED LOCATION: ICD-10-CM

## 2020-12-09 DIAGNOSIS — L57.0 ACTINIC KERATOSIS: ICD-10-CM

## 2020-12-09 LAB
ANION GAP SERPL CALCULATED.3IONS-SCNC: 9 MMOL/L (ref 3–14)
BUN SERPL-MCNC: 29 MG/DL (ref 7–25)
CALCIUM SERPL-MCNC: 10.2 MG/DL (ref 8.6–10.3)
CALCIUM SERPL-MCNC: 10.3 MG/DL (ref 8.6–10.3)
CHLORIDE SERPL-SCNC: 104 MMOL/L (ref 98–107)
CO2 SERPL-SCNC: 25 MMOL/L (ref 21–31)
CREAT SERPL-MCNC: 1.27 MG/DL (ref 0.6–1.2)
GFR SERPL CREATININE-BSD FRML MDRD: 40 ML/MIN/{1.73_M2}
GLUCOSE SERPL-MCNC: 98 MG/DL (ref 70–105)
MAGNESIUM SERPL-MCNC: 2 MG/DL (ref 1.9–2.7)
PHOSPHATE SERPL-MCNC: 3.5 MG/DL (ref 2.5–5)
POTASSIUM SERPL-SCNC: 5 MMOL/L (ref 3.5–5.1)
SODIUM SERPL-SCNC: 138 MMOL/L (ref 134–144)

## 2020-12-09 PROCEDURE — 84100 ASSAY OF PHOSPHORUS: CPT | Mod: ZL | Performed by: NURSE PRACTITIONER

## 2020-12-09 PROCEDURE — G0463 HOSPITAL OUTPT CLINIC VISIT: HCPCS | Mod: 25

## 2020-12-09 PROCEDURE — 72080 X-RAY EXAM THORACOLMB 2/> VW: CPT

## 2020-12-09 PROCEDURE — 82310 ASSAY OF CALCIUM: CPT | Mod: ZL,XU | Performed by: NURSE PRACTITIONER

## 2020-12-09 PROCEDURE — G0463 HOSPITAL OUTPT CLINIC VISIT: HCPCS

## 2020-12-09 PROCEDURE — 99214 OFFICE O/P EST MOD 30 MIN: CPT | Performed by: NURSE PRACTITIONER

## 2020-12-09 PROCEDURE — 36415 COLL VENOUS BLD VENIPUNCTURE: CPT | Mod: ZL | Performed by: NURSE PRACTITIONER

## 2020-12-09 PROCEDURE — 83735 ASSAY OF MAGNESIUM: CPT | Mod: ZL | Performed by: NURSE PRACTITIONER

## 2020-12-09 PROCEDURE — 80048 BASIC METABOLIC PNL TOTAL CA: CPT | Mod: ZL | Performed by: NURSE PRACTITIONER

## 2020-12-09 ASSESSMENT — ENCOUNTER SYMPTOMS
BACK PAIN: 1
ARTHRALGIAS: 1
MYALGIAS: 1

## 2020-12-09 ASSESSMENT — PAIN SCALES - GENERAL: PAINLEVEL: NO PAIN (0)

## 2020-12-09 ASSESSMENT — MIFFLIN-ST. JEOR: SCORE: 1189.57

## 2020-12-09 NOTE — NURSING NOTE
Chief Complaint   Patient presents with     RECHECK     kidney labs   Patient presents to the clinic today for a lab follow up for kidney issues.    Medication Reconciliation: completed   Susie Salgado LPN  12/9/2020 10:09 AM

## 2020-12-10 ENCOUNTER — MYC MEDICAL ADVICE (OUTPATIENT)
Dept: INTERNAL MEDICINE | Facility: OTHER | Age: 85
End: 2020-12-10

## 2020-12-10 ENCOUNTER — TELEPHONE (OUTPATIENT)
Dept: INTERNAL MEDICINE | Facility: OTHER | Age: 85
End: 2020-12-10

## 2020-12-10 DIAGNOSIS — M48.061 SPINAL STENOSIS OF LUMBAR REGION WITHOUT NEUROGENIC CLAUDICATION: Primary | ICD-10-CM

## 2020-12-10 DIAGNOSIS — M54.6 MIDLINE THORACIC BACK PAIN, UNSPECIFIED CHRONICITY: ICD-10-CM

## 2020-12-10 NOTE — TELEPHONE ENCOUNTER
After proper verification patient was asked if she has ever had any injuries or falls to her back to which she declined both. Stated that she would like to know why this Writer is asking that question, writer stating that the provider would like to know as the Radiologist is recommending a CT of her back due to a possible hardware issue. Patient stated that she did not have a holley in her back she didn't think but was going to call the [facility] to ask them as she thought her spine was just fused no hardware.. Patient will let us know either tonight or tomorrow what they say.  Susie Salgado LPN on 12/10/2020 at 3:41 PM

## 2020-12-10 NOTE — TELEPHONE ENCOUNTER
After proper verification, patient was relayed message from below, patient stated that she would like her xrays resulted as the person that called her she could not understand on her answering machine.  Susie Salgado LPN on 12/10/2020 at 3:02 PM     Detail Level: Detailed Detail Level: Simple

## 2020-12-10 NOTE — TELEPHONE ENCOUNTER
After proper verification, patient stated that she called the [facility] and she confirms that she does have a holley in her spine. Will be waiting for Radiology to call her for an appointment for CT.  Susie Salgado LPN on 12/10/2020 at 4:08 PM

## 2020-12-14 NOTE — TELEPHONE ENCOUNTER
Yes, she has been scheduled as of this morning. CT is on 12/17/20.  Blank Aranda CMA([facility])..................12/14/2020   10:24 AM

## 2020-12-16 ENCOUNTER — ALLIED HEALTH/NURSE VISIT (OUTPATIENT)
Dept: FAMILY MEDICINE | Facility: OTHER | Age: 85
End: 2020-12-16
Attending: INTERNAL MEDICINE
Payer: MEDICARE

## 2020-12-16 VITALS
DIASTOLIC BLOOD PRESSURE: 80 MMHG | HEART RATE: 80 BPM | RESPIRATION RATE: 18 BRPM | WEIGHT: 166.4 LBS | TEMPERATURE: 98.1 F | BODY MASS INDEX: 29.48 KG/M2 | SYSTOLIC BLOOD PRESSURE: 140 MMHG | HEIGHT: 63 IN

## 2020-12-16 DIAGNOSIS — M85.80 OSTEOPENIA, UNSPECIFIED LOCATION: Primary | ICD-10-CM

## 2020-12-16 PROCEDURE — 250N000011 HC RX IP 250 OP 636: Performed by: INTERNAL MEDICINE

## 2020-12-16 PROCEDURE — 96372 THER/PROPH/DIAG INJ SC/IM: CPT | Performed by: INTERNAL MEDICINE

## 2020-12-16 RX ADMIN — DENOSUMAB 60 MG: 60 INJECTION SUBCUTANEOUS at 13:35

## 2020-12-16 ASSESSMENT — MIFFLIN-ST. JEOR: SCORE: 1178.92

## 2020-12-16 NOTE — PROGRESS NOTES
Specialty Injection: Prolia  Verified patient's name and . Patient stated reason for visit today is to receive Prolia injection. Labs previously drawn during office visit with Oliva Hays NP. Labs on 2020. Calcium 10.3 mg/dL. Creatinine 1.27 mg/dL. Phosphorus 3.5 mg/dL. Order parameters met. Reviewed Prolia Patient Guide and Prolia Medication Guide with patient; both handouts given to patient to take home. Patient stated taking vitamin D supplements. Patient denied any concerns with previous Prolia injection(s). Prolia prepared and administered subcutaneous as ordered. Administration of medication documented in MAR (see MAR for further information regarding dose, lot #, NDC #, expiration date). Patient provided appointment slip to schedule next injection prior to departing clinic per her request.       Danielle FREEDMANN, RN on 2020 at 1:41 PM

## 2020-12-17 ENCOUNTER — HOSPITAL ENCOUNTER (OUTPATIENT)
Dept: CT IMAGING | Facility: OTHER | Age: 85
Discharge: HOME | End: 2020-12-17
Attending: NURSE PRACTITIONER | Admitting: NURSE PRACTITIONER
Payer: MEDICARE

## 2020-12-17 DIAGNOSIS — M48.061 SPINAL STENOSIS OF LUMBAR REGION WITHOUT NEUROGENIC CLAUDICATION: ICD-10-CM

## 2020-12-17 DIAGNOSIS — M54.6 MIDLINE THORACIC BACK PAIN, UNSPECIFIED CHRONICITY: ICD-10-CM

## 2020-12-17 PROCEDURE — 72128 CT CHEST SPINE W/O DYE: CPT

## 2020-12-29 ENCOUNTER — HOSPITAL ENCOUNTER (OUTPATIENT)
Dept: CT IMAGING | Facility: OTHER | Age: 85
Discharge: HOME | End: 2020-12-29
Attending: NURSE PRACTITIONER | Admitting: NURSE PRACTITIONER
Payer: MEDICARE

## 2020-12-29 DIAGNOSIS — M48.061 SPINAL STENOSIS OF LUMBAR REGION WITHOUT NEUROGENIC CLAUDICATION: ICD-10-CM

## 2020-12-29 DIAGNOSIS — M54.6 MIDLINE THORACIC BACK PAIN, UNSPECIFIED CHRONICITY: ICD-10-CM

## 2020-12-29 PROCEDURE — 72131 CT LUMBAR SPINE W/O DYE: CPT

## 2021-01-07 ENCOUNTER — TRANSFERRED RECORDS (OUTPATIENT)
Dept: HEALTH INFORMATION MANAGEMENT | Facility: OTHER | Age: 86
End: 2021-01-07

## 2021-01-07 DIAGNOSIS — R10.31 RIGHT GROIN PAIN: ICD-10-CM

## 2021-01-07 DIAGNOSIS — M25.551 HIP PAIN, RIGHT: Primary | ICD-10-CM

## 2021-01-20 ENCOUNTER — HOSPITAL ENCOUNTER (OUTPATIENT)
Dept: PHYSICAL THERAPY | Facility: OTHER | Age: 86
Setting detail: THERAPIES SERIES
End: 2021-01-20
Attending: NURSE PRACTITIONER
Payer: MEDICARE

## 2021-01-20 DIAGNOSIS — M48.061 SPINAL STENOSIS OF LUMBAR REGION WITHOUT NEUROGENIC CLAUDICATION: ICD-10-CM

## 2021-01-20 DIAGNOSIS — M85.80 OSTEOPENIA, UNSPECIFIED LOCATION: ICD-10-CM

## 2021-01-20 DIAGNOSIS — M54.6 CHRONIC BILATERAL THORACIC BACK PAIN: ICD-10-CM

## 2021-01-20 DIAGNOSIS — G89.29 CHRONIC BILATERAL THORACIC BACK PAIN: ICD-10-CM

## 2021-01-20 PROCEDURE — 97140 MANUAL THERAPY 1/> REGIONS: CPT | Mod: GP

## 2021-01-20 PROCEDURE — 97161 PT EVAL LOW COMPLEX 20 MIN: CPT | Mod: GP

## 2021-01-20 PROCEDURE — 97110 THERAPEUTIC EXERCISES: CPT | Mod: GP

## 2021-01-20 NOTE — PROGRESS NOTES
[facility]          OUTPATIENT PHYSICAL THERAPY ORTHOPEDIC EVALUATION  PLAN OF TREATMENT FOR OUTPATIENT REHABILITATION  (COMPLETE FOR INITIAL CLAIMS ONLY)  Patient's Last Name, First Name, M.I.  YOB: 1935  Vicki Mckeon  SEGUNDO    Provider s Name:  [facility]   Medical Record No.  [id number]   Start of Care Date:  01/20/21   Onset Date:  12/09/20   Type:     _X__PT   ___OT   ___SLP Medical Diagnosis:  Osteopenia, unspecified location M85.80,  Spinal stenosis of lumbar region without neurogenic claudication M48.061,  Chronic bilateral thoracic back pain M54.6, G89.29      PT Diagnosis:  Impaired mobility, decreased strength and endurance, impaired posture   Visits from SOC:  1      _________________________________________________________________________________  Plan of Treatment/Functional Goals:  balance training, gait training, joint mobilization, manual therapy, neuromuscular re-education, ROM, strengthening, stretching     Electrical stimulation, Hot packs, Cryotherapy, Ultrasound     Goals  Goal Identifier: Carrying  Goal Description: Patient will be able to lift and carry her groceries into her home with little to no increase in pain in order to improve her function around the home  Target Date: 03/17/21    Goal Identifier: ADL's  Goal Description: Angelo will report easier time with dressing, bathing, and grooming activities in order to improve her ability to complete ADL's  Target Date: 03/17/21    Goal Identifier: Home tasks  Goal Description: Patient will be able to complete home tasks, such as dishes, laundry, and vacuuming, with no increase in upper or mid back pain in order to improve her function at home  Target Date: 03/17/21    Goal Identifier: Walking  Goal Description: Patient will be able to ambulate for up to 10 minutes with no loss of balance and no increase in mid to upper back pain in order to improve her mobility around the  community  Target Date: 03/17/21      Therapy Frequency:  other (see comments)(1-2 times per week)  Predicted Duration of Therapy Intervention:  8 weeks    Jason Dodson, MATEO                 I CERTIFY THE NEED FOR THESE SERVICES FURNISHED UNDER        THIS PLAN OF TREATMENT AND WHILE UNDER MY CARE     (Physician co-signature of this document indicates review and certification of the therapy plan).                       Certification Date From:  01/20/21   Certification Date To:  03/17/21    Referring Provider:  Oliva Hays NP    Initial Assessment        See Epic Evaluation Start of Care Date: 01/20/21

## 2021-01-20 NOTE — PROGRESS NOTES
"   01/20/21 1200   General Information   Type of Visit Initial OP Ortho PT Evaluation   Start of Care Date 01/20/21   Referring Physician Oliva Hays NP   Patient/Family Goals Statement To improve her strength and posture   Orders Evaluate and Treat   Orders Comment right hip/leg and thoracic back/upper/shoulders   Date of Order 12/09/20   Certification Required? Yes   Medical Diagnosis Osteopenia, unspecified location M85.80,  Spinal stenosis of lumbar region without neurogenic claudication M48.061,  Chronic bilateral thoracic back pain M54.6, G89.29    Surgical/Medical history reviewed Yes   Precautions/Limitations no known precautions/limitations       Present No   Body Part(s)   Body Part(s) Lumbar Spine/SI   Presentation and Etiology   Pertinent history of current problem (include personal factors and/or comorbidities that impact the POC) Patient is a 85 year old female referred to physical therapy with low back and thoracic pain. She had back surgery to have rods placed to fix her scoliosis in her low back. Notes that her hardware has come loose and her surgeon and physician knows this. They are wanting to do another xray in six months to see if it gets worse. She has had therapy in the past regarding her Low back. This has done well and isn't dealing with much pain in her low back. Her \"achyness\" is mostly in her mid to upper back. Feels that if we work on her strength and posture that this will improve. She is also having some pain in the right hip and she is seeing Dr. Henry in a few weeks for this.    Impairments E. Decreased flexibility;F. Decreased strength and endurance;G. Impaired balance;H. Impaired gait   Functional Limitations perform activities of daily living;perform desired leisure / sports activities   Symptom Location Mid back/upper back   How/Where did it occur From insidious onset   Onset date of current episode/exacerbation 12/09/20   Chronicity New   Pain " quality C. Aching   Frequency of pain/symptoms B. Intermittent   Pain/symptoms are: Worse during the day   Pain/symptoms exacerbated by B. Walking;C. Lifting;D. Carrying;K. Home tasks   Pain/symptoms eased by A. Sitting;C. Rest;I. OTC medication(s)   Progression of symptoms since onset: Unchanged   Prior Level of Function   Prior Level of Function-Mobility Independent   Prior Level of Function-ADLs Independent   Current Level of Function   Current Community Support Family/friend caregiver   Patient role/employment history F. Retired   [facility]   Home/community accessibility Denies issues getting around her home however she is slower and more cautious   Current equipment-Gait/Locomotion None   Current equipment-ADL None   Fall Risk Screen   Fall screen completed by PT   Have you fallen 2 or more times in the past year? No   Have you fallen and had an injury in the past year? No   Is patient a fall risk? No   Lumbar Spine/SI Objective Findings   Observation Patient resting comfortably in chair. No apparent distress.    Integumentary No significant findings   Posture Protracted shoulders, kyphotic posture. Scoliosis noted    Gait/Locomotion Slower gait speend, slouched over, antalgic pattern because of right hip   Flexion ROM Min limited   Extension ROM Moderatly limited   Right Side Bending ROM Not teste   Left Side Bending ROM Not tested   Hip Flexion (L2) Strength 5/5   Hip Abduction Strength 5/5   Hip Adduction Strength 5/5   Knee Flexion Strength 5/5   Knee Extension (L3) Strength 5/5   Ankle Dorsiflexion (L4) Strength 5/5   Lumbar/Hip/Knee/Foot Strength Comments Shoulder flexion: 4+/5, shoulder abduction: 4+/5, shoudler Er/IR; 5/5   Hamstring Flexibility WFL   Sensation Testing Intact to light touch   Neurological Testing Comments No significant findings   Palpation Discomfort noted in upper trap on left, rhomboids, and her levator sacp on the right   Planned Therapy Interventions    Planned Therapy Interventions balance training;gait training;joint mobilization;manual therapy;neuromuscular re-education;ROM;strengthening;stretching   Planned Modality Interventions   Planned Modality Interventions Electrical stimulation;Hot packs;Cryotherapy;Ultrasound   Clinical Impression   Criteria for Skilled Therapeutic Interventions Met yes, treatment indicated   PT Diagnosis Impaired mobility, decreased strength and endurance, impaired posture   Influenced by the following impairments weakness, stiffness   Functional limitations due to impairments reaching overhead, discomfort in her mid back that leads to difficulty with lifting, walking, carrying and home tasks   Clinical Presentation Stable/Uncomplicated   Clinical Presentation Rationale Clinical judgement   Clinical Decision Making (Complexity) Low complexity   Therapy Frequency other (see comments)  (1-2 times per week)   Predicted Duration of Therapy Intervention (days/wks) 8 weeks   Risk & Benefits of therapy have been explained Yes   Patient, Family & other staff in agreement with plan of care Yes   Clinical Impression Comments Signs and symptoms consistent with mid back pain. Demonstrated more slouched kyphotic posture. She had surgery over a year ago and notes that her low back has responded well. She would benefit from skilled PT service sin order to improve her overall posture, strength and endurance while reducing her pain   Education Assessment   Barriers to Learning No barriers   ORTHO GOALS   PT Ortho Eval Goals 1;2;3;4   Ortho Goal 1   Goal Identifier Carrying   Goal Description Patient will be able to lift and carry her groceries into her home with little to no increase in pain in order to improve her function around the home   Target Date 03/17/21   Ortho Goal 2   Goal Identifier ADL's   Goal Description Angelo will report easier time with dressing, bathing, and grooming activities in order to improve her ability to complete ADL's    Target Date 03/17/21   Ortho Goal 3   Goal Identifier Home tasks   Goal Description Patient will be able to complete home tasks, such as dishes, laundry, and vacuuming, with no increase in upper or mid back pain in order to improve her function at home   Target Date 03/17/21   Ortho Goal 4   Goal Identifier Walking   Goal Description Patient will be able to ambulate for up to 10 minutes with no loss of balance and no increase in mid to upper back pain in order to improve her mobility around the community   Target Date 03/17/21   Total Evaluation Time   PT Eval, Low Complexity Minutes (70021) 30   Therapy Certification   Certification date from 01/20/21   Certification date to 03/17/21   Medical Diagnosis Osteopenia, unspecified location M85.80,  Spinal stenosis of lumbar region without neurogenic claudication M48.061,  Chronic bilateral thoracic back pain M54.6, G89.29

## 2021-01-26 ENCOUNTER — HOSPITAL ENCOUNTER (OUTPATIENT)
Dept: PHYSICAL THERAPY | Facility: OTHER | Age: 86
Setting detail: THERAPIES SERIES
End: 2021-01-26
Attending: NURSE PRACTITIONER
Payer: MEDICARE

## 2021-01-26 PROCEDURE — 97110 THERAPEUTIC EXERCISES: CPT | Mod: GP

## 2021-01-26 PROCEDURE — 97140 MANUAL THERAPY 1/> REGIONS: CPT | Mod: GP

## 2021-01-28 ENCOUNTER — HOSPITAL ENCOUNTER (OUTPATIENT)
Dept: PHYSICAL THERAPY | Facility: OTHER | Age: 86
Setting detail: THERAPIES SERIES
End: 2021-01-28
Attending: NURSE PRACTITIONER
Payer: MEDICARE

## 2021-01-28 PROCEDURE — 97140 MANUAL THERAPY 1/> REGIONS: CPT | Mod: GP

## 2021-01-28 PROCEDURE — 97110 THERAPEUTIC EXERCISES: CPT | Mod: GP

## 2021-02-02 ENCOUNTER — HOSPITAL ENCOUNTER (OUTPATIENT)
Dept: PHYSICAL THERAPY | Facility: OTHER | Age: 86
Setting detail: THERAPIES SERIES
End: 2021-02-02
Attending: NURSE PRACTITIONER
Payer: MEDICARE

## 2021-02-02 PROCEDURE — 97110 THERAPEUTIC EXERCISES: CPT | Mod: GP

## 2021-02-02 PROCEDURE — 97140 MANUAL THERAPY 1/> REGIONS: CPT | Mod: GP

## 2021-02-03 ENCOUNTER — IMMUNIZATION (OUTPATIENT)
Dept: FAMILY MEDICINE | Facility: OTHER | Age: 86
End: 2021-02-03
Attending: INTERNAL MEDICINE
Payer: MEDICARE

## 2021-02-03 PROCEDURE — 91300 PR COVID VAC PFIZER DIL RECON 30 MCG/0.3 ML IM: CPT

## 2021-02-09 ENCOUNTER — OFFICE VISIT (OUTPATIENT)
Dept: ORTHOPEDICS | Facility: OTHER | Age: 86
End: 2021-02-09
Attending: NURSE PRACTITIONER
Payer: COMMERCIAL

## 2021-02-09 VITALS
WEIGHT: 171 LBS | SYSTOLIC BLOOD PRESSURE: 152 MMHG | HEART RATE: 76 BPM | DIASTOLIC BLOOD PRESSURE: 80 MMHG | BODY MASS INDEX: 30.29 KG/M2

## 2021-02-09 DIAGNOSIS — R10.31 RIGHT GROIN PAIN: ICD-10-CM

## 2021-02-09 DIAGNOSIS — M48.062 SPINAL STENOSIS OF LUMBAR REGION WITH NEUROGENIC CLAUDICATION: Primary | ICD-10-CM

## 2021-02-09 DIAGNOSIS — M25.551 HIP PAIN, RIGHT: ICD-10-CM

## 2021-02-09 PROCEDURE — G0463 HOSPITAL OUTPT CLINIC VISIT: HCPCS

## 2021-02-09 PROCEDURE — 99204 OFFICE O/P NEW MOD 45 MIN: CPT | Performed by: ORTHOPAEDIC SURGERY

## 2021-02-09 NOTE — PROGRESS NOTES
Patient is here for consult on her right hip pain.   Jacey Larson LPN .....................2/9/2021 12:40 PM

## 2021-02-09 NOTE — PROGRESS NOTES
Visit Date:   02/09/2021      REASON FOR EVALUATION:  Right leg pain.      HISTORY:  Vicki comes in with regards to a history of right leg pain.  She has seen Dr. Jimmie Linda from [facility] due to a large effusion here from what appears to be about a T8-T9 down to the pelvis, has a couple broken rods on the fusion segment here with multiple pedicle screws in place at this point in time.  The patient is reporting right leg pain.  It has been persistent here since basically the time of surgery and it has not necessarily gotten better.  Pain kind of wraps around the pelvis, the hip region and radiates down to the knee, sometimes below that.  Her fusion was done in 2019 with Dr. Linda at the [facility].  She did have a hip injection done in the fall timeframe.  It really gave her no significant relief in terms of making her hip pain any better.  She does not report necessarily any groin-related pain.  It seems to be worse with standing and walking and better sitting at this point in time.      MEDICATIONS:  Have been reviewed.      ALLERGIES:  NOTED TO FOSAMAX, AS WELL AS SULFA.      REVIEW OF SYSTEMS:  A 12-point otherwise negative with the exception as stated above.      PHYSICAL EXAMINATION:   VITAL SIGNS:  The patient is 171 pounds.  Pulse is 76.   GENERAL:  She is alert and oriented x 3, cooperative with exam, in no acute distress.  Does ambulate with a satisfactory gait.  Affect is appropriate here as well.  Examination of the right leg shows hip flexed to 90, internal and external rotation 20/20 and minimal pain associated with hip flex and internal or external rotation.  Minimal pain with palpation across the trochanteric bursa.  ALEN test is also asymptomatic.  Hip flexor strength is 5/5, dorsalis pedis pulse 2+.  Appears to have more discomfort with straight leg raise on the right side than the left at this point in time.  She does have obvious scoliosis present there in the  lower back as well in addition to that.  Well-healed surgical incision from her previous fusion is otherwise noted.        IMAGING:  X-rays on the hip are reviewed from 2019, as well as her fluoroscopic injection.  I do not see any significant arthritis as far as the hip is concerned.  She does have fusion in place for her back from about T8 down to the pelvis with a couple fractured rods noted.      IMPRESSION:     1.  Right leg pain, likely secondary to lumbar in nature.   2.  Right hip pain with no significant underlying arthrosis.      PLAN:  I feel the majority of her symptoms are coming from most likely nerve pressure.  I am going to have her coordinated to see Dr. Luna here for further adjustment.  She really is not interested in any further surgery, but would be interested in potentially injections if they would be beneficial here to her in the lower back.  Potentially a CT myelogram will be recommended here for her; I will defer that decision to him at this point in time.  She is in agreement with that plan.  We will help to coordinate followup with him.  She does not necessarily want to travel back down to the [place] at this point.      TIME SPENT ON APPOINTMENT HERE:  Roughly 30 minutes.         LUZ MARINA MACARIO MD             D: 2021   T: 2021   MT: TRIP      Name:     IVY CONDON   MRN:      [id number]        Account:      [id number]   :      1935           Visit Date:   2021      Document: [id number]

## 2021-02-16 ENCOUNTER — OFFICE VISIT (OUTPATIENT)
Dept: ORTHOPEDICS | Facility: OTHER | Age: 86
End: 2021-02-16
Attending: STUDENT IN AN ORGANIZED HEALTH CARE EDUCATION/TRAINING PROGRAM
Payer: COMMERCIAL

## 2021-02-16 VITALS
WEIGHT: 171 LBS | BODY MASS INDEX: 30.29 KG/M2 | DIASTOLIC BLOOD PRESSURE: 84 MMHG | SYSTOLIC BLOOD PRESSURE: 162 MMHG | HEART RATE: 80 BPM

## 2021-02-16 DIAGNOSIS — M53.3 SI (SACROILIAC) JOINT DYSFUNCTION: Primary | ICD-10-CM

## 2021-02-16 PROCEDURE — 99214 OFFICE O/P EST MOD 30 MIN: CPT | Performed by: STUDENT IN AN ORGANIZED HEALTH CARE EDUCATION/TRAINING PROGRAM

## 2021-02-16 PROCEDURE — G0463 HOSPITAL OUTPT CLINIC VISIT: HCPCS

## 2021-02-16 NOTE — PROGRESS NOTES
Visit Date:   02/16/2021      Vicki is an 85-year-old female who presents for evaluation of right-sided hip and buttock pain.  She has a long segment scoliosis deformity correction from T10 to the pelvis performed approximately 2 years ago.  She has been followed since that time and has been told that she had hardware failure and was offered a corrective surgery, but she declined this.  She, at the time, was having predominantly right hip pain, which was not addressed during her followup visit.  She has undergone physical therapy and lumbar spine injections in the past, but no one has ever addressed her right-sided SI joint pain.  She is able to pinpoint her pain to the right posterior superior iliac spine via the Jim finger test with pain that radiates from that location into her right hip, occasionally into her right groin and down the back of her right leg.  She does have a minimal amount of mechanical back pain, but nothing significant, despite her hardware fracture.      PHYSICAL EXAMINATION:   GENERAL:  Well appearing, well nourished.   MUSCULOSKELETAL NEUROLOGIC:  As follows:  She has 5/5 strength in bilateral upper and lower extremities, including deltoid, biceps, triceps, wrist extension, flexion, hand , hand intrinsics, hip flexion, knee extension, flexion, ankle dorsiflexion, plantar flexion, EHL.  She has 5/5 provocative SI joint testing procedures positive for right-sided SI joint dysfunction, including distraction, ALEN, thigh thrust and compression and she has tenderness to palpation right posterior superior iliac spine.        Imaging available for review today includes a CT of the thoracic and lumbar spine, which shows evidence of bilateral hardware holley fractures with no significant anterolisthesis despite her fracture and multiple areas of healing fusion from her prior surgery.  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  She referred to Cristino Goerge at [facility], [place] for a series of 2 right-sided SI joint injections, which if she gets greater than 80% pain relief to the anesthetic window, she would be a good candidate for minimally invasive SI joint fusion surgery.  She will return to clinic after her 2 injections are completed for further surgical discussions.         ANITA GEORGE MD             D: 2021   T: 2021   MT: TANNER      Name:     IVY CONDON   MRN:      [id number]        Account:      [id number]   :      1935           Visit Date:   2021      Document: [id number]

## 2021-02-16 NOTE — PROGRESS NOTES
Patient is here for consult on her low back pain .  Jacey Larson LPN .....................2/16/2021 2:11 PM

## 2021-02-23 NOTE — PROGRESS NOTES
Outpatient Physical Therapy Discharge Note     Patient: Vicki Mckeon  : 1935    Beginning/End Dates of Reporting Period:  2021 to 2021    Referring Provider: Oliva Hays NP    Therapy Diagnosis: Impaired mobility, decreased strength and endurance, impaired posture     Client Self Report: Reports that she is feeling good, She is having more pain in her hip but nothing in her mid/upper back    Objective Measurements:  Objective Measure: Subjective pain rating  Details: 0/10 in back     Goals:  Goal Identifier Carrying   Goal Description Patient will be able to lift and carry her groceries into her home with little to no increase in pain in order to improve her function around the home   Target Date 21   Date Met      Progress:     Goal Identifier ADL's   Goal Description Angelo will report easier time with dressing, bathing, and grooming activities in order to improve her ability to complete ADL's   Target Date 21   Date Met      Progress:     Goal Identifier Home tasks   Goal Description Patient will be able to complete home tasks, such as dishes, laundry, and vacuuming, with no increase in upper or mid back pain in order to improve her function at home   Target Date 21   Date Met      Progress:     Goal Identifier Walking   Goal Description Patient will be able to ambulate for up to 10 minutes with no loss of balance and no increase in mid to upper back pain in order to improve her mobility around the community   Target Date 21   Date Met      Progress:     Progress Toward Goals:   Unable to assess progress towards goals as discharge is unplanned.     Plan:  Discharge from therapy.    Discharge:    Reason for Discharge: Patient has failed to schedule further appointments.    Equipment Issued: none    Discharge Plan: Follow up with physician as needed.

## 2021-02-24 ENCOUNTER — IMMUNIZATION (OUTPATIENT)
Dept: FAMILY MEDICINE | Facility: OTHER | Age: 86
End: 2021-02-24
Attending: FAMILY MEDICINE
Payer: MEDICARE

## 2021-02-24 PROCEDURE — 91300 PR COVID VAC PFIZER DIL RECON 30 MCG/0.3 ML IM: CPT

## 2021-03-10 ENCOUNTER — HOSPITAL ENCOUNTER (OUTPATIENT)
Dept: GENERAL RADIOLOGY | Facility: OTHER | Age: 86
End: 2021-03-10
Attending: NURSE PRACTITIONER
Payer: MEDICARE

## 2021-03-10 ENCOUNTER — OFFICE VISIT (OUTPATIENT)
Dept: INTERNAL MEDICINE | Facility: OTHER | Age: 86
End: 2021-03-10
Attending: NURSE PRACTITIONER
Payer: COMMERCIAL

## 2021-03-10 VITALS
OXYGEN SATURATION: 99 % | WEIGHT: 165.4 LBS | TEMPERATURE: 98.4 F | DIASTOLIC BLOOD PRESSURE: 70 MMHG | BODY MASS INDEX: 29.3 KG/M2 | RESPIRATION RATE: 16 BRPM | SYSTOLIC BLOOD PRESSURE: 176 MMHG | HEART RATE: 63 BPM

## 2021-03-10 DIAGNOSIS — R06.02 SHORTNESS OF BREATH ON EXERTION: ICD-10-CM

## 2021-03-10 DIAGNOSIS — R07.89 CHEST HEAVINESS: ICD-10-CM

## 2021-03-10 DIAGNOSIS — R07.89 CHEST HEAVINESS: Primary | ICD-10-CM

## 2021-03-10 LAB
BASOPHILS # BLD AUTO: 0.1 10E9/L (ref 0–0.2)
BASOPHILS NFR BLD AUTO: 0.7 %
DIFFERENTIAL METHOD BLD: NORMAL
EOSINOPHIL # BLD AUTO: 0.5 10E9/L (ref 0–0.7)
EOSINOPHIL NFR BLD AUTO: 5.5 %
ERYTHROCYTE [DISTWIDTH] IN BLOOD BY AUTOMATED COUNT: 14 % (ref 10–15)
HCT VFR BLD AUTO: 41.6 % (ref 35–47)
HGB BLD-MCNC: 13.5 G/DL (ref 11.7–15.7)
IMM GRANULOCYTES # BLD: 0 10E9/L (ref 0–0.4)
IMM GRANULOCYTES NFR BLD: 0.4 %
LYMPHOCYTES # BLD AUTO: 2.3 10E9/L (ref 0.8–5.3)
LYMPHOCYTES NFR BLD AUTO: 26.7 %
MAGNESIUM SERPL-MCNC: 2 MG/DL (ref 1.9–2.7)
MCH RBC QN AUTO: 31.4 PG (ref 26.5–33)
MCHC RBC AUTO-ENTMCNC: 32.5 G/DL (ref 31.5–36.5)
MCV RBC AUTO: 97 FL (ref 78–100)
MONOCYTES # BLD AUTO: 0.7 10E9/L (ref 0–1.3)
MONOCYTES NFR BLD AUTO: 8.3 %
NEUTROPHILS # BLD AUTO: 4.9 10E9/L (ref 1.6–8.3)
NEUTROPHILS NFR BLD AUTO: 58.4 %
PLATELET # BLD AUTO: 192 10E9/L (ref 150–450)
RBC # BLD AUTO: 4.3 10E12/L (ref 3.8–5.2)
WBC # BLD AUTO: 8.4 10E9/L (ref 4–11)

## 2021-03-10 PROCEDURE — 71046 X-RAY EXAM CHEST 2 VIEWS: CPT

## 2021-03-10 PROCEDURE — 99215 OFFICE O/P EST HI 40 MIN: CPT | Performed by: NURSE PRACTITIONER

## 2021-03-10 PROCEDURE — 36415 COLL VENOUS BLD VENIPUNCTURE: CPT | Mod: ZL | Performed by: NURSE PRACTITIONER

## 2021-03-10 PROCEDURE — 83735 ASSAY OF MAGNESIUM: CPT | Mod: ZL | Performed by: NURSE PRACTITIONER

## 2021-03-10 PROCEDURE — G0463 HOSPITAL OUTPT CLINIC VISIT: HCPCS | Mod: 25

## 2021-03-10 PROCEDURE — 85025 COMPLETE CBC W/AUTO DIFF WBC: CPT | Mod: ZL | Performed by: NURSE PRACTITIONER

## 2021-03-10 PROCEDURE — G0463 HOSPITAL OUTPT CLINIC VISIT: HCPCS

## 2021-03-10 PROCEDURE — 93010 ELECTROCARDIOGRAM REPORT: CPT | Performed by: INTERNAL MEDICINE

## 2021-03-10 PROCEDURE — 93005 ELECTROCARDIOGRAM TRACING: CPT

## 2021-03-10 ASSESSMENT — ENCOUNTER SYMPTOMS
DIARRHEA: 0
SHORTNESS OF BREATH: 1
CHEST TIGHTNESS: 1
NERVOUS/ANXIOUS: 1
CONSTIPATION: 0
PALPITATIONS: 0

## 2021-03-10 ASSESSMENT — PAIN SCALES - GENERAL: PAINLEVEL: MILD PAIN (2)

## 2021-03-10 NOTE — NURSING NOTE
Chief Complaint   Patient presents with     RECHECK     breathing    patient presents to the clinic today for a recheck. Patient states she has taking pepcid and it was getting better but about 3 weeks ago it started.    Medication Reconciliation: completed   Susie Salgado LPN  3/10/2021 10:09 AM

## 2021-03-10 NOTE — PROGRESS NOTES
"  Vicki Mckeon  : 1935 Age: 85 year old Sex: female MRN: [id number]    CC:   Chief Complaint   Patient presents with     RECHECK     breathing       HPI:     Patient reports she has been having episodes of \"chest heaviness\" for the past three weeks and is progressively worsening. She reports she has had episodes like this in the past. Reports worse with exertion and after eating. Denies chest pain or additional symptoms. Pressure/heaviness is mid chest, she states \"it exhausts her\" and doesn't allow her to do the things she wants to.    Her last echo was completed in 2018 and resulted as follows:  Normal exercise echocardiogram without evidence of inducible ischemia.  Target heart rate was achieved. Heart rate and blood pressure response to exercise were normal. With stress, the left ventricular ejection fraction increased from 55-60% to greater than 65% and the left ventricular size decreased appropriately.  Normal functional capacity. No subjective symptoms to suggest ischemia.  ECG reported seperately.  No significant valve disease on screening doppler evaluation. The aortic root and visualized ascending aorta are normal.      She reports she received her second Covid vaccine on  and is wondering if that has anything to do with this.    Last bowel movement was this morning and was normal so she is not constipated.  She is voiding without difficulty and she is having no adverse side effects from any medications.    ASSESSMENT AND PLAN:    Chest heaviness  We will obtain testing today for evaluation.  - EKG 12-lead, tracing only, sinus bradycardia otherwise normal  - XR Chest 2 Views, unremarkable with exception of fairly convex scoliosis  - Magnesium, within normal range at 2.0  - CBC with platelets differential, unremarkable      Shortness of breath on exertion  We did discuss that her posture could be part of her shortness of breath.  She is working at physical therapy trying to strengthen " her upper body.  We discussed her doing some rowing exercises to work on the trapezius muscles which will help maintain proper balance and allow her for improved lung expansion.  - EKG 12-lead, tracing only, sinus bradycardia otherwise normal  - XR Chest 2 Views, unremarkable with exception of fairly convex scoliosis  - Magnesium, within normal range at 2.0  - CBC with platelets differential, unremarkable    We did discuss that we could obtain an updated stress echo if she is interested in this.  She can make this decision and let us know.    LEONARDA Mckinney, AGNP-C  Internal Medicine  03/10/2021 4:44 PM    I explained my diagnostic considerations and recommendations to the patient, who voiced understanding and agreement with the treatment plan. All questions were answered. We discussed potential side effects of any prescribed or recommended therapies, as well as expectations for response to treatments.     Return if symptoms worsen or fail to improve.           Nursing Notes:   Susie Salgado LPN  3/10/2021 10:34 AM  Signed  Chief Complaint   Patient presents with     RECHECK     breathing    patient presents to the clinic today for a recheck. Patient states she has taking pepcid and it was getting better but about 3 weeks ago it started.    Medication Reconciliation: completed   Susie Salgado LPN  3/10/2021 10:09 AM        Nursing note reviewed with patient.  Accuracy and completeness verified.      SUBJECTIVE:                                                      BUSHRA Score:    BUSHRA-7 SCORE 7/25/2018 10/22/2018 12/27/2018   Total Score 0 0 0        PHQ-2 Score:     PHQ-2 ( 1999 [employer]) 3/10/2021 12/9/2020   Q1: Little interest or pleasure in doing things 0 0   Q2: Feeling down, depressed or hopeless 0 0   PHQ-2 Score 0 0          Problem List/PMH: Reviewed in EMR, and made relevant updates today.  Medications: Reviewed in EMR, and made relevant updates today.  Allergies: Reviewed in EMR, and made relevant  updates today.    Current Code Status:  Full Code    REVIEW OF SYSTEMS:    Review of Systems   Respiratory: Positive for chest tightness and shortness of breath.    Cardiovascular: Negative for chest pain, palpitations and leg swelling.   Gastrointestinal: Negative for constipation and diarrhea.   Psychiatric/Behavioral: The patient is nervous/anxious.    All other systems reviewed and are negative.      OBJECTIVE:                                                      EXAM:     BP (!) 148/90 (BP Location: Right arm, Patient Position: Sitting, Cuff Size: Adult Regular)   Pulse 63   Temp 98.4  F (36.9  C) (Tympanic)   Resp 16   Wt 75 kg (165 lb 6.4 oz)   SpO2 99%   BMI 29.30 kg/m      Current Pain Score: Mild Pain (2)     Physical Exam  Vitals signs and nursing note reviewed.   Constitutional:       Appearance: Normal appearance.   HENT:      Head: Normocephalic and atraumatic.      Mouth/Throat:      Mouth: Mucous membranes are moist.      Pharynx: Oropharynx is clear.   Eyes:      Extraocular Movements: Extraocular movements intact.      Conjunctiva/sclera: Conjunctivae normal.      Pupils: Pupils are equal, round, and reactive to light.   Cardiovascular:      Rate and Rhythm: Normal rate and regular rhythm.      Heart sounds: Normal heart sounds.   Pulmonary:      Effort: Pulmonary effort is normal.      Breath sounds: Normal breath sounds.   Musculoskeletal: Normal range of motion.   Skin:     General: Skin is warm and dry.   Neurological:      Mental Status: She is alert and oriented to person, place, and time. Mental status is at baseline.   Psychiatric:         Mood and Affect: Mood normal.         Behavior: Behavior normal.         Thought Content: Thought content normal.         Judgment: Judgment normal.          Diagnostics Completed at this Visit:    Results for orders placed or performed during the hospital encounter of 03/10/21   XR Chest 2 Views     Status: None    Narrative    PROCEDURE: XR CHEST  2 VW 3/10/2021 11:11 AM    HISTORY: Chest heaviness; Shortness of breath on exertion    COMPARISONS: 1/30/2020.    TECHNIQUE: 2 views.    FINDINGS: Heart is not enlarged. Lungs are clear and no pleural  effusion is seen.    There is been previous spinal fixation. There is a fairly severe right  convex scoliosis.         Impression    IMPRESSION: No acute infiltrate.    SAMUEL ZAMORA MD   Results for orders placed or performed in visit on 03/10/21   CBC with platelets differential     Status: None   Result Value Ref Range    WBC 8.4 4.0 - 11.0 10e9/L    RBC Count 4.30 3.8 - 5.2 10e12/L    Hemoglobin 13.5 11.7 - 15.7 g/dL    Hematocrit 41.6 35.0 - 47.0 %    MCV 97 78 - 100 fl    MCH 31.4 26.5 - 33.0 pg    MCHC 32.5 31.5 - 36.5 g/dL    RDW 14.0 10.0 - 15.0 %    Platelet Count 192 150 - 450 10e9/L    Diff Method Automated Method     % Neutrophils 58.4 %    % Lymphocytes 26.7 %    % Monocytes 8.3 %    % Eosinophils 5.5 %    % Basophils 0.7 %    % Immature Granulocytes 0.4 %    Absolute Neutrophil 4.9 1.6 - 8.3 10e9/L    Absolute Lymphocytes 2.3 0.8 - 5.3 10e9/L    Absolute Monocytes 0.7 0.0 - 1.3 10e9/L    Absolute Eosinophils 0.5 0.0 - 0.7 10e9/L    Absolute Basophils 0.1 0.0 - 0.2 10e9/L    Abs Immature Granulocytes 0.0 0 - 0.4 10e9/L   Magnesium     Status: None   Result Value Ref Range    Magnesium 2.0 1.9 - 2.7 mg/dL   EKG 12-lead, tracing only     Status: None (Preliminary result)   Result Value Ref Range    Interpretation ECG Click View Image link to view waveform and result         Disclaimer:  This note consists of words and symbols derived from keyboarding, dictation, or using voice recognition software. As a result, there may be errors in the script that have gone undetected. Please consider this when interpreting information found in this note. Please contact me if there are any concerns regarding the accuracy of the dictation.     Total time spent with this patient was 53   minutes which included chart  review, visualization and interpretation of labs and/or images, time spent with patient, and documentation.

## 2021-03-11 LAB — INTERPRETATION ECG - MUSE: NORMAL

## 2021-03-17 ENCOUNTER — TELEPHONE (OUTPATIENT)
Dept: INTERNAL MEDICINE | Facility: OTHER | Age: 86
End: 2021-03-17

## 2021-03-17 DIAGNOSIS — R06.02 SHORTNESS OF BREATH ON EXERTION: Primary | ICD-10-CM

## 2021-03-18 NOTE — NURSING NOTE
Patient here for physical.   Medication Reconciliation: complete    Sravanthi Muller LPN  6/15/2020 10:06 AM   [facility]

## 2021-03-22 ENCOUNTER — TELEPHONE (OUTPATIENT)
Dept: INTERNAL MEDICINE | Facility: OTHER | Age: 86
End: 2021-03-22

## 2021-03-22 NOTE — TELEPHONE ENCOUNTER
[facility]-Patient has sent a couple [employer] questions and is wondering if you SK has read them she has questions about test results      Please call and advise     Thank You  Carmina Toussaint on 3/22/2021 at 2:53 PM

## 2021-03-23 NOTE — TELEPHONE ENCOUNTER
Pt thought [facility] was going to call her back regarding the stress echo order placement. Pt has made her appointment for a stress echo 4/12/21 and has no further questions at this time  Avani Eastman LPN on 3/23/2021 at 3:44 PM

## 2021-04-08 ENCOUNTER — TELEPHONE (OUTPATIENT)
Dept: CARDIOLOGY | Facility: OTHER | Age: 86
End: 2021-04-08

## 2021-04-12 ENCOUNTER — HOSPITAL ENCOUNTER (OUTPATIENT)
Dept: CARDIOLOGY | Facility: OTHER | Age: 86
Discharge: HOME | End: 2021-04-12
Attending: NURSE PRACTITIONER | Admitting: NURSE PRACTITIONER
Payer: MEDICARE

## 2021-04-12 VITALS
OXYGEN SATURATION: 98 % | DIASTOLIC BLOOD PRESSURE: 72 MMHG | SYSTOLIC BLOOD PRESSURE: 158 MMHG | TEMPERATURE: 98.7 F | HEART RATE: 71 BPM

## 2021-04-12 DIAGNOSIS — R06.02 SHORTNESS OF BREATH ON EXERTION: ICD-10-CM

## 2021-04-12 PROCEDURE — 93350 STRESS TTE ONLY: CPT | Mod: 26 | Performed by: STUDENT IN AN ORGANIZED HEALTH CARE EDUCATION/TRAINING PROGRAM

## 2021-04-12 PROCEDURE — 255N000002 HC RX 255 OP 636: Performed by: NURSE PRACTITIONER

## 2021-04-12 PROCEDURE — 93018 CV STRESS TEST I&R ONLY: CPT | Performed by: INTERNAL MEDICINE

## 2021-04-12 PROCEDURE — 93016 CV STRESS TEST SUPVJ ONLY: CPT | Performed by: INTERNAL MEDICINE

## 2021-04-12 PROCEDURE — 93325 DOPPLER ECHO COLOR FLOW MAPG: CPT | Mod: 26 | Performed by: STUDENT IN AN ORGANIZED HEALTH CARE EDUCATION/TRAINING PROGRAM

## 2021-04-12 PROCEDURE — 93321 DOPPLER ECHO F-UP/LMTD STD: CPT | Mod: 26 | Performed by: STUDENT IN AN ORGANIZED HEALTH CARE EDUCATION/TRAINING PROGRAM

## 2021-04-12 PROCEDURE — 93017 CV STRESS TEST TRACING ONLY: CPT

## 2021-04-12 RX ADMIN — PERFLUTREN 4 ML: 6.52 INJECTION, SUSPENSION INTRAVENOUS at 15:09

## 2021-04-12 NOTE — PROGRESS NOTES
1:50;  The patient arrived for a stress echo.  The procedure, risks and benefits were discussed and the consent was signed.  The patient was prepped for the stress test, and an IV was placed per procedure.  The echo sonographer did the initial images with definity for image enhancement.    arrived, and the patient biked 3 minutes and 40 seconds.  The patient tolerated the procedure.  Stress images were completed and the IV was removed per procedure.  The patient was released in stable condition.  The patient was instructed that the ordering MD will call with results in one to two days.  If you have not received your results within 3 days please call the ordering provider.  Please see the chart for complete test results.

## 2021-04-13 ENCOUNTER — TELEPHONE (OUTPATIENT)
Dept: INTERNAL MEDICINE | Facility: OTHER | Age: 86
End: 2021-04-13

## 2021-04-13 DIAGNOSIS — R06.02 SHORTNESS OF BREATH ON EXERTION: Primary | ICD-10-CM

## 2021-04-13 DIAGNOSIS — R07.89 CHEST HEAVINESS: ICD-10-CM

## 2021-04-13 NOTE — TELEPHONE ENCOUNTER
Patient received message that stress echo came back fine. She is wanting to know what the next steps are to finding out what is going on. Please call.    Rommel Cr on 4/13/2021 at 2:24 PM

## 2021-04-14 NOTE — TELEPHONE ENCOUNTER
If patient is still having symptoms, there is a test called CTA we can do. Please find out if she is interested in having this done and christos up order if so. We will need updated creatinine also due to contrast.    Thanks.

## 2021-04-14 NOTE — TELEPHONE ENCOUNTER
Patient states that she has had a sensation in her chest and Echo came back clear. Patient states that she is still having that sensation in her chest with the heaviness and shortness of breath which have been ongoing. Patient states that she has had a conversation regarding a hiatal hernia possibly with the provider and would like to know what the next step is to finding out the cause.   Susie Salgado LPN on 4/14/2021 at 8:47 AM

## 2021-04-27 ENCOUNTER — TELEPHONE (OUTPATIENT)
Dept: CARDIOLOGY | Facility: OTHER | Age: 86
End: 2021-04-27

## 2021-04-29 ENCOUNTER — HOSPITAL ENCOUNTER (OUTPATIENT)
Dept: CT IMAGING | Facility: OTHER | Age: 86
End: 2021-04-29
Attending: NURSE PRACTITIONER
Payer: MEDICARE

## 2021-04-29 VITALS — WEIGHT: 165 LBS | OXYGEN SATURATION: 98 % | BODY MASS INDEX: 29.23 KG/M2 | HEIGHT: 63 IN | TEMPERATURE: 97.3 F

## 2021-04-29 DIAGNOSIS — R06.02 SHORTNESS OF BREATH ON EXERTION: ICD-10-CM

## 2021-04-29 DIAGNOSIS — R07.89 CHEST HEAVINESS: ICD-10-CM

## 2021-04-29 LAB
CREAT SERPL-MCNC: 1.33 MG/DL (ref 0.6–1.2)
GFR SERPL CREATININE-BSD FRML MDRD: 38 ML/MIN/{1.73_M2}

## 2021-04-29 PROCEDURE — 82565 ASSAY OF CREATININE: CPT | Mod: ZL | Performed by: NURSE PRACTITIONER

## 2021-04-29 PROCEDURE — 250N000011 HC RX IP 250 OP 636: Performed by: NURSE PRACTITIONER

## 2021-04-29 PROCEDURE — 250N000013 HC RX MED GY IP 250 OP 250 PS 637: Performed by: RADIOLOGY

## 2021-04-29 PROCEDURE — 36415 COLL VENOUS BLD VENIPUNCTURE: CPT | Mod: ZL | Performed by: NURSE PRACTITIONER

## 2021-04-29 PROCEDURE — 75574 CT ANGIO HRT W/3D IMAGE: CPT

## 2021-04-29 RX ORDER — METOPROLOL TARTRATE 1 MG/ML
10 INJECTION, SOLUTION INTRAVENOUS
Status: DISCONTINUED | OUTPATIENT
Start: 2021-04-29 | End: 2021-04-30 | Stop reason: HOSPADM

## 2021-04-29 RX ORDER — METOPROLOL TARTRATE 100 MG
100 TABLET ORAL
Status: COMPLETED | OUTPATIENT
Start: 2021-04-29 | End: 2021-04-29

## 2021-04-29 RX ORDER — NITROGLYCERIN 0.4 MG/1
0.4 TABLET SUBLINGUAL EVERY 5 MIN PRN
Status: DISCONTINUED | OUTPATIENT
Start: 2021-04-29 | End: 2021-04-30 | Stop reason: HOSPADM

## 2021-04-29 RX ORDER — IOPAMIDOL 755 MG/ML
93 INJECTION, SOLUTION INTRAVASCULAR ONCE
Status: COMPLETED | OUTPATIENT
Start: 2021-04-29 | End: 2021-04-29

## 2021-04-29 RX ORDER — ATROPINE SULFATE 0.1 MG/ML
.2-2 INJECTION INTRAVENOUS
Status: DISCONTINUED | OUTPATIENT
Start: 2021-04-29 | End: 2021-04-30 | Stop reason: HOSPADM

## 2021-04-29 RX ADMIN — NITROGLYCERIN 0.4 MG: 0.4 TABLET SUBLINGUAL at 12:18

## 2021-04-29 RX ADMIN — METOPROLOL TARTRATE 100 MG: 100 TABLET, FILM COATED ORAL at 11:13

## 2021-04-29 RX ADMIN — IOPAMIDOL 93 ML: 755 INJECTION, SOLUTION INTRAVENOUS at 12:19

## 2021-04-29 ASSESSMENT — MIFFLIN-ST. JEOR: SCORE: 1172.57

## 2021-04-29 NOTE — PROGRESS NOTES
1100 Patient arrived for a CCTA. . They were connected to a cardiac monitor and vital signs were obtained. The patient's heart rate was 65 . Medications and allergies were reviewed. . The procedure was explained, and the patient was instructed to rest and relax, as much as possible. A saline lock was established. The patient's heart rate 53. The patient was offered the restroom, and then they were wheeled to CT in a wheelchair.  The cardiac monitor was placed there, and the test was completed. The patient was given one bottle of water, and they were told to  Drink at least 3 other additional glasses of water today, per post contrast instructions.  The patient was instructed that the ordering MD will call with results in one to two days with test results. The patient left ambulatory in stable condition.

## 2021-04-29 NOTE — PROGRESS NOTES
IV Contrast- Discharge Instructions After Your CT Scan      The IV contrast you received today will be filtered from your bloodstream by your kidneys during the next 24 hours and pass from the body in urine.  You will not be aware of this process and your urine will not change in color.  To help this process you should drink at least 4 additional glasses of water or juice today.  This reduces stress on your kidneys.    Most contrast reactions are immediate.  Should you develop symptoms of concern after discharge, contact the department at the number below.  After hours you should contact your personal physician.  If you develop breathing distress or wheezing, call 911.      1.  Has the patient had a previous reaction to IV contrast? n    2.  Does the patient have kidney disease? Yes stage 3 GFR 38, ok per Rad to give contrast    3.  Is the patient on dialysis? n    If YES to any of these questions, exam will be reviewed with a Radiologist before administering contrast.

## 2021-05-06 ENCOUNTER — OFFICE VISIT (OUTPATIENT)
Dept: ORTHOPEDICS | Facility: OTHER | Age: 86
End: 2021-05-06
Attending: STUDENT IN AN ORGANIZED HEALTH CARE EDUCATION/TRAINING PROGRAM
Payer: COMMERCIAL

## 2021-05-06 DIAGNOSIS — M53.3 SI (SACROILIAC) JOINT DYSFUNCTION: Primary | ICD-10-CM

## 2021-05-06 PROCEDURE — 99213 OFFICE O/P EST LOW 20 MIN: CPT | Performed by: STUDENT IN AN ORGANIZED HEALTH CARE EDUCATION/TRAINING PROGRAM

## 2021-05-06 PROCEDURE — G0463 HOSPITAL OUTPT CLINIC VISIT: HCPCS | Performed by: STUDENT IN AN ORGANIZED HEALTH CARE EDUCATION/TRAINING PROGRAM

## 2021-05-06 NOTE — PROGRESS NOTES
Visit Date: 05/06/2021    HISTORY OF PRESENT ILLNESS:  Vicki is an 85-year-old female who presents for reevaluation of her right-sided SI joint dysfunction.  She has a T10 to the pelvis history from approximately 18 months ago for correction of a scoliotic deformity with 2 known fractured rods at the inferior-most aspect of her fusion, but with some evidence of solid bony fusion at these levels.  No significant gross instability seen on her x-rays.  She has now subsequently had a series of 2 right-sided SI joint injections, which has given her greater than 50% pain relief through the anesthetic window, and her pain has returned to its baseline at this time.     PHYSICAL EXAMINATION:    GENERAL:  Well-appearing, well-nourished.    MUSCULOSKELETAL/NEUROLOGIC:  She has 5/5 strength in bilateral upper and lower extremities, including deltoid, biceps, triceps, wrist extension/flexion, hand , hand intrinsics, hip flexion, knee extension/flexion, ankle dorsiflexion, plantarflexion, EHL.  She has normal sensation to light touch throughout bilateral upper and lower extremities.     IMAGING:  There is no new imaging available for today.    ASSESSMENT AND PLAN:  Vicki is an 85-year-old female with right-sided sacroiliac joint dysfunction previously documented in the last clinic visit with now 2 successful right-sided sacroiliac joint injections which has given her greater than 50% pain relief through the anesthetic window.  She would like some time to think about her potential of right-sided minimally invasive sacroiliac joint fusion surgery, as it is likely to give her 50% or so pain relief once she has healed, as some of her pain is likely related to her fractured rods in the back as well.  She is going to give it some thought and call the clinic once she has decided whether or not to proceed with surgical intervention.    This clinic visit took approximately 20 minutes.    Carlitos Luna MD        D: 05/06/2021    T: 2021   MT: CARLOS A    Name:     IVY CONDON  MRN:      [id number]        Account:    [id number]   :      1935           Visit Date: 2021     Document: [id number]

## 2021-05-06 NOTE — NURSING NOTE
"Chief Complaint   Patient presents with     RECHECK     Recheck right si joint pain      Recheck on right si joint pain.     Initial There were no vitals taken for this visit. Estimated body mass index is 29.23 kg/m  as calculated from the following:    Height as of 4/29/21: 1.6 m (5' 3\").    Weight as of 4/29/21: 74.8 kg (165 lb).  Medication Reconciliation: complete    Sanjuanita Arias LPN  "

## 2021-05-09 ENCOUNTER — MYC MEDICAL ADVICE (OUTPATIENT)
Dept: INTERNAL MEDICINE | Facility: OTHER | Age: 86
End: 2021-05-09

## 2021-05-09 DIAGNOSIS — N18.30 STAGE 3 CHRONIC KIDNEY DISEASE, UNSPECIFIED WHETHER STAGE 3A OR 3B CKD (H): Primary | ICD-10-CM

## 2021-06-21 ENCOUNTER — OFFICE VISIT (OUTPATIENT)
Dept: INTERNAL MEDICINE | Facility: OTHER | Age: 86
End: 2021-06-21
Attending: INTERNAL MEDICINE
Payer: MEDICARE

## 2021-06-21 ENCOUNTER — ALLIED HEALTH/NURSE VISIT (OUTPATIENT)
Dept: FAMILY MEDICINE | Facility: OTHER | Age: 86
End: 2021-06-21
Attending: INTERNAL MEDICINE
Payer: MEDICARE

## 2021-06-21 VITALS
BODY MASS INDEX: 29.77 KG/M2 | SYSTOLIC BLOOD PRESSURE: 158 MMHG | OXYGEN SATURATION: 99 % | WEIGHT: 168 LBS | DIASTOLIC BLOOD PRESSURE: 60 MMHG | RESPIRATION RATE: 16 BRPM | HEART RATE: 62 BPM | HEIGHT: 63 IN | TEMPERATURE: 97.5 F

## 2021-06-21 DIAGNOSIS — Z13.220 SCREENING FOR HYPERLIPIDEMIA: ICD-10-CM

## 2021-06-21 DIAGNOSIS — Z23 NEED FOR DIPHTHERIA-TETANUS-PERTUSSIS (TDAP) VACCINE: ICD-10-CM

## 2021-06-21 DIAGNOSIS — K26.4 GASTROINTESTINAL HEMORRHAGE ASSOCIATED WITH DUODENAL ULCER: ICD-10-CM

## 2021-06-21 DIAGNOSIS — L72.3 INFLAMED EPIDERMOID CYST OF SKIN: ICD-10-CM

## 2021-06-21 DIAGNOSIS — Z79.899 HIGH RISK MEDICATION USE: ICD-10-CM

## 2021-06-21 DIAGNOSIS — I10 ESSENTIAL HYPERTENSION: ICD-10-CM

## 2021-06-21 DIAGNOSIS — Z13.1 SCREENING FOR DIABETES MELLITUS: ICD-10-CM

## 2021-06-21 DIAGNOSIS — R06.09 DYSPNEA ON EXERTION: ICD-10-CM

## 2021-06-21 DIAGNOSIS — N18.30 STAGE 3 CHRONIC KIDNEY DISEASE, UNSPECIFIED WHETHER STAGE 3A OR 3B CKD (H): ICD-10-CM

## 2021-06-21 DIAGNOSIS — M85.80 OSTEOPENIA, UNSPECIFIED LOCATION: Primary | ICD-10-CM

## 2021-06-21 DIAGNOSIS — Z00.00 ENCOUNTER FOR MEDICARE ANNUAL WELLNESS EXAM: Primary | ICD-10-CM

## 2021-06-21 PROCEDURE — 99214 OFFICE O/P EST MOD 30 MIN: CPT | Mod: 25 | Performed by: INTERNAL MEDICINE

## 2021-06-21 PROCEDURE — 250N000011 HC RX IP 250 OP 636: Performed by: INTERNAL MEDICINE

## 2021-06-21 PROCEDURE — G0463 HOSPITAL OUTPT CLINIC VISIT: HCPCS | Mod: 25

## 2021-06-21 PROCEDURE — G0439 PPPS, SUBSEQ VISIT: HCPCS | Performed by: INTERNAL MEDICINE

## 2021-06-21 PROCEDURE — 96372 THER/PROPH/DIAG INJ SC/IM: CPT | Performed by: INTERNAL MEDICINE

## 2021-06-21 RX ORDER — LOSARTAN POTASSIUM 25 MG/1
25-50 TABLET ORAL DAILY
Qty: 90 TABLET | Refills: 0 | Status: SHIPPED | OUTPATIENT
Start: 2021-06-21 | End: 2021-06-22

## 2021-06-21 RX ORDER — DOXYCYCLINE HYCLATE 100 MG
100 TABLET ORAL 2 TIMES DAILY
Qty: 20 TABLET | Refills: 0 | Status: SHIPPED | OUTPATIENT
Start: 2021-06-21 | End: 2021-07-01

## 2021-06-21 RX ORDER — PANTOPRAZOLE SODIUM 40 MG/1
TABLET, DELAYED RELEASE ORAL
Qty: 90 TABLET | Refills: 3 | Status: SHIPPED | OUTPATIENT
Start: 2021-06-21 | End: 2022-02-02

## 2021-06-21 RX ORDER — CLOSTRIDIUM TETANI TOXOID ANTIGEN (FORMALDEHYDE INACTIVATED), CORYNEBACTERIUM DIPHTHERIAE TOXOID ANTIGEN (FORMALDEHYDE INACTIVATED), BORDETELLA PERTUSSIS TOXOID ANTIGEN (GLUTARALDEHYDE INACTIVATED), BORDETELLA PERTUSSIS FILAMENTOUS HEMAGGLUTININ ANTIGEN (FORMALDEHYDE INACTIVATED), BORDETELLA PERTUSSIS PERTACTIN ANTIGEN, AND BORDETELLA PERTUSSIS FIMBRIAE 2/3 ANTIGEN 5; 2; 2.5; 5; 3; 5 [LF]/.5ML; [LF]/.5ML; UG/.5ML; UG/.5ML; UG/.5ML; UG/.5ML
0.5 INJECTION, SUSPENSION INTRAMUSCULAR ONCE
Qty: 0.5 ML | Refills: 0 | Status: SHIPPED | OUTPATIENT
Start: 2021-06-21 | End: 2021-06-21

## 2021-06-21 RX ORDER — AMLODIPINE BESYLATE 5 MG/1
5 TABLET ORAL DAILY
Qty: 90 TABLET | Refills: 3 | Status: CANCELLED | OUTPATIENT
Start: 2021-06-21

## 2021-06-21 RX ADMIN — DENOSUMAB 60 MG: 60 INJECTION SUBCUTANEOUS at 13:31

## 2021-06-21 ASSESSMENT — PAIN SCALES - GENERAL: PAINLEVEL: NO PAIN (0)

## 2021-06-21 ASSESSMENT — MIFFLIN-ST. JEOR: SCORE: 1186.17

## 2021-06-21 NOTE — PROGRESS NOTES
"Medicare Wellness Visit   Ms. Mckeon is a 85 year old female who presents today who presents for Preventive Visit.      Are you in the first 12 months of your Medicare coverage?  No    Health Risk Assessment     Do you feel safe in your environment? Yes    Physical Health:    In general, how would you rate your overall physical health? good    Outside of work, how many days during the week do you exercise? 2-3 days/week    Outside of work, approximately how many minutes a day do you exercise?45-60 minutes    If you drink alcohol do you typically have >3 drinks per day or >7 drinks per week? No    Do you usually eat at least 4 servings of fruit and vegetables a day, include whole grains & fiber and avoid regularly eating high fat or \"junk\" foods? Yes    Do you have any problems taking medications regularly?  No    Do you have any side effects from medications? none    Needs assistance for the following daily activities: no assistance needed    Which of the following safety concerns are present in your home?  none identified     Hearing impairment: Yes, wears hearing aids which work very well    In the past 6 months, have you been bothered by leaking of urine? no      Screening     Fall risk  Fallen 2 or more times in the past year?: No  Any fall with injury in the past year?: No    Cognitive Screening   1) Repeat 3 items (Leader, Season, Table)    2) Clock draw: NORMAL  3) 3 item recall: Recalls 3 objects  Results: 3 items recalled: COGNITIVE IMPAIRMENT LESS LIKELY    Mini-CogTM Copyright ARNALDO Hines. Licensed by the author for use in [facility]; reprinted with permission (greg@.[facility]). All rights reserved.      Do you have sleep apnea, excessive snoring or daytime drowsiness?: no    Breast cancer screenin/15/20    Regular dental visits: every six months    Eye exam with in 2 years: just in last fall      End of Life Planning     Have you ever done Advance Care Planning? (For example, a Health " Directive, POLST, or a discussion with a medical provider or your loved ones about your wishes): Yes, advance care planning is on file.      End of Life Planning:  Patient currently has an advanced directive: Yes.  Practitioner is supportive of decision.        Medical Record Update     Reviewed and updated as needed this visit by clinical staff  Tobacco  Allergies  Meds  Med Hx  Surg Hx  Fam Hx  Soc Hx      Reviewed and updated as needed this visit by Provider  Tobacco  Allergies  Meds  Problems  Med Hx  Surg Hx  Fam Hx          Current Outpatient Medications   Medication     Cholecalciferol (VITAMIN D3) 1000 UNITS CAPS     cyanocobalamin (RA VITAMIN B-12 TR) 1000 MCG TBCR     doxycycline hyclate (VIBRA-TABS) 100 MG tablet     famotidine (PEPCID) 20 MG tablet     losartan (COZAAR) 25 MG tablet     pantoprazole (PROTONIX) 40 MG EC tablet     Tdap, tetanus-diphtheria-acell pertussis, (ADACEL) 5-2-15.5 LF-MCG/0.5 injection     No current facility-administered medications for this visit.           Current providers sharing in care for this patient include:   Patient Care Team:  Kirsten Guevara DO as PCP - General  Oliva Hays NP as Nurse Practitioner (Internal Medicine)  Bartolo Henry MD as Assigned Musculoskeletal Provider  Carlitos Luna MD as Assigned Surgical Provider  Oliva Hays NP as Assigned PCP     Subjective:   She has had elevated blood pressure.  Her blood pressure here is increased.  She has been on amlodipine however has had some lower extremity edema.  She is interested in trying something different for this.    She has had ongoing back issues and saw neurosurgery.  She has seen PT in the past.  She is taking tylenol regularly which helps.  She is not interested in surgery.  She has known sacroiliitis.    She has had some dyspnea on exertion.  No chest pain.  Recent testing negative.  She has dyspnea on exertion.  She had recent chest imaging that was negative.  Recent  "CT of the heart was negative.  She has never had pulmonary function studies.    She developed a lump on Saturday on her right labia.  No drainage.  Slightly sore.      She has a history of gastrointestinal hemorrhage with duodenal ulcer.  She continues on antiacid medications.      Review of Systems     GEN: -fevers/-chills/-night sweats/-wt change  NEURO: -headaches/-vision changes  EARS: -hearing changes  NOSE: -drainage/+congestion  MOUTH/THROAT: - sore throat/-dysphagia/-sores  LUNGS: -sob/-cough  CARDIOVASCULAR: -cp/-palpitations  ABD: -pain/-change in bowels/-bloody stools  : -change in bladder/-sores/-vaginal discharge or bleeding  HEMATOLOGIC/LYMPHATIC: -swollen nodes  SKIN: -rashes/+lesions - sees Dermatology in May  MSK/RHEUM: +joint pain/-joint swelling  NEURO: -weakness/-parasthesias  PSYCH: -depression/-anxiety          OBJECTIVE:     Vitals:    06/21/21 1021   BP: (!) 162/58   BP Location: Right arm   Patient Position: Sitting   Cuff Size: Adult Large   Pulse: 62   Resp: 16   Temp: 97.5  F (36.4  C)   TempSrc: Tympanic   SpO2: 99%   Weight: 76.2 kg (168 lb)   Height: 1.6 m (5' 3\")        Estimated body mass index is 29.76 kg/m  as calculated from the following:    Height as of this encounter: 1.6 m (5' 3\").    Weight as of this encounter: 76.2 kg (168 lb).     Physical Exam  GEN: Vitals reviewed. Healthy appearing. Patient is in no acute distress. Cooperative with exam.  HEENT: Normocephalic atraumatic.  Eyes grossly normal to inspection.  No discharge or erythema, or obvious scleral/conjunctival abnormalities.   NECK: Supple; no thyromegaly or masses noted.  No cervical or supraclavicular lymphadenopathy.  CV: Heart regular in rate and rhythm with no murmur.    LUNGS: No audible wheeze, cough, or visible cyanosis.  No visible retractions or increased work of breathing.  Lungs clear to auscultation bilaterally.    ABD:  Nondistended  SKIN: Warm and dry to touch.  Visible skin clear. No significant " rash, abnormal pigmentation or lesions.  EXT: No clubbing or cyanosis.  Trace peripheral edema on the left, 1+ on the right ankle  NEURO: Alert and oriented to person, place, and time.  Cranial nerves II-XII grossly intact with no focal or lateralizing deficits.  Muscle tone normal.  Gait normal. No tremor.   MSK: ROM of upper and lower ext symmetric and full.  PSYCH: Mood is good.  Mentation appears normal, affect normal/bright, judgement and insight intact, normal speech and appearance well-groomed.      Labs reviewed in EPIC    ASSESSMENT / PLAN:     Essential hypertension  -BP is uncontrolled at this time.  Prescription for losartan is sent in to replace amlodipine given edema.  She is to check her blood pressure regularly and let us know if it remains elevated.  She is to follow-up with me in approximately 4 weeks.  Plan for repeat labs in approximately 1 week.    Gastrointestinal hemorrhage associated with duodenal ulcer  -Continue with current medication.  Hemoglobin to be checked today.  - pantoprazole (PROTONIX) 40 MG EC tablet  Dispense: 90 tablet; Refill: 3  - CBC with platelets    Encounter for Medicare annual wellness exam    Stage 3 chronic kidney disease, unspecified whether stage 3a or 3b CKD  -Plan to recheck renal function with upcoming labs.    Screening for diabetes mellitus  - Comprehensive metabolic panel    High risk medication use  - Magnesium  - Phosphorus    Screening for hyperlipidemia  - Lipid Profile    Need for diphtheria-tetanus-pertussis (Tdap) vaccine  - Tdap, tetanus-diphtheria-acell pertussis, (ADACEL) 5-2-15.5 LF-MCG/0.5 injection  Dispense: 0.5 mL; Refill: 0    Inflamed epidermoid cyst of skin  -Labial cyst is likely consistent with inflamed epidermoid cyst.  At this time we will treat with antibiotics however may need incision and drainage if symptoms worsen or do not resolve.  - doxycycline hyclate (VIBRA-TABS) 100 MG tablet  Dispense: 20 tablet; Refill: 0    Dyspnea on  exertion  -With ongoing dyspnea on exertion we will obtain PFTs.  She is to call if she has any new or changing symptoms.  - Pulmonary Function Test ()        Preventative Care Guidelines and Health Counseling     COUNSELING:  Reviewed preventive health counseling  Special attention given to:   Preventative screening  Regular exercise  Healthy diet/nutrition  Immunizations   Reviewed preventive health counseling, as reflected in patient instructions    (!) 162/58       Body mass index is 29.76 kg/m . Weight management plan: Discussed healthy diet and exercise guidelines        Appropriate preventive services were discussed with this patient, including applicable screening as appropriate for cardiovascular disease, diabetes, osteopenia/osteoporosis, and glaucoma.  As appropriate for age/gender, discussed screening for colorectal cancer, prostate cancer, breast cancer, and cervical cancer. Checklist reviewing preventive services available has been given to the patient.    Reviewed patients plan of care and provided an AVS. The Basic Care Plan (routine screening as documented in Health Maintenance) for patient meets the Care Plan requirement. This Care Plan has been established and reviewed with the Patient and any available family members.    Counseling Resources:  ATP IV Guidelines  Pooled Cohorts Equation Calculator  Breast Cancer Risk Calculator  FRAX Risk Assessment  ICSI Preventive Guidelines  Dietary Guidelines for Americans, 2010  USDA's MyPlate  ASA Prophylaxis  Lung CA Screening    Kirsten Guevara DO  6/21/2021  10:26 AM  [facility]

## 2021-06-21 NOTE — PROGRESS NOTES
Specialty Medication: Prolia    LABS  Not drawn today per provider ok (). Pt will return to clinic in 7 days for lab only appt.    Verified patient's first and last name, and . Patient stated reason for visit today is to receive a Prolia injection. Patient denied any concerns with previous injections. Prolia removed from [employer] in Procedure room, allowed to sit out for 15 mins prior to administration. Prolia administered SubQ, as ordered. Administration of medication documented in MAR (see MAR for further information regarding dose, lot #, NDC #, expiration date). Patient encouraged to wait in lobby for 15 minutes post-injection and notify staff immediately of any reaction.     Harini Giordano RN ....................  2021   1:32 PM

## 2021-06-21 NOTE — NURSING NOTE
Patient presents to clinic today for annual Medicare visit.  She is not fasting.  Medication reconciliation completed.  Blank Aranda CMA([facility])..................6/21/2021   10:20 AM

## 2021-06-21 NOTE — PATIENT INSTRUCTIONS
Stop Amlodipine    Start Losartan 1 tab daily (25mg) and if BP is still elevated after 3-5 days then increase to 2 tabs daily.  Monitor BP and swelling and contact me with any concerns.          Patient Education   Personalized Prevention Plan  You are due for the preventive services outlined below.  Your care team is available to assist you in scheduling these services.  If you have already completed any of these items, please share that information with your care team to update in your medical record.  Health Maintenance Due   Topic Date Due     Breathing Capacity Test  Never done     COPD Action Plan  Never done     Zoster (Shingles) Vaccine (2 of 3) 07/26/2012     Diptheria Tetanus Pertussis (DTAP/TDAP/TD) Vaccine (2 - Td) 04/06/2021     FALL RISK ASSESSMENT  06/15/2021     Annual Wellness Visit  06/15/2021   Patient Education     Infected Epidermoid Cyst (Antibiotic Treatment)   You have an epidermoid cyst. This is a small, painless lump under your skin. An epidermoid cyst is often called an epidermal cyst, an epidermal inclusion cyst, or incorrectly, a sebaceous cyst. Epidermoid cysts form slowly under the skin. They can be found on most parts of the body. But they are most often found on areas with more hair such as the scalp, face, upper back, and genitals.   Here are some general facts about these cysts:     A cyst is a sac filled with material that is often cheesy, fatty, oily, or stringy. The material inside can be thick. Or it can be a liquid.    The area around the cyst may smell bad. If the cyst breaks open, the material inside it often smells bad as well.    You can usually move the cyst slightly if you try.    The cyst can be smaller than a pea or as large as a few inches.    The cyst is usually not painful, unless it becomes inflamed or infected.  Your cyst became infected and your healthcare provider wants to treat it with antibiotics. You will likely take the antibiotics by mouth or apply it as a  cream, or both. If the antibiotics don t clear up the infection, the cyst will need to be drained by making a small cut (incision). Local anesthesia will be used to numb the area before the incision and drainage.   Home care    Resist the temptation to squeeze or pop the cyst, stick a needle in it, or cut it open. This often leads to a worsening infection and scarring.    If antibiotic pills were prescribed, take them exactly as directed. Finish the antibiotic prescribed, even though you may feel better after the first few days.    Soak the affected area in hot water or apply a hot pack (a thin, clean towel soaked in hot water) for 20 minutes at a time. Do this 3 to 4 times a day.    If your healthcare provider recommended it, apply antibiotic cream or ointment 2 to 3 times a day.    You may use over-the-counter pain medicine to control pain, unless another medicine was given. If you have chronic liver or kidney disease or ever had a stomach ulcer or gastrointestinal bleeding, talk with your healthcare provider before using these medicines.    Prevention  Once this infection has healed, reduce the risk of future infections by:     Keeping the cyst area clean by bathing or showering daily    Avoiding tight-fitting clothing in the cyst area  Follow-up care  Follow up with your healthcare provider, or as advised. If a gauze packing was put in your wound, it should be removed in a few days as advised by your healthcare provider. Check your wound every day for the signs listed below.   When to seek medical advice  Call your healthcare provider right away if any of these occur:     Pus coming from the cyst    Increasing redness around the wound    Increasing local pain or swelling    Fever of 100.4 F (38 C) or higher, or as directed by your provider  Miguelito last reviewed this educational content on 7/1/2019 2000-2021 [employer]. All rights reserved. This information is not intended as a substitute for  professional medical care. Always follow your healthcare professional's instructions.

## 2021-06-22 RX ORDER — LOSARTAN POTASSIUM 25 MG/1
TABLET ORAL
Qty: 180 TABLET | Refills: 3 | Status: SHIPPED | OUTPATIENT
Start: 2021-06-22 | End: 2021-07-19

## 2021-06-29 DIAGNOSIS — Z13.220 SCREENING FOR HYPERLIPIDEMIA: ICD-10-CM

## 2021-06-29 DIAGNOSIS — Z79.899 HIGH RISK MEDICATION USE: ICD-10-CM

## 2021-06-29 DIAGNOSIS — Z13.1 SCREENING FOR DIABETES MELLITUS: ICD-10-CM

## 2021-06-29 DIAGNOSIS — K26.4 GASTROINTESTINAL HEMORRHAGE ASSOCIATED WITH DUODENAL ULCER: ICD-10-CM

## 2021-06-29 LAB
ALBUMIN SERPL-MCNC: 4.1 G/DL (ref 3.5–5.7)
ALP SERPL-CCNC: 50 U/L (ref 34–104)
ALT SERPL W P-5'-P-CCNC: 17 U/L (ref 7–52)
ANION GAP SERPL CALCULATED.3IONS-SCNC: 9 MMOL/L (ref 3–14)
AST SERPL W P-5'-P-CCNC: 22 U/L (ref 13–39)
BILIRUB SERPL-MCNC: 0.5 MG/DL (ref 0.3–1)
BUN SERPL-MCNC: 29 MG/DL (ref 7–25)
CALCIUM SERPL-MCNC: 9.5 MG/DL (ref 8.6–10.3)
CHLORIDE SERPL-SCNC: 107 MMOL/L (ref 98–107)
CHOLEST SERPL-MCNC: 198 MG/DL
CO2 SERPL-SCNC: 23 MMOL/L (ref 21–31)
CREAT SERPL-MCNC: 1.15 MG/DL (ref 0.6–1.2)
ERYTHROCYTE [DISTWIDTH] IN BLOOD BY AUTOMATED COUNT: 14.1 % (ref 10–15)
GFR SERPL CREATININE-BSD FRML MDRD: 45 ML/MIN/{1.73_M2}
GLUCOSE SERPL-MCNC: 91 MG/DL (ref 70–105)
HCT VFR BLD AUTO: 35.9 % (ref 35–47)
HDLC SERPL-MCNC: 77 MG/DL (ref 23–92)
HGB BLD-MCNC: 11.6 G/DL (ref 11.7–15.7)
LDLC SERPL CALC-MCNC: 106 MG/DL
MAGNESIUM SERPL-MCNC: 1.8 MG/DL (ref 1.9–2.7)
MCH RBC QN AUTO: 32.4 PG (ref 26.5–33)
MCHC RBC AUTO-ENTMCNC: 32.3 G/DL (ref 31.5–36.5)
MCV RBC AUTO: 100 FL (ref 78–100)
NONHDLC SERPL-MCNC: 121 MG/DL
PHOSPHATE SERPL-MCNC: 2.8 MG/DL (ref 2.5–5)
PLATELET # BLD AUTO: 203 10E9/L (ref 150–450)
POTASSIUM SERPL-SCNC: 4.3 MMOL/L (ref 3.5–5.1)
PROT SERPL-MCNC: 7.4 G/DL (ref 6.4–8.9)
RBC # BLD AUTO: 3.58 10E12/L (ref 3.8–5.2)
SODIUM SERPL-SCNC: 139 MMOL/L (ref 134–144)
TRIGL SERPL-MCNC: 77 MG/DL
WBC # BLD AUTO: 7.7 10E9/L (ref 4–11)

## 2021-06-29 PROCEDURE — 84100 ASSAY OF PHOSPHORUS: CPT | Mod: ZL | Performed by: INTERNAL MEDICINE

## 2021-06-29 PROCEDURE — 83735 ASSAY OF MAGNESIUM: CPT | Mod: ZL | Performed by: INTERNAL MEDICINE

## 2021-06-29 PROCEDURE — 80061 LIPID PANEL: CPT | Mod: ZL | Performed by: INTERNAL MEDICINE

## 2021-06-29 PROCEDURE — 80053 COMPREHEN METABOLIC PANEL: CPT | Mod: ZL | Performed by: INTERNAL MEDICINE

## 2021-06-29 PROCEDURE — 85027 COMPLETE CBC AUTOMATED: CPT | Mod: ZL | Performed by: INTERNAL MEDICINE

## 2021-06-29 PROCEDURE — 36415 COLL VENOUS BLD VENIPUNCTURE: CPT | Mod: ZL | Performed by: INTERNAL MEDICINE

## 2021-07-19 ENCOUNTER — OFFICE VISIT (OUTPATIENT)
Dept: INTERNAL MEDICINE | Facility: OTHER | Age: 86
End: 2021-07-19
Attending: INTERNAL MEDICINE
Payer: COMMERCIAL

## 2021-07-19 VITALS
BODY MASS INDEX: 29.94 KG/M2 | WEIGHT: 169 LBS | HEART RATE: 70 BPM | SYSTOLIC BLOOD PRESSURE: 162 MMHG | RESPIRATION RATE: 16 BRPM | DIASTOLIC BLOOD PRESSURE: 92 MMHG | OXYGEN SATURATION: 100 % | TEMPERATURE: 98.7 F

## 2021-07-19 DIAGNOSIS — D64.9 ANEMIA, UNSPECIFIED TYPE: Primary | ICD-10-CM

## 2021-07-19 DIAGNOSIS — I10 ESSENTIAL HYPERTENSION: ICD-10-CM

## 2021-07-19 DIAGNOSIS — R60.0 BILATERAL LOWER EXTREMITY EDEMA: ICD-10-CM

## 2021-07-19 PROBLEM — M46.1 SACROILIITIS (H): Status: RESOLVED | Noted: 2020-12-09 | Resolved: 2021-07-19

## 2021-07-19 PROBLEM — J44.9 COPD (CHRONIC OBSTRUCTIVE PULMONARY DISEASE) (H): Status: RESOLVED | Noted: 2020-12-08 | Resolved: 2021-07-19

## 2021-07-19 PROBLEM — I47.10 PAROXYSMAL SUPRAVENTRICULAR TACHYCARDIA (H): Status: RESOLVED | Noted: 2018-10-22 | Resolved: 2021-07-19

## 2021-07-19 LAB
HGB BLD-MCNC: 12 G/DL (ref 11.7–15.7)
HOLD SPECIMEN: NORMAL
HOLD SPECIMEN: NORMAL

## 2021-07-19 PROCEDURE — G0463 HOSPITAL OUTPT CLINIC VISIT: HCPCS

## 2021-07-19 PROCEDURE — 85018 HEMOGLOBIN: CPT | Mod: ZL | Performed by: INTERNAL MEDICINE

## 2021-07-19 PROCEDURE — 99214 OFFICE O/P EST MOD 30 MIN: CPT | Performed by: INTERNAL MEDICINE

## 2021-07-19 PROCEDURE — 36415 COLL VENOUS BLD VENIPUNCTURE: CPT | Mod: ZL | Performed by: INTERNAL MEDICINE

## 2021-07-19 RX ORDER — LOSARTAN POTASSIUM 50 MG/1
50 TABLET ORAL DAILY
Qty: 90 TABLET | Refills: 0 | Status: SHIPPED | OUTPATIENT
Start: 2021-07-19 | End: 2021-10-12

## 2021-07-19 ASSESSMENT — PAIN SCALES - GENERAL: PAINLEVEL: NO PAIN (1)

## 2021-07-19 NOTE — PATIENT INSTRUCTIONS
Increase Losartan to 50mg daily (2 of your current medications); monitor your blood pressure for 1-2 weeks and if still high, increase to 75mg daily (1 of the current meds and one of the new Rx).

## 2021-07-19 NOTE — PROGRESS NOTES
Chief Complaint   Patient presents with     RECHECK         HPI: Ms. Mckeon is a 86 year old female who presents today for follow up of blood pressure and recent anemia.    She was started on losartan at her last visit.  She has been taking 25 mg daily.  She did not increase it at any time.  Her blood pressure has continued to be elevated.  She has not noted any side effects from the medication.    She was noted to have a drop in her hemoglobin at her last visit.  She has a history of GI bleed.  She continues on Protonix and Pepcid.  She has not noted any blood in the stool or black stools.    She continues to have lower extremity edema.  This has been going on since her back surgery.  Her swelling is about the same as it was on the Amlodipine.  It is primarily on the right.    She  reports that she quit smoking about 53 years ago. She has never used smokeless tobacco.    Past medical history reviewed as below:     Past Medical History:   Diagnosis Date     Anemia 05/15/2013     CKD (chronic kidney disease) stage 3, GFR 30-59 ml/min 2014    Stable. First noted when patient was taking NSAIDs regularly due to back pain.     Essential (primary) hypertension      Nonrheumatic mitral valve prolapse 2008    with normal echo and no residual prolapse     Nontoxic single thyroid nodule 2012    Patient with multinodular goiter. Negative cytology and stable ultrasound findings.     Osteopenia      Other fracture of unspecified lower leg, initial encounter for closed fracture      Pain in left hip 2017     Pregnancy      2, para 2 with 2 spontaneous vaginal deliveries     Pure hypercholesterolemia      Scoliosis      Spinal stenosis     with nerve root impingement     Squamous cell carcinoma    .      ROS  Pertinent ROS was performed and was negative, including for fever, chills, chest pain, shortness of breath, increased lower extremity edema, changes in bowel or bladder, blood in the stool. No  "other concerns, with exception of HPI above.      EXAM:   BP (!) 162/92 (BP Location: Right arm, Patient Position: Sitting, Cuff Size: Adult Large)   Pulse 70   Temp 98.7  F (37.1  C) (Tympanic)   Resp 16   Wt 76.7 kg (169 lb)   SpO2 100%   BMI 29.94 kg/m      Estimated body mass index is 29.94 kg/m  as calculated from the following:    Height as of 6/21/21: 1.6 m (5' 3\").    Weight as of this encounter: 76.7 kg (169 lb).      GEN: Vitals reviewed. Healthy appearing. Patient is in no acute distress. Cooperative with exam.  HEENT: Normocephalic atraumatic.  Eyes grossly normal to inspection.  No discharge or erythema, or obvious scleral/conjunctival abnormalities.   CV: Heart regular in rate and rhythm with no murmur.    LUNGS: No audible wheeze, cough, or visible cyanosis.  No visible retractions or increased work of breathing.  Lungs clear to auscultation bilaterally.    ABD:  nondistended  SKIN: Warm and dry to touch.  Visible skin clear. No significant rash, abnormal pigmentation or lesions.  EXT: No clubbing or cyanosis.  No peripheral edema.  PSYCH: Mood is good.  Mentation appears normal, affect normal/bright, judgement and insight intact, normal speech and appearance well-groomed.     ASSESSMENT AND PLAN:    Anemia, unspecified type  -Plan for recheck of her hemoglobin.  If it is low we may need to consider further evaluation and adjustments in her medications.  - Hemoglobin    Essential hypertension  Increase losartan to 50 mg daily.  If it remains elevated she may need to increase to 75 mg daily.  Continue to monitor for side effects.  - losartan (COZAAR) 50 MG tablet  Dispense: 90 tablet; Refill: 0    Bilateral lower extremity edema  Appears to be well controlled at this time.  Encouraged to elevate legs, wear compression stockings, reduce salt intake.         Return in about 1 month (around 8/19/2021) for Recheck.  Up blood pressure.    35 minutes spent on the date of the encounter doing chart " review, history and exam, documentation and further activities as noted above      CHRIS HORAN,    7/19/2021 11:28 AM

## 2021-07-19 NOTE — NURSING NOTE
Patient presents to clinic today for follow up on blood pressure and edema.    Medication reconciliation completed.    FOOD SECURITY SCREENING QUESTIONS  Hunger Vital Signs:  Within the past 12 months we worried whether our food would run out before we got money to buy more. Never  Within the past 12 months the food we bought just didn't last and we didn't have money to get more. Never    Blank Aranda CMA([facility])..................7/19/2021   11:14 AM

## 2021-07-20 DIAGNOSIS — K26.4 GASTROINTESTINAL HEMORRHAGE ASSOCIATED WITH DUODENAL ULCER: ICD-10-CM

## 2021-07-21 RX ORDER — PANTOPRAZOLE SODIUM 40 MG/1
TABLET, DELAYED RELEASE ORAL
Qty: 90 TABLET | Refills: 3 | OUTPATIENT
Start: 2021-07-21

## 2021-08-17 ENCOUNTER — TELEPHONE (OUTPATIENT)
Dept: INTERNAL MEDICINE | Facility: OTHER | Age: 86
End: 2021-08-17

## 2021-08-17 NOTE — TELEPHONE ENCOUNTER
Please call the patient.   She has back and leg pain.  What is her next step to make this go away?      Lisa Mendiola on 8/17/2021 at 10:39 AM

## 2021-08-18 NOTE — TELEPHONE ENCOUNTER
Patient called again and stated that she would be happy to have Oliva Hays give her a call today    Thank you

## 2021-08-19 NOTE — TELEPHONE ENCOUNTER
Spoke with patient she has appointment with Roro Guevara DO tomorrow. Nakita Salas LPN .............8/19/2021  11:41 AM

## 2021-08-20 ENCOUNTER — OFFICE VISIT (OUTPATIENT)
Dept: INTERNAL MEDICINE | Facility: OTHER | Age: 86
End: 2021-08-20
Attending: INTERNAL MEDICINE
Payer: COMMERCIAL

## 2021-08-20 VITALS
HEART RATE: 70 BPM | TEMPERATURE: 98 F | DIASTOLIC BLOOD PRESSURE: 68 MMHG | SYSTOLIC BLOOD PRESSURE: 172 MMHG | WEIGHT: 169 LBS | RESPIRATION RATE: 16 BRPM | OXYGEN SATURATION: 99 % | BODY MASS INDEX: 29.94 KG/M2

## 2021-08-20 DIAGNOSIS — M48.061 SPINAL STENOSIS OF LUMBAR REGION WITHOUT NEUROGENIC CLAUDICATION: ICD-10-CM

## 2021-08-20 DIAGNOSIS — M51.369 DDD (DEGENERATIVE DISC DISEASE), LUMBAR: ICD-10-CM

## 2021-08-20 DIAGNOSIS — M79.661 PAIN OF RIGHT LOWER LEG: Primary | ICD-10-CM

## 2021-08-20 DIAGNOSIS — I10 ESSENTIAL HYPERTENSION: ICD-10-CM

## 2021-08-20 PROCEDURE — G0463 HOSPITAL OUTPT CLINIC VISIT: HCPCS

## 2021-08-20 PROCEDURE — 99214 OFFICE O/P EST MOD 30 MIN: CPT | Performed by: INTERNAL MEDICINE

## 2021-08-20 PROCEDURE — 250N000011 HC RX IP 250 OP 636: Performed by: INTERNAL MEDICINE

## 2021-08-20 PROCEDURE — 96372 THER/PROPH/DIAG INJ SC/IM: CPT | Performed by: INTERNAL MEDICINE

## 2021-08-20 PROCEDURE — G0463 HOSPITAL OUTPT CLINIC VISIT: HCPCS | Mod: 25

## 2021-08-20 RX ORDER — KETOROLAC TROMETHAMINE 30 MG/ML
15 INJECTION, SOLUTION INTRAMUSCULAR; INTRAVENOUS ONCE
Status: COMPLETED | OUTPATIENT
Start: 2021-08-20 | End: 2021-08-20

## 2021-08-20 RX ORDER — KETOROLAC TROMETHAMINE 30 MG/ML
15 INJECTION, SOLUTION INTRAMUSCULAR; INTRAVENOUS ONCE
Status: DISCONTINUED | OUTPATIENT
Start: 2021-08-20 | End: 2021-08-20 | Stop reason: CLARIF

## 2021-08-20 RX ORDER — KETOROLAC TROMETHAMINE 30 MG/ML
15 INJECTION, SOLUTION INTRAMUSCULAR; INTRAVENOUS ONCE
Status: DISCONTINUED | OUTPATIENT
Start: 2021-08-20 | End: 2021-08-20

## 2021-08-20 RX ADMIN — KETOROLAC TROMETHAMINE 15 MG: 30 INJECTION, SOLUTION INTRAMUSCULAR; INTRAVENOUS at 12:27

## 2021-08-20 ASSESSMENT — PAIN SCALES - GENERAL: PAINLEVEL: SEVERE PAIN (6)

## 2021-08-20 NOTE — PATIENT INSTRUCTIONS
Ice every 3-4 hours, gentle exercise/rest as much as possible.    Caution with NSAIDS (ibuprofen, aspirin, naproxen, aleve, advil) due to risk for increased blood pressure,stomach pain/nausea/ulcers and kidney damage; use minimal amount necessary    Ok to take Tylenol Arthritis 8 hour, 650mg (take 2 tablets three times daily every day)    Call with an update on Monday and let me know if prednisone is needed - if we do use Prednisone, increase Pantoprazole (Protonix)  to twice daily for 5 days     - Return/call as needed for follow-up should any new symptoms develop, for worsening of current symptoms or if symptoms do notresolve with above plan.

## 2021-08-20 NOTE — PROGRESS NOTES
Chief Complaint   Patient presents with     RECHECK          Subjective:   Ms. Mckeon is a 86 year old female  seen for the acute concern today of acute right leg pain with low back pain.    She reports that over the last 2 weeks she has had increased right leg pain.  She has been seen chiropractics who thought that this was due to radiculopathy.  She has had a history of degenerative disease and spinal stenosis in the past.  She had surgery in 2019.  She does not recall any specific injury or accident however has been doing some exercise classes which she is curious may have contributed.    Her pain is different than when she had back surgery in 2019 and different than when she had SI joint pain.    She is curious and various options for pain treatment.    She has a history of hypertension.  She is on losartan daily.  Her blood pressure most recently has ranged between 133/77 and 149/77.  It is elevated today due to her pain.    Past medical history reviewed as below:     Past Medical History:   Diagnosis Date     Anemia 05/15/2013     CKD (chronic kidney disease) stage 3, GFR 30-59 ml/min 2014    Stable. First noted when patient was taking NSAIDs regularly due to back pain.     Essential (primary) hypertension      Nonrheumatic mitral valve prolapse 2008    with normal echo and no residual prolapse     Nontoxic single thyroid nodule 2012    Patient with multinodular goiter. Negative cytology and stable ultrasound findings.     Osteopenia      Other fracture of unspecified lower leg, initial encounter for closed fracture      Pain in left hip 2017     Pregnancy      2, para 2 with 2 spontaneous vaginal deliveries     Pure hypercholesterolemia      Scoliosis      Spinal stenosis     with nerve root impingement     Squamous cell carcinoma    .      ROS:   Pertinent  ROS was performed and was negative, including for fevers, chills, chest pain, shortness of breath, increased lower  extremity edema, changes in bowel or bladder. No other concerns, with exception of HPI above.      Objective:    BP (!) 172/68 (BP Location: Right arm, Patient Position: Sitting, Cuff Size: Adult Large)   Pulse 70   Temp 98  F (36.7  C) (Tympanic)   Resp 16   Wt 76.7 kg (169 lb)   SpO2 99%   BMI 29.94 kg/m    GEN: Vitals reviewed.  Patient is in no acute distress. Cooperative with exam.  HEENT: Normocephalic atraumatic.  Eyes grossly normal to inspection.  No discharge or erythema, or obvious scleral/conjunctival abnormalities.   CV: Heart regular in rate and rhythm with no murmur.   LUNGS: No audible wheeze, cough, or visible cyanosis.  No visible retractions or increased work of breathing.  Lungs clear to auscultation bilaterally.    SKIN: Warm and dry to touch.  Visible skin clear. No significant rash, abnormal pigmentation or lesions.  EXT: No clubbing or cyanosis.  No peripheral edema.       Assessment/Plan:   Pain of right lower leg  -At this time with her right leg pain she will be set up for physical therapy along with an epidural injection.  She will need MR imaging prior.  She is given a Toradol injection today.  She is encouraged to use Tylenol through the weekend.  If she has continued pain come Monday she is to give us an update and we will send in a short course of prednisone.  She does however have history of GI bleed when on steroids and so we will need to monitor closely and possibly increase her Protonix.  - Physical Therapy Referral  - XR Lumbar Epidural Injection Incl Imaging  - MR Lumbar Spine w/o & w Contrast    Spinal stenosis of lumbar region without neurogenic claudication  See above  - XR Lumbar Epidural Injection Incl Imaging  - MR Lumbar Spine w/o & w Contrast    DDD (degenerative disc disease), lumbar  See above  - XR Lumbar Epidural Injection Incl Imaging  - MR Lumbar Spine w/o & w Contrast    Essential hypertension  - Blood pressure today of(!) 172/68 is not at the goal of<140/90  however with acute pain this is not unexpected  - Continue current regimen.  Continue to monitor at future visits.  - Cautioned patient to monitor with any OTC medications        - Return/call as needed for follow-up should any new symptoms develop, for worsening of current symptoms or if symptoms do not resolve with above plan.    Return if symptoms worsen or fail to improve.     31 minutes spent on the date of the encounter doing chart review, history and exam, documentation and further activities as noted above       CHRIS HORAN DO   8/20/2021 11:44 AM

## 2021-08-20 NOTE — NURSING NOTE
"Patient presents to clinic today for follow up on blood pressure and tests. She also has some pain in her right leg. She has been to the chiropractor but it is not helping. She goes to \"[employer] and thinks this might \"set it off\".  Medication reconciliation completed.    ACP on file? yes    FOOD SECURITY SCREENING QUESTIONS  Hunger Vital Signs:  Within the past 12 months we worried whether our food would run out before we got money to buy more. Never  Within the past 12 months the food we bought just didn't last and we didn't have money to get more. Never    Blank Aranda CMA([facility])..................8/20/2021   11:27 AM        "

## 2021-08-23 ENCOUNTER — TELEPHONE (OUTPATIENT)
Dept: INTERNAL MEDICINE | Facility: OTHER | Age: 86
End: 2021-08-23

## 2021-08-23 DIAGNOSIS — M79.661 PAIN OF RIGHT LOWER LEG: Primary | ICD-10-CM

## 2021-08-23 DIAGNOSIS — M48.061 SPINAL STENOSIS OF LUMBAR REGION WITHOUT NEUROGENIC CLAUDICATION: ICD-10-CM

## 2021-08-23 RX ORDER — PREDNISONE 20 MG/1
20 TABLET ORAL DAILY
Qty: 7 TABLET | Refills: 0 | Status: SHIPPED | OUTPATIENT
Start: 2021-08-23 | End: 2021-08-30

## 2021-08-23 NOTE — TELEPHONE ENCOUNTER
She has been on 5, 10 and 20 mg previously for short periods of time per medication history.  Would you consider refilling one of those?  Blank Aranda CMA([facility])..................8/23/2021   12:08 PM

## 2021-08-26 ENCOUNTER — HOSPITAL ENCOUNTER (OUTPATIENT)
Dept: MRI IMAGING | Facility: OTHER | Age: 86
Discharge: HOME | End: 2021-08-26
Attending: INTERNAL MEDICINE | Admitting: INTERNAL MEDICINE
Payer: MEDICARE

## 2021-08-26 DIAGNOSIS — M79.661 PAIN OF RIGHT LOWER LEG: ICD-10-CM

## 2021-08-26 DIAGNOSIS — M48.061 SPINAL STENOSIS OF LUMBAR REGION WITHOUT NEUROGENIC CLAUDICATION: ICD-10-CM

## 2021-08-26 DIAGNOSIS — M51.369 DDD (DEGENERATIVE DISC DISEASE), LUMBAR: ICD-10-CM

## 2021-08-26 PROCEDURE — A9575 INJ GADOTERATE MEGLUMI 0.1ML: HCPCS | Performed by: INTERNAL MEDICINE

## 2021-08-26 PROCEDURE — 255N000002 HC RX 255 OP 636: Performed by: INTERNAL MEDICINE

## 2021-08-26 PROCEDURE — 72158 MRI LUMBAR SPINE W/O & W/DYE: CPT

## 2021-08-26 RX ORDER — GADOTERATE MEGLUMINE 376.9 MG/ML
15 INJECTION INTRAVENOUS ONCE
Status: COMPLETED | OUTPATIENT
Start: 2021-08-26 | End: 2021-08-26

## 2021-08-26 RX ADMIN — GADOTERATE MEGLUMINE 15 ML: 376.9 INJECTION INTRAVENOUS at 12:33

## 2021-08-27 DIAGNOSIS — I10 ESSENTIAL HYPERTENSION: ICD-10-CM

## 2021-08-27 RX ORDER — AMLODIPINE BESYLATE 5 MG/1
TABLET ORAL
Qty: 90 TABLET | Refills: 3 | OUTPATIENT
Start: 2021-08-27

## 2021-08-27 NOTE — TELEPHONE ENCOUNTER
Medication discontinued 6/2021 and replaced with Losartan.  Unable to complete prescription refill per RN Medication Refill Policy. Raina Salas RN 8/27/2021 8:05 AM

## 2021-09-10 ENCOUNTER — HOSPITAL ENCOUNTER (OUTPATIENT)
Dept: GENERAL RADIOLOGY | Facility: OTHER | Age: 86
Discharge: HOME | End: 2021-09-10
Attending: INTERNAL MEDICINE | Admitting: INTERNAL MEDICINE
Payer: MEDICARE

## 2021-09-10 DIAGNOSIS — M48.061 SPINAL STENOSIS OF LUMBAR REGION WITHOUT NEUROGENIC CLAUDICATION: ICD-10-CM

## 2021-09-10 DIAGNOSIS — M51.369 DDD (DEGENERATIVE DISC DISEASE), LUMBAR: ICD-10-CM

## 2021-09-10 DIAGNOSIS — M79.661 PAIN OF RIGHT LOWER LEG: ICD-10-CM

## 2021-09-10 PROCEDURE — 250N000009 HC RX 250: Performed by: RADIOLOGY

## 2021-09-10 PROCEDURE — 250N000011 HC RX IP 250 OP 636: Performed by: RADIOLOGY

## 2021-09-10 PROCEDURE — 62323 NJX INTERLAMINAR LMBR/SAC: CPT

## 2021-09-10 PROCEDURE — 255N000002 HC RX 255 OP 636: Performed by: RADIOLOGY

## 2021-09-10 RX ORDER — LIDOCAINE HYDROCHLORIDE 10 MG/ML
2 INJECTION, SOLUTION INFILTRATION; PERINEURAL ONCE
Status: COMPLETED | OUTPATIENT
Start: 2021-09-10 | End: 2021-09-10

## 2021-09-10 RX ORDER — LIDOCAINE HYDROCHLORIDE 10 MG/ML
2 INJECTION, SOLUTION EPIDURAL; INFILTRATION; INTRACAUDAL; PERINEURAL ONCE
Status: COMPLETED | OUTPATIENT
Start: 2021-09-10 | End: 2021-09-10

## 2021-09-10 RX ORDER — DEXAMETHASONE SODIUM PHOSPHATE 10 MG/ML
10 INJECTION, SOLUTION INTRAMUSCULAR; INTRAVENOUS ONCE
Status: COMPLETED | OUTPATIENT
Start: 2021-09-10 | End: 2021-09-10

## 2021-09-10 RX ADMIN — LIDOCAINE HYDROCHLORIDE 2 ML: 10 INJECTION, SOLUTION EPIDURAL; INFILTRATION; INTRACAUDAL; PERINEURAL at 12:06

## 2021-09-10 RX ADMIN — IOHEXOL 4 ML: 240 INJECTION, SOLUTION INTRATHECAL; INTRAVASCULAR; INTRAVENOUS; ORAL at 12:06

## 2021-09-10 RX ADMIN — LIDOCAINE HYDROCHLORIDE 2 ML: 10 INJECTION, SOLUTION INFILTRATION; PERINEURAL at 12:06

## 2021-09-10 RX ADMIN — DEXAMETHASONE SODIUM PHOSPHATE 10 MG: 10 INJECTION, SOLUTION INTRAMUSCULAR; INTRAVENOUS at 12:06

## 2021-09-28 ENCOUNTER — HOSPITAL ENCOUNTER (OUTPATIENT)
Dept: RESPIRATORY THERAPY | Facility: HOSPITAL | Age: 86
Discharge: HOME | End: 2021-09-28
Attending: INTERNAL MEDICINE | Admitting: INTERNAL MEDICINE
Payer: MEDICARE

## 2021-09-28 DIAGNOSIS — R06.09 DYSPNEA ON EXERTION: ICD-10-CM

## 2021-09-28 LAB — HGB BLD-MCNC: 11.4 G/DL (ref 11.7–15.7)

## 2021-09-28 PROCEDURE — 94010 BREATHING CAPACITY TEST: CPT

## 2021-09-28 PROCEDURE — 94729 DIFFUSING CAPACITY: CPT | Mod: 26 | Performed by: INTERNAL MEDICINE

## 2021-09-28 PROCEDURE — 85018 HEMOGLOBIN: CPT | Performed by: INTERNAL MEDICINE

## 2021-09-28 PROCEDURE — 36415 COLL VENOUS BLD VENIPUNCTURE: CPT | Performed by: INTERNAL MEDICINE

## 2021-09-28 PROCEDURE — 94726 PLETHYSMOGRAPHY LUNG VOLUMES: CPT | Mod: 26 | Performed by: INTERNAL MEDICINE

## 2021-09-28 PROCEDURE — 94726 PLETHYSMOGRAPHY LUNG VOLUMES: CPT

## 2021-09-28 PROCEDURE — 94729 DIFFUSING CAPACITY: CPT

## 2021-09-28 PROCEDURE — 94010 BREATHING CAPACITY TEST: CPT | Mod: 26 | Performed by: INTERNAL MEDICINE

## 2021-10-05 ENCOUNTER — HOSPITAL ENCOUNTER (OUTPATIENT)
Dept: PHYSICAL THERAPY | Facility: OTHER | Age: 86
Setting detail: THERAPIES SERIES
End: 2021-10-05
Attending: INTERNAL MEDICINE
Payer: MEDICARE

## 2021-10-05 DIAGNOSIS — M79.661 PAIN OF RIGHT LOWER LEG: ICD-10-CM

## 2021-10-05 PROCEDURE — 97112 NEUROMUSCULAR REEDUCATION: CPT | Mod: GP

## 2021-10-05 PROCEDURE — 97140 MANUAL THERAPY 1/> REGIONS: CPT | Mod: GP

## 2021-10-05 PROCEDURE — 97161 PT EVAL LOW COMPLEX 20 MIN: CPT | Mod: GP

## 2021-10-05 PROCEDURE — 97110 THERAPEUTIC EXERCISES: CPT | Mod: GP

## 2021-10-05 NOTE — PROGRESS NOTES
10/05/21 1300   General Information   Type of Visit Initial OP Ortho PT Evaluation   Start of Care Date 10/05/21   Referring Physician Kirsten Guevara, DO   Patient/Family Goals Statement reduce pain   Orders Evaluate and Treat   Date of Order 08/20/21   Certification Required? Yes   Medical Diagnosis pain of right leg   Surgical/Medical history reviewed Yes   Body Part(s)   Body Part(s) Lumbar Spine/SI   Presentation and Etiology   Pertinent history of current problem (include personal factors and/or comorbidities that impact the POC) History of fusion T10-S1, ileum; failure of rods between L3L4. Reports she has had right leg pain since her 2019 surgery. Did have an injection L5S1, brought her some relief. Continues with basic exercises such as mini squats, hip abd/hip ext. Has a walker but isn't sure she uses it correctly, does not have it with her today. Hoping to get a little more pain relief.    Impairments A. Pain;E. Decreased flexibility;F. Decreased strength and endurance;G. Impaired balance;H. Impaired gait   Functional Limitations perform activities of daily living   Symptom Location low back right leg   Onset date of current episode/exacerbation 08/20/21   Chronicity Chronic   Pain rating (0-10 point scale) Best (/10);Worst (/10)   Best (/10) 2   Worst (/10) 10   Pain quality A. Sharp;E. Shooting   Frequency of pain/symptoms A. Constant   Pain/symptoms exacerbated by A. Sitting;G. Certain positions;M. Other   Pain exacerbation comment rolling in bed   Pain/symptoms eased by A. Sitting;B. Walking;I. OTC medication(s)   Progression of symptoms since onset: Improved   Prior Level of Function   Prior Level of Function-Mobility limited gait distance due to back issues, fatigue in muscles   Current Level of Function   Patient role/employment history F. Retired   Fall Risk Screen   Fall screen completed by PT   Have you fallen 2 or more times in the past year? No   Have you fallen and had an injury in the past  year? No   Is patient a fall risk? No   Abuse Screen (yes response referral indicated)   Feels Unsafe at Home or Work/School no   Feels Threatened by Someone no   Does Anyone Try to Keep You From Having Contact with Others or Doing Things Outside Your Home? no   Physical Signs of Abuse Present no   Lumbar Spine/SI Objective Findings   Observation scoliotic curve, increased kyphosis, no lumbar lordosis, limited stride length, right iliac crest superior    Pelvic Screen + FFT right, right superior pubic shear   Hip Screen limited inf/medial glide right hip, limited IR/ER right hip mm and capsule   Hip Abduction Strength 3/5   Hip Extension Strength 3/5   Hamstring Flexibility tightness   Hip Flexor Flexibility tightness   Palpation limited loading left shoulder right pelvis; general listening to anteiror pelvis; local listeing to right pubic bone.    Planned Therapy Interventions   Planned Therapy Interventions manual therapy;neuromuscular re-education;strengthening;stretching   Planned Modality Interventions   Planned Modality Interventions Cryotherapy;Electrical stimulation;Hot packs   Clinical Impression   Criteria for Skilled Therapeutic Interventions Met yes, treatment indicated   PT Diagnosis back pain, right leg pain, muscle weakness   Influenced by the following impairments pain, stiffness, weakness, limited gait and balance   Functional limitations due to impairments walking to shop, limited activity tolerance, pain rolling in bed, pain with sitting.    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     ORTHO GOALS   PT Ortho Eval Goals 1;2;3   Ortho Goal 1   Goal Identifier walking   Goal Description Patient to report ability to walk 1000 feet without muscle fatigue to allow greater ease of shopping with or without assistive dievice.    Target Date 01/05/22   Ortho Goal 2   Goal Identifier bed moibility   Goal Description Patient to tolerate rolling in bed without pain.    Target Date 01/05/22   Ortho Goal 3   Goal Identifier pain   Goal Description Patient to report reduction in pain to 5/10 or less during functional activities.    Target Date 01/05/22   Total Evaluation Time   PT Eval, Low Complexity Minutes (47020) 20   Therapy Certification   Certification date from 10/05/21   Certification date to 01/05/22   Medical Diagnosis pain of right leg

## 2021-10-05 NOTE — PROGRESS NOTES
[facility]          OUTPATIENT PHYSICAL THERAPY ORTHOPEDIC EVALUATION  PLAN OF TREATMENT FOR OUTPATIENT REHABILITATION  (COMPLETE FOR INITIAL CLAIMS ONLY)  Patient's Last Name, First Name, M.I.  YOB: 1935  Vicki Mckeon    Provider s Name:  [facility]   Medical Record No.  [id number]   Start of Care Date:  10/05/21   Onset Date:  08/20/21   Type:     _X__PT   ___OT   ___SLP Medical Diagnosis:  pain of right leg     PT Diagnosis:  back pain, right leg pain, muscle weakness   Visits from SOC:  1      _________________________________________________________________________________  Plan of Treatment/Functional Goals:  manual therapy, neuromuscular re-education, strengthening, stretching     Cryotherapy, Electrical stimulation, Hot packs     Goals  Goal Identifier: walking  Goal Description: Patient to report ability to walk 1000 feet without muscle fatigue to allow greater ease of shopping with or without assistive dievice.   Target Date: 01/05/22    Goal Identifier: bed moibility  Goal Description: Patient to tolerate rolling in bed without pain.   Target Date: 01/05/22    Goal Identifier: pain  Goal Description: Patient to report reduction in pain to 5/10 or less during functional activities.   Target Date: 01/05/22                                                           Therapy Frequency:  2 times/Week  Predicted Duration of Therapy Intervention:  3 months    Karen Duarte, PT                 I CERTIFY THE NEED FOR THESE SERVICES FURNISHED UNDER        THIS PLAN OF TREATMENT AND WHILE UNDER MY CARE     (Physician co-signature of this document indicates review and certification of the therapy plan).                       Certification Date From:  10/05/21   Certification Date To:  01/05/22    Referring Provider:  Kirsten Guevara, DO    Initial Assessment        See Epic Evaluation Start of Care Date: 10/05/21

## 2021-10-07 ENCOUNTER — HOSPITAL ENCOUNTER (OUTPATIENT)
Dept: PHYSICAL THERAPY | Facility: OTHER | Age: 86
Setting detail: THERAPIES SERIES
End: 2021-10-07
Attending: INTERNAL MEDICINE
Payer: MEDICARE

## 2021-10-07 PROCEDURE — 97112 NEUROMUSCULAR REEDUCATION: CPT | Mod: GP

## 2021-10-07 PROCEDURE — 97140 MANUAL THERAPY 1/> REGIONS: CPT | Mod: GP

## 2021-10-09 ENCOUNTER — HEALTH MAINTENANCE LETTER (OUTPATIENT)
Age: 86
End: 2021-10-09

## 2021-10-10 DIAGNOSIS — I10 ESSENTIAL HYPERTENSION: ICD-10-CM

## 2021-10-12 ENCOUNTER — HOSPITAL ENCOUNTER (OUTPATIENT)
Dept: PHYSICAL THERAPY | Facility: OTHER | Age: 86
Setting detail: THERAPIES SERIES
End: 2021-10-12
Attending: INTERNAL MEDICINE
Payer: MEDICARE

## 2021-10-12 PROCEDURE — 97110 THERAPEUTIC EXERCISES: CPT | Mod: GP

## 2021-10-12 RX ORDER — LOSARTAN POTASSIUM 50 MG/1
TABLET ORAL
Qty: 90 TABLET | Refills: 0 | Status: SHIPPED | OUTPATIENT
Start: 2021-10-12 | End: 2021-10-18

## 2021-10-12 NOTE — TELEPHONE ENCOUNTER
" Disp Refills Start End KIRA   losartan (COZAAR) 50 MG tablet 90 tablet 0 7/19/2021  No   Sig - Route: Take 1 tablet (50 mg) by mouth daily - Oral       LOV: 8/20/2021  Future Office visit:    Next 5 appointments (look out 90 days)    Oct 18, 2021 11:20 AM  SHORT with Kirsten Guevara DO  [facility] ([facility] ) [address]  [place] [id number] [phone]          Routing refill request to provider for review/approval because:  Blood pressure out of range   Failed protocol  Upcoming appointment  Received limited refill    Requested Prescriptions   Pending Prescriptions Disp Refills     losartan (COZAAR) 50 MG tablet [Pharmacy Med Name: LOSARTAN 50MG TABLETS] 90 tablet 0     Sig: TAKE 1 TABLET(50 MG) BY MOUTH DAILY       Angiotensin-II Receptors Failed - 10/10/2021  4:25 PM        Failed - Last blood pressure under 140/90 in past 12 months     BP Readings from Last 3 Encounters:   08/20/21 (!) 172/68   07/19/21 (!) 162/92   06/21/21 (!) 158/60                 Passed - Recent (12 mo) or future (30 days) visit within the authorizing provider's specialty     Patient has had an office visit with the authorizing provider or a provider within the authorizing providers department within the previous 12 mos or has a future within next 30 days. See \"Patient Info\" tab in inbasket, or \"Choose Columns\" in Meds & Orders section of the refill encounter.              Passed - Medication is active on med list        Passed - Patient is age 18 or older        Passed - No active pregnancy on record        Passed - Normal serum creatinine on file in past 12 months     Recent Labs   Lab Test 06/29/21  0956   CR 1.15       Ok to refill medication if creatinine is low          Passed - Normal serum potassium on file in past 12 months     Recent Labs   Lab Test 06/29/21  0956   POTASSIUM 4.3                    Passed - No positive pregnancy test in past 12 months "         Unable to complete prescription refill per RN Medication Refill Policy.................... Heidy Osborne RN ....................  10/12/2021   2:26 PM

## 2021-10-14 ENCOUNTER — HOSPITAL ENCOUNTER (OUTPATIENT)
Dept: PHYSICAL THERAPY | Facility: OTHER | Age: 86
Setting detail: THERAPIES SERIES
End: 2021-10-14
Attending: INTERNAL MEDICINE
Payer: MEDICARE

## 2021-10-14 PROCEDURE — 97112 NEUROMUSCULAR REEDUCATION: CPT | Mod: GP

## 2021-10-14 PROCEDURE — 97110 THERAPEUTIC EXERCISES: CPT | Mod: GP

## 2021-10-18 ENCOUNTER — OFFICE VISIT (OUTPATIENT)
Dept: INTERNAL MEDICINE | Facility: OTHER | Age: 86
End: 2021-10-18
Attending: INTERNAL MEDICINE
Payer: COMMERCIAL

## 2021-10-18 VITALS
DIASTOLIC BLOOD PRESSURE: 78 MMHG | RESPIRATION RATE: 16 BRPM | SYSTOLIC BLOOD PRESSURE: 174 MMHG | TEMPERATURE: 96.8 F | BODY MASS INDEX: 29.8 KG/M2 | OXYGEN SATURATION: 97 % | WEIGHT: 168.2 LBS | HEART RATE: 76 BPM

## 2021-10-18 DIAGNOSIS — D64.9 ANEMIA, UNSPECIFIED TYPE: ICD-10-CM

## 2021-10-18 DIAGNOSIS — I10 ESSENTIAL HYPERTENSION: Primary | ICD-10-CM

## 2021-10-18 DIAGNOSIS — Z23 NEED FOR PROPHYLACTIC VACCINATION AND INOCULATION AGAINST INFLUENZA: ICD-10-CM

## 2021-10-18 DIAGNOSIS — M79.661 PAIN OF RIGHT LOWER LEG: ICD-10-CM

## 2021-10-18 LAB
FERRITIN SERPL-MCNC: 305 NG/ML (ref 24–336)
HGB BLD-MCNC: 12.1 G/DL (ref 11.7–15.7)

## 2021-10-18 PROCEDURE — 85018 HEMOGLOBIN: CPT | Mod: ZL | Performed by: INTERNAL MEDICINE

## 2021-10-18 PROCEDURE — G0463 HOSPITAL OUTPT CLINIC VISIT: HCPCS | Mod: 25

## 2021-10-18 PROCEDURE — G0463 HOSPITAL OUTPT CLINIC VISIT: HCPCS

## 2021-10-18 PROCEDURE — 82728 ASSAY OF FERRITIN: CPT | Mod: ZL | Performed by: INTERNAL MEDICINE

## 2021-10-18 PROCEDURE — 90662 IIV NO PRSV INCREASED AG IM: CPT | Performed by: INTERNAL MEDICINE

## 2021-10-18 PROCEDURE — G0008 ADMIN INFLUENZA VIRUS VAC: HCPCS | Performed by: INTERNAL MEDICINE

## 2021-10-18 PROCEDURE — 36415 COLL VENOUS BLD VENIPUNCTURE: CPT | Mod: ZL | Performed by: INTERNAL MEDICINE

## 2021-10-18 PROCEDURE — 99214 OFFICE O/P EST MOD 30 MIN: CPT | Performed by: INTERNAL MEDICINE

## 2021-10-18 RX ORDER — LOSARTAN POTASSIUM 50 MG/1
75-100 TABLET ORAL DAILY
Qty: 180 TABLET | Refills: 0 | Status: SHIPPED | OUTPATIENT
Start: 2021-10-18 | End: 2021-11-22

## 2021-10-18 ASSESSMENT — PAIN SCALES - GENERAL: PAINLEVEL: NO PAIN (0)

## 2021-10-18 NOTE — NURSING NOTE
Immunization Documentation    Prior to Fluzone HD Immunization administration, verified patients identity using patient's name and date of birth. Please see IMMUNIZATIONS  and order for additional information.  Patient / Parent instructed to remain in clinic for 15 minutes and report any adverse reaction to staff immediately.    Was entire vial of medication used? Yes  Vial/Syringe: Syringe    Blank Aranda CMA([facility]).............10/18/2021  11:57 AM

## 2021-10-18 NOTE — PATIENT INSTRUCTIONS
Increase losartan to 75mg daily for 2 weeks.  If blood pressure remains greater than 140 on top, increase to 100mg daily.

## 2021-10-18 NOTE — PROGRESS NOTES
Chief Complaint   Patient presents with     Follow Up     BP and back      Imm/Inj     Flu Shot         HPI: Ms. Mckeon is a 86 year old female who presents today for follow up of back pain and blood pressure.    Her blood pressure at home has been in the 140s systolic.  It is increased further here.  She is currently on losartan 50 mg daily.  She denies any obvious side effects.  She did not take her losartan today.    She reports that she has been doing well with physical therapy.  She has 2 more sessions and feels that has improved her pain in her back.    She  reports that she quit smoking about 53 years ago. She has never used smokeless tobacco.  She would like her flu shot today.    She is also due for recheck of her hemoglobin as it was low recently.  She has a history of GI bleed in the past.    Past medical history reviewed as below:     Past Medical History:   Diagnosis Date     Anemia 05/15/2013     CKD (chronic kidney disease) stage 3, GFR 30-59 ml/min (H) 2014    Stable. First noted when patient was taking NSAIDs regularly due to back pain.     Essential (primary) hypertension      Nonrheumatic mitral valve prolapse 2008    with normal echo and no residual prolapse     Nontoxic single thyroid nodule 2012    Patient with multinodular goiter. Negative cytology and stable ultrasound findings.     Osteopenia      Other fracture of unspecified lower leg, initial encounter for closed fracture      Pain in left hip 2017     Pregnancy      2, para 2 with 2 spontaneous vaginal deliveries     Pure hypercholesterolemia      Scoliosis      Spinal stenosis     with nerve root impingement     Squamous cell carcinoma    .      ROS  Pertinent ROS was performed and was negative, including for fever, chills, chest pain, shortness of breath, increased lower extremity edema, changes in bowel or bladder, blood in the stool, difficulty swallowing, sores in the mouth. No other concerns, with  "exception of HPI above.      EXAM:   BP (!) 174/78 (BP Location: Right arm, Patient Position: Sitting, Cuff Size: Adult Regular)   Pulse 76   Temp 96.8  F (36  C) (Tympanic)   Resp 16   Wt 76.3 kg (168 lb 3.2 oz)   SpO2 97%   BMI 29.80 kg/m      Estimated body mass index is 29.8 kg/m  as calculated from the following:    Height as of 6/21/21: 1.6 m (5' 3\").    Weight as of this encounter: 76.3 kg (168 lb 3.2 oz).      GEN: Vitals reviewed. Healthy appearing. Patient is in no acute distress. Cooperative with exam.  HEENT: Normocephalic atraumatic.  Eyes grossly normal to inspection.  No discharge or erythema, or obvious scleral/conjunctival abnormalities.   CV: Heart regular in rate and rhythm with no murmur.    LUNGS: No audible wheeze, cough, or visible cyanosis.  No visible retractions or increased work of breathing.  Lungs clear to auscultation bilaterally.    ABD: Nondistended  SKIN: Warm and dry to touch.  Visible skin clear. No significant rash, abnormal pigmentation or lesions.  EXT: No clubbing or cyanosis.  No peripheral edema.  PSYCH: Mood is good.  Mentation appears normal, affect normal/bright, judgement and insight intact, normal speech and appearance well-groomed.     ASSESSMENT AND PLAN:    Essential hypertension  -Given uncontrolled blood pressure today and borderline uncontrolled blood pressures at home, we will increase her losartan to 75 mg daily.  She can increase as she feels it is necessary to 100 mg at home.  She is to call with any side effects or concerns.  - losartan (COZAAR) 50 MG tablet  Dispense: 180 tablet; Refill: 0    Need for prophylactic vaccination and inoculation against influenza  - INFLUENZA, QUAD, HIGH DOSE, PF, 65YR + (FLUZONE HD)    Pain of right lower leg  -Improvement with physical therapy.  Continue at this time.  If she has recurrent symptoms we can consider further treatment.    Anemia, unspecified type  -Recheck hemoglobin and iron are normal today.  Continue to " monitor.  - Hemoglobin  - Ferritin           Return in about 4 weeks (around 11/15/2021) for BP Recheck.    31 minutes spent on the date of the encounter doing chart review, history and exam, documentation and further activities as noted above      CHRIS HORAN DO   10/18/2021 11:51 AM

## 2021-10-18 NOTE — NURSING NOTE
"Chief Complaint   Patient presents with     Follow Up     BP and back        FOOD SECURITY SCREENING QUESTIONS  Hunger Vital Signs:  Within the past 12 months we worried whether our food would run out before we got money to buy more. Never  Within the past 12 months the food we bought just didn't last and we didn't have money to get more. Never  Ashlye Ruffin LPN 10/18/2021 11:39 AM      Initial BP (!) 174/78 (BP Location: Right arm, Patient Position: Sitting, Cuff Size: Adult Regular)   Pulse 76   Temp 96.8  F (36  C) (Tympanic)   Resp 16   Wt 76.3 kg (168 lb 3.2 oz)   SpO2 97%   BMI 29.80 kg/m   Estimated body mass index is 29.8 kg/m  as calculated from the following:    Height as of 6/21/21: 1.6 m (5' 3\").    Weight as of this encounter: 76.3 kg (168 lb 3.2 oz).  Medication Reconciliation: complete    Ashley Ruffin LPN  "

## 2021-10-19 ENCOUNTER — HOSPITAL ENCOUNTER (OUTPATIENT)
Dept: PHYSICAL THERAPY | Facility: OTHER | Age: 86
Setting detail: THERAPIES SERIES
End: 2021-10-19
Attending: INTERNAL MEDICINE
Payer: MEDICARE

## 2021-10-19 PROCEDURE — 97110 THERAPEUTIC EXERCISES: CPT | Mod: GP

## 2021-10-19 PROCEDURE — 97140 MANUAL THERAPY 1/> REGIONS: CPT | Mod: GP

## 2021-10-21 ENCOUNTER — HOSPITAL ENCOUNTER (OUTPATIENT)
Dept: PHYSICAL THERAPY | Facility: OTHER | Age: 86
Setting detail: THERAPIES SERIES
End: 2021-10-21
Attending: INTERNAL MEDICINE
Payer: MEDICARE

## 2021-10-21 PROCEDURE — 97140 MANUAL THERAPY 1/> REGIONS: CPT | Mod: GP

## 2021-10-28 ENCOUNTER — IMMUNIZATION (OUTPATIENT)
Dept: FAMILY MEDICINE | Facility: OTHER | Age: 86
End: 2021-10-28
Attending: FAMILY MEDICINE
Payer: MEDICARE

## 2021-10-28 PROCEDURE — 91300 PR COVID VAC PFIZER DIL RECON 30 MCG/0.3 ML IM: CPT

## 2021-11-08 ENCOUNTER — HOSPITAL ENCOUNTER (OUTPATIENT)
Dept: PHYSICAL THERAPY | Facility: OTHER | Age: 86
Setting detail: THERAPIES SERIES
End: 2021-11-08
Attending: INTERNAL MEDICINE
Payer: MEDICARE

## 2021-11-08 PROCEDURE — 97112 NEUROMUSCULAR REEDUCATION: CPT | Mod: GP

## 2021-11-08 PROCEDURE — 97140 MANUAL THERAPY 1/> REGIONS: CPT | Mod: GP

## 2021-11-10 ENCOUNTER — HOSPITAL ENCOUNTER (OUTPATIENT)
Dept: PHYSICAL THERAPY | Facility: OTHER | Age: 86
Setting detail: THERAPIES SERIES
End: 2021-11-10
Attending: INTERNAL MEDICINE
Payer: MEDICARE

## 2021-11-10 PROCEDURE — 97140 MANUAL THERAPY 1/> REGIONS: CPT | Mod: GP

## 2021-11-13 NOTE — TELEPHONE ENCOUNTER
Dad called concerned that pt still has phone. This RN took pt's phone and placed with pt belongings. Pt calm and cooperative.    I would recommend considering physical therapy.  If interested we can place referral.  Otherwise she can try Naproxen 1 tablets 1-2 times daily for 3-5 days to see if this helps the pain however I would not recommend it much longer or ongoing due to concern for stomach and kidneys.  She should take this with food.

## 2021-11-15 ENCOUNTER — HOSPITAL ENCOUNTER (OUTPATIENT)
Dept: PHYSICAL THERAPY | Facility: OTHER | Age: 86
Setting detail: THERAPIES SERIES
End: 2021-11-15
Attending: INTERNAL MEDICINE
Payer: MEDICARE

## 2021-11-15 PROCEDURE — 97110 THERAPEUTIC EXERCISES: CPT | Mod: GP

## 2021-11-22 ENCOUNTER — OFFICE VISIT (OUTPATIENT)
Dept: INTERNAL MEDICINE | Facility: OTHER | Age: 86
End: 2021-11-22
Attending: INTERNAL MEDICINE
Payer: COMMERCIAL

## 2021-11-22 VITALS
OXYGEN SATURATION: 99 % | HEART RATE: 67 BPM | DIASTOLIC BLOOD PRESSURE: 80 MMHG | WEIGHT: 169 LBS | BODY MASS INDEX: 29.94 KG/M2 | TEMPERATURE: 96.9 F | SYSTOLIC BLOOD PRESSURE: 160 MMHG | RESPIRATION RATE: 16 BRPM

## 2021-11-22 DIAGNOSIS — R60.0 BILATERAL LOWER EXTREMITY EDEMA: ICD-10-CM

## 2021-11-22 DIAGNOSIS — N18.30 STAGE 3 CHRONIC KIDNEY DISEASE, UNSPECIFIED WHETHER STAGE 3A OR 3B CKD (H): ICD-10-CM

## 2021-11-22 DIAGNOSIS — I10 ESSENTIAL HYPERTENSION: Primary | ICD-10-CM

## 2021-11-22 DIAGNOSIS — D64.9 ANEMIA, UNSPECIFIED TYPE: ICD-10-CM

## 2021-11-22 PROCEDURE — 99214 OFFICE O/P EST MOD 30 MIN: CPT | Performed by: INTERNAL MEDICINE

## 2021-11-22 PROCEDURE — G0463 HOSPITAL OUTPT CLINIC VISIT: HCPCS

## 2021-11-22 RX ORDER — LOSARTAN POTASSIUM 100 MG/1
100 TABLET ORAL DAILY
Qty: 90 TABLET | Refills: 0 | Status: SHIPPED | OUTPATIENT
Start: 2021-11-22 | End: 2021-12-06

## 2021-11-22 ASSESSMENT — PAIN SCALES - GENERAL: PAINLEVEL: NO PAIN (0)

## 2021-11-22 NOTE — PROGRESS NOTES
Chief Complaint   Patient presents with     RECHECK         HPI: Ms. Mckeon is a 86 year old female who presents today for follow up of back pain and blood pressure.    She is doing well with PT and feels her right back and leg are better.  She feels things have improved significantly.    She continues to have elevated blood pressures.  At home they range between 125/62 and 155/72.  She is taking losartan 75 mg daily.  She denies any side effects that she is aware of.  She reports that her lower extremity edema has been much better off of amlodipine.    She is due for repeat labs for history of anemia.  She does have some fatigue and shortness of breath when she walks upstairs however denies any chest pain.  Her most recent hemoglobin was normal.  She has a history of chronic kidney disease and is due for recheck of these in the next month.      She  reports that she quit smoking about 53 years ago. She has never used smokeless tobacco.    Past medical history reviewed as below:     Past Medical History:   Diagnosis Date     Anemia 05/15/2013     CKD (chronic kidney disease) stage 3, GFR 30-59 ml/min (H) 2014    Stable. First noted when patient was taking NSAIDs regularly due to back pain.     Essential (primary) hypertension      Nonrheumatic mitral valve prolapse 2008    with normal echo and no residual prolapse     Nontoxic single thyroid nodule 2012    Patient with multinodular goiter. Negative cytology and stable ultrasound findings.     Osteopenia      Other fracture of unspecified lower leg, initial encounter for closed fracture      Pain in left hip 2017     Pregnancy      2, para 2 with 2 spontaneous vaginal deliveries     Pure hypercholesterolemia      Scoliosis      Spinal stenosis     with nerve root impingement     Squamous cell carcinoma    .      ROS  Pertinent ROS was performed and was negative, including for fever, chills, chest pain, increased lower extremity edema,  "changes in bowel or bladder, blood in the stool/black stools. No other concerns, with exception of HPI above.      EXAM:   BP (!) 160/80 (BP Location: Right arm, Patient Position: Sitting)   Pulse 67   Temp 96.9  F (36.1  C) (Tympanic)   Resp 16   Wt 76.7 kg (169 lb)   SpO2 99%   BMI 29.94 kg/m      Estimated body mass index is 29.94 kg/m  as calculated from the following:    Height as of 6/21/21: 1.6 m (5' 3\").    Weight as of this encounter: 76.7 kg (169 lb).      GEN: Vitals reviewed. Healthy appearing. Patient is in no acute distress. Cooperative with exam.  HEENT: Normocephalic atraumatic.  Eyes grossly normal to inspection.  No discharge or erythema, or obvious scleral/conjunctival abnormalities.   NECK: Supple; no thyromegaly or masses noted.  No cervical or supraclavicular lymphadenopathy.  CV: Heart regular in rate and rhythm with no murmur.    LUNGS: No audible wheeze, cough, or visible cyanosis.  No visible retractions or increased work of breathing.  Lungs clear to auscultation bilaterally.    ABD:  Nondistended  SKIN: Warm and dry to touch.  Visible skin clear. No significant rash, abnormal pigmentation or lesions.  EXT: No clubbing or cyanosis.  No peripheral edema.  PSYCH: Mood is good.  Mentation appears normal, affect normal/bright, judgement and insight intact, normal speech and appearance well-groomed.     ASSESSMENT AND PLAN:    Essential hypertension  Given her continued uncontrolled blood pressure we will increase her losartan to 100 mg daily.  She will follow-up in 2 weeks with a phone visit.  If it remains elevated at that time we will consider starting low-dose hydrochlorothiazide.  She would need labs done after this.  She is to call with any new or changing symptoms.  She is encouraged to check her blood pressure daily at least 1 hour after caffeine and after taking her medications in the morning.  - losartan (COZAAR) 100 MG tablet  Dispense: 90 tablet; Refill: 0    Bilateral lower " extremity edema  Improved off of amlodipine.  Added to allergy list.    Anemia, unspecified type  Plan for repeat labs and treatment if indicated.  - Hemoglobin  - Iron Binding Panel    Stage 3 chronic kidney disease, unspecified whether stage 3a or 3b CKD (H)  Plan for repeat renal function.  - Renal Function Panel           Return in about 2 weeks (around 12/6/2021) for BP Recheck.      CHRIS HORAN,    11/22/2021 10:25 AM

## 2021-11-22 NOTE — NURSING NOTE
Patient presents to clinic today for follow up on back and blood pressure. She says PT is going well and has no pain currently.  Medication reconciliation completed.    ACP on file? yes    FOOD SECURITY SCREENING QUESTIONS  Hunger Vital Signs:  Within the past 12 months we worried whether our food would run out before we got money to buy more. Never  Within the past 12 months the food we bought just didn't last and we didn't have money to get more. Never    Blank Aranda CMA([facility])..................11/22/2021   10:09 AM

## 2021-12-01 ENCOUNTER — TELEPHONE (OUTPATIENT)
Dept: INTERNAL MEDICINE | Facility: OTHER | Age: 86
End: 2021-12-01
Payer: COMMERCIAL

## 2021-12-01 NOTE — TELEPHONE ENCOUNTER
"She didn't feel right when she took the 100 mg of Losartan on Sunday, even though her blood pressure was 136/68. She was worried about driving when she took that so she was afraid to keep taking that much. She cut them in half and now on 50 mg her range is 177/92 to 151/77.She can't recall if she had any side effects when she was on a 50 mg and 25 mg together.    She says you discussed maybe starting a \"water pill\" instead. Would you be willing to try this?    Has recheck on Monday, 12/6/21.    Incidentally, her [employer] messages are not coming to the nursing pool, so I gave her the number to call [employer] people, but will have someone here check into it also.  Blank Aranda CMA([facility])..................12/1/2021   2:43 PM    "

## 2021-12-02 NOTE — TELEPHONE ENCOUNTER
She hasn't felt well today. 150/61 now. She will take 75 mg daily until Monday and see how she feels and what the numbers are.  Blank Aranda CMA([facility])..................12/2/2021   1:44 PM

## 2021-12-02 NOTE — TELEPHONE ENCOUNTER
I would recommend trying to take the 75 mg dose of losartan which is in between the 2 dosages. It appears she has an appointment with me on December 6. I would recommend continuing with 75 mg daily until she sees me and we can discuss a water pill or alternative therapy at that time.

## 2021-12-06 ENCOUNTER — VIRTUAL VISIT (OUTPATIENT)
Dept: INTERNAL MEDICINE | Facility: OTHER | Age: 86
End: 2021-12-06
Attending: INTERNAL MEDICINE
Payer: COMMERCIAL

## 2021-12-06 DIAGNOSIS — R07.9 CHEST PAIN, UNSPECIFIED TYPE: Primary | ICD-10-CM

## 2021-12-06 DIAGNOSIS — Z87.898 HISTORY OF ULCERATION: ICD-10-CM

## 2021-12-06 DIAGNOSIS — I10 ESSENTIAL HYPERTENSION: ICD-10-CM

## 2021-12-06 DIAGNOSIS — R07.9 CHEST PAIN, UNSPECIFIED TYPE: ICD-10-CM

## 2021-12-06 PROCEDURE — 99212 OFFICE O/P EST SF 10 MIN: CPT | Mod: 95 | Performed by: INTERNAL MEDICINE

## 2021-12-06 PROCEDURE — G0463 HOSPITAL OUTPT CLINIC VISIT: HCPCS | Mod: TEL,95

## 2021-12-06 RX ORDER — LOSARTAN POTASSIUM 50 MG/1
75 TABLET ORAL DAILY
Qty: 135 TABLET | Refills: 1 | Status: SHIPPED | OUTPATIENT
Start: 2021-12-06 | End: 2022-02-02

## 2021-12-06 RX ORDER — SUCRALFATE 1 G/1
1 TABLET ORAL 4 TIMES DAILY
Qty: 120 TABLET | Refills: 1 | Status: SHIPPED | OUTPATIENT
Start: 2021-12-06 | End: 2021-12-08

## 2021-12-06 NOTE — PROGRESS NOTES
"Vicki is a 86 year old who is being evaluated via a billable telephone visit.  Starting 11/23/21= 164/77, 153/71, 174/81, 151/64, 139/68, 136/58, 150/70, 177/92, 154/68, 151/77, 158/68, 150/61, 141/68, mkwgmyzps=606/81. The patient complained of chest pain. The patient states it is acid reflux. The patient denies back, arm or jaw pain. \"It is more of a discomfort then pain.\"     What phone number would you like to be contacted at? [phone]   How would you like to obtain your AVS? MyChart    Assessment & Plan     1. Essential hypertension  -At this time we will continue on 75 mg of losartan daily.  She is to monitor her blood pressure and if it remains greater than 140/90 she is to let us know and we will need to consider additional medication such as beta-blocker versus diuretic as she is intolerant to amlodipine.  - losartan (COZAAR) 50 MG tablet; Take 1.5 tablets (75 mg) by mouth daily  Dispense: 135 tablet; Refill: 1    2. Chest pain, unspecified type  Exact etiology is unknown.  Given multiple courses of normal cardiac testing and it is unlikely to be cardiac disease.  We will treat with sucralfate 4 times daily and follow-up in 6 to 8 weeks.  She is to come in sooner should she have any acute worsening  - sucralfate (CARAFATE) 1 GM tablet; Take 1 tablet (1 g) by mouth 4 times daily For acid reflux/gastritis  Dispense: 120 tablet; Refill: 1    3. History of ulceration  See above  - sucralfate (CARAFATE) 1 GM tablet; Take 1 tablet (1 g) by mouth 4 times daily For acid reflux/gastritis  Dispense: 120 tablet; Refill: 1    Return in about 6 weeks (around 1/17/2022) for Recheck.    Kirsten Guevara, [facility]    Subjective   Vicki is a 86 year old who presents for the following health issues:    She reports that her blood pressure overall has been slightly better on the losartan.  She tried losartan 100 mg daily and found that it caused significant weakness and fatigue.  She " "decreased it to 75 mg after talking with us here at the clinic and has found that this has been a little better.  Her blood pressures at home are as above.    She has had some chest discomfort.  This has been an ongoing issue.  She has undergone cardiac testing twice over the last 3 years that has all been negative.  She denies any new issues with this.    She has a history of duodenal ulceration.  She is curious as this or reflux is contributing to her chest discomfort.  She is on Protonix daily.  She denies any side effects from this.    Review of Systems   She denies any fevers or chills.      Objective    Vitals - Patient Reported  Systolic (Patient Reported): (!) 145  Diastolic (Patient Reported): 81  Weight (Patient Reported): 74.8 kg (165 lb)  Height (Patient Reported): 161.3 cm (5' 3.5\")  BMI (Based on Pt Reported Ht/Wt): 28.77  Pulse (Patient Reported): 64  Pain Score: No Pain (0) (\"Acid reflux\" \"discomfort in chest\")        Physical Exam   healthy, alert and no distress  PSYCH: Alert and oriented times 3; coherent speech, normal   rate and volume, able to articulate logical thoughts, able   to abstract reason, no tangential thoughts, no hallucinations   or delusions  Her affect is normal  RESP: No cough, no audible wheezing, able to talk in full sentences  Remainder of exam unable to be completed due to telephone visits              Phone call duration: 11 minutes  "

## 2021-12-08 ENCOUNTER — TRANSFERRED RECORDS (OUTPATIENT)
Dept: HEALTH INFORMATION MANAGEMENT | Facility: OTHER | Age: 86
End: 2021-12-08
Payer: COMMERCIAL

## 2021-12-08 RX ORDER — SUCRALFATE 1 G/1
TABLET ORAL
Qty: 360 TABLET | Refills: 0 | Status: SHIPPED | OUTPATIENT
Start: 2021-12-08 | End: 2022-05-05

## 2021-12-08 NOTE — TELEPHONE ENCOUNTER
Prescription refilled per RN Medication Refill Policy..................Raina Salas RN 12/8/2021 4:28 PM

## 2021-12-13 ENCOUNTER — LAB (OUTPATIENT)
Dept: LAB | Facility: OTHER | Age: 86
End: 2021-12-13
Attending: INTERNAL MEDICINE
Payer: MEDICARE

## 2021-12-13 DIAGNOSIS — N18.30 STAGE 3 CHRONIC KIDNEY DISEASE, UNSPECIFIED WHETHER STAGE 3A OR 3B CKD (H): ICD-10-CM

## 2021-12-13 DIAGNOSIS — D64.9 ANEMIA, UNSPECIFIED TYPE: ICD-10-CM

## 2021-12-13 LAB
ALBUMIN SERPL-MCNC: 4.2 G/DL (ref 3.5–5.7)
ANION GAP SERPL CALCULATED.3IONS-SCNC: 7 MMOL/L (ref 3–14)
BUN SERPL-MCNC: 25 MG/DL (ref 7–25)
CALCIUM SERPL-MCNC: 10.1 MG/DL (ref 8.6–10.3)
CHLORIDE BLD-SCNC: 107 MMOL/L (ref 98–107)
CO2 SERPL-SCNC: 25 MMOL/L (ref 21–31)
CREAT SERPL-MCNC: 1.24 MG/DL (ref 0.6–1.2)
GFR SERPL CREATININE-BSD FRML MDRD: 40 ML/MIN/1.73M2
GLUCOSE BLD-MCNC: 90 MG/DL (ref 70–105)
HGB BLD-MCNC: 11.4 G/DL (ref 11.7–15.7)
IRON SATN MFR SERPL: 34 % (ref 20–55)
IRON SERPL-MCNC: 111 UG/DL (ref 50–212)
PHOSPHATE SERPL-MCNC: 3.1 MG/DL (ref 2.5–5)
POTASSIUM BLD-SCNC: 4.7 MMOL/L (ref 3.5–5.1)
SODIUM SERPL-SCNC: 139 MMOL/L (ref 134–144)
TIBC SERPL-MCNC: 324.8 UG/DL (ref 245–400)
TRANSFERRIN SERPL-MCNC: 232 MG/DL (ref 203–362)
UIBC (UNSATURATED): 213.8 MG/DL

## 2021-12-13 PROCEDURE — 80069 RENAL FUNCTION PANEL: CPT | Mod: ZL

## 2021-12-13 PROCEDURE — 36415 COLL VENOUS BLD VENIPUNCTURE: CPT | Mod: ZL

## 2021-12-13 PROCEDURE — 85018 HEMOGLOBIN: CPT | Mod: ZL

## 2021-12-13 PROCEDURE — 83550 IRON BINDING TEST: CPT | Mod: ZL

## 2022-01-03 DIAGNOSIS — R07.9 CHEST PAIN, UNSPECIFIED TYPE: ICD-10-CM

## 2022-01-03 DIAGNOSIS — Z87.898 HISTORY OF ULCERATION: ICD-10-CM

## 2022-01-05 RX ORDER — SUCRALFATE 1 G/1
TABLET ORAL
Qty: 360 TABLET | Refills: 0 | OUTPATIENT
Start: 2022-01-05

## 2022-01-05 NOTE — TELEPHONE ENCOUNTER
sucralfate (CARAFATE) 1 GM tablet 360 tablet 0 12/8/2021  No   Sig: TAKE 1 TABLET(1 GRAM) BY MOUTH FOUR TIMES DAILY FOR ACID REFLUX OR GASTRITIS   Sent to pharmacy as: Sucralfate 1 GM Oral Tablet (CARAFATE)   Class: E-Prescribe   Notes to Pharmacy: **Patient requests 90 days supply**   Order: [id number]   E-Prescribing Status: Receipt confirmed by pharmacy (12/8/2021  4:29 PM CST)     Redundant refill request refused: Too soon:    Unable to complete prescription refill per RN Medication Refill Policy.................... Ashley Mccurdy RN ....................  1/5/2022   9:42 AM

## 2022-01-13 ENCOUNTER — HOSPITAL ENCOUNTER (OUTPATIENT)
Dept: PHYSICAL THERAPY | Facility: OTHER | Age: 87
Setting detail: THERAPIES SERIES
End: 2022-01-13
Attending: INTERNAL MEDICINE
Payer: MEDICARE

## 2022-01-13 PROCEDURE — 97112 NEUROMUSCULAR REEDUCATION: CPT | Mod: GP

## 2022-01-13 PROCEDURE — 97140 MANUAL THERAPY 1/> REGIONS: CPT | Mod: GP

## 2022-01-13 NOTE — PROGRESS NOTES
[facility]    OUTPATIENT PHYSICAL THERAPY  PLAN OF TREATMENT FOR OUTPATIENT REHABILITATION AND PROGRESS NOTE           Patient's Last Name, First Name, Vicki Soto Date of Birth  1935   Provider's Name  [facility] Medical Record No.  [id number]    Onset Date  8/20/21 Start of Care Date  10/5/21   Type:     _X_PT   ___OT   ___SLP Medical Diagnosis  Pain in right leg   PT Diagnosis  back pain, right leg pain, muscle weakness Plan of Treatment  Frequency/Duration: 1 timer per week, 3 months  Certification date from 1/6/22 to 4/5/22     manual therapy, neuromuscular re-education, strengthening, stretching      Goals:  Goal Identifier walking   Goal Description Patient to report ability to walk 1000 feet without muscle fatigue to allow greater ease of shopping with or without assistive dievice.    Target Date 01/05/22   Date Met      Progress (detail required for progress note):  Increased ease of walking over all but recent set back requiring more use of walking stick to stabilize due to pain      Goal Identifier bed moibility   Goal Description Patient to tolerate rolling in bed without pain.    Target Date 01/05/22   Date Met  1/5/22   Progress (detail required for progress note):       Goal Identifier pain   Goal Description Patient to report reduction in pain to 5/10 or less during functional activities.    Target Date 01/05/22   Date Met      Progress (detail required for progress note):  progressing     Due to history of severe scoliosis, failed lumbar fusion with broken rods, patient has episodes of increased pain and dysfunction.       Beginning/End Dates of Progress Note Reporting Period:  10/5/21 to 1/13/22    Client Self (Subjective) Report for Progress Note Reporting Period: Having more right hip/leg pain; still has more pain when in bed and  needs to urinate- will wake her at night. Has been worsening over the last two weeks; humid weather is always an issue. Also a pain in the upper and mid back. Walking has been more painful, has a walking stick with her today.     Objective Measurements:   Objective Measure: postural loading  Details: limited loading right shoulder, left and right pelvis; general listening while seated to anterior left pelvis; local listening to bladder with extended listening to pubovesical ligament left side.   Objective Measure: joint  Details: + FFT left, inferior left pubic shear, FRS right T1, FRS right T7  Objective Measure: myofascial  Details: pubovesical ligaments               I CERTIFY THE NEED FOR THESE SERVICES FURNISHED UNDER        THIS PLAN OF TREATMENT AND WHILE UNDER MY CARE     (Physician co-signature of this document indicates review and certification of the therapy plan).                Referring Provider: DO Karen Dawkins, PT

## 2022-01-17 ENCOUNTER — HOSPITAL ENCOUNTER (OUTPATIENT)
Dept: PHYSICAL THERAPY | Facility: OTHER | Age: 87
Setting detail: THERAPIES SERIES
End: 2022-01-17
Attending: INTERNAL MEDICINE
Payer: MEDICARE

## 2022-01-17 PROCEDURE — 97112 NEUROMUSCULAR REEDUCATION: CPT | Mod: GP

## 2022-01-17 PROCEDURE — 97140 MANUAL THERAPY 1/> REGIONS: CPT | Mod: GP

## 2022-01-27 ENCOUNTER — HOSPITAL ENCOUNTER (OUTPATIENT)
Dept: PHYSICAL THERAPY | Facility: OTHER | Age: 87
Setting detail: THERAPIES SERIES
End: 2022-01-27
Attending: INTERNAL MEDICINE
Payer: MEDICARE

## 2022-01-27 PROCEDURE — 97140 MANUAL THERAPY 1/> REGIONS: CPT | Mod: GP

## 2022-02-02 ENCOUNTER — ALLIED HEALTH/NURSE VISIT (OUTPATIENT)
Dept: FAMILY MEDICINE | Facility: OTHER | Age: 87
End: 2022-02-02
Attending: INTERNAL MEDICINE
Payer: MEDICARE

## 2022-02-02 ENCOUNTER — OFFICE VISIT (OUTPATIENT)
Dept: INTERNAL MEDICINE | Facility: OTHER | Age: 87
End: 2022-02-02
Attending: INTERNAL MEDICINE
Payer: MEDICARE

## 2022-02-02 ENCOUNTER — HOSPITAL ENCOUNTER (OUTPATIENT)
Dept: PHYSICAL THERAPY | Facility: OTHER | Age: 87
Setting detail: THERAPIES SERIES
End: 2022-02-02
Attending: INTERNAL MEDICINE
Payer: MEDICARE

## 2022-02-02 DIAGNOSIS — M85.80 OSTEOPENIA, UNSPECIFIED LOCATION: Primary | ICD-10-CM

## 2022-02-02 DIAGNOSIS — D64.9 ANEMIA, UNSPECIFIED TYPE: ICD-10-CM

## 2022-02-02 DIAGNOSIS — N18.30 STAGE 3 CHRONIC KIDNEY DISEASE, UNSPECIFIED WHETHER STAGE 3A OR 3B CKD (H): ICD-10-CM

## 2022-02-02 DIAGNOSIS — D50.0 IRON DEFICIENCY ANEMIA DUE TO CHRONIC BLOOD LOSS: ICD-10-CM

## 2022-02-02 DIAGNOSIS — I10 ESSENTIAL HYPERTENSION: Primary | ICD-10-CM

## 2022-02-02 LAB
CALCIUM SERPL-MCNC: 10.6 MG/DL (ref 8.6–10.3)
CREAT SERPL-MCNC: 1.43 MG/DL (ref 0.6–1.2)
GFR SERPL CREATININE-BSD FRML MDRD: 36 ML/MIN/1.73M2
HGB BLD-MCNC: 11.9 G/DL (ref 11.7–15.7)
PHOSPHATE SERPL-MCNC: 3.5 MG/DL (ref 2.5–5)

## 2022-02-02 PROCEDURE — 82310 ASSAY OF CALCIUM: CPT | Mod: ZL | Performed by: INTERNAL MEDICINE

## 2022-02-02 PROCEDURE — G0463 HOSPITAL OUTPT CLINIC VISIT: HCPCS | Mod: 25

## 2022-02-02 PROCEDURE — 96372 THER/PROPH/DIAG INJ SC/IM: CPT | Performed by: INTERNAL MEDICINE

## 2022-02-02 PROCEDURE — G0463 HOSPITAL OUTPT CLINIC VISIT: HCPCS

## 2022-02-02 PROCEDURE — 84100 ASSAY OF PHOSPHORUS: CPT | Mod: ZL | Performed by: INTERNAL MEDICINE

## 2022-02-02 PROCEDURE — 97140 MANUAL THERAPY 1/> REGIONS: CPT | Mod: GP

## 2022-02-02 PROCEDURE — 85018 HEMOGLOBIN: CPT | Mod: ZL

## 2022-02-02 PROCEDURE — 82565 ASSAY OF CREATININE: CPT | Mod: ZL

## 2022-02-02 PROCEDURE — 36415 COLL VENOUS BLD VENIPUNCTURE: CPT | Mod: ZL | Performed by: INTERNAL MEDICINE

## 2022-02-02 PROCEDURE — 250N000011 HC RX IP 250 OP 636: Performed by: INTERNAL MEDICINE

## 2022-02-02 PROCEDURE — 99214 OFFICE O/P EST MOD 30 MIN: CPT | Performed by: INTERNAL MEDICINE

## 2022-02-02 RX ORDER — LOSARTAN POTASSIUM 100 MG/1
100 TABLET ORAL DAILY
Qty: 90 TABLET | Refills: 1 | Status: SHIPPED | OUTPATIENT
Start: 2022-02-02 | End: 2022-06-23

## 2022-02-02 RX ADMIN — DENOSUMAB 60 MG: 60 INJECTION SUBCUTANEOUS at 14:39

## 2022-02-02 ASSESSMENT — PAIN SCALES - GENERAL: PAINLEVEL: NO PAIN (0)

## 2022-02-02 NOTE — PROGRESS NOTES
Assessment & Plan     Stage 3 chronic kidney disease, unspecified whether stage 3a or 3b CKD (H)  -Kidney function is slightly worse than prior although overall stable from last year.  Strongly encouraged to increase water intake and avoid nonsteroidal anti-inflammatory medications    Essential hypertension  Blood pressures appear well controlled.  Continue on current medication, prescription sent 10  - losartan (COZAAR) 100 MG tablet; Take 1 tablet (100 mg) by mouth daily    Anemia, unspecified type  -Labs reviewed from today and hemoglobin has improved    Return in about 4 months (around 6/2/2022) for Annual Review with renewal of all medications.    Kirsten Guevara, [facility]    Moses Cohen is a 86 year old who presents for the following health issues:    She is feeling pretty good.  She stopped the PPI due to concerns for side effects and feels this has improved her symptoms.      She has episodes of SOB that are lasting a few seconds.  This is with both rest and activities.  She has not had any chest pain, dizziness, nausea with this.      She has a history of hypertension.  She has been on losartan 100 mg most recently.  Her blood pressures at home have mostly been between 117/59 and 134/69.  They are regularly higher.    HPI       Review of Systems   She denies any fever or chills      Objective    BP (!) 147/66   Pulse 66   Temp 98.2  F (36.8  C)   Resp 18   Wt 76.8 kg (169 lb 4.8 oz)   SpO2 95%   BMI 29.99 kg/m    Body mass index is 29.99 kg/m .  Physical Exam   GEN: Vitals reviewed. Healthy appearing. Patient is in no acute distress. Cooperative with exam.  HEENT: Normocephalic atraumatic.  Eyes grossly normal to inspection.  No discharge or erythema, or obvious scleral/conjunctival abnormalities. .  CV: Heart regular in rate and rhythm with no murmur.    LUNGS: No audible wheeze, cough, or visible cyanosis.  No visible retractions or increased work of breathing.   Lungs clear to auscultation bilaterally.

## 2022-02-02 NOTE — PROGRESS NOTES
Specialty Medication: Prolia      Verified patient's first and last name, and . Patient stated reason for visit today is to receive a Prolia injection. Patient denied any concerns with previous injections. Prolia removed from [employer] in Procedure room, allowed to sit out for 15 mins prior to administration. Prolia administered SubQ, as ordered. Administration of medication documented in MAR (see MAR for further information regarding dose, lot #, NDC #, expiration date). Patient encouraged to wait in lobby for 15 minutes post-injection and notify staff immediately of any reaction.     Harini Giordano RN ....................  2022   2:51 PM

## 2022-02-02 NOTE — NURSING NOTE
"Chief Complaint   Patient presents with     Recheck Medication     polia        Initial BP (!) 172/76 (BP Location: Right arm, Patient Position: Sitting, Cuff Size: Adult Regular)   Pulse 77   Temp 98.2  F (36.8  C)   Resp 18   Wt 76.8 kg (169 lb 4.8 oz)   SpO2 95%   BMI 29.99 kg/m   Estimated body mass index is 29.99 kg/m  as calculated from the following:    Height as of 6/21/21: 1.6 m (5' 3\").    Weight as of this encounter: 76.8 kg (169 lb 4.8 oz).  Medication Reconciliation: complete    Jaci Almanza, RN  "

## 2022-02-03 VITALS
OXYGEN SATURATION: 95 % | HEART RATE: 66 BPM | BODY MASS INDEX: 29.99 KG/M2 | RESPIRATION RATE: 18 BRPM | DIASTOLIC BLOOD PRESSURE: 66 MMHG | WEIGHT: 169.3 LBS | SYSTOLIC BLOOD PRESSURE: 147 MMHG | TEMPERATURE: 98.2 F

## 2022-02-18 DIAGNOSIS — I10 ESSENTIAL HYPERTENSION: ICD-10-CM

## 2022-02-18 RX ORDER — LOSARTAN POTASSIUM 100 MG/1
TABLET ORAL
Qty: 90 TABLET | Refills: 1 | OUTPATIENT
Start: 2022-02-18

## 2022-02-18 NOTE — TELEPHONE ENCOUNTER
Disp Refills Start End KIRA   losartan (COZAAR) 100 MG tablet 90 tablet 1 2/2/2022  No   Sig - Route: Take 1 tablet (100 mg) by mouth daily - Oral     Redundant refill request refused: Too soon: Informed pharmacy to request refill at [id number]. Heidy Osborne RN  ....................  2/18/2022   11:06 AM

## 2022-03-07 ENCOUNTER — HOSPITAL ENCOUNTER (OUTPATIENT)
Dept: PHYSICAL THERAPY | Facility: OTHER | Age: 87
Setting detail: THERAPIES SERIES
End: 2022-03-07
Attending: INTERNAL MEDICINE
Payer: MEDICARE

## 2022-03-07 PROCEDURE — 97140 MANUAL THERAPY 1/> REGIONS: CPT | Mod: GP

## 2022-03-07 PROCEDURE — 97112 NEUROMUSCULAR REEDUCATION: CPT | Mod: GP

## 2022-03-17 ENCOUNTER — HOSPITAL ENCOUNTER (OUTPATIENT)
Dept: PHYSICAL THERAPY | Facility: OTHER | Age: 87
Setting detail: THERAPIES SERIES
Discharge: HOME | End: 2022-03-17
Attending: INTERNAL MEDICINE
Payer: MEDICARE

## 2022-03-17 PROCEDURE — 97112 NEUROMUSCULAR REEDUCATION: CPT | Mod: GP

## 2022-03-17 PROCEDURE — 97140 MANUAL THERAPY 1/> REGIONS: CPT | Mod: GP

## 2022-03-28 ENCOUNTER — ALLIED HEALTH/NURSE VISIT (OUTPATIENT)
Dept: FAMILY MEDICINE | Facility: OTHER | Age: 87
End: 2022-03-28
Attending: FAMILY MEDICINE
Payer: MEDICARE

## 2022-03-28 DIAGNOSIS — R05.9 COUGH: Primary | ICD-10-CM

## 2022-03-28 PROCEDURE — U0005 INFEC AGEN DETEC AMPLI PROBE: HCPCS | Mod: ZL

## 2022-03-28 PROCEDURE — C9803 HOPD COVID-19 SPEC COLLECT: HCPCS

## 2022-03-28 NOTE — PROGRESS NOTES
Patient here today for Covid test. symptoms are feeling tired and having a cough.     Lisa Rob CNA .............................on 3/28/2022 at 11:26 AM

## 2022-03-29 ENCOUNTER — TELEPHONE (OUTPATIENT)
Dept: INTERNAL MEDICINE | Facility: OTHER | Age: 87
End: 2022-03-29
Payer: COMMERCIAL

## 2022-03-29 LAB — SARS-COV-2 RNA RESP QL NAA+PROBE: NEGATIVE

## 2022-03-29 NOTE — TELEPHONE ENCOUNTER
Called and spoke to Patient after verifying last name and date of birth. Pt informed of NEGATIVE Covid result. Anahi Sr RN .............. 3/29/2022  3:22 PM

## 2022-03-29 NOTE — TELEPHONE ENCOUNTER
Pt is looking for results from covid test.  Please call    Braxton Arcos on 3/29/2022 at 3:17 PM

## 2022-04-14 ENCOUNTER — HOSPITAL ENCOUNTER (OUTPATIENT)
Dept: PHYSICAL THERAPY | Facility: OTHER | Age: 87
Setting detail: THERAPIES SERIES
Discharge: HOME | End: 2022-04-14
Attending: INTERNAL MEDICINE
Payer: MEDICARE

## 2022-04-14 PROCEDURE — 97140 MANUAL THERAPY 1/> REGIONS: CPT | Mod: GP

## 2022-04-14 PROCEDURE — 97112 NEUROMUSCULAR REEDUCATION: CPT | Mod: GP

## 2022-04-14 NOTE — PROGRESS NOTES
[facility]    OUTPATIENT PHYSICAL THERAPY  PLAN OF TREATMENT FOR OUTPATIENT REHABILITATION AND PROGRESS NOTE           Patient's Last Name, First Name, Vicki Soto Date of Birth  1935   Provider's Name  [facility] Medical Record No.  [id number]    Onset Date  8-20-21 Start of Care Date  10/5/21   Type:     _X_PT   ___OT   ___SLP Medical Diagnosis  Pain in right leg   PT Diagnosis  back pain, right leg pain, muscle weakness Plan of Treatment  Frequency/Duration: Continue PT 1 time per week for 3 additional months. Continue with manual therapy, N-M re-education, therapeutic exercise and HEP.  Certification date from 4-5-22 to 7-1-22     Goals:  Goal Identifier walking   Goal Description Patient to report ability to walk 1000 feet without muscle fatigue to allow greater ease of shopping with or without assistive dievice.    Target Date 01/05/22   Date Met      Progress (detail required for progress note): Notes she has not been going out walking or shopping due to the poor weather. Does fine with a cart in the store     Goal Identifier bed moibility   Goal Description Patient to tolerate rolling in bed without pain.    Target Date 01/05/22   Date Met      Progress (detail required for progress note): Pain wakes her up if bladder being full; otherwise no pain with rolling.     Goal Identifier pain   Goal Description Patient to report reduction in pain to 5/10 or less during functional activities.    Target Date 01/05/22   Date Met      Progress (detail required for progress note): Pain level is 0/10 at rest. Pain is 2/10 when up and walking. Still at a 7/10 at night when her bladder is full.           Beginning/End Dates of Progress Note Reporting Period:  1-13-22 to 4/14/2022     Progress Toward Goals:   Less pain with rolling and with walking.      Client Self (Subjective) Report for Progress Note Reporting Period: Pt continues to be followed in PT due to back pain, right leg pain, muscle weakness. Notes that she is struggling a little with the cold and rainy/snowy weather--it always makes her pain worse and she also does not get out walkting. Notes that her pain ranges from 0 at rest  up as high as 7/10 in the night when her bladder is full. Once she is up to the bathroom, her pain is gone and she is able to get back to sleep.        Objective Measurements:   Objective Measure: postural loading  Details: Postural loading limited R shoulder, R pelvis. Seated listening to right anterior pelvis.   Objective Measure: joint  Details: L superior pubic shear--very mild  Objective Measure: myofascial  Details: Local listening to the R side of the bladder with extended listening to the L side of the bladder, obturator fascia and hip adducctors L. Specific fascial restrictions noted in the R>L pubovesical ligaments, R Round ligament, obturator fascia, urachus and the R bladder wall as well as the L obturator fascai, L hip adductors today. Significant myofascial restriction in the thoracic inlet, upper traps, LS with restrictions with inferior thoracic loading and with necklace technique.                     I CERTIFY THE NEED FOR THESE SERVICES FURNISHED UNDER        THIS PLAN OF TREATMENT AND WHILE UNDER MY CARE     (Physician co-signature of this document indicates review and certification of the therapy plan).                Referring Provider: DO Marina Dawkins, PT

## 2022-04-20 ENCOUNTER — HOSPITAL ENCOUNTER (OUTPATIENT)
Dept: PHYSICAL THERAPY | Facility: OTHER | Age: 87
Setting detail: THERAPIES SERIES
Discharge: HOME | End: 2022-04-20
Attending: INTERNAL MEDICINE
Payer: MEDICARE

## 2022-04-20 PROCEDURE — 97112 NEUROMUSCULAR REEDUCATION: CPT | Mod: GP

## 2022-04-20 PROCEDURE — 97140 MANUAL THERAPY 1/> REGIONS: CPT | Mod: GP

## 2022-04-28 ENCOUNTER — HOSPITAL ENCOUNTER (OUTPATIENT)
Dept: PHYSICAL THERAPY | Facility: OTHER | Age: 87
Setting detail: THERAPIES SERIES
Discharge: HOME | End: 2022-04-28
Attending: INTERNAL MEDICINE
Payer: MEDICARE

## 2022-04-28 PROCEDURE — 97112 NEUROMUSCULAR REEDUCATION: CPT | Mod: GP

## 2022-04-28 PROCEDURE — 97140 MANUAL THERAPY 1/> REGIONS: CPT | Mod: GP

## 2022-05-05 ENCOUNTER — OFFICE VISIT (OUTPATIENT)
Dept: UROLOGY | Facility: OTHER | Age: 87
End: 2022-05-05
Attending: UROLOGY
Payer: MEDICARE

## 2022-05-05 ENCOUNTER — OFFICE VISIT (OUTPATIENT)
Dept: INTERNAL MEDICINE | Facility: OTHER | Age: 87
End: 2022-05-05
Attending: INTERNAL MEDICINE
Payer: COMMERCIAL

## 2022-05-05 VITALS
BODY MASS INDEX: 29.33 KG/M2 | HEART RATE: 66 BPM | WEIGHT: 165.6 LBS | OXYGEN SATURATION: 98 % | RESPIRATION RATE: 16 BRPM | SYSTOLIC BLOOD PRESSURE: 138 MMHG | DIASTOLIC BLOOD PRESSURE: 84 MMHG

## 2022-05-05 VITALS
TEMPERATURE: 96.6 F | OXYGEN SATURATION: 98 % | SYSTOLIC BLOOD PRESSURE: 138 MMHG | DIASTOLIC BLOOD PRESSURE: 84 MMHG | BODY MASS INDEX: 29.34 KG/M2 | HEIGHT: 63 IN | WEIGHT: 165.6 LBS | HEART RATE: 66 BPM | RESPIRATION RATE: 16 BRPM

## 2022-05-05 DIAGNOSIS — R06.09 DYSPNEA ON EXERTION: Primary | ICD-10-CM

## 2022-05-05 DIAGNOSIS — N32.81 OVERACTIVE BLADDER: Primary | ICD-10-CM

## 2022-05-05 DIAGNOSIS — R07.9 CHEST PAIN, UNSPECIFIED TYPE: ICD-10-CM

## 2022-05-05 DIAGNOSIS — Z87.898 HISTORY OF ULCERATION: ICD-10-CM

## 2022-05-05 LAB
ALBUMIN UR-MCNC: 10 MG/DL
APPEARANCE UR: CLEAR
BACTERIA #/AREA URNS HPF: ABNORMAL /HPF
BILIRUB UR QL STRIP: NEGATIVE
COLOR UR AUTO: ABNORMAL
GLUCOSE UR STRIP-MCNC: NEGATIVE MG/DL
HGB UR QL STRIP: NEGATIVE
HYALINE CASTS: 1 /LPF
KETONES UR STRIP-MCNC: NEGATIVE MG/DL
LEUKOCYTE ESTERASE UR QL STRIP: ABNORMAL
MUCOUS THREADS #/AREA URNS LPF: PRESENT /LPF
NITRATE UR QL: NEGATIVE
PH UR STRIP: 5 [PH] (ref 5–9)
RBC URINE: 2 /HPF
SP GR UR STRIP: 1.02 (ref 1–1.03)
SQUAMOUS EPITHELIAL: 9 /HPF
UROBILINOGEN UR STRIP-MCNC: NORMAL MG/DL
WBC URINE: 28 /HPF

## 2022-05-05 PROCEDURE — G0463 HOSPITAL OUTPT CLINIC VISIT: HCPCS

## 2022-05-05 PROCEDURE — 51798 US URINE CAPACITY MEASURE: CPT | Performed by: UROLOGY

## 2022-05-05 PROCEDURE — 99203 OFFICE O/P NEW LOW 30 MIN: CPT | Performed by: UROLOGY

## 2022-05-05 PROCEDURE — G0463 HOSPITAL OUTPT CLINIC VISIT: HCPCS | Mod: 25

## 2022-05-05 PROCEDURE — 99214 OFFICE O/P EST MOD 30 MIN: CPT | Performed by: INTERNAL MEDICINE

## 2022-05-05 PROCEDURE — 81001 URINALYSIS AUTO W/SCOPE: CPT | Mod: ZL | Performed by: UROLOGY

## 2022-05-05 RX ORDER — SUCRALFATE 1 G/1
TABLET ORAL
Qty: 360 TABLET | Refills: 0 | COMMUNITY
Start: 2022-05-05 | End: 2022-06-23

## 2022-05-05 ASSESSMENT — PAIN SCALES - GENERAL
PAINLEVEL: NO PAIN (0)
PAINLEVEL: MILD PAIN (2)

## 2022-05-05 NOTE — PATIENT INSTRUCTIONS
- avoid caffeine, NSAIDS, chocolate, alcohol, spicy food, red sauce; consider raising the head of bed 10-15 degrees; do not eat within 2-3 hours of bedtime    - can trial the following medications:    Famotidine (Pepcid) 20mg-40mg twice daily 20-30 minutes before eating as needed        Lifestyle Changes for Controlling GERD  When you have GERD, stomach acid feels as if it s backing up toward your mouth. Whether or not you take medicine to control your GERD, your symptoms can often be improved with lifestyle changes. Talk to your healthcare provider about the following suggestions. These suggestions may help you get relief from your symptoms.      Raise your head  Reflux is more likely to strike when you re lying down flat, because stomach fluid can flow backward more easily. Raising the head of your bed 4 to 6 inches can help. To do this:  Slide blocks or books under the legs at the head of your bed. Or, place a wedge under the mattress. Many [employer] can make a suitable wedge for you. The wedge should run from your waist to the top of your head.  Don t just prop your head on several pillows. This increases pressure on your stomach. It can make GERD worse.  Watch your eating habits  Certain foods may increase the acid in your stomach or relax the lower esophageal sphincter. This makes GERD more likely. It s best to avoid the following if they cause you symptoms:  Coffee, tea, and carbonated drinks (with and without caffeine)  Fatty, fried, or spicy food  Mint, chocolate, onions, and tomatoes  Peppermint  Any other foods that seem to irritate your stomach or cause you pain  Relieve the pressure  Tips include the following:  Eat smaller meals, even if you have to eat more often.  Don t lie down right after you eat. Wait a few hours for your stomach to empty.  Avoid tight belts and tight-fitting clothes.  Lose excess weight.  Tobacco and alcohol  Avoid smoking tobacco and drinking alcohol. They can make GERD  symptoms worse.  Date Last Reviewed: 7/1/2016 2000-2017 [employer]. [address] [place] [address], [place] [address]. All rights reserved. This information is not intended as a substitute for professional medical care. Always follow your healthcare professional's instructions.

## 2022-05-05 NOTE — PROGRESS NOTES
"  Assessment & Plan     Dyspnea on exertion  -At this time we will try Combivent 4 times daily.  If she continues to have symptoms she can come in for a repeat hemoglobin test.  - ipratropium-albuterol (COMBIVENT RESPIMAT)  MCG/ACT inhaler; Inhale 1 puff into the lungs 4 times daily  - CBC W PLT No Diff; Future    Chest pain, unspecified type  -Further with chest discomfort with history of ulceration we will trial sucralfate  - sucralfate (CARAFATE) 1 GM tablet; TAKE 1 TABLET(1 GRAM) BY MOUTH FOUR TIMES DAILY FOR ACID REFLUX OR GASTRITIS    History of ulceration  - sucralfate (CARAFATE) 1 GM tablet; TAKE 1 TABLET(1 GRAM) BY MOUTH FOUR TIMES DAILY FOR ACID REFLUX OR GASTRITIS    BMI:   Estimated body mass index is 29.33 kg/m  as calculated from the following:    Height as of this encounter: 1.6 m (5' 3\").    Weight as of this encounter: 75.1 kg (165 lb 9.6 oz).   Weight management plan: Discussed healthy diet and exercise guidelines    Return in about 1 month (around 6/5/2022) for as scheduled.     30 minutes spent on the date of the encounter doing chart review, history and exam, documentation and further activities as noted above    Kirsten Guevara, [facility]    Moses Cohen is a 86 year old who presents for the following health issues:    She reports she has continued to have some dyspnea and chest discomfort.  See below.  A year ago when she was having the symptoms she had an echocardiogram, stress test and CTA coronary artery which were all essentially normal.  PFTs show minimal pulmonary disease.    She has a history of ulceration.    History of Present Illness       Back Pain:  She presents for follow up of back pain. Patient's back pain is a chronic problem.  Location of back pain:  Right hip  Description of back pain: dull ache  Back pain spreads: right buttocks    Since patient first noticed back pain, pain is: gradually improving  Does back pain interfere with her " "job:  Not applicable      Reason for visit:  Chest discomfort  Symptom onset:  More than a month  Symptoms include:  Shortness of breath and feeling tired  Symptom intensity:  Moderate  Symptom progression:  Improving  Had these symptoms before:  Yes    She eats 2-3 servings of fruits and vegetables daily.She consumes 1 sweetened beverage(s) daily.She exercises with enough effort to increase her heart rate 9 or less minutes per day.    She is taking medications regularly.     Review of Systems   Denies any fevers, chills.       Objective    /84   Pulse 66   Temp (!) 96.6  F (35.9  C) (Tympanic)   Resp 16   Ht 1.6 m (5' 3\")   Wt 75.1 kg (165 lb 9.6 oz)   SpO2 98%   BMI 29.33 kg/m    Body mass index is 29.33 kg/m .  Physical Exam   GEN: Vitals reviewed. Healthy appearing. Patient is in no acute distress. Cooperative with exam.  HEENT: Normocephalic atraumatic.  Eyes grossly normal to inspection.  No discharge or erythema, or obvious scleral/conjunctival abnormalities.   LUNGS: No audible wheeze, cough, or visible cyanosis.  No visible retractions or increased work of breathing.    SKIN: Warm and dry to touch.  Visible skin clear. No significant rash, abnormal pigmentation or lesions.        "

## 2022-05-05 NOTE — NURSING NOTE
Chief Complaint   Patient presents with     Consult     Kidney disease pain on right side   Patient presents to the clinic today for a consult for kidney disease and pain on the right side    Review of Systems:    Weight loss:    No     Recent fever/chills:  No   Night sweats:   No  Current skin rash:  No   Recent hair loss:  Yes   Heat intolerance:  Yes   Cold intolerance:  Yes  Chest pain:   Yes   Palpitations:   No  Shortness of breath:  Yes   Wheezing:   No  Constipation:    No   Diarrhea:   No   Nausea:   No   Vomiting:   No   Kidney/side pain:  No   Back pain:   Yes  Frequent headaches:  No   Dizziness:     No  Leg swelling:   Yes   Calf pain:    No    Parents, brothers or sisters with history of kidney cancer:   yes  Parents, brothers or sisters with history of bladder cancer: No    Post-Void Residual  A post-void residual was measured by ultrasonic bladder scanner.  2 mL  Susie Salgado LPN  5/5/2022 2:23 PM    Medication Reconciliation: completed   Susie Salgado LPN  5/5/2022 2:08 PM

## 2022-05-05 NOTE — NURSING NOTE
Patient presents to clinic today for follow up on possible GERD or upper chest pressure.    Medication reconciliation completed.    ACP on file? yes    FOOD SECURITY SCREENING QUESTIONS    The next two questions are to help us understand your food security.  If you are feeling you need any assistance in this area, we have resources available to support you today.    Hunger Vital Signs:  Within the past 12 months we worried whether our food would run out before we got money to buy more. Never  Within the past 12 months the food we bought just didn't last and we didn't have money to get more. Never    Blank Aranda CMA(AAMA)..................5/5/2022   4:01 PM

## 2022-05-05 NOTE — PROGRESS NOTES
Type of Visit  NPV    Chief Complaint  Right flank pain    HPI  Ms. Mckeon is a 86 year old female who presents with right flank pain.  She has a longstanding history of back surgery, spinal stenosis, osteopenia and scoliosis.  She states that she occasionally develops right flank pain that radiates down her right leg.  It is most noticeable at night.  If she voids at times she notices the pain may improve.  She denies gross hematuria, dysuria, fevers or other urologic symptoms.  She describes the pain as low-grade.  She has no history of kidney stones.      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  Patient Active Problem List   Diagnosis     Hypertension     Osteopenia     Stage 3 chronic kidney disease (H)     White coat syndrome with hypertension     Iron deficiency anemia due to chronic blood loss     Spinal stenosis of lumbar region without neurogenic claudication     Actinic keratosis       Past Surgical History  She  has a past surgical history that includes Biopsy breast (Left); Laparoscopy diagnostic (gyn); Closed reduction ankle (04/2001); Extracapsular cataract extration with intraocular lens implant (Bilateral, 2008); Colonoscopy (2004); back surgery (10/2011); back surgery (09/18/2019); back surgery (10/2011); examegd (12/11/2019); Esophagoscopy, gastroscopy, duodenoscopy (EGD), combined (N/A, 12/11/2019); and colonoscopy (2014).    Medications  She has a current medication list which includes the following prescription(s): vitamin d3, vitamin b-12,  losartan, and sucralfate.    Allergies  Allergies   Allergen Reactions     Amlodipine Swelling     LE edema     Fosamax [Alendronic Acid]      palpitations     Sulfa Drugs Other (See Comments)     Doesn't remember       Social History  She  reports that she quit smoking about 54 years ago. She has never used smokeless tobacco. She reports current alcohol use. She reports that she does not use drugs.  No drug abuse.    Family History  Family History   Problem Relation Age of Onset     Arthritis Mother         rheumatoid arthritis, psoriasis     Cancer Father         Cancer,renal     No Known Problems Daughter      No Known Problems Son      Breast Cancer Sister 66        Cancer-breast,essential tremor (treated with surgery)     Osteoarthritis Sister      Chronic Obstructive Pulmonary Disease Sister      Heart Disease Brother         Heart Disease,pacemaker placement     Pancreatic Cancer Brother      Other - See Comments Brother         back issues     Parkinsonism Brother        Review of Systems  I personally reviewed the ROS with the patient.    Nursing Notes:   Susie Salgado LPN  5/5/2022  2:26 PM  Addendum  Chief Complaint   Patient presents with     Consult     Kidney disease pain on right side   Patient presents to the clinic today for a consult for kidney disease and pain on the right side    Review of Systems:    Weight loss:    No     Recent fever/chills:  No   Night sweats:   No  Current skin rash:  No   Recent hair loss:  Yes   Heat intolerance:  Yes   Cold intolerance:  Yes  Chest pain:   Yes   Palpitations:   No  Shortness of breath:  Yes   Wheezing:   No  Constipation:    No   Diarrhea:   No   Nausea:   No   Vomiting:   No   Kidney/side pain:  No   Back pain:   Yes  Frequent headaches:  No   Dizziness:     No  Leg swelling:   Yes   Calf pain:    No    Parents, brothers or sisters with history of kidney cancer:   yes  Parents, brothers or sisters with history of bladder cancer: No    Post-Void  Residual  A post-void residual was measured by ultrasonic bladder scanner.  2 mL  Susie SalgadoOG  5/5/2022 2:23 PM    Medication Reconciliation: completed   Susie Salgado OG  5/5/2022 2:08 PM       Physical Exam  Vitals:    05/05/22 1420   BP: 138/84   BP Location: Right arm   Patient Position: Sitting   Cuff Size: Adult Regular   Pulse: 66   Resp: 16   SpO2: 98%   Weight: 75.1 kg (165 lb 9.6 oz)     Constitutional: No acute distress.  Alert and cooperative   Head: NCAT  Eyes: Conjunctivae normal  Cardiovascular: Regular rate.  Pulmonary/Chest: Respirations are even and non-labored bilaterally, no audible wheezing  Abdominal: Soft. No distension, tenderness, masses or guarding.   Extremities: BRENDAN x 4, Warm. No clubbing.  No cyanosis.    Skin: Pink, warm and dry.  No visible rashes noted.  Back:  - left CVA tenderness.  - right CVA tenderness.  Genitourinary:  Nonpalpable bladder    Labs  Results for orders placed or performed in visit on 05/05/22   UA reflex to Microscopic     Status: Abnormal   Result Value Ref Range    Color Urine Light Yellow Colorless, Straw, Light Yellow, Yellow    Appearance Urine Clear Clear    Glucose Urine Negative Negative mg/dL    Bilirubin Urine Negative Negative    Ketones Urine Negative Negative mg/dL    Specific Gravity Urine 1.022 1.000 - 1.030    Blood Urine Negative Negative    pH Urine 5.0 5.0 - 9.0    Protein Albumin Urine 10  (A) Negative mg/dL    Urobilinogen Urine Normal Normal, 2.0 mg/dL    Nitrite Urine Negative Negative    Leukocyte Esterase Urine Large (A) Negative    Bacteria Urine Few (A) None Seen /HPF    RBC Urine 2 <=2 /HPF    WBC Urine 28 (H) <=5 /HPF    Squamous Epithelials Urine 9 (H) <=1 /HPF    Mucus Urine Present (A) None Seen /LPF    Hyaline Casts Urine 1 <=2 /LPF     Imaging  I personally reviewed and interpreted the images and report.  CT a/p   12/29/2020  IMPRESSION:      Failure of both flexible vertical fusion rods, between the right L3  and L5 pedicle  screws and the left L4 and S1 pedicle screws.     Loosening of both T10 pedicle screws, worse on the right.       Extensive subsidence of an intervertebral spacer device into the  inferior endplate of L4.    *Kidneys can be visualized and no stones bilaterally present*    Post-Void Residual  A post-void residual was measured by ultrasonic bladder scanner.  2 mL today    Assessment  Ms. Mckeon is a 86 year old female who presents with right flank pain.  Her pain is highly suggestive of back origin as it radiates down her right leg and she has a strong history including scoliosis and prior back surgery.  I reviewed her CT scan from December 2020.  She did not have stones at that time and she has no history of stones.  We discussed additional imaging but given her clinical situation I explained it would likely be of low yield.  She can follow-up as needed

## 2022-05-11 ENCOUNTER — HOSPITAL ENCOUNTER (OUTPATIENT)
Dept: PHYSICAL THERAPY | Facility: OTHER | Age: 87
Setting detail: THERAPIES SERIES
Discharge: HOME | End: 2022-05-11
Attending: INTERNAL MEDICINE
Payer: MEDICARE

## 2022-05-11 PROCEDURE — 97112 NEUROMUSCULAR REEDUCATION: CPT | Mod: GP

## 2022-05-11 PROCEDURE — 97140 MANUAL THERAPY 1/> REGIONS: CPT | Mod: GP

## 2022-05-25 ENCOUNTER — LAB (OUTPATIENT)
Dept: LAB | Facility: OTHER | Age: 87
End: 2022-05-25
Attending: INTERNAL MEDICINE
Payer: MEDICARE

## 2022-05-25 ENCOUNTER — HOSPITAL ENCOUNTER (OUTPATIENT)
Dept: PHYSICAL THERAPY | Facility: OTHER | Age: 87
Setting detail: THERAPIES SERIES
Discharge: HOME | End: 2022-05-25
Attending: INTERNAL MEDICINE
Payer: MEDICARE

## 2022-05-25 DIAGNOSIS — R06.09 DYSPNEA ON EXERTION: ICD-10-CM

## 2022-05-25 LAB
ERYTHROCYTE [DISTWIDTH] IN BLOOD BY AUTOMATED COUNT: 13.9 % (ref 10–15)
HCT VFR BLD AUTO: 37.1 % (ref 35–47)
HGB BLD-MCNC: 11.7 G/DL (ref 11.7–15.7)
MCH RBC QN AUTO: 31.4 PG (ref 26.5–33)
MCHC RBC AUTO-ENTMCNC: 31.5 G/DL (ref 31.5–36.5)
MCV RBC AUTO: 100 FL (ref 78–100)
PLATELET # BLD AUTO: 192 10E3/UL (ref 150–450)
RBC # BLD AUTO: 3.73 10E6/UL (ref 3.8–5.2)
WBC # BLD AUTO: 7.1 10E3/UL (ref 4–11)

## 2022-05-25 PROCEDURE — 85027 COMPLETE CBC AUTOMATED: CPT | Mod: ZL

## 2022-05-25 PROCEDURE — 97140 MANUAL THERAPY 1/> REGIONS: CPT | Mod: GP

## 2022-05-25 PROCEDURE — 36415 COLL VENOUS BLD VENIPUNCTURE: CPT | Mod: ZL

## 2022-05-25 PROCEDURE — 97112 NEUROMUSCULAR REEDUCATION: CPT | Mod: GP

## 2022-06-09 ENCOUNTER — HOSPITAL ENCOUNTER (OUTPATIENT)
Dept: PHYSICAL THERAPY | Facility: OTHER | Age: 87
Setting detail: THERAPIES SERIES
Discharge: HOME | End: 2022-06-09
Attending: INTERNAL MEDICINE
Payer: MEDICARE

## 2022-06-09 PROCEDURE — 97112 NEUROMUSCULAR REEDUCATION: CPT | Mod: GP

## 2022-06-09 PROCEDURE — 97140 MANUAL THERAPY 1/> REGIONS: CPT | Mod: GP

## 2022-06-20 ENCOUNTER — HOSPITAL ENCOUNTER (OUTPATIENT)
Dept: PHYSICAL THERAPY | Facility: OTHER | Age: 87
Setting detail: THERAPIES SERIES
Discharge: HOME | End: 2022-06-20
Attending: INTERNAL MEDICINE
Payer: MEDICARE

## 2022-06-20 PROCEDURE — 97112 NEUROMUSCULAR REEDUCATION: CPT | Mod: GP

## 2022-06-20 PROCEDURE — 97140 MANUAL THERAPY 1/> REGIONS: CPT | Mod: GP

## 2022-06-23 ENCOUNTER — OFFICE VISIT (OUTPATIENT)
Dept: INTERNAL MEDICINE | Facility: OTHER | Age: 87
End: 2022-06-23
Attending: INTERNAL MEDICINE
Payer: COMMERCIAL

## 2022-06-23 ENCOUNTER — HOSPITAL ENCOUNTER (OUTPATIENT)
Dept: GENERAL RADIOLOGY | Facility: OTHER | Age: 87
Discharge: HOME | End: 2022-06-23
Attending: INTERNAL MEDICINE
Payer: MEDICARE

## 2022-06-23 DIAGNOSIS — M54.50 CHRONIC RIGHT-SIDED LOW BACK PAIN, UNSPECIFIED WHETHER SCIATICA PRESENT: ICD-10-CM

## 2022-06-23 DIAGNOSIS — Z00.00 ENCOUNTER FOR MEDICARE ANNUAL WELLNESS EXAM: Primary | ICD-10-CM

## 2022-06-23 DIAGNOSIS — D64.9 ANEMIA, UNSPECIFIED TYPE: ICD-10-CM

## 2022-06-23 DIAGNOSIS — Z87.898 HISTORY OF ULCERATION: ICD-10-CM

## 2022-06-23 DIAGNOSIS — Z13.220 SCREENING FOR HYPERLIPIDEMIA: ICD-10-CM

## 2022-06-23 DIAGNOSIS — H02.401 PTOSIS OF RIGHT EYELID: ICD-10-CM

## 2022-06-23 DIAGNOSIS — N18.30 STAGE 3 CHRONIC KIDNEY DISEASE, UNSPECIFIED WHETHER STAGE 3A OR 3B CKD (H): ICD-10-CM

## 2022-06-23 DIAGNOSIS — R06.09 DYSPNEA ON EXERTION: ICD-10-CM

## 2022-06-23 DIAGNOSIS — I10 ESSENTIAL HYPERTENSION: ICD-10-CM

## 2022-06-23 DIAGNOSIS — Z13.1 SCREENING FOR DIABETES MELLITUS: ICD-10-CM

## 2022-06-23 DIAGNOSIS — G89.29 CHRONIC RIGHT-SIDED LOW BACK PAIN, UNSPECIFIED WHETHER SCIATICA PRESENT: ICD-10-CM

## 2022-06-23 PROBLEM — M54.9 BACK PAIN: Status: ACTIVE | Noted: 2022-06-23

## 2022-06-23 LAB
ALBUMIN SERPL-MCNC: 4.5 G/DL (ref 3.5–5.7)
ALP SERPL-CCNC: 36 U/L (ref 34–104)
ALT SERPL W P-5'-P-CCNC: 13 U/L (ref 7–52)
ANION GAP SERPL CALCULATED.3IONS-SCNC: 11 MMOL/L (ref 3–14)
AST SERPL W P-5'-P-CCNC: 22 U/L (ref 13–39)
BILIRUB SERPL-MCNC: 0.6 MG/DL (ref 0.3–1)
BUN SERPL-MCNC: 30 MG/DL (ref 7–25)
CALCIUM SERPL-MCNC: 9.8 MG/DL (ref 8.6–10.3)
CHLORIDE BLD-SCNC: 106 MMOL/L (ref 98–107)
CHOLEST SERPL-MCNC: 236 MG/DL
CO2 SERPL-SCNC: 23 MMOL/L (ref 21–31)
CREAT SERPL-MCNC: 1.26 MG/DL (ref 0.6–1.2)
CREAT UR-MCNC: 161 MG/DL
ERYTHROCYTE [DISTWIDTH] IN BLOOD BY AUTOMATED COUNT: 14.4 % (ref 10–15)
FASTING STATUS PATIENT QL REPORTED: NO
GFR SERPL CREATININE-BSD FRML MDRD: 42 ML/MIN/1.73M2
GLUCOSE BLD-MCNC: 116 MG/DL (ref 70–105)
HCT VFR BLD AUTO: 40.2 % (ref 35–47)
HDLC SERPL-MCNC: 88 MG/DL (ref 23–92)
HGB BLD-MCNC: 12.5 G/DL (ref 11.7–15.7)
IRON SATN MFR SERPL: 33 % (ref 20–55)
IRON SERPL-MCNC: 125 UG/DL (ref 50–212)
LDLC SERPL CALC-MCNC: 131 MG/DL
MAGNESIUM SERPL-MCNC: 2.2 MG/DL (ref 1.9–2.7)
MCH RBC QN AUTO: 31.5 PG (ref 26.5–33)
MCHC RBC AUTO-ENTMCNC: 31.1 G/DL (ref 31.5–36.5)
MCV RBC AUTO: 101 FL (ref 78–100)
MICROALBUMIN UR-MCNC: 96.3 MG/L
MICROALBUMIN/CREAT UR: 59.81 MG/G CR (ref 0–25)
NONHDLC SERPL-MCNC: 148 MG/DL
PLATELET # BLD AUTO: 131 10E3/UL (ref 150–450)
POTASSIUM BLD-SCNC: 4.6 MMOL/L (ref 3.5–5.1)
PROT SERPL-MCNC: 7.7 G/DL (ref 6.4–8.9)
RBC # BLD AUTO: 3.97 10E6/UL (ref 3.8–5.2)
SODIUM SERPL-SCNC: 140 MMOL/L (ref 134–144)
TIBC SERPL-MCNC: 376.6 UG/DL (ref 245–400)
TRANSFERRIN SERPL-MCNC: 269 MG/DL (ref 203–362)
TRIGL SERPL-MCNC: 85 MG/DL
UIBC (UNSATURATED): 251.6 MG/DL
VIT B12 SERPL-MCNC: >1500 PG/ML (ref 180–914)
WBC # BLD AUTO: 8.2 10E3/UL (ref 4–11)

## 2022-06-23 PROCEDURE — 36415 COLL VENOUS BLD VENIPUNCTURE: CPT | Mod: ZL | Performed by: INTERNAL MEDICINE

## 2022-06-23 PROCEDURE — 80061 LIPID PANEL: CPT | Mod: ZL | Performed by: INTERNAL MEDICINE

## 2022-06-23 PROCEDURE — G0439 PPPS, SUBSEQ VISIT: HCPCS | Performed by: INTERNAL MEDICINE

## 2022-06-23 PROCEDURE — 80053 COMPREHEN METABOLIC PANEL: CPT | Mod: ZL | Performed by: INTERNAL MEDICINE

## 2022-06-23 PROCEDURE — 83735 ASSAY OF MAGNESIUM: CPT | Mod: ZL | Performed by: INTERNAL MEDICINE

## 2022-06-23 PROCEDURE — 82607 VITAMIN B-12: CPT | Mod: ZL | Performed by: INTERNAL MEDICINE

## 2022-06-23 PROCEDURE — 85014 HEMATOCRIT: CPT | Mod: ZL | Performed by: INTERNAL MEDICINE

## 2022-06-23 PROCEDURE — 83550 IRON BINDING TEST: CPT | Mod: ZL | Performed by: INTERNAL MEDICINE

## 2022-06-23 PROCEDURE — G0463 HOSPITAL OUTPT CLINIC VISIT: HCPCS | Mod: 25

## 2022-06-23 PROCEDURE — 71046 X-RAY EXAM CHEST 2 VIEWS: CPT

## 2022-06-23 PROCEDURE — 99214 OFFICE O/P EST MOD 30 MIN: CPT | Mod: 25 | Performed by: INTERNAL MEDICINE

## 2022-06-23 PROCEDURE — 82043 UR ALBUMIN QUANTITATIVE: CPT | Mod: ZL | Performed by: INTERNAL MEDICINE

## 2022-06-23 RX ORDER — LOSARTAN POTASSIUM 100 MG/1
100 TABLET ORAL DAILY
Qty: 90 TABLET | Refills: 4 | Status: SHIPPED | OUTPATIENT
Start: 2022-06-23 | End: 2023-01-10

## 2022-06-23 ASSESSMENT — ENCOUNTER SYMPTOMS
BRUISES/BLEEDS EASILY: 0
HEADACHES: 0
BREAST MASS: 0
CHEST TIGHTNESS: 0
WEAKNESS: 0
DIFFICULTY URINATING: 0
POLYPHAGIA: 0
SINUS PRESSURE: 1
FREQUENCY: 0
HEMATURIA: 0
DIZZINESS: 0
HEARTBURN: 0
FATIGUE: 0
SHORTNESS OF BREATH: 1
ABDOMINAL PAIN: 0
CONSTIPATION: 0
EYE PAIN: 0
ARTHRALGIAS: 1
COUGH: 0
HEMATOCHEZIA: 0
LIGHT-HEADEDNESS: 0
PARESTHESIAS: 0
SORE THROAT: 0
NERVOUS/ANXIOUS: 0
ARTHRALGIAS: 0
SHORTNESS OF BREATH: 0
CHILLS: 0
PALPITATIONS: 0
TROUBLE SWALLOWING: 1
POLYDIPSIA: 0
JOINT SWELLING: 0
FEVER: 0
MYALGIAS: 0
NAUSEA: 0
DIARRHEA: 0
DYSURIA: 0

## 2022-06-23 ASSESSMENT — PAIN SCALES - GENERAL: PAINLEVEL: NO PAIN (0)

## 2022-06-23 ASSESSMENT — ACTIVITIES OF DAILY LIVING (ADL): CURRENT_FUNCTION: NO ASSISTANCE NEEDED

## 2022-06-23 NOTE — NURSING NOTE
Patient presents to clinic today for annual Medicare wellness visit.  Medication reconciliation completed.    ACP on file? yes    FOOD SECURITY SCREENING QUESTIONS    The next two questions are to help us understand your food security.  If you are feeling you need any assistance in this area, we have resources available to support you today.    Hunger Vital Signs:   Within the past 12 months we worried whether our food would run out before we got money to buy more. Never  Within the past 12 months the food we bought just didn't last and we didn't have money to get more. Never    Blank Aranda CMA(AAMA)..................6/23/2022   10:02 AM     16

## 2022-06-23 NOTE — PROGRESS NOTES
"SUBJECTIVE:   Vicki Mckeon is a 86 year old female who presents for Preventive Visit.      Patient has been advised of split billing requirements and indicates understanding: Yes  Are you in the first 12 months of your Medicare coverage?  No    Healthy Habits:     In general, how would you rate your overall health?  Good    Frequency of exercise:  None    Do you usually eat at least 4 servings of fruit and vegetables a day, include whole grains    & fiber and avoid regularly eating high fat or \"junk\" foods?  Yes    Taking medications regularly:  Yes    Medication side effects:  None    Ability to successfully perform activities of daily living:  No assistance needed    Home Safety:  Lack of grab bars in the bathroom    Hearing Impairment:  No hearing concerns    In the past 6 months, have you been bothered by leaking of urine?  No    In general, how would you rate your overall mental or emotional health?  Good      PHQ-2 Total Score: 0    Additional concerns today:  Yes    Do you feel safe in your environment? Yes    Have you ever done Advance Care Planning? (For example, a Health Directive, POLST, or a discussion with a medical provider or your loved ones about your wishes): Yes, advance care planning is on file.         Fall risk  Fallen 2 or more times in the past year?: No  Any fall with injury in the past year?: No    Cognitive Screening   1) Repeat 3 items (Daughter, [place], [place])    2) Clock draw: NORMAL hands are not distinctly short/long  3) 3 item recall: Recalls 2 objects   Results: 3 items recalled: COGNITIVE IMPAIRMENT LESS LIKELY    Mini-CogTM Copyright ARNALDO Hines. Licensed by the author for use in [facility]; reprinted with permission (greg@.[facility]). All rights reserved.      Do you have sleep apnea, excessive snoring or daytime drowsiness?: no    Breast cancer screenin/15/20     Regular dental visits: Every six months     Eye exam with in 2 years: annually due to a recheck " soon.     Complains of ptysosis on the right eye     Reviewed and updated as needed this visit by clinical staff   Tobacco  Allergies  Meds  Problems  Med Hx  Surg Hx  Fam Hx  Soc   Hx          Reviewed and updated as needed this visit by Provider   Tobacco  Allergies  Meds  Problems  Med Hx  Surg Hx  Fam Hx           Social History     Tobacco Use     Smoking status: Former Smoker     Quit date: 1968     Years since quittin.5     Smokeless tobacco: Never Used   Substance Use Topics     Alcohol use: Yes     Comment: Occasionally         Alcohol Use 2022   Prescreen: >3 drinks/day or >7 drinks/week? No     Review current opioid prescriptions    For a patient with a current opioid prescription:    Reviewed potential Opioid Use Disorder (OUD) risk factors: Yes     Evaluate their pain severity and current treatment plan:     Provide information on non-opioid treatment options:    Refer to a specialist, as appropriate:    Get more information on pain management in the [facility] Pain Management Best Practices Inter-agency Task Force Report    Screen for potential Substance Use Disorders (SUDs)    Reviewed the patient s potential risk factors for SUDs: Yes     Refer to treatment or specialist, as appropriate:     A screening tool isn t required but you may use one:  Find more information in the National [employer] on Drug Abuse Screening and Assessment Tools Chart      Current providers sharing in care for this patient include:   Patient Care Team:  Kirsten Guevara DO as PCP - General  Oliva Hays NP as Nurse Practitioner (Internal Medicine)  Bartolo Henry MD as Assigned Musculoskeletal Provider  Carlitos Luna MD as Assigned Surgical Provider  Kirsten Guevara DO as Assigned PCP    The following health maintenance items are reviewed in Epic and correct as of today:  Health Maintenance Due   Topic Date Due     MICROALBUMIN  Never done     COVID-19 Vaccine (4 - Booster for Pfizer series)  "02/28/2022     LIPID  06/29/2022     Mammogram Screening - Patient over age 75, has elected to discontinue screenings.  Pertinent mammograms are reviewed under the imaging tab.    Review of Systems   Constitutional: Negative for chills, fatigue and fever.   HENT: Positive for hearing loss (hearing aides ), sinus pressure (occasionally ), sneezing (due to allergies ) and trouble swallowing (occasioanally ). Negative for congestion, dental problem, ear pain and sore throat.    Eyes:        Rt eye drooping? More when tired    Respiratory: Negative for cough, chest tightness and shortness of breath (occasionally, when going to basement stairs ).    Cardiovascular: Positive for peripheral edema (right foot, slight ). Negative for chest pain and palpitations.   Gastrointestinal: Negative for abdominal pain, constipation, diarrhea and hematochezia.   Endocrine: Negative for polydipsia, polyphagia and polyuria.   Genitourinary: Negative for difficulty urinating, frequency, vaginal bleeding, vaginal discharge and vaginal pain.   Musculoskeletal: Positive for arthralgias (tight back muscles when walking) and gait problem (since surgery ).   Skin: Negative for rash.   Neurological: Negative for dizziness, light-headedness and headaches.   Hematological: Does not bruise/bleed easily.   Psychiatric/Behavioral: Negative for mood changes.   All other systems reviewed and are negative.      OBJECTIVE:   BP (!) 168/70 (BP Location: Right arm, Patient Position: Sitting, Cuff Size: Adult Regular)   Pulse 60   Temp 97  F (36.1  C) (Temporal)   Resp 14   Ht 1.588 m (5' 2.5\")   Wt 74.8 kg (165 lb)   SpO2 100%   BMI 29.70 kg/m   Estimated body mass index is 29.7 kg/m  as calculated from the following:    Height as of this encounter: 1.588 m (5' 2.5\").    Weight as of this encounter: 74.8 kg (165 lb).  Physical Exam  GEN: Vitals reviewed. Healthy appearing. Patient is in no acute distress. Cooperative with exam.  HEENT: " Normocephalic atraumatic.  Eyes grossly normal to inspection.  No discharge or erythema, or obvious scleral/conjunctival abnormalities. EACs clear bilat, TM gray with normal landmarks.   NECK: Supple; no thyromegaly or masses noted.  No cervical or supraclavicular lymphadenopathy.  CV: Heart regular in rate and rhythm with no murmur.    LUNGS: No audible wheeze, cough, or visible cyanosis.  No visible retractions or increased work of breathing.  Lungs clear to auscultation bilaterally.    ABD:  Nondistended  SKIN: Warm and dry to touch.  Visible skin clear. No significant rash, abnormal pigmentation or lesions.  EXT: No clubbing or cyanosis.  Trace lower ext peripheral edema.  NEURO: Alert and oriented to person, place, and time.  Cranial nerves II-XII grossly intact with no focal or lateralizing deficits.  Muscle tone normal.  Gait normal. No tremor.   MSK: ROM of upper and lower ext symmetric and full.  PSYCH: Mood is good.  Mentation appears normal, affect normal/bright, judgement and insight intact, normal speech and appearance well-groomed.      Diagnostic Test Results:  Labs reviewed in Epic    ASSESSMENT / PLAN:     (Z00.00) Encounter for Medicare annual wellness exam  (primary encounter diagnosis)  Comment:  Plan:    (Z87.898) History of ulceration  Comment: Stable, no complaints. No longer taking Carafate.   Plan: CBC with platelets          (R06.00) Dyspnea on exertion  Comment: Gets SOB when climbing her stairs at home.    Plan: XR Chest 2 Views  Per x-ray report normal-sized cardiac silhouette.  No infiltrates or atelectasis noted in lung fields.          (I10) Essential hypertension  Comment: BP elevated in office today 168/70. She brought list from home showing BP ranging from 115-130's/80's and Pulse 41 - 80. Low pulse was one reading.    Plan: losartan (COZAAR) 100 MG tablet          (N18.30) Stage 3 chronic kidney disease, unspecified whether stage 3a or 3b CKD (H)  Comment:   Plan: Albumin Random  "Urine Quantitative with Creat         Ratio, Magnesium            (Z13.1) Screening for diabetes mellitus  Comment: BUN 30, which is stable for her as she has run 25 - 29 over the last several months and her creatinine has been elevated. 1.26 today and she has ranged from 1.24 to 1.43. glucose slightly elevated at 116.  GFR increase to 42.  Albumin creatinine urine 59.81 done to check her where her baseline is for kidney disease.       (D64.9) Anemia, unspecified type  Comment:  B12 high 1,500 (6/23/22). Iron panel normal. Magnesium normal. CBC normal except slight decreased RBC and platelet count 131.  Plan: Recheck CBC with platelets in 4 weeks.     (Z13.220) Screening for hyperlipidemia  Comment:  Plan: Lipid Profile  Lipid profile returned with slightly elevated cholesterol and LDL. No need for STATIN therapy    #Eye ptosis - questionable if there is mild pytosis to the right eye, overall minimal. She sees her opthalmology soon. Monitor for now.    #Right lower back pain  Continue physical therapy    Patient has been advised of split billing requirements and indicates understanding: Yes    COUNSELING:  Reviewed preventive health counseling, as reflected in patient instructions    Estimated body mass index is 29.7 kg/m  as calculated from the following:    Height as of this encounter: 1.588 m (5' 2.5\").    Weight as of this encounter: 74.8 kg (165 lb).    She reports that she quit smoking about 54 years ago. She has never used smokeless tobacco.      Appropriate preventive services were discussed with this patient, including applicable screening as appropriate for cardiovascular disease, diabetes, osteopenia/osteoporosis, and glaucoma.  As appropriate for age/gender, discussed screening for colorectal cancer, prostate cancer, breast cancer, and cervical cancer. Checklist reviewing preventive services available has been given to the patient.    Reviewed patients plan of care and provided an AVS. The Basic Care " Plan (routine screening as documented in Health Maintenance) for Vicki meets the Care Plan requirement. This Care Plan has been established and reviewed with the Patient.    Counseling Resources:  ATP IV Guidelines  Pooled Cohorts Equation Calculator  Breast Cancer Risk Calculator  Breast Cancer: Medication to Reduce Risk  FRAX Risk Assessment  ICSI Preventive Guidelines  Dietary Guidelines for Americans, 2010  USDA's MyPlate  ASA Prophylaxis  Lung CA Screening      Sancho Jackson [facility]       I was present with the nurse practitioner student who participated in the service and in the documentation of the note. I have verified the history and personally reviewed and preformed aspects of the physical exam with the student. I agree with the assessment and plan of care as documented in the note.     Kirsten Guevara,   [facility]    Identified Health Risks:

## 2022-06-23 NOTE — PATIENT INSTRUCTIONS
Set up next Prolia injection in August.     Also get your COVID shot in August    Patient Education   Personalized Prevention Plan  You are due for the preventive services outlined below.  Your care team is available to assist you in scheduling these services.  If you have already completed any of these items, please share that information with your care team to update in your medical record.  Health Maintenance Due   Topic Date Due    Kidney Microalbumin Urine Test  Never done    COVID-19 Vaccine (4 - Booster for Pfizer series) 02/28/2022    Cholesterol Lab  06/29/2022       Exercise for a Healthier Heart  You may wonder how you can improve the health of your heart. If you re thinking about exercise, you re on the right track. You don t need to become an athlete. But you do need a certain amount of brisk exercise to help strengthen your heart. If you have been diagnosed with a heart condition, your healthcare provider may advise exercise to help stabilize your condition. To help make exercise a habit, choose safe, fun activities.      Exercise with a friend. When activity is fun, you're more likely to stick with it.   Before you start  Check with your healthcare provider before starting an exercise program. This is especially important if you have not been active for a while. It's also important if you have a long-term (chronic) health problem such as heart disease, diabetes, or obesity. Or if you are at high risk for having these problems.   Why exercise?  Exercising regularly offers many healthy rewards. It can help you do all of the following:   Improve your blood cholesterol level to help prevent further heart trouble  Lower your blood pressure to help prevent a stroke or heart attack  Control diabetes, or reduce your risk of getting this disease  Improve your heart and lung function  Reach and stay at a healthy weight  Make your muscles stronger so you can stay active  Prevent falls and fractures by slowing  the loss of bone mass (osteoporosis)  Manage stress better  Reduce your blood pressure  Improve your sense of self and your body image  Exercise tips    Ease into your routine. Set small goals. Then build on them. If you are not sure what your activity level should be, talk with your healthcare provider first before starting an exercise routine.  Exercise on most days. Aim for a total of 150 minutes (2 hours and 30 minutes) or more of moderate-intensity aerobic activity each week. Or 75 minutes (1 hour and 15 minutes) or more of vigorous-intensity aerobic activity each week. Or try for a combination of both. Moderate activity means that you breathe heavier and your heart rate increases but you can still talk. Think about doing 40 minutes of moderate exercise, 3 to 4 times a week. For best results, activity should last for about 40 minutes to lower blood pressure and cholesterol. It's OK to work up to the 40-minute period over time. Examples of moderate-intensity activity are walking 1 mile in 15 minutes. Or doing 30 to 45 minutes of yard work.  Step up your daily activity level.  Along with your exercise program, try being more active the whole day. Walk instead of drive. Or park further away so that you take more steps each day. Do more household tasks or yard work. You may not be able to meet the advised mount of physical activity. But doing some moderate- or vigorous-intensity aerobic activity can help reduce your risk for heart disease. Your healthcare provider can help you figure out what is best for you.  Choose 1 or more activities you enjoy.  Walking is one of the easiest things you can do. You can also try swimming, riding a bike, dancing, or taking an exercise class.    When to call your healthcare provider  Call your healthcare provider if you have any of these:   Chest pain or feel dizzy or lightheaded  Burning, tightness, pressure, or heaviness in your chest, neck, shoulders, back, or arms  Abnormal  shortness of breath  More joint or muscle pain  A very fast or irregular heartbeat (palpitations)  Miguelito last reviewed this educational content on 7/1/2019 2000-2021 [employer]. All rights reserved. This information is not intended as a substitute for professional medical care. Always follow your healthcare professional's instructions.

## 2022-07-07 ENCOUNTER — VIRTUAL VISIT (OUTPATIENT)
Dept: FAMILY MEDICINE | Facility: OTHER | Age: 87
End: 2022-07-07
Attending: PHYSICIAN ASSISTANT
Payer: COMMERCIAL

## 2022-07-07 ENCOUNTER — NURSE TRIAGE (OUTPATIENT)
Dept: INTERNAL MEDICINE | Facility: OTHER | Age: 87
End: 2022-07-07

## 2022-07-07 DIAGNOSIS — U07.1 INFECTION DUE TO 2019 NOVEL CORONAVIRUS: Primary | ICD-10-CM

## 2022-07-07 PROCEDURE — 99212 OFFICE O/P EST SF 10 MIN: CPT | Mod: 95 | Performed by: PHYSICIAN ASSISTANT

## 2022-07-07 RX ORDER — HYDROQUINONE 40 MG/G
CREAM TOPICAL
COMMUNITY
Start: 2022-05-13 | End: 2022-12-20

## 2022-07-07 ASSESSMENT — PAIN SCALES - GENERAL: PAINLEVEL: NO PAIN (0)

## 2022-07-07 NOTE — PATIENT INSTRUCTIONS
Instructions for Patients      What are the symptoms of COVID-19?  Symptoms can include fever, cough, shortness of breath, chills, headache, muscle pain sore throat, fatigue, runny or stuffy nose, and loss of taste and smell. Other less common symptoms include nausea, vomiting, or diarrhea (watery stools).    Know when to call 911. Emergency warning signs include:    Trouble breathing or shortness of breath    Pain or pressure in the chest that doesn't go away    Feeling confused like you haven't felt before, or not being able to wake up    Bluish-colored lips or face    How can I take care of myself?  1. Get lots of rest. Drink extra fluids (unless a doctor has told you not to).  2. Take Tylenol (acetaminophen) for fever or pain. If you have liver or kidney problems, ask your family doctor if it's okay to take Tylenol   Adults:   650 mg (two 325 mg pills or tablets) every 4 to 6 hours, or...   1,000 mg (two 500 mg pills or tablets) every 8 hours as needed.  Note: Don't take more than 3,000 mg in one day. Acetaminophen is found in many medicines (both prescribed and over the counter). Read all labels to be sure you don't take too much.  For children, check the Tylenol bottle for the right dose. The dose is based on the child's age or weight.  3. Take over the counter medicines for your symptoms as needed. Talk to your pharmacist.  4. If you have other health problems (like cancer, heart failure, an organ transplant, or severe kidney disease): Call your specialty clinic if you don't feel better in the next 2 days.    These guidelines are for isolating and quarantining before returning to work, school or .     For employers, schools and day cares: This is an official notice for this person s medical guidelines for returning in-person.     For health care sites: The CDC gives different isolation and quarantine guidelines for healthcare sites, please check with these sites before arriving.     How do I  self-isolate?  You isolate when you have symptoms of COVID or a test shows you have COVID, even if you don t have symptoms.     If you DO have symptoms:  o Stay home and away from others  - For at least 5 days after your symptoms started, AND   - You are fever free for 24 hours (with no medicine that reduces fever), AND  - Your other symptoms are better.  o Wear a mask for 10 full days any time you are around others.    If you DON T have symptoms:  o Stay at home and away from others for at least 5 days after your positive test.  o Wear a mask for 10 full days any time you are around others.    How and when do I quarantine?  You quarantine when you may have been exposed to the virus and DON T have symptoms.     Stay home and away from others.     You must quarantine for 5 days after your last contact with a person who has COVID.  o Note: If you are fully vaccinated, you don t need to quarantine. You should still follow the steps below.     Wear a mask for 10 full days any time you re around others.    Get tested at least 5 days after you were exposed, even if you don t have symptoms.     If you start to have symptoms, isolate right away and get tested.    Where can I get more information?    [facility] COVID-19 Resource Hub: [email or web address]     CDC Quarantine & Isolation: https://www.cdc.gov/coronavirus/2019-ncov/your-health/quarantine-isolation.html     CDC - What to Do If You're Sick: https://www.cdc.gov/coronavirus/2019-ncov/if-you-are-sick/index.html    [facility] clinical trials (COVID-19 research studies): clinicalaffairs.[facility]/umn-clinical-trials    [place] Department of Health COVID-19 Public Hotline: 1-182.425.1201  Coping with Life After COVID-19  Being in the hospital because of COVID-19 is scary. Going home can be scary, too. You may face changes to your life, the way you work or what you can eat. It s hard to adjust to change, and it s normal to feel afraid,  frustrated or even angry. These feelings usually go away over time. If your feelings don t start to get better, it s called  adjustment disorder.      What signs should I look out for?  Adjustment disorder can happen to anyone in a time of stress. It makes it hard to cope with daily life. You may feel lonely or fight with loved ones, even if you re glad to be home. Watch for these signs:  Fear or worry  Hard time focusing  Sadness or anger  Trouble talking to family or friends  Feeling like you don t fit in or isolating yourself  Problems with sleep   Drinking alcohol or taking drugs to cope    What can I do?  You can help yourself get better. Feeling you have control helps you move forward. You may wonder if you ll be able to do things you did before. Be patient. Do your best to make the most of every day. Try to build relationships, be as active as you can, eat right and keep a sense of humor. Avoid smoking and drinking too much alcohol. Call your family doctor or clinic if you re not sure what to do. They can guide you to care or other services.    When should I get help?  Think about getting counseling if your sadness or frustration gets worse. Together with a trained counselor, you can talk about your problems adjusting to life after your hospital stay. You can come up with new ways to handle changes that give you more control. Your family doctor or care team can help you find a counselor.     Get help if you re thinking about hurting yourself. If you need help right away, call 911 or go to the nearest emergency room. You can also try the Crisis Text Line.    Crisis Text Line: 636-359 (http://www.crisistextline.org)  The Crisis Text Line serves anyone, in any crisis. It gives free, 24/7 support. Here's how it works:  Text HOME to 775264 from anywhere in the [place], anytime, about any type of crisis.  A live, trained Crisis Counselor will text you back quickly.  The volunteer Crisis Counselor can help you move from  a  hot moment  to a  cool moment.  They can also help you work out a safety plan.

## 2022-07-07 NOTE — PROGRESS NOTES
Vicki is a 87 year old who is being evaluated via a billable telephone visit.      What phone number would you like to be contacted at? [phone]  How would you like to obtain your AVS? [facility]    Assessment & Plan   Problem List Items Addressed This Visit    None     Visit Diagnoses     Infection due to 2019 novel coronavirus    -  Primary    Relevant Medications    nirmatrelvir and ritonavir (PAXLOVID) therapy pack           Discussed symptoms and concerns at length.  Patient was given Paxlovid treatment at a reduced amount due to her kidney function.  Discussed side effect concerns.  Gave warning signs and symptoms.  Encouraged use over-the-counter cough and cold remedies as needed for symptomatic relief.  Increase fluids with electrolytes and rest.  See patient education.  Return to clinic or emergency room if symptoms are changing or worsening.     See Patient Instructions    Return if symptoms worsen or fail to improve.    Lisa Olivo PA-C  [facility]    Subjective   Vicki is a 87 year old, presenting for the following health issues:  Covid Concern (Antiviral )      HPI       COVID-19 Symptom Review  How many days ago did these symptoms start? 2 days ago     Are any of the following symptoms significant for you?    New or worsening difficulty breathing? No    Worsening cough? No    Fever or chills? No    Headache: YES- yesterday     Sore throat: YES- yesterday    Chest pain: No    Diarrhea: No    Body aches? YES    What treatments has patient tried? Acetaminophen   Does patient live in a nursing home, group home, or shelter? No  Does patient have a way to get food/medications during quarantined? Yes, I have a friend or family member who can help me.    Patient was diagnosed with COVID-19 this past evening.  Has had slight fever and body aches over the last 2 days.  Symptoms started on 7/5/2022.  No fever today.  Has a mild sore throat and hoarse voice.  Has a little headache  over the eyes.  No sinus pain or pressure.  Has some chills.  Mild cough.  No shortness of breath or wheezing.  No ear pain, change in taste or smell.  Keeping food and fluids down.  No dehydration concerns.  Interested in antiviral treatment.      Estimated Creatinine Clearance: 31.3 mL/min (A) (based on SCr of 1.26 mg/dL (H)).      Review of Systems   Constitutional, HEENT, cardiovascular, pulmonary, gi and gu systems are negative, except as otherwise noted.      Objective    Vitals - Patient Reported  Pain Score: No Pain (0)      Vitals:  No vitals were obtained today due to virtual visit.    Physical Exam   healthy, alert and no distress  PSYCH: Alert and oriented times 3; coherent speech, normal   rate and volume, able to articulate logical thoughts, able   to abstract reason, no tangential thoughts, no hallucinations   or delusions  Her affect is normal  RESP: No cough, no audible wheezing, able to talk in full sentences  Remainder of exam unable to be completed due to telephone visits          Phone call duration: 8 minutes    .  ..

## 2022-07-07 NOTE — TELEPHONE ENCOUNTER
Patient states she has COVID and would like some direction from Dr. Guevara, Please advise.    Lina Robins on 7/7/2022 at 8:52 AM

## 2022-07-07 NOTE — TELEPHONE ENCOUNTER
Patient transferred to [facility] for COVID antiviral Rx telephone visit. Mary Gant RN on 7/7/2022 at 10:14 AM      Reason for Disposition    [1] COVID-19 diagnosed by positive lab test (e.g., PCR, rapid self-test kit) AND [2] mild symptoms (e.g., cough, fever, others) AND [3] no complications or SOB    Additional Information    Negative: SEVERE difficulty breathing (e.g., struggling for each breath, speaks in single words)    Negative: Difficult to awaken or acting confused (e.g., disoriented, slurred speech)    Negative: Bluish (or gray) lips or face now    Negative: Shock suspected (e.g., cold/pale/clammy skin, too weak to stand, low BP, rapid pulse)    Negative: Sounds like a life-threatening emergency to the triager    Negative: [1] Diagnosed or suspected COVID-19 AND [2] symptoms lasting 3 or more weeks    Negative: [1] COVID-19 exposure AND [2] no symptoms    Negative: COVID-19 vaccine reaction suspected (e.g., fever, headache, muscle aches) occurring 1 to 3 days after getting vaccine    Negative: COVID-19 vaccine, questions about    Negative: [1] Lives with someone known to have influenza (flu test positive) AND [2] flu-like symptoms (e.g., cough, runny nose, sore throat, SOB; with or without fever)    Negative: [1] Adult with possible COVID-19 symptoms AND [2] triager concerned about severity of symptoms or other causes    Negative: COVID-19 and breastfeeding, questions about    Negative: SEVERE or constant chest pain or pressure  (Exception: Mild central chest pain, present only when coughing.)    Negative: MODERATE difficulty breathing (e.g., speaks in phrases, SOB even at rest, pulse 100-120)    Negative: Headache and stiff neck (can't touch chin to chest)    Negative: Oxygen level (e.g., pulse oximetry) 90 percent or lower    Negative: Chest pain or pressure    Negative: Patient sounds very sick or weak to the triager    Negative: MILD difficulty breathing (e.g., minimal/no SOB at rest, SOB with  "walking, pulse <100)    Negative: Fever > 103 F (39.4 C)    Negative: [1] Fever > 101 F (38.3 C) AND [2] over 60 years of age    Negative: [1] Fever > 100.0 F (37.8 C) AND [2] bedridden (e.g., nursing home patient, CVA, chronic illness, recovering from surgery)    Negative: HIGH RISK for severe COVID complications (e.g., weak immune system, age > 64 years, obesity with BMI > 25, pregnant, chronic lung disease or other chronic medical condition) (Exception: Already seen by PCP and no new or worsening symptoms.)    Negative: [1] HIGH RISK patient AND [2] influenza is widespread in the community AND [3] ONE OR MORE respiratory symptoms: cough, sore throat, runny or stuffy nose    Negative: [1] HIGH RISK patient AND [2] influenza exposure within the last 7 days AND [3] ONE OR MORE respiratory symptoms: cough, sore throat, runny or stuffy nose    Negative: Oxygen level (e.g., pulse oximetry) 91 to 94 percent    Negative: [1] COVID-19 infection suspected by caller or triager AND [2] mild symptoms (cough, fever, or others) AND [3] negative COVID-19 rapid test    Negative: Fever present > 3 days (72 hours)    Negative: [1] Fever returns after gone for over 24 hours AND [2] symptoms worse or not improved    Negative: [1] Continuous (nonstop) coughing interferes with work or school AND [2] no improvement using cough treatment per Care Advice    Negative: Cough present > 3 weeks    Answer Assessment - Initial Assessment Questions  1. COVID-19 DIAGNOSIS: \"Who made your COVID-19 diagnosis?\" \"Was it confirmed by a positive lab test or self-test?\" If not diagnosed by a doctor (or NP/PA), ask \"Are there lots of cases (community spread) where you live?\" Note: See public health department website, if unsure.      Home test yesterday  2. COVID-19 EXPOSURE: \"Was there any known exposure to COVID before the symptoms began?\" CDC Definition of close contact: within 6 feet (2 meters) for a total of 15 minutes or more over a 24-hour period. " "      Tested positive  3. ONSET: \"When did the COVID-19 symptoms start?\"       Tuesday  4. WORST SYMPTOM: \"What is your worst symptom?\" (e.g., cough, fever, shortness of breath, muscle aches)      Chills, body ache, headache, sore throat  5. COUGH: \"Do you have a cough?\" If Yes, ask: \"How bad is the cough?\"        slight  6. FEVER: \"Do you have a fever?\" If Yes, ask: \"What is your temperature, how was it measured, and when did it start?\"      denies  7. RESPIRATORY STATUS: \"Describe your breathing?\" (e.g., shortness of breath, wheezing, unable to speak)       Denies issue today  8. BETTER-SAME-WORSE: \"Are you getting better, staying the same or getting worse compared to yesterday?\"  If getting worse, ask, \"In what way?\"      better  9. HIGH RISK DISEASE: \"Do you have any chronic medical problems?\" (e.g., asthma, heart or lung disease, weak immune system, obesity, etc.)      denies  10. VACCINE: \"Have you had the COVID-19 vaccine?\" If Yes, ask: \"Which one, how many shots, when did you get it?\"        yes  11. BOOSTER: \"Have you received your COVID-19 booster?\" If Yes, ask: \"Which one and when did you get it?\"        yes  12. PREGNANCY: \"Is there any chance you are pregnant?\" \"When was your last menstrual period?\"        no  13. OTHER SYMPTOMS: \"Do you have any other symptoms?\"  (e.g., chills, fatigue, headache, loss of smell or taste, muscle pain, sore throat)        As above  14. O2 SATURATION MONITOR:  \"Do you use an oxygen saturation monitor (pulse oximeter) at home?\" If Yes, ask \"What is your reading (oxygen level) today?\" \"What is your usual oxygen saturation reading?\" (e.g., 95%)        denies    Protocols used: CORONAVIRUS (COVID-19) DIAGNOSED OR [id number] 1.18.2022    "

## 2022-07-07 NOTE — NURSING NOTE
Pt presents to clinic today for a Covid antiviral, patient was positive with an at home test yesterday and symptoms started 2 days ago.       FOOD SECURITY SCREENING QUESTIONS:    The next two questions are to help us understand your food security.  If you are feeling you need any assistance in this area, we have resources available to support you today.    Hunger Vital Signs:  Within the past 12 months we worried whether our food would run out before we got money to buy more. Never  Within the past 12 months the food we bought just didn't last and we didn't have money to get more. Never            Medication Reconciliation: Ernie Urena, OG,LPN on 7/7/2022 at 2:48 PM

## 2022-07-11 ENCOUNTER — TELEPHONE (OUTPATIENT)
Dept: INTERNAL MEDICINE | Facility: OTHER | Age: 87
End: 2022-07-11

## 2022-07-11 NOTE — TELEPHONE ENCOUNTER
Patient called stating she was diagnosed 7/6/22 and is taking the antiviral, one day left, she is wanting the 2nd booster but has some questions on when she should get that after COVID.     Blank Thapa on 7/11/2022 at 9:39 AM

## 2022-07-12 NOTE — TELEPHONE ENCOUNTER
She can wait 3 months for the booster and I would recommend watching for when the booster with the new variant is release  - hopefully this fall.

## 2022-07-12 NOTE — TELEPHONE ENCOUNTER
Unable to reach by phone, so I sent her a [employer] message.  Blank Aranda CMA([facility])..................7/12/2022   8:26 AM

## 2022-07-19 ENCOUNTER — HOSPITAL ENCOUNTER (OUTPATIENT)
Dept: PHYSICAL THERAPY | Facility: OTHER | Age: 87
Setting detail: THERAPIES SERIES
Discharge: HOME | End: 2022-07-19
Attending: INTERNAL MEDICINE
Payer: MEDICARE

## 2022-07-19 PROCEDURE — 97140 MANUAL THERAPY 1/> REGIONS: CPT | Mod: GP

## 2022-07-19 PROCEDURE — 97112 NEUROMUSCULAR REEDUCATION: CPT | Mod: GP

## 2022-07-19 NOTE — PROGRESS NOTES
[facility]    OUTPATIENT PHYSICAL THERAPY  PLAN OF TREATMENT FOR OUTPATIENT REHABILITATION AND PROGRESS NOTE                    Patient's Last Name, First Name, Vicki Soto Date of Birth  1935   Provider's Name  [facility] Medical Record No.  [id number]    Onset Date  8-20-21 Start of Care Date  10/5/21   Type:     _X_PT   ___OT   ___SLP Medical Diagnosis  Pain in right leg   PT Diagnosis  back pain, right leg pain, muscle weakness Plan of Treatment  Frequency/Duration: Continue PT 1 time per week for 3 additional months. Continue with manual therapy, N-M re-education, therapeutic exercise and HEP.  Certification date from 7-2-22 to 10-1-22        Goals:  Goal Identifier walking   Goal Description Patient to report ability to walk 1000 feet without muscle fatigue to allow greater ease of shopping with or without assistive dievice.    Target Date 01/05/22   Date Met  07/19/22   Progress (detail required for progress note): humidity has affected her arthritis. Able to shop, no issues     Goal Identifier bed moibility   Goal Description Patient to tolerate rolling in bed without pain.    Target Date 01/05/22   Date Met  07/19/22   Progress (detail required for progress note): bladder will wake her as it refers pain to back.     Goal Identifier pain   Goal Description Patient to report reduction in pain to 5/10 or less during functional activities.    Target Date 01/05/22   Date Met      Progress (detail required for progress note): Pain level is 0/10 at rest. Pain is 2/10 when up and walking. Still at a 7/10 at night when her bladder is full. 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        Client Self (Subjective) Report for Progress Note  Reporting Period: Had COVID, took meds that helped. Feeling better now. Walking as she can.       Objective Measurements:   Objective Measure: postural loading  Details: limited loading right pelvis; general listening anterior pelvis; local listening to right pubic bone  Objective Measure: joint  Details: + FFT right, superior right pubic shear                                    I CERTIFY THE NEED FOR THESE SERVICES FURNISHED UNDER        THIS PLAN OF TREATMENT AND WHILE UNDER MY CARE     (Physician co-signature of this document indicates review and certification of the therapy plan).                Referring Provider: DO Karen Dawkins, PT

## 2022-08-08 NOTE — RESULT ENCOUNTER NOTE
Please let patient about results below as it appears she has not viewed them.  Call if she has any questions.

## 2022-08-11 ENCOUNTER — HOSPITAL ENCOUNTER (OUTPATIENT)
Dept: PHYSICAL THERAPY | Facility: OTHER | Age: 87
Setting detail: THERAPIES SERIES
Discharge: HOME | End: 2022-08-11
Attending: INTERNAL MEDICINE
Payer: MEDICARE

## 2022-08-11 PROCEDURE — 97140 MANUAL THERAPY 1/> REGIONS: CPT | Mod: GP

## 2022-08-11 PROCEDURE — 97112 NEUROMUSCULAR REEDUCATION: CPT | Mod: GP

## 2022-08-24 ENCOUNTER — HOSPITAL ENCOUNTER (OUTPATIENT)
Dept: PHYSICAL THERAPY | Facility: OTHER | Age: 87
Setting detail: THERAPIES SERIES
Discharge: HOME | End: 2022-08-24
Attending: INTERNAL MEDICINE
Payer: MEDICARE

## 2022-08-24 PROCEDURE — 97140 MANUAL THERAPY 1/> REGIONS: CPT | Mod: GP,PO

## 2022-08-26 ENCOUNTER — ALLIED HEALTH/NURSE VISIT (OUTPATIENT)
Dept: FAMILY MEDICINE | Facility: OTHER | Age: 87
End: 2022-08-26
Attending: INTERNAL MEDICINE
Payer: MEDICARE

## 2022-08-26 DIAGNOSIS — D64.9 ANEMIA, UNSPECIFIED TYPE: ICD-10-CM

## 2022-08-26 DIAGNOSIS — M85.80 OSTEOPENIA, UNSPECIFIED LOCATION: Primary | ICD-10-CM

## 2022-08-26 LAB
BASOPHILS # BLD AUTO: 0.1 10E3/UL (ref 0–0.2)
BASOPHILS NFR BLD AUTO: 1 %
CALCIUM SERPL-MCNC: 10.5 MG/DL (ref 8.6–10.3)
CREAT SERPL-MCNC: 1.31 MG/DL (ref 0.6–1.2)
EOSINOPHIL # BLD AUTO: 0.3 10E3/UL (ref 0–0.7)
EOSINOPHIL NFR BLD AUTO: 5 %
ERYTHROCYTE [DISTWIDTH] IN BLOOD BY AUTOMATED COUNT: 14.3 % (ref 10–15)
GFR SERPL CREATININE-BSD FRML MDRD: 40 ML/MIN/1.73M2
HCT VFR BLD AUTO: 37.7 % (ref 35–47)
HGB BLD-MCNC: 12.3 G/DL (ref 11.7–15.7)
IMM GRANULOCYTES # BLD: 0 10E3/UL
IMM GRANULOCYTES NFR BLD: 0 %
LYMPHOCYTES # BLD AUTO: 2.1 10E3/UL (ref 0.8–5.3)
LYMPHOCYTES NFR BLD AUTO: 36 %
MCH RBC QN AUTO: 31.8 PG (ref 26.5–33)
MCHC RBC AUTO-ENTMCNC: 32.6 G/DL (ref 31.5–36.5)
MCV RBC AUTO: 97 FL (ref 78–100)
MONOCYTES # BLD AUTO: 0.6 10E3/UL (ref 0–1.3)
MONOCYTES NFR BLD AUTO: 11 %
NEUTROPHILS # BLD AUTO: 2.7 10E3/UL (ref 1.6–8.3)
NEUTROPHILS NFR BLD AUTO: 47 %
NRBC # BLD AUTO: 0 10E3/UL
NRBC BLD AUTO-RTO: 0 /100
PHOSPHATE SERPL-MCNC: 3.6 MG/DL (ref 2.5–5)
PLATELET # BLD AUTO: 168 10E3/UL (ref 150–450)
RBC # BLD AUTO: 3.87 10E6/UL (ref 3.8–5.2)
WBC # BLD AUTO: 5.8 10E3/UL (ref 4–11)

## 2022-08-26 PROCEDURE — 96372 THER/PROPH/DIAG INJ SC/IM: CPT | Performed by: INTERNAL MEDICINE

## 2022-08-26 PROCEDURE — 36415 COLL VENOUS BLD VENIPUNCTURE: CPT | Mod: ZL | Performed by: INTERNAL MEDICINE

## 2022-08-26 PROCEDURE — 82565 ASSAY OF CREATININE: CPT | Mod: ZL | Performed by: INTERNAL MEDICINE

## 2022-08-26 PROCEDURE — 250N000011 HC RX IP 250 OP 636: Performed by: INTERNAL MEDICINE

## 2022-08-26 PROCEDURE — 84100 ASSAY OF PHOSPHORUS: CPT | Mod: ZL | Performed by: INTERNAL MEDICINE

## 2022-08-26 PROCEDURE — 36415 COLL VENOUS BLD VENIPUNCTURE: CPT | Mod: ZL

## 2022-08-26 PROCEDURE — 85004 AUTOMATED DIFF WBC COUNT: CPT | Mod: ZL

## 2022-08-26 PROCEDURE — 82310 ASSAY OF CALCIUM: CPT | Mod: ZL | Performed by: INTERNAL MEDICINE

## 2022-08-26 RX ADMIN — DENOSUMAB 60 MG: 60 INJECTION SUBCUTANEOUS at 10:05

## 2022-08-26 NOTE — PROGRESS NOTES
Specialty Medication: Prolia      LABS  Calcium:10.5  Creat:1.31  Phosphorus:3.6    Verified patient's first and last name, and . Patient stated reason for visit today is to receive a Prolia injection. Patient denied any concerns with previous injections. Prolia removed from [employer] in Procedure room, allowed to sit out for 15 mins prior to administration. Prolia administered SubQ, as ordered. Administration of medication documented in MAR (see MAR for further information regarding dose, lot #, NDC #, expiration date). Patient encouraged to wait in lobby for 15 minutes post-injection and notify staff immediately of any reaction.     Based on lab values in the past, Prolia was given.     Jaci Almanza RN ....................  2022   9:03 AM

## 2022-08-29 ENCOUNTER — MYC MEDICAL ADVICE (OUTPATIENT)
Dept: INTERNAL MEDICINE | Facility: OTHER | Age: 87
End: 2022-08-29

## 2022-09-17 ENCOUNTER — HEALTH MAINTENANCE LETTER (OUTPATIENT)
Age: 87
End: 2022-09-17

## 2022-09-25 VITALS
HEIGHT: 63 IN | WEIGHT: 165 LBS | DIASTOLIC BLOOD PRESSURE: 63 MMHG | HEART RATE: 60 BPM | BODY MASS INDEX: 29.23 KG/M2 | OXYGEN SATURATION: 100 % | RESPIRATION RATE: 14 BRPM | SYSTOLIC BLOOD PRESSURE: 135 MMHG | TEMPERATURE: 97 F

## 2022-09-26 ENCOUNTER — HOSPITAL ENCOUNTER (OUTPATIENT)
Dept: PHYSICAL THERAPY | Facility: OTHER | Age: 87
Setting detail: THERAPIES SERIES
Discharge: HOME | End: 2022-09-26
Attending: INTERNAL MEDICINE
Payer: MEDICARE

## 2022-09-26 PROCEDURE — 97140 MANUAL THERAPY 1/> REGIONS: CPT | Mod: GP,PO

## 2022-09-26 PROCEDURE — 97112 NEUROMUSCULAR REEDUCATION: CPT | Mod: GP,PO

## 2022-09-26 NOTE — PROGRESS NOTES
[facility]    OUTPATIENT PHYSICAL THERAPY  PLAN OF TREATMENT FOR OUTPATIENT REHABILITATION AND PROGRESS NOTE           Patient's Last Name, First Name, Vicki Soto Date of Birth  1935   Provider's Name  [facility] Medical Record No.  [id number]    Onset Date  8/20/22 Start of Care Date  10/5/22   Type:     _X_PT   ___OT   ___SLP Medical Diagnosis  Pain in right leg   PT Diagnosis  back pain, right leg pain, muscle weakness Plan of Treatment  Frequency/Duration: 1-2 times per week for therapeutic exercise, manual therapy, neuro re-ed  Certification date from 10/2/22 to 12/31/22     Goals:  Goal Identifier walking   Goal Description Patient to report ability to walk 1000 feet without muscle fatigue to allow greater ease of shopping with or without assistive dievice.    Target Date 01/05/22   Date Met  07/19/22   Progress (detail required for progress note): humidity has affected her arthritis. Able to shop, no issues     Goal Identifier bed moibility   Goal Description Patient to tolerate rolling in bed without pain.    Target Date 01/05/22   Date Met  07/19/22   Progress (detail required for progress note): bladder will wake her as it refers pain to back.     Goal Identifier pain   Goal Description Patient to report reduction in pain to 5/10 or less during functional activities and at night when sleeping.     Target Date 01/05/22   Date Met      Progress (detail required for progress note): Pain level is 0/10 at rest. Pain is 2/10 when up and walking. Still at a 7/10 at night when her bladder is full. Reduces after voiding.     Physical therapy interventions (therapeutice exercise, manual therapy and neuro re-ed, have allowed the patient to remain active, walk without assistive device, not require pain medication. 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     Client Self (Subjective) Report for Progress Note Reporting Period: Started at [employer] last week. when she exercises on left side, it pulls on right back. Doesn't last. Overall, having a lot of pain right low back/hip area (6/10), still has same issues at night when she needs to void, increases pain. Pain settles with voiding. Still feels PT offers relief for a couple of weeks afterwards. Still walking without assistive device. Feeling looser over all since starting [employer]. Will be getting a massage this week.      Objective Measurements:   Objective Measure: postural loading  Details: limited loading right pelvis; general listening to right anterior pelvis; local listening to rectum (endopelvic fascia)  Objective Measure: joint  Details: + FFT right, superior right pubic shear, supeiror shear righ tinnom.  Objective Measure: myofascial  Details: endopelvic fascia (rectum<>bladder), inferior parietocecal ligament                       I CERTIFY THE NEED FOR THESE SERVICES FURNISHED UNDER        THIS PLAN OF TREATMENT AND WHILE UNDER MY CARE     (Physician co-signature of this document indicates review and certification of the therapy plan).              Referring Provider: DO Karen Dawkins, PT

## 2022-10-24 ENCOUNTER — HOSPITAL ENCOUNTER (OUTPATIENT)
Dept: PHYSICAL THERAPY | Facility: OTHER | Age: 87
Setting detail: THERAPIES SERIES
Discharge: HOME | End: 2022-10-24
Attending: INTERNAL MEDICINE
Payer: MEDICARE

## 2022-10-24 PROCEDURE — 97140 MANUAL THERAPY 1/> REGIONS: CPT | Mod: GP,PO

## 2022-10-31 ENCOUNTER — IMMUNIZATION (OUTPATIENT)
Dept: FAMILY MEDICINE | Facility: OTHER | Age: 87
End: 2022-10-31
Attending: INTERNAL MEDICINE
Payer: MEDICARE

## 2022-10-31 DIAGNOSIS — Z23 HIGH PRIORITY FOR 2019-NCOV VACCINE: ICD-10-CM

## 2022-10-31 DIAGNOSIS — Z23 NEED FOR PROPHYLACTIC VACCINATION AND INOCULATION AGAINST INFLUENZA: ICD-10-CM

## 2022-10-31 PROCEDURE — G0008 ADMIN INFLUENZA VIRUS VAC: HCPCS

## 2022-10-31 PROCEDURE — 0124A COVID-19,PF,PFIZER BOOSTER BIVALENT: CPT

## 2022-11-17 ENCOUNTER — TELEPHONE (OUTPATIENT)
Dept: INTERNAL MEDICINE | Facility: OTHER | Age: 87
End: 2022-11-17

## 2022-11-17 DIAGNOSIS — I10 ESSENTIAL HYPERTENSION: Primary | ICD-10-CM

## 2022-11-17 NOTE — TELEPHONE ENCOUNTER
"Is on Losartan 100 mg.  Was told to follow up with some readings. She says Dr Guevara mentioned maybe adding a \"water pill or something\".    165/65  162/70  133/66  152/66  144/86  167/81 P70  163/76 P77    56-88 pulses    Blank Aranda CMA([facility])..................11/17/2022   8:39 AM   "

## 2022-11-18 NOTE — TELEPHONE ENCOUNTER
Message left stating we are waiting for Dr. Guevara  To address.  Kin Lu .......  11/18/2022  4:15 PM

## 2022-11-18 NOTE — TELEPHONE ENCOUNTER
Patient called back. She states she never heard back from Blank about a medication for her high BP. Please call.    Lina Robins on 11/18/2022 at 9:45 AM

## 2022-11-21 RX ORDER — HYDROCHLOROTHIAZIDE 12.5 MG/1
6.25 TABLET ORAL DAILY
Qty: 45 TABLET | Refills: 0 | Status: SHIPPED | OUTPATIENT
Start: 2022-11-21 | End: 2023-01-19

## 2022-11-21 NOTE — TELEPHONE ENCOUNTER
I think it would be reasonable to start a low dose blood pressure pill however water pills can cause dehydration and dizziness which she will need to watch for.  I am going to start the lowest dose possible and 1/2 tablets (6.25 mg).  I would like her to set up a follow-up in December with Oliva Hays for blood pressure and a recheck of kidneys and electrolytes.

## 2022-11-21 NOTE — TELEPHONE ENCOUNTER
After proper verification, patient notified. Transferred to the appointment desk to schedule.  Blank Aranda CMA([facility])..................11/21/2022   11:45 AM

## 2022-11-23 ENCOUNTER — HOSPITAL ENCOUNTER (OUTPATIENT)
Dept: PHYSICAL THERAPY | Facility: OTHER | Age: 87
Setting detail: THERAPIES SERIES
Discharge: HOME | End: 2022-11-23
Attending: INTERNAL MEDICINE
Payer: MEDICARE

## 2022-11-23 PROCEDURE — 97112 NEUROMUSCULAR REEDUCATION: CPT | Mod: GP

## 2022-11-23 PROCEDURE — 97140 MANUAL THERAPY 1/> REGIONS: CPT | Mod: GP

## 2022-12-20 ENCOUNTER — OFFICE VISIT (OUTPATIENT)
Dept: INTERNAL MEDICINE | Facility: OTHER | Age: 87
End: 2022-12-20
Attending: NURSE PRACTITIONER
Payer: COMMERCIAL

## 2022-12-20 VITALS
RESPIRATION RATE: 16 BRPM | WEIGHT: 168 LBS | OXYGEN SATURATION: 99 % | DIASTOLIC BLOOD PRESSURE: 78 MMHG | BODY MASS INDEX: 29.77 KG/M2 | HEIGHT: 63 IN | TEMPERATURE: 97 F | HEART RATE: 61 BPM | SYSTOLIC BLOOD PRESSURE: 142 MMHG

## 2022-12-20 DIAGNOSIS — I10 ESSENTIAL HYPERTENSION: Primary | ICD-10-CM

## 2022-12-20 DIAGNOSIS — L57.0 ACTINIC KERATOSIS: ICD-10-CM

## 2022-12-20 DIAGNOSIS — I10 WHITE COAT SYNDROME WITH HYPERTENSION: ICD-10-CM

## 2022-12-20 LAB
ANION GAP SERPL CALCULATED.3IONS-SCNC: 12 MMOL/L (ref 7–15)
BUN SERPL-MCNC: 33.9 MG/DL (ref 8–23)
CALCIUM SERPL-MCNC: 10.1 MG/DL (ref 8.8–10.2)
CHLORIDE SERPL-SCNC: 107 MMOL/L (ref 98–107)
CREAT SERPL-MCNC: 1.45 MG/DL (ref 0.51–0.95)
DEPRECATED HCO3 PLAS-SCNC: 23 MMOL/L (ref 22–29)
GFR SERPL CREATININE-BSD FRML MDRD: 35 ML/MIN/1.73M2
GLUCOSE SERPL-MCNC: 101 MG/DL (ref 70–99)
POTASSIUM SERPL-SCNC: 4.7 MMOL/L (ref 3.4–5.3)
SODIUM SERPL-SCNC: 142 MMOL/L (ref 136–145)

## 2022-12-20 PROCEDURE — 36415 COLL VENOUS BLD VENIPUNCTURE: CPT | Mod: ZL | Performed by: NURSE PRACTITIONER

## 2022-12-20 PROCEDURE — 82310 ASSAY OF CALCIUM: CPT | Mod: ZL | Performed by: NURSE PRACTITIONER

## 2022-12-20 PROCEDURE — G0463 HOSPITAL OUTPT CLINIC VISIT: HCPCS

## 2022-12-20 PROCEDURE — 99214 OFFICE O/P EST MOD 30 MIN: CPT | Performed by: NURSE PRACTITIONER

## 2022-12-20 RX ORDER — HYDROQUINONE 40 MG/G
CREAM TOPICAL
Qty: 28.35 G | Refills: 3 | Status: SHIPPED | OUTPATIENT
Start: 2022-12-20 | End: 2024-07-31

## 2022-12-20 RX ORDER — OXYMETAZOLINE HYDROCHLORIDE OPHTHALMIC 1 MG/ML
SOLUTION/ DROPS OPHTHALMIC
COMMUNITY
Start: 2022-10-11 | End: 2023-03-13

## 2022-12-20 ASSESSMENT — ENCOUNTER SYMPTOMS
LIGHT-HEADEDNESS: 0
DIZZINESS: 0
FREQUENCY: 0

## 2022-12-20 ASSESSMENT — PAIN SCALES - GENERAL: PAINLEVEL: NO PAIN (0)

## 2022-12-20 NOTE — PATIENT INSTRUCTIONS
Check Blood Pressure one hour after taking medication in the morning.  Same arm, feet flat on floor. Record readings and send one week worth for review.  If still elevated, start taking 12.5 mg of the hydrochlorothiazide and continue to monitor BP daily. Will need to follow up in two weeks after that.

## 2022-12-20 NOTE — NURSING NOTE
"Chief Complaint   Patient presents with     Hypertension       Initial BP (!) 140/82 (BP Location: Right arm, Patient Position: Sitting, Cuff Size: Adult Regular)   Pulse 61   Temp 97  F (36.1  C) (Temporal)   Resp 16   Ht 1.588 m (5' 2.5\")   Wt 76.2 kg (168 lb)   LMP  (LMP Unknown)   SpO2 99%   BMI 30.24 kg/m   Estimated body mass index is 30.24 kg/m  as calculated from the following:    Height as of this encounter: 1.588 m (5' 2.5\").    Weight as of this encounter: 76.2 kg (168 lb).     Medication Reconciliation: complete      FOOD SECURITY SCREENING QUESTIONS:    The next two questions are to help us understand your food security.  If you are feeling you need any assistance in this area, we have resources available to support you today.    Hunger Vital Signs:  Within the past 12 months we worried whether our food would run out before we got money to buy more. Never  Within the past 12 months the food we bought just didn't last and we didn't have money to get more. Never        Advance care plan reviewed      Damari Gibson LPN on 12/20/2022 at 10:45 AM      "

## 2022-12-20 NOTE — PROGRESS NOTES
"  Assessment & Plan     Essential hypertension  White coat syndrome with hypertension  We will go ahead and get repeat BMP today check kidney function.  Patient will check her blood pressure again for 1 week.  We discussed that if it is over 140 systolic she could possibly increase to 12.5 mg however I want to see what her kidney function is prior to giving her the go ahead to do this.  She will check her blood pressure 1 hour after taking her medication.  She is advised to not check it at different times throughout the day. **Kidney function indicates it would not tolerate an increase at this time.  Continue 6.25 mg daily.  - Basic Metabolic Panel    Actinic keratosis  Refill given.  - hydroquinone (DAVIS) 4 % external cream  Dispense: 28.35 g; Refill: 3      Ordering of each unique test  Prescription drug management  35 minutes spent on the date of the encounter doing chart review, history and exam, documentation and further activities per the note     BMI:   Estimated body mass index is 30.24 kg/m  as calculated from the following:    Height as of this encounter: 1.588 m (5' 2.5\").    Weight as of this encounter: 76.2 kg (168 lb).     Return in about 1 week (around 12/27/2022) for Telephone Visit for BP review/med change.    Oliva Hays NP  [facility]    Subjective   Vicki is a very pleasant 87 year old, presenting for the following health issues: Hypertension    History of Present Illness       Hypertension: She presents for follow up of hypertension.  She does check blood pressure  regularly outside of the clinic. Outside blood pressures have been over 140/90. She does not follow a low salt diet.       Per recommendation of [facility] on 11.21.22 note:  I think it would be reasonable to start a low dose blood pressure pill however water pills can cause dehydration and dizziness which she will need to watch for.  I am going to start the lowest dose possible and 1/2 tablets (6.25 mg).  " "I would like her to set up a follow-up in December with Oliva Hays for blood pressure and a recheck of kidneys and electrolytes.    Today patient reports she has been taking the hydrochlorothiazide in AM. She has been checking her BP after eating and at random times.    She reports some mild \"sensation\" in her chest, has had cardiac workups.  She wonders if she could possibly have GERD.  Reports she does not eat after 7 PM.  Sits up after meals.    She still has some SOB with exertion.  Did have COVID in July.    Review of Systems   Cardiovascular:        Borderline elevated BP   Genitourinary: Negative for frequency and urgency.   Neurological: Negative for dizziness and light-headedness.   All other systems reviewed and are negative.       Objective    BP (!) 142/78 (BP Location: Right arm, Patient Position: Sitting, Cuff Size: Adult Regular)   Pulse 61   Temp 97  F (36.1  C) (Temporal)   Resp 16   Ht 1.588 m (5' 2.5\")   Wt 76.2 kg (168 lb)   LMP  (LMP Unknown)   SpO2 99%   BMI 30.24 kg/m    Body mass index is 30.24 kg/m .  Physical Exam   GENERAL: healthy, alert and no distress  EYES: Eyes grossly normal to inspection, PERRL and conjunctivae and sclerae normal  RESP: lungs clear to auscultation - no rales, rhonchi or wheezes  CV: regular rates and rhythm and no murmur, click or rub  ABDOMEN: soft, nontender and bowel sounds normal  MS: no gross musculoskeletal defects noted, no edema  NEURO: Normal strength and tone, mentation intact and speech normal  PSYCH: mentation appears normal, affect normal/bright    Results for orders placed or performed in visit on 12/20/22   Basic Metabolic Panel     Status: Abnormal   Result Value Ref Range    Sodium 142 136 - 145 mmol/L    Potassium 4.7 3.4 - 5.3 mmol/L    Chloride 107 98 - 107 mmol/L    Carbon Dioxide (CO2) 23 22 - 29 mmol/L    Anion Gap 12 7 - 15 mmol/L    Urea Nitrogen 33.9 (H) 8.0 - 23.0 mg/dL    Creatinine 1.45 (H) 0.51 - 0.95 mg/dL    Calcium " 10.1 8.8 - 10.2 mg/dL    Glucose 101 (H) 70 - 99 mg/dL    GFR Estimate 35 (L) >60 mL/min/1.73m2

## 2023-01-03 ENCOUNTER — HOSPITAL ENCOUNTER (OUTPATIENT)
Dept: PHYSICAL THERAPY | Facility: OTHER | Age: 88
Setting detail: THERAPIES SERIES
Discharge: HOME | End: 2023-01-03
Attending: INTERNAL MEDICINE
Payer: MEDICARE

## 2023-01-03 PROCEDURE — 97140 MANUAL THERAPY 1/> REGIONS: CPT | Mod: GP

## 2023-01-03 NOTE — PROGRESS NOTES
[facility]    OUTPATIENT PHYSICAL THERAPY  PLAN OF TREATMENT FOR OUTPATIENT REHABILITATION AND PROGRESS NOTE           Patient's Last Name, First Name, Vicki Soto Date of Birth  1935   Provider's Name  [facility] Medical Record No.  [id number]    Onset Date  8/20/22 Start of Care Date  10/5/22   Type:     _X_PT   ___OT   ___SLP Medical Diagnosis  Pain in right leg   PT Diagnosis  back pain, right leg pain, muscle weakness Plan of Treatment  Frequency/Duration: 1-2 times per week for therapeutic exercise, manual therapy, neuro re-ed  Certification date from 01/01/23 to 03/31/23     Goals:  Goal Identifier walking   Goal Description Patient to report ability to walk 1000 feet without muscle fatigue to allow greater ease of shopping with or without assistive dievice.    Target Date 01/05/22   Date Met  07/19/22   Progress (detail required for progress note): humidity has affected her arthritis. Able to shop, no issues     Goal Identifier bed moibility   Goal Description Patient to tolerate rolling in bed without pain.    Target Date 01/05/22   Date Met  07/19/22   Progress (detail required for progress note): bladder will wake her as it refers pain to back.     Goal Identifier pain   Goal Description Patient to report reduction in pain to 5/10 or less during functional activities, such as sleep.    Target Date 03/31/23   Date Met      Progress (detail required for progress note): Pain level is 7/10 at night with a full bladder.      Broken hardware in lumbar spine contributes to pain; not a surgical candidate for another lumbar surgery.     Client Self (Subjective) Report for Progress Note Reporting Period: Right hip pain/gluteal pain still an issue at night, after she wakes and urinates, she feels better. Damp weather has not been helping her arthritis  pain. Uses tylenol twice a day and that does help. Doing HEP for strengthening. Uses assistive device with icy conditions. Can still role over in bed without issues. PT still helps to reduce her pain and improve her sleep.      Objective Measurements:   Objective Measure: postural loading  Details: general listening in sitting to right anterior pelvis; local listening to bladder wtih extended listening to sacrum  Objective Measure: joint  Details: clear  Objective Measure: myofascial  Details: endopelvic fascia (bladder<>sacrum, rectum<>bladder)                            I CERTIFY THE NEED FOR THESE SERVICES FURNISHED UNDER        THIS PLAN OF TREATMENT AND WHILE UNDER MY CARE     (Physician co-signature of this document indicates review and certification of the therapy plan).              Referring Provider: DO Karen Dawkins, PT

## 2023-01-10 ENCOUNTER — OFFICE VISIT (OUTPATIENT)
Dept: INTERNAL MEDICINE | Facility: OTHER | Age: 88
End: 2023-01-10
Attending: NURSE PRACTITIONER
Payer: COMMERCIAL

## 2023-01-10 VITALS
RESPIRATION RATE: 16 BRPM | SYSTOLIC BLOOD PRESSURE: 132 MMHG | HEART RATE: 68 BPM | TEMPERATURE: 96.8 F | DIASTOLIC BLOOD PRESSURE: 84 MMHG | WEIGHT: 167 LBS | OXYGEN SATURATION: 100 % | BODY MASS INDEX: 30.06 KG/M2

## 2023-01-10 DIAGNOSIS — I10 ESSENTIAL HYPERTENSION: ICD-10-CM

## 2023-01-10 PROCEDURE — G0463 HOSPITAL OUTPT CLINIC VISIT: HCPCS

## 2023-01-10 PROCEDURE — 99213 OFFICE O/P EST LOW 20 MIN: CPT | Performed by: NURSE PRACTITIONER

## 2023-01-10 RX ORDER — LOSARTAN POTASSIUM 50 MG/1
50 TABLET ORAL DAILY
Qty: 90 TABLET | Refills: 0 | Status: SHIPPED | OUTPATIENT
Start: 2023-01-10 | End: 2023-03-13

## 2023-01-10 ASSESSMENT — PAIN SCALES - GENERAL: PAINLEVEL: NO PAIN (0)

## 2023-01-10 NOTE — NURSING NOTE
"Chief Complaint   Patient presents with     Hypertension       Initial /84 (BP Location: Right arm, Patient Position: Sitting, Cuff Size: Adult Regular)   Pulse 68   Temp 96.8  F (36  C) (Temporal)   Resp 16   Wt 75.8 kg (167 lb)   LMP  (LMP Unknown)   SpO2 100%   BMI 30.06 kg/m   Estimated body mass index is 30.06 kg/m  as calculated from the following:    Height as of 12/20/22: 1.588 m (5' 2.5\").    Weight as of this encounter: 75.8 kg (167 lb).     Medication Reconciliation: complete      FOOD SECURITY SCREENING QUESTIONS:    The next two questions are to help us understand your food security.  If you are feeling you need any assistance in this area, we have resources available to support you today.    Hunger Vital Signs:  Within the past 12 months we worried whether our food would run out before we got money to buy more. Never  Within the past 12 months the food we bought just didn't last and we didn't have money to get more. Never        Advance care plan reviewed      Damari Gibson LPN on 1/10/2023 at 1:19 PM      "

## 2023-01-10 NOTE — PROGRESS NOTES
"Patient called to report that there was a misunderstanding at her appointment yesterday. States she has not been cutting losartan in half and has been taking the whole 100 mg tablets. States she has plenty of the 100 mg tablets.     States her issue was with the hydrochlorothiazide- states she is cutting these in half (12.5 mg tablets to equal 6.25 mg). States these are giving her a difficult time as they are small pills and she can never get them to cut equally. She was wondering if she could go up to a full tablet of these (12.5 mg) as 12.5 mg is the small dose medication comes in.      Assessment & Plan     Essential hypertension  Will give her Rx for 50 mg tablets so she doesn't have to split them. Encouraged her to lose weight, increase daily exercise, low salt diet.   - losartan (COZAAR) 50 MG tablet  Dispense: 90 tablet; Refill: 0    Prescription drug management  20 minutes spent on the date of the encounter doing chart review, history and exam, documentation and further activities per the note     BMI:   Estimated body mass index is 30.06 kg/m  as calculated from the following:    Height as of 12/20/22: 1.588 m (5' 2.5\").    Weight as of this encounter: 75.8 kg (167 lb).     Return in about 2 months (around 3/15/2023) for BP Recheck.    Oliva Hays NP  [facility]    Moses Cohen is a 87 year old, presenting for the following health issues: Hypertension Follow up    She has been cutting her 100 mg tablets in half and does admit the pill cutter doesn't always cut it straight.     History of Present Illness       Hypertension: She presents for follow up of hypertension.  She does check blood pressure  regularly outside of the clinic. Outside blood pressures have been over 140/90. She does not follow a low salt diet.         How many servings of fruits and vegetables do you eat daily?  2-3    On average, how many sweetened beverages do you drink each day (Examples: soda, " juice, sweet tea, etc.  Do NOT count diet or artificially sweetened beverages)?   0    How many days per week do you exercise enough to make your heart beat faster? 3 or less    How many minutes a day do you exercise enough to make your heart beat faster? 20 - 29    How many days per week do you miss taking your medication? 0    Blood Pressure Readings reported from home per message sent from patient:  12/21   147/77    60  12//22  160/67  60  12/24   128/63   78  12/26 12/66   63  12/28    141/60   65  12/31   141/0    67  1/1       157/78    67  1/3       148/72     70    Readings brought with her today are:  1/1/23   157/79  67  1/3/23   148/72  70  1/4/23  150/62  67  1/7/23   135/68  70  1/10/23 132/66 64     Review of Systems   CONSTITUTIONAL: NEGATIVE for fever, chills, change in weight  ENT/MOUTH: NEGATIVE for ear, mouth and throat problems  RESP: NEGATIVE for significant cough or SOB  CV: NEGATIVE for chest pain, palpitations or peripheral edema      Objective    /84 (BP Location: Right arm, Patient Position: Sitting, Cuff Size: Adult Regular)   Pulse 68   Temp 96.8  F (36  C) (Temporal)   Resp 16   Wt 75.8 kg (167 lb)   LMP  (LMP Unknown)   SpO2 100%   BMI 30.06 kg/m    Body mass index is 30.06 kg/m .  Physical Exam   GENERAL: healthy, alert and no distress  RESP: lungs clear to auscultation - no rales, rhonchi or wheezes  CV: regular rates and rhythm and no peripheral edema  ABDOMEN: soft, nontender and bowel sounds normal  MS: no gross musculoskeletal defects noted, no edema  NEURO: Normal strength and tone, mentation intact and speech normal  PSYCH: mentation appears normal, affect normal/bright    No results found for any visits on 01/10/23.

## 2023-01-12 ENCOUNTER — TELEPHONE (OUTPATIENT)
Dept: INTERNAL MEDICINE | Facility: OTHER | Age: 88
End: 2023-01-12
Payer: COMMERCIAL

## 2023-01-12 NOTE — TELEPHONE ENCOUNTER
Patient called to report that there was a misunderstanding at her appointment yesterday. States she has not been cutting losartan in half and has been taking the whole 100 mg tablets. States she has plenty of the 100 mg tablets.    States her issue was with the hydrochlorothiazide- states she is cutting these in half (12.5 mg tablets to equal 6.25 mg). States these are giving her a difficult time as they are small pills and she can never get them to cut equally. She was wondering if she could go up to a full tablet of these (12.5 mg) as 12.5 mg is the small dose medication comes in.    Please advise    Corinne R Thayer, RN on 1/12/2023 at 11:27 AM

## 2023-01-12 NOTE — TELEPHONE ENCOUNTER
Patient would like to go over current medications and dosage.    Na Anderson on 1/12/2023 at 10:43 AM

## 2023-01-16 DIAGNOSIS — I10 ESSENTIAL HYPERTENSION: ICD-10-CM

## 2023-01-17 NOTE — TELEPHONE ENCOUNTER
Please call the patient and let her know she should continue on the 6.25 mg.  The lowest dose possible in pill form is 12.5 mg.  We would need to reevaluate her blood pressure and meds in order to switch her over to 12.5 mg total for blood pressure management.  We can do this over a quick telephone visit if needed.

## 2023-01-18 VITALS — DIASTOLIC BLOOD PRESSURE: 70 MMHG | SYSTOLIC BLOOD PRESSURE: 146 MMHG

## 2023-01-18 NOTE — TELEPHONE ENCOUNTER
Called patient and verified medication dose, 6.25 mg hydrochlorothiazide,   per Oliva Hays NP .  Patient states that some days she has forgotten to take B/P medication.    Current B/P's:     1/17/23 - 146/70  1/16/23 - 160/68  1/15/23 - 164/78  1/13/23 - 139/70  1/12/23 - 142/71    Damari Gibson LPN on 1/18/2023 at 3:23 PM

## 2023-01-19 NOTE — TELEPHONE ENCOUNTER
[id number] sent Rx request for the following:      Requested Prescriptions   Pending Prescriptions Disp Refills     hydrochlorothiazide (HYDRODIURIL) 12.5 MG tablet [Pharmacy Med Name: HYDROCHLOROTHIAZIDE 12.5MG TAB] 45 tablet 0     Sig: TAKE 1/2 TABLET (6.25 MG) BY MOUTH DAILY       Diuretics (Including Combos) Protocol Failed - 1/19/2023 11:18 AM        Failed - Blood pressure under 140/90 in past 12 months     BP Readings from Last 3 Encounters:   01/17/23 (!) 146/70   01/10/23 132/84   12/20/22 (!) 142/78           Failed - Medication is active on med list        Failed - Normal serum creatinine on file in past 12 months     Recent Labs   Lab Test 12/20/22  1030   CR 1.45*             Last Prescription Date:   11/21/22  Last Fill Qty/Refills:         45, R-0    Last Office Visit:              1/10/23  Future Office visit:           3/13/23    Creatine 1.45, having re evaluation of blood pressure in March. Does not meet criteria but is monitored by provider. Annamarie Benz RN on 1/19/2023 at 11:23 AM

## 2023-01-20 RX ORDER — HYDROCHLOROTHIAZIDE 12.5 MG/1
TABLET ORAL
Qty: 45 TABLET | Refills: 0 | Status: SHIPPED | OUTPATIENT
Start: 2023-01-20 | End: 2023-03-06

## 2023-02-02 ENCOUNTER — HOSPITAL ENCOUNTER (OUTPATIENT)
Dept: PHYSICAL THERAPY | Facility: OTHER | Age: 88
Setting detail: THERAPIES SERIES
Discharge: HOME | End: 2023-02-02
Attending: INTERNAL MEDICINE
Payer: MEDICARE

## 2023-02-02 PROCEDURE — 97140 MANUAL THERAPY 1/> REGIONS: CPT | Mod: GP

## 2023-02-08 ENCOUNTER — HOSPITAL ENCOUNTER (OUTPATIENT)
Dept: PHYSICAL THERAPY | Facility: OTHER | Age: 88
Setting detail: THERAPIES SERIES
Discharge: HOME | End: 2023-02-08
Attending: INTERNAL MEDICINE
Payer: MEDICARE

## 2023-02-08 PROCEDURE — 97112 NEUROMUSCULAR REEDUCATION: CPT | Mod: GP

## 2023-02-08 PROCEDURE — 97140 MANUAL THERAPY 1/> REGIONS: CPT | Mod: GP

## 2023-02-28 ENCOUNTER — ALLIED HEALTH/NURSE VISIT (OUTPATIENT)
Dept: FAMILY MEDICINE | Facility: OTHER | Age: 88
End: 2023-02-28
Attending: INTERNAL MEDICINE
Payer: MEDICARE

## 2023-02-28 DIAGNOSIS — M85.80 OSTEOPENIA, UNSPECIFIED LOCATION: Primary | ICD-10-CM

## 2023-02-28 LAB
ALBUMIN SERPL BCG-MCNC: 4.4 G/DL (ref 3.5–5.2)
CALCIUM SERPL-MCNC: 10.3 MG/DL (ref 8.8–10.2)
CREAT SERPL-MCNC: 1.2 MG/DL (ref 0.51–0.95)
GFR SERPL CREATININE-BSD FRML MDRD: 44 ML/MIN/1.73M2
PHOSPHATE SERPL-MCNC: 3.5 MG/DL (ref 2.5–4.5)

## 2023-02-28 PROCEDURE — 250N000011 HC RX IP 250 OP 636: Performed by: INTERNAL MEDICINE

## 2023-02-28 PROCEDURE — 96372 THER/PROPH/DIAG INJ SC/IM: CPT | Performed by: INTERNAL MEDICINE

## 2023-02-28 PROCEDURE — 82310 ASSAY OF CALCIUM: CPT | Mod: ZL | Performed by: NURSE PRACTITIONER

## 2023-02-28 PROCEDURE — 36415 COLL VENOUS BLD VENIPUNCTURE: CPT | Mod: ZL | Performed by: NURSE PRACTITIONER

## 2023-02-28 PROCEDURE — 84100 ASSAY OF PHOSPHORUS: CPT | Mod: ZL | Performed by: NURSE PRACTITIONER

## 2023-02-28 PROCEDURE — 82565 ASSAY OF CREATININE: CPT | Mod: ZL | Performed by: NURSE PRACTITIONER

## 2023-02-28 PROCEDURE — 82040 ASSAY OF SERUM ALBUMIN: CPT | Mod: ZL | Performed by: NURSE PRACTITIONER

## 2023-02-28 RX ADMIN — DENOSUMAB 60 MG: 60 INJECTION SUBCUTANEOUS at 14:21

## 2023-02-28 NOTE — PROGRESS NOTES
Specialty Medication: Prolia    VITAL SIGNS  Height: 5'2.5''  Weight: 75.8 kg    LABS  Calcium:10.3  Creat: 1.2  Phosphorus:3.5    Verified patient's first and last name, and . Patient stated reason for visit today is to receive a Prolia injection. Patient denied any concerns with previous injections. Prolia removed from [employer] in Procedure room, allowed to sit out for 15 mins prior to administration. Prolia administered SubQ, as ordered. Administration of medication documented in MAR (see MAR for further information regarding dose, lot #, NDC #, expiration date). Patient encouraged to wait in lobby for 15 minutes post-injection and notify staff immediately of any reaction.     ROSA ELENA CORRAL RN on 2023 at 2:22 PM

## 2023-03-03 ENCOUNTER — HOSPITAL ENCOUNTER (OUTPATIENT)
Dept: PHYSICAL THERAPY | Facility: OTHER | Age: 88
Setting detail: THERAPIES SERIES
Discharge: HOME | End: 2023-03-03
Attending: INTERNAL MEDICINE
Payer: MEDICARE

## 2023-03-03 PROCEDURE — 97140 MANUAL THERAPY 1/> REGIONS: CPT | Mod: GP

## 2023-03-03 PROCEDURE — 97112 NEUROMUSCULAR REEDUCATION: CPT | Mod: GP

## 2023-03-06 DIAGNOSIS — I10 ESSENTIAL HYPERTENSION: ICD-10-CM

## 2023-03-06 RX ORDER — HYDROCHLOROTHIAZIDE 12.5 MG/1
12.5 TABLET ORAL DAILY
Qty: 45 TABLET | Refills: 0 | COMMUNITY
Start: 2023-03-06 | End: 2023-03-13 | Stop reason: DRUGHIGH

## 2023-03-13 ENCOUNTER — OFFICE VISIT (OUTPATIENT)
Dept: INTERNAL MEDICINE | Facility: OTHER | Age: 88
End: 2023-03-13
Attending: INTERNAL MEDICINE
Payer: COMMERCIAL

## 2023-03-13 VITALS
DIASTOLIC BLOOD PRESSURE: 58 MMHG | BODY MASS INDEX: 30.42 KG/M2 | SYSTOLIC BLOOD PRESSURE: 154 MMHG | OXYGEN SATURATION: 100 % | HEART RATE: 72 BPM | RESPIRATION RATE: 12 BRPM | WEIGHT: 169 LBS | TEMPERATURE: 96.9 F

## 2023-03-13 DIAGNOSIS — I10 ESSENTIAL HYPERTENSION: Primary | ICD-10-CM

## 2023-03-13 DIAGNOSIS — M48.061 SPINAL STENOSIS OF LUMBAR REGION WITHOUT NEUROGENIC CLAUDICATION: ICD-10-CM

## 2023-03-13 DIAGNOSIS — R06.09 DYSPNEA ON EXERTION: ICD-10-CM

## 2023-03-13 DIAGNOSIS — N18.30 STAGE 3 CHRONIC KIDNEY DISEASE, UNSPECIFIED WHETHER STAGE 3A OR 3B CKD (H): ICD-10-CM

## 2023-03-13 PROBLEM — Z13.1 SCREENING FOR DIABETES MELLITUS: Status: RESOLVED | Noted: 2022-06-23 | Resolved: 2023-03-13

## 2023-03-13 PROBLEM — Z00.00 ENCOUNTER FOR MEDICARE ANNUAL WELLNESS EXAM: Status: RESOLVED | Noted: 2022-06-23 | Resolved: 2023-03-13

## 2023-03-13 PROCEDURE — 99214 OFFICE O/P EST MOD 30 MIN: CPT | Performed by: INTERNAL MEDICINE

## 2023-03-13 PROCEDURE — G0463 HOSPITAL OUTPT CLINIC VISIT: HCPCS | Performed by: INTERNAL MEDICINE

## 2023-03-13 RX ORDER — LOSARTAN POTASSIUM 100 MG/1
100 TABLET ORAL DAILY
COMMUNITY
Start: 2023-01-20 | End: 2023-03-13 | Stop reason: ALTCHOICE

## 2023-03-13 RX ORDER — LOSARTAN POTASSIUM AND HYDROCHLOROTHIAZIDE 25; 100 MG/1; MG/1
1 TABLET ORAL DAILY
Qty: 90 TABLET | Refills: 1 | Status: SHIPPED | OUTPATIENT
Start: 2023-03-13 | End: 2023-06-15

## 2023-03-13 ASSESSMENT — PAIN SCALES - GENERAL: PAINLEVEL: NO PAIN (0)

## 2023-03-13 NOTE — PROGRESS NOTES
"  Assessment & Plan     Stage 3 chronic kidney disease, unspecified whether stage 3a or 3b CKD (H)  Plan to recheck in approximately 3 months or sooner as needed    Essential hypertension  Continue on current dosing, new prescription sent in for Hyzaar for combination dosing.  She is to call with any issues.  - losartan-hydrochlorothiazide (HYZAAR) 100-25 MG tablet; Take 1 tablet by mouth daily    Dyspnea on exertion  Suspect this is multifactorial including minimal emphysema.  We discussed that depending on how she is doing and her follow-up that we may consider referring her for repeat PFTs, imaging or other.    Spinal stenosis of lumbar region without neurogenic claudication  Continue on Tylenol but can increase to 8-hour Tylenol.  Continue to monitor     BMI:   Estimated body mass index is 30.42 kg/m  as calculated from the following:    Height as of 12/20/22: 1.588 m (5' 2.5\").    Weight as of this encounter: 76.7 kg (169 lb).   Weight management plan: Discussed healthy diet and exercise guidelines    Return in about 3 months (around 6/13/2023) for Annual Review with renewal of all medications, as scheduled.    Kirsten Guevara, [facility]   Vicki is a 87 year old, presenting for the following health issues:  RECHECK (Blood pressure)    She has occasoinal episodes where she feels a bit lightheaded and does feel winded with exertion.She had an extensive cardiac work-up 2 years ago that was all normal.  She had PFTs done that showed minimal obstructive disease.    She has a history of stage III chronic kidney disease and will be due for labs when she is seen in June.    She brings in her blood pressure readings today and they are controlled, ranging between 124/59 and 148/63.  She is taking hydrochlorothiazide at a full tablet.  She denies any obvious side effects that she is aware of.    She continues to have some back pain with her spinal stenosis.  She has been taking " some Tylenol and is curious what other options are available.    History of Present Illness       Hypertension: She presents for follow up of hypertension.  She does check blood pressure  regularly outside of the clinic. Outside blood pressures have been over 140/90. She follows a low salt diet.     She eats 2-3 servings of fruits and vegetables daily.She consumes 0 sweetened beverage(s) daily.She exercises with enough effort to increase her heart rate 9 or less minutes per day.    She is taking medications regularly.       Review of Systems   Denies any fevers, chills, SOB, chest pain, increased lower extremity edema, changes in bowel or bladder or other concerns.         Objective    BP (!) 154/58 (BP Location: Right arm, Patient Position: Sitting, Cuff Size: Adult Regular)   Pulse 72   Temp 96.9  F (36.1  C) (Temporal)   Resp 12   Wt 76.7 kg (169 lb)   LMP  (LMP Unknown)   SpO2 100%   BMI 30.42 kg/m    Body mass index is 30.42 kg/m .  Physical Exam   GEN: Vitals reviewed. Healthy appearing. Patient is in no acute distress. Cooperative with exam.  HEENT: Normocephalic atraumatic.  Eyes grossly normal to inspection.  No discharge or erythema, or obvious scleral/conjunctival abnormalities.   CV: Heart regular in rate and rhythm with no murmur.    LUNGS: No audible wheeze, cough, or visible cyanosis.  No visible retractions or increased work of breathing.  Lungs clear to auscultation bilaterally.    ABD:  Obese, nondistended  SKIN: Warm and dry to touch.  Visible skin clear. No significant rash, abnormal pigmentation or lesions.  EXT: No clubbing or cyanosis.  Trace lower ext peripheral edema.

## 2023-03-13 NOTE — NURSING NOTE
Patient presents to clinic today for follow up on blood pressure.   Medication review completed.    Advanced Care Planning discussed/reviewed.    FOOD SECURITY SCREENING QUESTIONS    The next two questions are to help us understand your food security.  If you are feeling you need any assistance in this area, we have resources available to support you today.    Hunger Vital Signs:  Within the past 12 months we worried whether our food would run out before we got money to buy more. Never  Within the past 12 months the food we bought just didn't last and we didn't have money to get more. Never    Blank Aranda CMA(AAMA)..................3/13/2023   11:34 AM

## 2023-03-13 NOTE — PATIENT INSTRUCTIONS
Caution with NSAIDS (ibuprofen, aspirin, naproxen, aleve, advil) due to risk for increased blood pressure, heart disease, stomach pain/nausea/ulcers and kidney damage; use minimal amount necessary     Ok to take Acetaminophen (Tylenol 8 hour extended release) 650mg - 1300mg three times a day as needed; Maximum of 4000mg daily     - Return/call as needed for follow-up should any new symptoms develop, for worsening of current symptoms or if symptoms do notresolve with above plan.

## 2023-03-15 ENCOUNTER — HOSPITAL ENCOUNTER (OUTPATIENT)
Dept: PHYSICAL THERAPY | Facility: OTHER | Age: 88
Setting detail: THERAPIES SERIES
Discharge: HOME | End: 2023-03-15
Attending: INTERNAL MEDICINE
Payer: MEDICARE

## 2023-03-15 PROCEDURE — 97112 NEUROMUSCULAR REEDUCATION: CPT | Mod: GP

## 2023-03-15 PROCEDURE — 97140 MANUAL THERAPY 1/> REGIONS: CPT | Mod: GP

## 2023-04-20 ENCOUNTER — HOSPITAL ENCOUNTER (OUTPATIENT)
Dept: PHYSICAL THERAPY | Facility: OTHER | Age: 88
Setting detail: THERAPIES SERIES
Discharge: HOME | End: 2023-04-20
Attending: INTERNAL MEDICINE
Payer: MEDICARE

## 2023-04-20 PROCEDURE — 97140 MANUAL THERAPY 1/> REGIONS: CPT | Mod: GP

## 2023-04-20 PROCEDURE — 97112 NEUROMUSCULAR REEDUCATION: CPT | Mod: GP

## 2023-04-20 NOTE — PROGRESS NOTES
[facility]    OUTPATIENT PHYSICAL THERAPY  PLAN OF TREATMENT FOR OUTPATIENT REHABILITATION AND PROGRESS NOTE           Patient's Last Name, First Name, Vicki Soto Date of Birth  1935   Provider's Name  [facility] Medical Record No.  [id number]    Onset Date  8/20/22 Start of Care Date  10/5/22   Type:     _X_PT   ___OT   ___SLP Medical Diagnosis  Pain in right leg   PT Diagnosis  back pain, right leg pain, muscle weakness Plan of Treatment   1-2 times per month for therapeutic exercise, manual therapy, neuro re-ed  Certification date from 04/01/23 to 06/30/23     Goals:  Goal Identifier walking   Goal Description Patient to report ability to walk 1000 feet without muscle fatigue to allow greater ease of shopping with or without assistive dievice.    Target Date 01/05/22   Date Met  07/19/22   Progress (detail required for progress note): humidity has affected her arthritis. Able to shop, no issues     Goal Identifier bed moibility   Goal Description Patient to tolerate rolling in bed without pain.    Target Date 01/05/22   Date Met  07/19/22   Progress (detail required for progress note): bladder will wake her as it refers pain to back.     Goal Identifier pain   Goal Description Patient to report reduction in pain to 5/10 or less during functional activities.    Target Date 07/05/23   Date Met      Progress (detail required for progress note): Pain level is 0/10 at rest. Pain is 3/10 when up and walking, ache in to right leg. Still at a 6/10 at night when her bladder is full. Reduces after voiding. PT aides in reduce fascial tightness that irritates back nerves affected by broken fusion rods.       Client Self (Subjective) Report for Progress Note Reporting Period: Having more anterior right leg pain with sitting; still dealing with night time pain  that wakes her, lessenes with voiding. PT continues to provide relief for her to keep her more mobile.     Objective Measurements:   Objective Measure: postural loading  Details: limited loading in sitting shoulder R/R L/R; general listening to right abdomen right anterior chest wall; local listening to right kidney with extended listening to right renal artery.  Objective Measure: joint  Details: + FFT, superior right pubic shear, + manubrial compression test  Objective Measure: myofascial  Details: right renal fascia, right renal artery, celiac trunk, hepatic artery,                            I CERTIFY THE NEED FOR THESE SERVICES FURNISHED UNDER        THIS PLAN OF TREATMENT AND WHILE UNDER MY CARE     (Physician co-signature of this document indicates review and certification of the therapy plan).              Referring Provider: DO Karen Dawkins, PT

## 2023-04-25 ENCOUNTER — HOSPITAL ENCOUNTER (OUTPATIENT)
Dept: PHYSICAL THERAPY | Facility: OTHER | Age: 88
Setting detail: THERAPIES SERIES
Discharge: HOME | End: 2023-04-25
Attending: INTERNAL MEDICINE
Payer: MEDICARE

## 2023-04-25 PROCEDURE — 97140 MANUAL THERAPY 1/> REGIONS: CPT | Mod: GP

## 2023-04-25 PROCEDURE — 97112 NEUROMUSCULAR REEDUCATION: CPT | Mod: GP

## 2023-05-17 ENCOUNTER — THERAPY VISIT (OUTPATIENT)
Dept: PHYSICAL THERAPY | Facility: OTHER | Age: 88
End: 2023-05-17
Attending: INTERNAL MEDICINE
Payer: MEDICARE

## 2023-05-17 DIAGNOSIS — M54.9 BACK PAIN: Primary | ICD-10-CM

## 2023-05-17 DIAGNOSIS — M62.81 MUSCLE WEAKNESS (GENERALIZED): ICD-10-CM

## 2023-05-17 DIAGNOSIS — M79.604 PAIN OF RIGHT LOWER EXTREMITY: ICD-10-CM

## 2023-05-17 PROCEDURE — 97140 MANUAL THERAPY 1/> REGIONS: CPT | Mod: GP

## 2023-05-17 PROCEDURE — 97112 NEUROMUSCULAR REEDUCATION: CPT | Mod: GP

## 2023-05-19 ENCOUNTER — MYC MEDICAL ADVICE (OUTPATIENT)
Dept: INTERNAL MEDICINE | Facility: OTHER | Age: 88
End: 2023-05-19
Payer: COMMERCIAL

## 2023-05-19 DIAGNOSIS — J44.9 CHRONIC OBSTRUCTIVE PULMONARY DISEASE, UNSPECIFIED COPD TYPE (H): Primary | ICD-10-CM

## 2023-05-23 ENCOUNTER — OFFICE VISIT (OUTPATIENT)
Dept: FAMILY MEDICINE | Facility: OTHER | Age: 88
End: 2023-05-23
Attending: NURSE PRACTITIONER
Payer: COMMERCIAL

## 2023-05-23 VITALS
HEART RATE: 58 BPM | OXYGEN SATURATION: 100 % | SYSTOLIC BLOOD PRESSURE: 160 MMHG | DIASTOLIC BLOOD PRESSURE: 70 MMHG | TEMPERATURE: 97.4 F | WEIGHT: 168.1 LBS | HEIGHT: 64 IN | BODY MASS INDEX: 28.7 KG/M2 | RESPIRATION RATE: 18 BRPM

## 2023-05-23 DIAGNOSIS — L03.316 CELLULITIS, UMBILICAL: Primary | ICD-10-CM

## 2023-05-23 PROCEDURE — 250N000013 HC RX MED GY IP 250 OP 250 PS 637: Performed by: NURSE PRACTITIONER

## 2023-05-23 PROCEDURE — 99213 OFFICE O/P EST LOW 20 MIN: CPT | Performed by: NURSE PRACTITIONER

## 2023-05-23 PROCEDURE — G0463 HOSPITAL OUTPT CLINIC VISIT: HCPCS

## 2023-05-23 PROCEDURE — 87070 CULTURE OTHR SPECIMN AEROBIC: CPT | Mod: ZL | Performed by: NURSE PRACTITIONER

## 2023-05-23 RX ORDER — CLOTRIMAZOLE 1 %
CREAM (GRAM) TOPICAL 2 TIMES DAILY
Qty: 15 G | Refills: 0 | Status: SHIPPED | OUTPATIENT
Start: 2023-05-23 | End: 2023-05-28

## 2023-05-23 RX ORDER — CEPHALEXIN 500 MG/1
500 CAPSULE ORAL 2 TIMES DAILY
Qty: 14 CAPSULE | Refills: 0 | Status: SHIPPED | OUTPATIENT
Start: 2023-05-23 | End: 2023-06-01

## 2023-05-23 RX ORDER — SODIUM CHLORIDE FOR INHALATION 0.9 %
3 VIAL, NEBULIZER (ML) INHALATION
Status: ACTIVE | OUTPATIENT
Start: 2023-05-23

## 2023-05-23 RX ORDER — MUPIROCIN 20 MG/G
OINTMENT TOPICAL 2 TIMES DAILY
Qty: 22 G | Refills: 0 | Status: SHIPPED | OUTPATIENT
Start: 2023-05-23 | End: 2023-05-23

## 2023-05-23 RX ADMIN — Medication 3 ML: at 12:49

## 2023-05-23 ASSESSMENT — PAIN SCALES - GENERAL: PAINLEVEL: NO PAIN (0)

## 2023-05-23 NOTE — PATIENT INSTRUCTIONS
Keep area clean and dry    Cleans with warm soapy water twice a day and then apply bacitracin ointment, for a total of 5 days    Take oral antibiotic twice a day for 7 days

## 2023-05-23 NOTE — NURSING NOTE
Pt presents to clinic today for mass right above belly button. Patient reports it's more discomfort than painful, even when pushing on it. Patient reports noticing it about 4 days ago.      FOOD SECURITY SCREENING QUESTIONS:    The next two questions are to help us understand your food security.  If you are feeling you need any assistance in this area, we have resources available to support you today.    Hunger Vital Signs:  Within the past 12 months we worried whether our food would run out before we got money to buy more. Never  Within the past 12 months the food we bought just didn't last and we didn't have money to get more. Never      Medication Reconciliation: complete  Olivia Uribe LPN,LPN on 5/23/2023 at 11:48 AM

## 2023-05-23 NOTE — PROGRESS NOTES
Vicki Mckeon  1935    ASSESSMENT/PLAN:   1. Cellulitis, umbilical  Palpable area of induration around umbilicus approximately 2 cm x 2 cm.  Visible white/tan dried drainage in the umbilicus with erythema around umbilicus.  This is nontender.  Was able to remove some of the odorous tan flaky drainage from umbilicus.  Unable to manually express any further drainage.  No area of fluctuance.  Image of umbilicus was taken, please see physical exam.  Patient is generally feeling well, afebrile, no systemic signs of infection.  Recommend beginning treatment for umbilical cellulitis.  Would recommend empiric treatment of Keflex twice a day for 7 days.  I am also can prescribe metrazol topical cream twice a day for 5 days to cover fungal properties.  Wound culture was obtained and is processing.  Should will be notified of the symptoms with return.  I recommend she follow-up with her PCP in 1 week to ensure improvement and resolution of symptoms.  Should she start having any systemic signs of illness, redness, pain or drainage around umbilicus worsening recommend reevaluation.  Patient feels comfortable with plan of care.  - cephALEXin (KEFLEX) 500 MG capsule; Take 1 capsule (500 mg) by mouth 2 times daily for 7 days  Dispense: 14 capsule; Refill: 0  - Abscess Aerobic Bacterial Culture Routine  - sodium chloride (PF) 0.9% PF flush 3 mL  - clotrimazole (LOTRIMIN) 1 % external cream; Apply topically 2 times daily for 5 days  Dispense: 15 g; Refill: 0  - sodium chloride 0.9 % neb solution 3 mL    Patient agrees with plan of care and verbalizes understating. AVS printed. Patient education provided verbally and written instructions provided as requested. Patient made aware of emergent sings and symptoms to monitor for and when to seek additional care/follow up.     SUBJECTIVE:   CHIEF COMPLAINT/ REASON FOR VISIT  Patient presents with:  Mass     HISTORY OF PRESENT ILLNESS  Vicki Mckeon is a pleasant 87 year old  "female presents to [facility] clinic today with concerns of a lump above her umbilicus.  She noticed this about 4 days ago, her pants were rubbing on her abdomen and it seemed irritated.  She then noticed a firm lump.  Is not causing her any pain.  States when she stands up in her abdomen stretches, she can feel a pull.  She has not been feeling unwell, no fever, chills or body aches.  No diffuse bowel pain, nausea, vomiting or diarrhea.  She is urinating without difficulty, no constipation.    I have reviewed the nursing notes.  I have reviewed allergies, medication list, problem list, and past medical history.    REVIEW OF SYSTEMS  Review of Systems   Gastrointestinal:        Umbilicus abdominal lump   All other systems reviewed and are negative.       VITAL SIGNS  Vitals:    05/23/23 1143 05/23/23 1217   BP: (!) 182/86 (!) 160/70   BP Location: Left arm    Patient Position: Sitting    Cuff Size: Adult Regular    Pulse: 58    Resp: 18    Temp: 97.4  F (36.3  C)    TempSrc: Temporal    SpO2: 100%    Weight: 76.2 kg (168 lb 1.6 oz)    Height: 1.613 m (5' 3.5\")       Body mass index is 29.31 kg/m .    OBJECTIVE:   PHYSICAL EXAM  Physical Exam  Vitals reviewed.   Constitutional:       Appearance: Normal appearance. She is not ill-appearing or toxic-appearing.   HENT:      Head: Normocephalic and atraumatic.      Nose: Nose normal.   Eyes:      Conjunctiva/sclera: Conjunctivae normal.   Cardiovascular:      Rate and Rhythm: Normal rate and regular rhythm.      Pulses: Normal pulses.      Heart sounds: Normal heart sounds. No murmur heard.  Pulmonary:      Effort: Pulmonary effort is normal.      Breath sounds: Normal breath sounds. No wheezing.   Abdominal:      Palpations: Abdomen is soft.      Tenderness: There is no right CVA tenderness, left CVA tenderness or guarding.       Neurological:      General: No focal deficit present.      Mental Status: She is alert and oriented to person, place, and time.   Psychiatric:    "      Mood and Affect: Mood normal.         Behavior: Behavior normal.         Thought Content: Thought content normal.         Judgment: Judgment normal.              DIAGNOSTICS  No results found for any visits on 05/23/23.     Mona Valdes NP  [facility]

## 2023-05-26 LAB — BACTERIA ABSC ANAEROBE+AEROBE CULT: NORMAL

## 2023-06-01 ENCOUNTER — OFFICE VISIT (OUTPATIENT)
Dept: INTERNAL MEDICINE | Facility: OTHER | Age: 88
End: 2023-06-01
Attending: INTERNAL MEDICINE
Payer: COMMERCIAL

## 2023-06-01 VITALS
WEIGHT: 171.2 LBS | SYSTOLIC BLOOD PRESSURE: 132 MMHG | BODY MASS INDEX: 29.23 KG/M2 | TEMPERATURE: 97.1 F | HEIGHT: 64 IN | RESPIRATION RATE: 16 BRPM | DIASTOLIC BLOOD PRESSURE: 80 MMHG | OXYGEN SATURATION: 99 % | HEART RATE: 86 BPM

## 2023-06-01 DIAGNOSIS — L03.316 CELLULITIS, UMBILICAL: Primary | ICD-10-CM

## 2023-06-01 DIAGNOSIS — L02.216 URACHAL ABSCESS: ICD-10-CM

## 2023-06-01 PROCEDURE — 99214 OFFICE O/P EST MOD 30 MIN: CPT | Performed by: INTERNAL MEDICINE

## 2023-06-01 PROCEDURE — G0463 HOSPITAL OUTPT CLINIC VISIT: HCPCS | Performed by: INTERNAL MEDICINE

## 2023-06-01 RX ORDER — CEPHALEXIN 500 MG/1
500 CAPSULE ORAL 2 TIMES DAILY
Qty: 14 CAPSULE | Refills: 0 | Status: SHIPPED | OUTPATIENT
Start: 2023-06-01 | End: 2023-07-05

## 2023-06-01 ASSESSMENT — PAIN SCALES - GENERAL: PAINLEVEL: NO PAIN (0)

## 2023-06-01 NOTE — NURSING NOTE
"Chief Complaint   Patient presents with     RECHECK     [facility]    Patient presents to the clinic today for a follow up for [facility], mass above belly button    Initial LMP  (LMP Unknown)  Estimated body mass index is 29.31 kg/m  as calculated from the following:    Height as of 5/23/23: 1.613 m (5' 3.5\").    Weight as of 5/23/23: 76.2 kg (168 lb 1.6 oz).  Meds Reconciled: complete        FOOD SECURITY SCREENING QUESTIONS:    The next two questions are to help us understand your food security.  If you are feeling you need any assistance in this area, we have resources available to support you today.    Hunger Vital Signs:  Within the past 12 months we worried whether our food would run out before we got money to buy more. Never  Within the past 12 months the food we bought just didn't last and we didn't have money to get more. Never  Susie Salgado LPN,LPN on 6/1/2023 at 2:55 PM      Susie Salgado LPN  "

## 2023-06-01 NOTE — PROGRESS NOTES
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  At this time we will continue on Keflex for an additional 7 days if she did have improvement with this.  Topical erythema has improved and can discontinue topical antifungal at this time.  Her case was discussed with general surgery who does not recommend any additional culturing due to recent antibiotic use.  Recommend follow-up with general surgery if not completely resolved with additional antibiotics.  Referral placed to be seen in surgery clinic should she have continued issues.  - cephALEXin (KEFLEX) 500 MG capsule; Take 1 capsule (500 mg) by mouth 2 times daily    Urachal abscess  See above    Follow-up as needed    Kirsten Guevara,   [facility]    Subjective   Vicki is a 87 year old, presenting for the following health issues:  RECHECK ([facility] )        6/1/2023     2:54 PM   Additional Questions   Roomed by Susie FOLEY     Patient started having issues with her umbilicus approximately 2 weeks ago.  She initially had erythema with some drainage from the umbilicus.  She was seen in rapid clinic and given a topical antifungal along with oral antibiotics.  She has completed the course over the last 7 days and feels that the size of the underlining mass that was present has decreased however she does continue to have discharge.  The erythema of the skin has resolved at this time.  She has not had any recent fevers.    Prior culture from the clinic is reviewed by myself and shows normal skin mo.    History of Present Illness       Reason for visit:  To check on infection    She eats 4 or more servings of fruits and vegetables daily.She exercises with enough effort to increase her heart rate 10 to 19 minutes per day.  She exercises with enough effort to increase her heart rate 3 or less days per week.   She is taking medications regularly.       Review of Systems   No fever    "   Objective    /80 (BP Location: Right arm, Patient Position: Sitting, Cuff Size: Adult Regular)   Pulse 86   Temp 97.1  F (36.2  C) (Temporal)   Resp 16   Ht 1.613 m (5' 3.5\")   Wt 77.7 kg (171 lb 3.2 oz)   LMP 06/01/1993 (Approximate)   SpO2 99%   Breastfeeding No   BMI 29.85 kg/m    Body mass index is 29.85 kg/m .  Physical Exam   GEN: Vitals reviewed. Healthy appearing. Patient is in no acute distress. Cooperative with exam.  HEENT: Normocephalic atraumatic.  Eyes grossly normal to inspection.  No discharge or erythema, or obvious scleral/conjunctival abnormalities.    LUNGS: No audible wheeze, cough, or visible cyanosis.  No visible retractions or increased work of breathing.   ABD:  Nondistended, minimal to no erythema in the surrounding umbilicus, weight material present in the umbilicus with small mass noted subcutaneously just superior to the umbilicus.  SKIN: Warm and dry to touch.  Visible skin clear. No significant rash, abnormal pigmentation or lesions.      "

## 2023-06-08 ENCOUNTER — THERAPY VISIT (OUTPATIENT)
Dept: PHYSICAL THERAPY | Facility: OTHER | Age: 88
End: 2023-06-08
Attending: INTERNAL MEDICINE
Payer: MEDICARE

## 2023-06-08 DIAGNOSIS — M79.604 PAIN OF RIGHT LOWER EXTREMITY: Primary | ICD-10-CM

## 2023-06-08 DIAGNOSIS — M79.604 RIGHT LEG PAIN: ICD-10-CM

## 2023-06-08 PROCEDURE — 97140 MANUAL THERAPY 1/> REGIONS: CPT | Mod: GP

## 2023-06-08 PROCEDURE — 97112 NEUROMUSCULAR REEDUCATION: CPT | Mod: GP

## 2023-06-12 ENCOUNTER — OFFICE VISIT (OUTPATIENT)
Dept: SURGERY | Facility: OTHER | Age: 88
End: 2023-06-12
Attending: INTERNAL MEDICINE
Payer: COMMERCIAL

## 2023-06-12 VITALS
RESPIRATION RATE: 16 BRPM | DIASTOLIC BLOOD PRESSURE: 79 MMHG | WEIGHT: 169 LBS | SYSTOLIC BLOOD PRESSURE: 129 MMHG | BODY MASS INDEX: 29.47 KG/M2 | HEART RATE: 86 BPM | OXYGEN SATURATION: 99 % | TEMPERATURE: 98 F

## 2023-06-12 DIAGNOSIS — L02.216 URACHAL ABSCESS: ICD-10-CM

## 2023-06-12 DIAGNOSIS — L03.316 CELLULITIS, UMBILICAL: Primary | ICD-10-CM

## 2023-06-12 PROCEDURE — G0463 HOSPITAL OUTPT CLINIC VISIT: HCPCS

## 2023-06-12 PROCEDURE — 99203 OFFICE O/P NEW LOW 30 MIN: CPT | Performed by: SURGERY

## 2023-06-12 ASSESSMENT — PAIN SCALES - GENERAL: PAINLEVEL: NO PAIN (0)

## 2023-06-12 NOTE — NURSING NOTE
"Chief Complaint   Patient presents with     Derm Problem     2 rounds of antibiotics completed.    Initial /79 (BP Location: Right arm, Patient Position: Sitting, Cuff Size: Adult Large)   Pulse 86   Temp 98  F (36.7  C) (Tympanic)   Resp 16   Wt 76.7 kg (169 lb)   LMP 06/01/1993 (Approximate)   SpO2 99%   BMI 29.47 kg/m   Estimated body mass index is 29.47 kg/m  as calculated from the following:    Height as of 6/1/23: 1.613 m (5' 3.5\").    Weight as of this encounter: 76.7 kg (169 lb).  Meds Reconciled: complete      Heidy Osborne RN      "

## 2023-06-12 NOTE — PROGRESS NOTES
GENERAL SURGERY CONSULTATION NOTE    Vicki Mckeon   [address] [facility] [address]  87 year old  female    Primary Care Provider:  Kirsten Guevara      HPI: Vicki Mckeon presents to clinic with umbilical swelling and pain.  Concern for infection at the umbilicus.  Patient's been on 2 courses of antibiotics for suspected umbilical infection.  Patient denies fevers chills.  States she feels well today.    REVIEW OF SYSTEMS:    GENERAL: No fevers or chills. Denies fatigue, recent weight loss.  HEENT: No sinus drainage. No changes with vision or hearing. No difficulty swallowing.   LYMPHATICS:  Noswollen nodes in axilla, neck or groin.  CARDIOVASCULAR: Denies chest pain, palpitations and dyspnea on exertion.  PULMONARY: No shortness of breath or cough. No increase in sputum production.  GI: Denies melena,bright red blood in stools. No hematemesis. No constipation or diarrhea.  : No dysuria or hematuria.  SKIN: No recent rashes or ulcers.   HEMATOLOGY:  No history of easy bruising or bleeding.  ENDOCRINE:  No history of diabetes or thyroid problems.  NEUROLOGY:  No history of seizures or headaches. No motor or sensory changes.        Patient Active Problem List   Diagnosis     Osteopenia     Stage 3 chronic kidney disease (H)     White coat syndrome with hypertension     Iron deficiency anemia due to chronic blood loss     Spinal stenosis of lumbar region without neurogenic claudication     Actinic keratosis     History of ulceration     Essential hypertension     Anemia, unspecified type     Screening for hyperlipidemia     Dyspnea on exertion     Ptosis of right eyelid     Back pain     Right leg pain       Past Medical History:   Diagnosis Date     Anemia 05/15/2013     CKD (chronic kidney disease) stage 3, GFR 30-59 ml/min (H) 05/19/2014    Stable. First noted when patient was taking NSAIDs regularly due to back pain.     Essential (primary) hypertension      Nonrheumatic mitral valve prolapse 03/28/2008     with normal echo and no residual prolapse     Nontoxic single thyroid nodule 2012    Patient with multinodular goiter. Negative cytology and stable ultrasound findings.     Osteopenia      Other fracture of unspecified lower leg, initial encounter for closed fracture      Pain in left hip 2017     Pregnancy      2, para 2 with 2 spontaneous vaginal deliveries     Pure hypercholesterolemia      Scoliosis      Spinal stenosis     with nerve root impingement     Squamous cell carcinoma        Past Surgical History:   Procedure Laterality Date     BACK SURGERY  10/2011    Back surgery, Dr. Linda, [facility]     BACK SURGERY  2019    [place] spine     BACK SURGERY  10/2011     BIOPSY BREAST Left     1970's, benign     CLOSED REDUCTION ANKLE  2001    ORIF left ankle w/ later removal of hardware      COLONOSCOPY       COLONOSCOPY       ESOPHAGOGASTRODUODENOSCOPY  2019    normal exam, no duodenal ulcers seen     ESOPHAGOSCOPY, GASTROSCOPY, DUODENOSCOPY (EGD), COMBINED N/A 2019    Procedure: ESOPHAGOGASTRODUODENOSCOPY (EGD);  Surgeon: Aaksh Meléndez MD;  Location: [facility] OR     EXTRACAPSULAR CATARACT EXTRATION WITH INTRAOCULAR LENS IMPLANT Bilateral      LAPAROSCOPY DIAGNOSTIC (GYN)      Laparoscopy for pelvic pain which was negative       Family History   Problem Relation Age of Onset     Arthritis Mother         rheumatoid arthritis, psoriasis     Cancer Father         Cancer,renal     No Known Problems Daughter      No Known Problems Son      Breast Cancer Sister 66        Cancer-breast,essential tremor (treated with surgery)     Osteoarthritis Sister      Chronic Obstructive Pulmonary Disease Sister      Heart Disease Brother         Heart Disease,pacemaker placement     Pancreatic Cancer Brother      Other - See Comments Brother         back issues     Parkinsonism Brother        Social History     Social History Narrative    Patient is  (56 years), living  with  independently, lives in [facility].  She has two grown children.  She does exercise occasionally.  She is retired.    Esequiel Mckeon -   Irvin Mckeon - Son, born , lives in [place].   Teresa Avina - Daughter, born , lives in [place]  2 grandchildren, Kristel [facility]       Social History     Socioeconomic History     Marital status:      Spouse name: Not on file     Number of children: Not on file     Years of education: Not on file     Highest education level: Not on file   Occupational History     Not on file   Tobacco Use     Smoking status: Former     Types: Cigarettes     Quit date: 1968     Years since quittin.4     Passive exposure: Past     Smokeless tobacco: Never   Vaping Use     Vaping status: Never Used     Passive vaping exposure: Yes   Substance and Sexual Activity     Alcohol use: Yes     Comment: Occasionally     Drug use: No     Comment:       Sexual activity: Yes     Partners: Male     Comment: Birth control method: monogamous   Other Topics Concern     Parent/sibling w/ CABG, MI or angioplasty before 65F 55M? Not Asked   Social History Narrative    Patient is  (56 years), living with  independently, lives in [facility].  She has two grown children.  She does exercise occasionally.  She is retired.    Esequiel Mckeon -   Irvin Mckeon - Son, born , lives in [place].   Teresa Avina - Daughter, born , lives in [place]  2 grandchildren, Kristel and Memo     Social Determinants of Health     Financial Resource Strain: Not on file   Food Insecurity: Not on file   Transportation Needs: Not on file   Physical Activity: Not on file   Stress: Not on file   Social Connections: Not on file   Intimate Partner Violence: Not on file   Housing Stability: Not on file       cephALEXin (KEFLEX) 500 MG capsule, Take 1 capsule (500 mg) by mouth 2 times daily  Cholecalciferol (VITAMIN D3) 1000 UNITS CAPS, Take 1,000 Units by mouth  daily  hydroquinone (DAVIS) 4 % external cream, APPLY A SMALL AMOUNT TO THE DARK SPOTS ON THE FACE AND HANDS UNTIL CLEAR. Strength: 4 %  losartan-hydrochlorothiazide (HYZAAR) 100-25 MG tablet, Take 1 tablet by mouth daily  vitamin B-12 (CYANOCOBALAMIN) 1000 MCG CR tablet, Take 1,000 mcg by mouth daily    sodium chloride (PF) 0.9% PF flush 3 mL  sodium chloride 0.9 % neb solution 3 mL          ALLERGIES/SENSITIVITIES:   Allergies   Allergen Reactions     Amlodipine Swelling     LE edema     Fosamax [Alendronate]      palpitations     Sulfa Antibiotics Other (See Comments)     Doesn't remember       PHYSICAL EXAM:     /79 (BP Location: Right arm, Patient Position: Sitting, Cuff Size: Adult Large)   Pulse 86   Temp 98  F (36.7  C) (Tympanic)   Resp 16   Wt 76.7 kg (169 lb)   LMP 06/01/1993 (Approximate)   SpO2 99%   BMI 29.47 kg/m      General Appearance:   Sitting up in the chair, no apparent distress  HEENT: Pupils are equal and reactive, no scleral icterus,   Heart & CV:  RRR no murmur.  Intact distal pulses, good cap refill.  LUNGS: No increased work of breathing.Lugns are CTA B/L, no wheezing or crackles.  Abd: Abdomen is soft, nondistended.  Some discomfort around palpation of the umbilicus.  The umbilicus is investigated and found to be packed full of caseous material.  Some time is spent taking out this waxy, odorous material from the umbilicus once the great majority of this material was removed the umbilicus was flushed clean.  This was dried and a wick was placed at the umbilicus to facilitate drainage of any residual irrigation.  Ext: Normal bulk and tone, no lower extremity edema  Neuro: Alert and oriented, normal speech and mentation        CONSULTATION ASSESSMENT AND PLAN:    87 year old female with umbilical discomfort due to large amount of debris.  The umbilicus was thoroughly cleansed in clinic and I think this should resolve the majority of the patient's symptoms.  I did  the  patient to continue her antibiotics in case there was some component of cellulitis that continues to resolve.  Follow-up in clinic with concerns    Akash Meléndez MD on 6/12/2023 at 1:42 PM

## 2023-06-22 ENCOUNTER — TELEPHONE (OUTPATIENT)
Dept: INTERNAL MEDICINE | Facility: OTHER | Age: 88
End: 2023-06-22
Payer: COMMERCIAL

## 2023-06-22 NOTE — TELEPHONE ENCOUNTER
Reason for call: Patient wanting a work in appointment.    Is the appointment for a Hospital Follow up?  NO     Patient is having the following symptoms:  PX     The patient is requesting an appointment with  [facility]    Was an appointment offered for this call? No - patient only wants to see [facility]    Preferred method for responding to this message: Telephone Call    Phone number patient can be reached at? Cell number on file:    Telephone Information:   Mobile [phone]       If we can't reach you directly, may we leave a detailed response at the number you provided? Yes    Can this message wait until your PCP/provider returns if unavailable today? Yes    Jackie Doe on 6/22/2023 at 9:27 AM

## 2023-06-22 NOTE — TELEPHONE ENCOUNTER
Options include 7:40am on 6/26, 6/28, or 7/3; 11:40 on 7/5.  If none of these work, please ask her what days/times she is available and we will see what we can do.

## 2023-06-23 NOTE — TELEPHONE ENCOUNTER
Called and spoke with the patient after verifying last name and . She would like the physical for 7/3/23 at 1140 am.     Ashley Ruffin LPN on 2023 at 8:25 AM

## 2023-06-28 NOTE — TELEPHONE ENCOUNTER
11:40 on 7/3 wasn't an optoins for scheduling patient as there is already a patient in that appointment spot.    To clarify:    Options include   7:40am on 6/26  7:40am on 6/28  7:40am on 7/3  11:40 on 7/5    Please call patient and reschedule

## 2023-06-28 NOTE — TELEPHONE ENCOUNTER
After proper verification, patient was relayed message from below. Patient decided to take the 07/05 at 11:40 appointment. Message sent to  to change appointment.  Susie Salgado LPN on 6/28/2023 at 9:25 AM

## 2023-07-05 ENCOUNTER — OFFICE VISIT (OUTPATIENT)
Dept: INTERNAL MEDICINE | Facility: OTHER | Age: 88
End: 2023-07-05
Attending: INTERNAL MEDICINE
Payer: COMMERCIAL

## 2023-07-05 VITALS
RESPIRATION RATE: 16 BRPM | TEMPERATURE: 96.9 F | OXYGEN SATURATION: 100 % | HEART RATE: 65 BPM | WEIGHT: 170.8 LBS | SYSTOLIC BLOOD PRESSURE: 136 MMHG | HEIGHT: 62 IN | BODY MASS INDEX: 31.43 KG/M2 | DIASTOLIC BLOOD PRESSURE: 80 MMHG

## 2023-07-05 DIAGNOSIS — E55.9 VITAMIN D DEFICIENCY: ICD-10-CM

## 2023-07-05 DIAGNOSIS — I10 ESSENTIAL HYPERTENSION: ICD-10-CM

## 2023-07-05 DIAGNOSIS — Z00.00 ENCOUNTER FOR MEDICARE ANNUAL WELLNESS EXAM: Primary | ICD-10-CM

## 2023-07-05 DIAGNOSIS — D64.9 ANEMIA, UNSPECIFIED TYPE: ICD-10-CM

## 2023-07-05 DIAGNOSIS — Z13.220 SCREENING FOR HYPERLIPIDEMIA: ICD-10-CM

## 2023-07-05 DIAGNOSIS — N18.32 STAGE 3B CHRONIC KIDNEY DISEASE (H): ICD-10-CM

## 2023-07-05 DIAGNOSIS — M85.80 OSTEOPENIA, UNSPECIFIED LOCATION: ICD-10-CM

## 2023-07-05 DIAGNOSIS — Z78.0 ASYMPTOMATIC MENOPAUSAL STATE: ICD-10-CM

## 2023-07-05 DIAGNOSIS — M48.061 SPINAL STENOSIS OF LUMBAR REGION WITHOUT NEUROGENIC CLAUDICATION: ICD-10-CM

## 2023-07-05 PROBLEM — Z87.898 HISTORY OF ULCERATION: Status: RESOLVED | Noted: 2022-06-23 | Resolved: 2023-07-05

## 2023-07-05 PROBLEM — M54.9 BACK PAIN: Status: RESOLVED | Noted: 2022-06-23 | Resolved: 2023-07-05

## 2023-07-05 LAB
ALBUMIN SERPL BCG-MCNC: 4.6 G/DL (ref 3.5–5.2)
ALP SERPL-CCNC: 40 U/L (ref 35–104)
ALT SERPL W P-5'-P-CCNC: 19 U/L (ref 0–50)
ANION GAP SERPL CALCULATED.3IONS-SCNC: 12 MMOL/L (ref 7–15)
AST SERPL W P-5'-P-CCNC: 30 U/L (ref 0–45)
BILIRUB SERPL-MCNC: 0.4 MG/DL
BUN SERPL-MCNC: 46.2 MG/DL (ref 8–23)
CALCIUM SERPL-MCNC: 10.2 MG/DL (ref 8.8–10.2)
CHLORIDE SERPL-SCNC: 105 MMOL/L (ref 98–107)
CHOLEST SERPL-MCNC: 221 MG/DL
CREAT SERPL-MCNC: 1.44 MG/DL (ref 0.51–0.95)
CREAT UR-MCNC: 79.3 MG/DL
DEPRECATED HCO3 PLAS-SCNC: 23 MMOL/L (ref 22–29)
ERYTHROCYTE [DISTWIDTH] IN BLOOD BY AUTOMATED COUNT: 13.9 % (ref 10–15)
GFR SERPL CREATININE-BSD FRML MDRD: 35 ML/MIN/1.73M2
GLUCOSE SERPL-MCNC: 106 MG/DL (ref 70–99)
HCT VFR BLD AUTO: 34.6 % (ref 35–47)
HDLC SERPL-MCNC: 95 MG/DL
HGB BLD-MCNC: 11.2 G/DL (ref 11.7–15.7)
HOLD SPECIMEN: NORMAL
LDLC SERPL CALC-MCNC: 114 MG/DL
MAGNESIUM SERPL-MCNC: 1.8 MG/DL (ref 1.7–2.3)
MCH RBC QN AUTO: 31.8 PG (ref 26.5–33)
MCHC RBC AUTO-ENTMCNC: 32.4 G/DL (ref 31.5–36.5)
MCV RBC AUTO: 98 FL (ref 78–100)
MICROALBUMIN UR-MCNC: 15.9 MG/L
MICROALBUMIN/CREAT UR: 20.05 MG/G CR (ref 0–25)
NONHDLC SERPL-MCNC: 126 MG/DL
PLATELET # BLD AUTO: 183 10E3/UL (ref 150–450)
POTASSIUM SERPL-SCNC: 5.2 MMOL/L (ref 3.4–5.3)
PROT SERPL-MCNC: 7.9 G/DL (ref 6.4–8.3)
RBC # BLD AUTO: 3.52 10E6/UL (ref 3.8–5.2)
SODIUM SERPL-SCNC: 140 MMOL/L (ref 136–145)
TRIGL SERPL-MCNC: 58 MG/DL
TSH SERPL DL<=0.005 MIU/L-ACNC: 2.69 UIU/ML (ref 0.3–4.2)
WBC # BLD AUTO: 7.1 10E3/UL (ref 4–11)

## 2023-07-05 PROCEDURE — 99214 OFFICE O/P EST MOD 30 MIN: CPT | Mod: 25 | Performed by: INTERNAL MEDICINE

## 2023-07-05 PROCEDURE — 82306 VITAMIN D 25 HYDROXY: CPT | Mod: ZL | Performed by: INTERNAL MEDICINE

## 2023-07-05 PROCEDURE — 82570 ASSAY OF URINE CREATININE: CPT | Mod: ZL | Performed by: INTERNAL MEDICINE

## 2023-07-05 PROCEDURE — 82607 VITAMIN B-12: CPT | Mod: ZL | Performed by: INTERNAL MEDICINE

## 2023-07-05 PROCEDURE — 80061 LIPID PANEL: CPT | Mod: ZL | Performed by: INTERNAL MEDICINE

## 2023-07-05 PROCEDURE — 85027 COMPLETE CBC AUTOMATED: CPT | Mod: ZL | Performed by: INTERNAL MEDICINE

## 2023-07-05 PROCEDURE — 80053 COMPREHEN METABOLIC PANEL: CPT | Mod: ZL | Performed by: INTERNAL MEDICINE

## 2023-07-05 PROCEDURE — 84443 ASSAY THYROID STIM HORMONE: CPT | Mod: ZL | Performed by: INTERNAL MEDICINE

## 2023-07-05 PROCEDURE — 83550 IRON BINDING TEST: CPT | Mod: ZL | Performed by: INTERNAL MEDICINE

## 2023-07-05 PROCEDURE — G0439 PPPS, SUBSEQ VISIT: HCPCS | Performed by: INTERNAL MEDICINE

## 2023-07-05 PROCEDURE — G0463 HOSPITAL OUTPT CLINIC VISIT: HCPCS

## 2023-07-05 PROCEDURE — 36415 COLL VENOUS BLD VENIPUNCTURE: CPT | Mod: ZL | Performed by: INTERNAL MEDICINE

## 2023-07-05 PROCEDURE — 83735 ASSAY OF MAGNESIUM: CPT | Mod: ZL | Performed by: INTERNAL MEDICINE

## 2023-07-05 RX ORDER — SENNOSIDES 8.6 MG
1300 CAPSULE ORAL EVERY 8 HOURS PRN
COMMUNITY
Start: 2023-07-05

## 2023-07-05 RX ORDER — HYDROQUINONE 40 MG/G
CREAM TOPICAL
Qty: 28.35 G | Refills: 3 | Status: CANCELLED | OUTPATIENT
Start: 2023-07-05

## 2023-07-05 RX ORDER — LOSARTAN POTASSIUM AND HYDROCHLOROTHIAZIDE 25; 100 MG/1; MG/1
1 TABLET ORAL DAILY
Qty: 90 TABLET | Refills: 4 | Status: CANCELLED | OUTPATIENT
Start: 2023-07-05

## 2023-07-05 ASSESSMENT — ENCOUNTER SYMPTOMS
DYSURIA: 0
EYE PAIN: 0
ABDOMINAL PAIN: 0
SORE THROAT: 0
HEARTBURN: 0
HEMATURIA: 0
MYALGIAS: 1
NAUSEA: 0
COUGH: 0
PARESTHESIAS: 0
CHILLS: 0
BREAST MASS: 0
CONSTIPATION: 0
PALPITATIONS: 0
DIARRHEA: 0
ADENOPATHY: 0
HEADACHES: 0
FEVER: 0
DIZZINESS: 0
FREQUENCY: 0
WEAKNESS: 0
SHORTNESS OF BREATH: 1
ARTHRALGIAS: 1
HEMATOCHEZIA: 0

## 2023-07-05 ASSESSMENT — PAIN SCALES - GENERAL: PAINLEVEL: MILD PAIN (3)

## 2023-07-05 ASSESSMENT — ACTIVITIES OF DAILY LIVING (ADL): CURRENT_FUNCTION: NO ASSISTANCE NEEDED

## 2023-07-05 NOTE — NURSING NOTE
"Chief Complaint   Patient presents with     Medicare Visit     Patient presents to the clinic today for a medicare Visit  Initial LMP 06/01/1993 (Approximate)  Estimated body mass index is 29.47 kg/m  as calculated from the following:    Height as of 6/1/23: 1.613 m (5' 3.5\").    Weight as of 6/12/23: 76.7 kg (169 lb).  Meds Reconciled: complete        FOOD SECURITY SCREENING QUESTIONS:    The next two questions are to help us understand your food security.  If you are feeling you need any assistance in this area, we have resources available to support you today.    Hunger Vital Signs:  Within the past 12 months we worried whether our food would run out before we got money to buy more. Never  Within the past 12 months the food we bought just didn't last and we didn't have money to get more. Never  Susie Salgado LPN,LPN on 7/5/2023 at 11:40 AM      Susie Salgado LPN  "

## 2023-07-05 NOTE — PATIENT INSTRUCTIONS
Ok to try Turmeric - supplement or herbal. Take once daily. Pay attention to how well it works for pain and watch for side effects.    Also ok to use multiple topical agents and see how they work    Consider flavoring or liquid IV for water to increase intake          Patient Education   Personalized Prevention Plan  You are due for the preventive services outlined below.  Your care team is available to assist you in scheduling these services.  If you have already completed any of these items, please share that information with your care team to update in your medical record.  Health Maintenance Due   Topic Date Due    COVID-19 Vaccine (5 - Pfizer series) 02/28/2023    Cholesterol Lab  06/23/2023    Kidney Microalbumin Urine Test  06/23/2023    Hemoglobin  08/26/2023       Exercise for a Healthier Heart  You may wonder how you can improve the health of your heart. If you re thinking about exercise, you re on the right track. You don t need to become an athlete. But you do need a certain amount of brisk exercise to help strengthen your heart. If you have been diagnosed with a heart condition, your healthcare provider may advise exercise to help your condition. To help make exercise a habit, choose safe, fun activities.      Exercise with a friend. When activity is fun, you're more likely to stick with it.     Before you start  Check with your healthcare provider before starting an exercise program. This is especially important if you haven't been active for a while. It's also important if you have a long-term (chronic) health problem such as heart disease, diabetes, or obesity. Also check with your provider if you're at high risk for having these problems.   Why exercise?  Exercising regularly offers many healthy rewards. It can help you do all of these:   Improve your blood cholesterol level to help prevent further heart trouble.  Lower your blood pressure to help prevent a stroke or heart attack.  Control diabetes  or reduce your risk of getting this disease.  Improve your heart and lung function.  Reach and stay at a healthy weight.  Make your muscles stronger so you can stay active.  Prevent falls and fractures by slowing the loss of bone mass (osteoporosis).  Manage stress better.  Improve your sense of self and your body image.  Exercise tips    Ease into your routine. Set small goals. Then build on them. Talk with your healthcare provider first before starting an exercise routine if you're not sure what your activity level should be.  Exercise on most days. Aim for a total of at least 150 minutes (2 hours and 30 minutes) or more of moderate-intensity aerobic activity each week. You could also do 75 minutes (1 hour and 15 minutes) or more of vigorous-intensity aerobic activity each week. Or try for a combination of both. Moderate activity means that you breathe heavier and your heart rate increases, but you can still talk. Think about doing at least 30 minutes of moderate exercise, 5 times a week. It's OK to work up to the 30-minute period over time. Examples of moderate-intensity activity are brisk walking, gardening, and water aerobics.  Step up your daily activity level.  Along with your exercise program, try being more active the whole day. Walk instead of drive. Or park further away so that you take more steps each day. Do more household tasks or yard work. You may not be able to meet the advised amount of physical activity. But doing some moderate- or vigorous-intensity aerobic activity can help reduce your risk for heart disease. Your healthcare provider can help you figure out what is best for you.  Choose 1 or more activities you enjoy.  Walking is one of the easiest things you can do. You can also try swimming, riding a bike, dancing, or taking an exercise class.    Call 911  Call 911 right away if any of these occur:   Chest pain that doesn't go away quickly with rest  New burning, tightness, pressure, or  heaviness in your chest, neck, shoulders, back, or arms  Abnormal or severe shortness of breath  A very fast or irregular heartbeat (palpitations)  Fainting  When to call your healthcare provider  Call your healthcare provider if you have any of these:   Dizziness or lightheadedness  Mild shortness of breath or chest pain  Increased or new joint or muscle pain    [employer] last reviewed this educational content on 7/1/2022 2000-2023 [employer]. All rights reserved. This information is not intended as a substitute for professional medical care. Always follow your healthcare professional's instructions.          Signs of Hearing Loss  Hearing loss is a problem shared by many people. In fact, it's one of the most common health problems, particularly as people age. Most people aged 65 and older have some hearing loss. By age 80, almost everyone does. Hearing loss often occurs slowly over the years. So, you may not realize your hearing has gotten worse.   When sudden hearing loss occurs, it's important to contact your healthcare provider right away. Your provider will do a medical exam and a hearing exam as soon as possible. This is to help find the cause and type of your sudden hearing loss. Based on your diagnosis, your healthcare provider will discuss possible treatments.      Hearing much better with one ear can be a sign of hearing loss.     Have your hearing checked  Call your healthcare provider if you:   Have to strain to hear normal conversation  Have to watch other people s faces very carefully to follow what they re saying  Need to ask people to repeat what they ve said  Often misunderstand what people are saying  Turn the volume of the television or radio up so high that others complain  Feel that people are mumbling when they re talking to you  Find that the effort to hear leaves you feeling tired and irritated  Notice, when using the phone, that you hear better with one ear than the  simon  [employer] last reviewed this educational content on 6/1/2022 2000-2023 [employer]. All rights reserved. This information is not intended as a substitute for professional medical care. Always follow your healthcare professional's instructions.

## 2023-07-05 NOTE — LETTER
My COPD Action Plan     Name: Vicki Mckeon    YOB: 1935   Date: 7/5/2023    My doctor: Kirsten Guevara, DO   My clinic: [facility]    [address]  [place] [id number] [phone]  My Controller Medicine:    Dose:      My Rescue Medicine:    Dose:      My Flare Up Medicine:    Dose:      My COPD Severity: Moderate = FeV1 < 79% -50%      Use of Oxygen: Oxygen Not Prescribed      Make sure you've had your pneumonia   vaccines.          GREEN ZONE       Doing well today    Usual level of activity and exercise  Usual amount of cough and mucus  No shortness of breath  Usual level of health (thinking clearly, sleeping well, feel like eating) Actions:    Take daily medicines  Use oxygen as prescribed  Follow regular exercise and diet plan  Avoid cigarette smoke and other irritants that harm the lungs           YELLOW ZONE          Having a bad day or flare up    Short of breath more than usual  A lot more sputum (mucus) than usual  Sputum looks yellow, green, tan, brown or bloody  More coughing or wheezing  Fever or chills  Less energy; trouble completing activities  Trouble thinking or focusing  Using quick relief inhaler or nebulizer more often  Poor sleep; symptoms wake me up  Do not feel like eating Actions:    Get plenty of rest  Take daily medicines  Use quick relief inhaler every 4 hours  If you use oxygen, call you doctor to see if you should adjust your oxygen  Do breathing exercises or other things to help you relax  Let a loved one, friend or neighbor know you are feeling worse  Call your care team if you have 2 or more symptoms.  Start taking steroids or antibiotics if directed by your care team           RED ZONE       Need medical care now    Severe shortness of breath (feel you can't breathe)  Fever, chills  Not enough breath to do any activity  Trouble coughing up mucus, walking or talking  Blood in mucus  Frequent coughing Rescue medicines are not  working  Not able to sleep because of breathing  Feel confused or drowsy  Chest pain    Actions:    Call your health care team.  If you cannot reach your care team, call 911 or go to the emergency room.        Annual Reminders:  Meet with Care Team, Flu Shot every Fall  Pharmacy: [id number] - [place], [place] - [address]

## 2023-07-05 NOTE — PROGRESS NOTES
"SUBJECTIVE:   Vicki is a 88 year old who presents for Preventive Visit.      7/5/2023    11:39 AM   Additional Questions   Roomed by Susie FOLEY     Are you in the first 12 months of your Medicare coverage?  No    Healthy Habits:     In general, how would you rate your overall health?  Good    Frequency of exercise:  1 day/week    Duration of exercise:  Less than 15 minutes    Do you usually eat at least 4 servings of fruit and vegetables a day, include whole grains    & fiber and avoid regularly eating high fat or \"junk\" foods?  Yes    Taking medications regularly:  Yes    Medication side effects:  None    Ability to successfully perform activities of daily living:  No assistance needed    Home Safety:  No safety concerns identified    Hearing Impairment:  Difficulty following a conversation in a noisy restaurant or crowded room, need to ask people to speak up or repeat themselves and difficulty understanding soft or whispered speech    In the past 6 months, have you been bothered by leaking of urine?  No    In general, how would you rate your overall mental or emotional health?  Good    Additional concerns today:  No    Patient presents for follow-up of multiple issues.    She has a history of hypertension.  She was started on losartan/hydrochlorothiazide and then this was increased.  Her blood pressure has ranged between 115/65 and 140/71.  She has not had any side effects from medication.  It was recently renewed.    She has a history of spinal stenosis.  She has previously undergone surgery for this.  She has continued to have some issues with back and pelvis pain along with right leg pain related to this.  She has been using some topical creams that seems to be beneficial.  She is interested in possibly trying turmeric.     She has a history of osteopenia/osteoporosis and has been on Prolia since 2019.  She is due for repeat DEXA scan.     Her recent issues with her umbilicus have resolved.  Medication list " updated.     She continues to have some issues with dyspnea on exertion/shortness of breath.  Recent chest x-ray that was obtained overall looked good.  She is scheduled to see pulmonology in about a month.     She is overall up-to-date on her vaccines.  She is due for cholesterol screening.  She has a history of vitamin D deficiency and we have followed this periodically.  She does continue on supplementation.        Have you ever done Advance Care Planning? (For example, a Health Directive, POLST, or a discussion with a medical provider or your loved ones about your wishes): Yes, advance care planning is on file.       Fall risk  Fallen 2 or more times in the past year?: No  Any fall with injury in the past year?: No    Cognitive Screening   1) Repeat 3 items (Leader, Season, Table)    2) Clock draw: NORMAL  3) 3 item recall: Recalls 3 objects  Results: 3 items recalled: COGNITIVE IMPAIRMENT LESS LIKELY    Mini-CogTM Copyright ARNALDO Hines. Licensed by the author for use in [facility]; reprinted with permission (greg@[facility]). All rights reserved.      Do you have sleep apnea, excessive snoring or daytime drowsiness?: yes  daytime drowsiness  Reviewed and updated as needed this visit by clinical staff   Tobacco  Allergies  Meds  Problems  Med Hx  Surg Hx  Fam Hx          Reviewed and updated as needed this visit by Provider   Tobacco  Allergies  Meds  Problems  Med Hx  Surg Hx  Fam Hx         Social History     Tobacco Use     Smoking status: Former     Types: Cigarettes     Quit date: 1968     Years since quittin.5     Passive exposure: Past     Smokeless tobacco: Never   Substance Use Topics     Alcohol use: Yes     Comment: Occasionally             2023    11:39 AM   Alcohol Use   Prescreen: >3 drinks/day or >7 drinks/week? No     Do you have a current opioid prescription? No  Do you use any other controlled substances or medications that are not prescribed by a provider?  None      Current providers sharing in care for this patient include:   Patient Care Team:  Kirsten Guevara DO as PCP - General  Oliva Hays NP as Nurse Practitioner (Internal Medicine)  Kirsten Guevara DO as Assigned PCP  Akash Meléndez MD as Assigned Surgical Provider    The following health maintenance items are reviewed in Epic and correct as of today:  Health Maintenance   Topic Date Due     COPD ACTION PLAN  Never done     COVID-19 Vaccine (5 - Pfizer series) 02/28/2023     LIPID  06/23/2023     MICROALBUMIN  06/23/2023     INFLUENZA VACCINE (1) 09/01/2023     BMP  12/20/2023     MEDICARE ANNUAL WELLNESS VISIT  07/05/2024     FALL RISK ASSESSMENT  07/05/2024     HEMOGLOBIN  07/05/2024     ADVANCE CARE PLANNING  07/05/2028     DTAP/TDAP/TD IMMUNIZATION (3 - Td or Tdap) 07/01/2031     SPIROMETRY  Completed     PHQ-2 (once per calendar year)  Completed     Pneumococcal Vaccine: 65+ Years  Completed     URINALYSIS  Completed     ZOSTER IMMUNIZATION  Completed     IPV IMMUNIZATION  Aged Out     MENINGITIS IMMUNIZATION  Aged Out     MAMMO SCREENING  Discontinued     Mammogram Screening - Patient over age 75, has elected to discontinue screenings.  Pertinent mammograms are reviewed under the imaging tab.    Review of Systems   Constitutional: Negative for chills and fever.   HENT: Positive for hearing loss. Negative for congestion, ear pain and sore throat.    Eyes: Negative for pain and visual disturbance.   Respiratory: Positive for shortness of breath. Negative for cough.         Seeing pulmonology in 1 month   Cardiovascular: Positive for chest pain. Negative for palpitations and peripheral edema.        Sometimes with anxiety   Gastrointestinal: Negative for abdominal pain, constipation, diarrhea, heartburn, hematochezia and nausea.   Breasts:  Negative for tenderness, breast mass and discharge.   Genitourinary: Negative for dysuria, frequency, genital sores, hematuria, pelvic pain, urgency,  "vaginal bleeding and vaginal discharge.   Musculoskeletal: Positive for arthralgias and myalgias.        Chronic and intermittent   Skin: Negative for rash.   Neurological: Negative for dizziness, weakness, headaches and paresthesias.   Hematological: Negative for adenopathy.   Psychiatric/Behavioral: Negative for mood changes.       OBJECTIVE:   /80 (BP Location: Right arm, Patient Position: Sitting, Cuff Size: Adult Regular)   Pulse 65   Temp 96.9  F (36.1  C) (Temporal)   Resp 16   Ht 1.562 m (5' 1.5\")   Wt 77.5 kg (170 lb 12.8 oz)   LMP 06/01/1993 (Approximate)   SpO2 100%   BMI 31.75 kg/m   Estimated body mass index is 31.75 kg/m  as calculated from the following:    Height as of this encounter: 1.562 m (5' 1.5\").    Weight as of this encounter: 77.5 kg (170 lb 12.8 oz).  Physical Exam  GEN: Vitals reviewed. Healthy appearing. Patient is in no acute distress. Cooperative with exam.  HEENT: Normocephalic atraumatic.  Eyes grossly normal to inspection.  No discharge or erythema, or obvious scleral/conjunctival abnormalities. Oropharynx with no erythema or exudates. Dentition adequate.  EACs clear bilaterally, TM gray with normal landmarks.  NECK: Supple; no thyromegaly or masses noted.  No cervical or supraclavicular lymphadenopathy.  CV: Heart regular in rate and rhythm with no murmur.    LUNGS: No audible wheeze, cough, or visible cyanosis.  No visible retractions or increased work of breathing.  Lungs clear to auscultation bilaterally.    ABD:  Obese, Soft, nontender, and nondistended.  No rebound. Bowel sounds positive.  SKIN: Warm and dry to touch.  Visible skin clear. No significant rash, abnormal pigmentation or lesions.  EXT: No clubbing or cyanosis.  No peripheral edema.  NEURO: Alert and oriented to person, place, and time.  Cranial nerves II-XII grossly intact with no focal or lateralizing deficits.  Muscle tone normal.  Gait normal. No tremor.   MSK: ROM of upper and lower ext symmetric " and full.  PSYCH: Mood is good.  Mentation appears normal, affect normal/bright, judgement and insight intact, normal speech and appearance well-groomed.      Diagnostic Test Results:  Labs reviewed in Epic    ASSESSMENT / PLAN:   1. Encounter for Medicare annual wellness exam    2. Stage 3b chronic kidney disease (H)  -Recheck renal function today along with labs.  Continue to monitor.  Previously was referred to nephrology however has chosen not to follow any further given age and comorbidities.  - Comprehensive metabolic panel  - CBC with platelets  - Magnesium  - Albumin Random Urine Quantitative with Creat Ratio  - TSH with free T4 reflex    3. Essential hypertension  - Controlled.  Continue current regimen.    - TSH with free T4 reflex    4. Vitamin D deficiency  Recheck labs and adjust supplementation as indicated  - TSH with free T4 reflex  - Vitamin D Total; Future    5. Spinal stenosis of lumbar region without neurogenic claudication  We discussed options for this and she is welcome to use topical agents as needed.  She is to continue with therapy and call if she needs any additional treatments.  She can try turmeric however was encouraged to watch for side effects and determine efficacy.  - TSH with free T4 reflex    6. Osteopenia, unspecified location  Continue with Prolia at this time for at least 1-3 more years.  Obtain DEXA scan to reassess bone density.  - DX Hip/Pelvis/Spine; Future  - TSH with free T4 reflex    7. Asymptomatic menopausal state  See above  - DX Hip/Pelvis/Spine; Future    8. Screening for hyperlipidemia  - Lipid Profile        Patient has been advised of split billing requirements and indicates understanding: Yes      COUNSELING:  Reviewed preventive health counseling, as reflected in patient instructions        She reports that she quit smoking about 55 years ago. Her smoking use included cigarettes. She has been exposed to tobacco smoke. She has never used smokeless  tobacco.      Appropriate preventive services were discussed with this patient, including applicable screening as appropriate for cardiovascular disease, diabetes, osteopenia/osteoporosis, and glaucoma.  As appropriate for age/gender, discussed screening for colorectal cancer, prostate cancer, breast cancer, and cervical cancer. Checklist reviewing preventive services available has been given to the patient.    Reviewed patients plan of care and provided an AVS. The Intermediate Care Plan ( asthma action plan, low back pain action plan, and migraine action plan) for Vicki meets the Care Plan requirement. This Care Plan has been established and reviewed with the Patient.    Kirsten Guevara, [facility]    Identified Health Risks:    I have reviewed Opioid Use Disorder and Substance Use Disorder risk factors and made any needed referrals.

## 2023-07-06 ENCOUNTER — THERAPY VISIT (OUTPATIENT)
Dept: PHYSICAL THERAPY | Facility: OTHER | Age: 88
End: 2023-07-06
Attending: INTERNAL MEDICINE
Payer: MEDICARE

## 2023-07-06 DIAGNOSIS — M79.604 RIGHT LEG PAIN: Primary | ICD-10-CM

## 2023-07-06 LAB
DEPRECATED CALCIDIOL+CALCIFEROL SERPL-MC: 44 UG/L (ref 20–75)
IRON BINDING CAPACITY (ROCHE): 307 UG/DL (ref 240–430)
IRON SATN MFR SERPL: 26 % (ref 15–46)
IRON SERPL-MCNC: 80 UG/DL (ref 37–145)
VIT B12 SERPL-MCNC: 1212 PG/ML (ref 232–1245)

## 2023-07-06 PROCEDURE — 97140 MANUAL THERAPY 1/> REGIONS: CPT | Mod: GP

## 2023-07-06 NOTE — PROGRESS NOTES
[facility]                                                                                   OUTPATIENT PHYSICAL THERAPY    PLAN OF TREATMENT FOR OUTPATIENT REHABILITATION   Patient's Last Name, First Name, Vicki Soto YOB: 1935   Provider's Name   [facility]   Medical Record No.  [id number]     Onset Date: 08/20/22  Start of Care Date: 10/05/22     Medical Diagnosis:  pain in right leg      PT Treatment Diagnosis:  back pain, right leg pain, muscle weakness Plan of Treatment  Frequency/Duration: 1-2 times per month as needed/ 6-12 months    Certification date from 07/01/23 to 09/25/23         See note for plan of treatment details and functional goals     Karen Duarte, PT                         I CERTIFY THE NEED FOR THESE SERVICES FURNISHED UNDER        THIS PLAN OF TREATMENT AND WHILE UNDER MY CARE     (Physician attestation of this document indicates review and certification of the therapy plan).                Referring Provider:  Kirsten Guevara      Initial Assessment  See Epic Evaluation- Start of Care Date: 10/05/22             07/06/23 0500   Appointment Info   Signing clinician's name / credentials Karen Duarte, PT, CLT, CAPP   Visits Used 38   Medical Diagnosis pain in right leg   PT Tx Diagnosis back pain, right leg pain, muscle weakness   Quick Adds Certification   Progress Note/Certification   Start of Care Date 10/05/22   Onset of illness/injury or Date of Surgery 08/20/22   Therapy Frequency 1-2 times per month as needed   Predicted Duration 6-12 months   Certification date from 07/01/23   Certification date to 09/25/23   GOALS   PT Goals 2;3   PT Goal 1   Goal Identifier walking   Goal Description Patient to report ability to walk 1000 feet without muscle fatigue to allow greater ease of shopping with or without assistive dievice.   Target Date 01/05/22   Date Met 07/19/22   PT Goal 2   Goal Identifier  bed mobility   Goal Description Patient to tolerate rolling in bed without pain.   Target Date 01/05/22   Date Met 07/19/22   PT Goal 3   Goal Identifier pain   Goal Description Patient to report reduction in pain to 5/10 or less during functional activities.   Goal Progress Pain more consistently present as of late, traveling down right leg   Target Date 01/05/22   Subjective Report   Subjective Report Right side low back and thigh pain really bothering her at night. Worse when she lays down, must turn on left side to sleep. Had to get up twice last night. Pain now without bladder filling, pain down leg. LImits her ability to fall asleep. this is a change. Has a trip to [place] in one week. Pain at night 3-4/10, agrrevating.   Objective Measure 1   Objective Measure postural loading   Details general listening to right anterior pelvis; local listening to right ureter   Objective Measure 2   Objective Measure myofascial   Details right ureter, right renal fascia, right internal iliac art, right femoral artery   Objective Measure 3   Objective Measure joint   Details pain with palpation to right L5.   Neuromuscular Re-education   Skilled Intervention muscle energy technique(MET) to restore function and reduce pain   Manual Therapy   Manual Therapy: Mobilization, MFR, MLD, friction massage minutes (95371) 45   Skilled Intervention MFR, organ specific fascial mobilization (OSFM) to restore function and reduce pain   Patient Response/Progress pain in to right leg continues.   Treatment Detail MFR/OSFM- right ureter, right renal fascia, right external iliac art<>femoral artery, right internal iliac artery   Plan   Plan for next session continue as needed   Total Session Time   Timed Code Treatment Minutes 45   Total Treatment Time (sum of timed and untimed services) 45   Ortho Goal 3   Goal Identifier pain   Goal Description Patient to report reduction in pain to 5/10 or less during functional activities.   Goal Progress Pain  level is 0/10 at rest. Pain is 2/10 when up and walking. Still at a 7/10 at night when her bladder is full. Reduces after voiding.   Target Date 01/05/22   Ortho Goal 1   Goal Identifier walking   Goal Description Patient to report ability to walk 1000 feet without muscle fatigue to allow greater ease of shopping with or without assistive dievice.   Goal Progress humidity has affected her arthritis. Able to shop, no issues   Target Date 01/05/22   Date Met 07/19/22   Ortho Goal 2   Goal Identifier bed moibility   Goal Description Patient to tolerate rolling in bed without pain.   Goal Progress bladder will wake her as it refers pain to back.   Target Date 01/05/22   Date Met 07/19/22

## 2023-07-31 ENCOUNTER — TELEPHONE (OUTPATIENT)
Dept: INTERNAL MEDICINE | Facility: OTHER | Age: 88
End: 2023-07-31
Payer: COMMERCIAL

## 2023-07-31 DIAGNOSIS — D64.9 ANEMIA, UNSPECIFIED TYPE: ICD-10-CM

## 2023-07-31 DIAGNOSIS — N18.32 STAGE 3B CHRONIC KIDNEY DISEASE (H): Primary | ICD-10-CM

## 2023-07-31 NOTE — TELEPHONE ENCOUNTER
Pt would like orders for follow up labs put in.  She has an appt scheduled on 8/28/23.      Braxton Arcos on 7/31/2023 at 3:08 PM

## 2023-08-22 NOTE — PROGRESS NOTES
Patient is scheduled for a Prolia injection on Tuesday, . The therapy plan will contain  orders by then (orders  on ). Plan sent to PCP for review and re-signing. Financial approval request faxed to Jose.     ROSA ELENA CORRAL RN on 2023 at 1:18 PM

## 2023-08-29 ENCOUNTER — LAB (OUTPATIENT)
Dept: LAB | Facility: OTHER | Age: 88
End: 2023-08-29
Attending: INTERNAL MEDICINE
Payer: MEDICARE

## 2023-08-29 ENCOUNTER — ALLIED HEALTH/NURSE VISIT (OUTPATIENT)
Dept: FAMILY MEDICINE | Facility: OTHER | Age: 88
End: 2023-08-29
Attending: INTERNAL MEDICINE
Payer: MEDICARE

## 2023-08-29 VITALS
HEART RATE: 74 BPM | SYSTOLIC BLOOD PRESSURE: 148 MMHG | WEIGHT: 173.8 LBS | HEIGHT: 62 IN | OXYGEN SATURATION: 98 % | DIASTOLIC BLOOD PRESSURE: 64 MMHG | BODY MASS INDEX: 31.98 KG/M2

## 2023-08-29 DIAGNOSIS — D64.9 ANEMIA, UNSPECIFIED TYPE: ICD-10-CM

## 2023-08-29 DIAGNOSIS — M85.80 OSTEOPENIA, UNSPECIFIED LOCATION: Primary | ICD-10-CM

## 2023-08-29 LAB
CALCIUM SERPL-MCNC: 10.6 MG/DL (ref 8.8–10.2)
CREAT SERPL-MCNC: 1.55 MG/DL (ref 0.51–0.95)
GFR SERPL CREATININE-BSD FRML MDRD: 32 ML/MIN/1.73M2
HGB BLD-MCNC: 11.5 G/DL (ref 11.7–15.7)
HOLD SPECIMEN: NORMAL
HOLD SPECIMEN: NORMAL
PHOSPHATE SERPL-MCNC: 4.2 MG/DL (ref 2.5–4.5)

## 2023-08-29 PROCEDURE — 36415 COLL VENOUS BLD VENIPUNCTURE: CPT | Mod: ZL | Performed by: INTERNAL MEDICINE

## 2023-08-29 PROCEDURE — 36415 COLL VENOUS BLD VENIPUNCTURE: CPT | Mod: ZL

## 2023-08-29 PROCEDURE — 96372 THER/PROPH/DIAG INJ SC/IM: CPT | Performed by: NURSE PRACTITIONER

## 2023-08-29 PROCEDURE — 82565 ASSAY OF CREATININE: CPT | Mod: ZL | Performed by: INTERNAL MEDICINE

## 2023-08-29 PROCEDURE — 250N000011 HC RX IP 250 OP 636: Mod: JZ | Performed by: NURSE PRACTITIONER

## 2023-08-29 PROCEDURE — 84100 ASSAY OF PHOSPHORUS: CPT | Mod: ZL | Performed by: INTERNAL MEDICINE

## 2023-08-29 PROCEDURE — 85018 HEMOGLOBIN: CPT | Mod: ZL

## 2023-08-29 PROCEDURE — 82310 ASSAY OF CALCIUM: CPT | Mod: ZL | Performed by: INTERNAL MEDICINE

## 2023-08-29 RX ADMIN — DENOSUMAB 60 MG: 60 INJECTION SUBCUTANEOUS at 14:22

## 2023-08-29 NOTE — PROGRESS NOTES
Clinic Administered Medication Documentation    Prolia Documentation    Indication: Prolia  (denosumab) is a prescription medicine used to treat osteoporosis in patients who:   Are at high risk for fracture, meaning patients who have had a fracture related to osteoporosis, or who have multiple risk factors for fracture.  Cannot use another osteoporosis medicine or other osteoporosis medicines did not work well.  The timeline for early/late injections would be 4 weeks early and any time after the 6 month hermes. If a patient receives their injection late, then the subsequent injection would be 6 months from the date that they actually received the injection.    When was the last injection?  23  Was the last injection at least 6 months ago? Yes  Has the prior authorization been completed?  Yes  Is there an active order (written within the past 365 days, with administrations remaining, not ) in the chart?  Yes  Patient denies any dental work involving the bone (e.g. tooth extraction or dental implants) in the past 4 weeks?  Yes  Patient denies plans for any dental work involving the bone (e.g. tooth extraction or dental implants) in the next 4 weeks? Yes    The following steps were completed to comply with the REMS program for Prolia:  Reviewed information in the Medication Guide and Patient Counseling Chart, including the serious risks of Prolia  and the symptoms of each risk.  Advised patient to seek prompt medical attention if they have signs or symptoms of any of the serious risks.  Provided each patient a copy of the Medication Guide and Patient Brochure.    Prior to injection, verified patient identity using patient's name and date of birth. Patient brought to lab for blood draw. Creatinine level is elevated. Lab results discussed with Salma Allen, LEONARDA, CNP, and verbal okay received to proceed with Prolia injection today. Medication was administered. Please see MAR and medication order for additional  information. Patient instructed to remain in clinic for 15 minutes and report any adverse reaction to staff immediately but patient declined.    Vial/Syringe: Syringe  Was this medication supplied by the patient? No  Verified that the patient has refills remaining in their prescription.    ROSA ELENA CORRAL RN on 8/29/2023 at 2:46 PM

## 2023-10-23 ENCOUNTER — ALLIED HEALTH/NURSE VISIT (OUTPATIENT)
Dept: FAMILY MEDICINE | Facility: OTHER | Age: 88
End: 2023-10-23
Attending: INTERNAL MEDICINE
Payer: MEDICARE

## 2023-10-23 DIAGNOSIS — Z23 NEED FOR PROPHYLACTIC VACCINATION AND INOCULATION AGAINST INFLUENZA: Primary | ICD-10-CM

## 2023-10-23 PROCEDURE — G0008 ADMIN INFLUENZA VIRUS VAC: HCPCS

## 2023-10-30 ENCOUNTER — TELEPHONE (OUTPATIENT)
Dept: INTERNAL MEDICINE | Facility: OTHER | Age: 88
End: 2023-10-30
Payer: COMMERCIAL

## 2023-10-30 DIAGNOSIS — M54.50 CHRONIC RIGHT-SIDED LOW BACK PAIN, UNSPECIFIED WHETHER SCIATICA PRESENT: ICD-10-CM

## 2023-10-30 DIAGNOSIS — M25.551 HIP PAIN, RIGHT: ICD-10-CM

## 2023-10-30 DIAGNOSIS — M48.061 SPINAL STENOSIS OF LUMBAR REGION WITHOUT NEUROGENIC CLAUDICATION: Primary | ICD-10-CM

## 2023-10-30 DIAGNOSIS — M79.661 PAIN OF RIGHT LOWER LEG: ICD-10-CM

## 2023-10-30 DIAGNOSIS — G89.29 CHRONIC RIGHT-SIDED LOW BACK PAIN, UNSPECIFIED WHETHER SCIATICA PRESENT: ICD-10-CM

## 2023-10-30 NOTE — TELEPHONE ENCOUNTER
Patient requests orders for PT with [facility]   Please assist       Ok Flowers on 10/30/2023 at 8:24 AM

## 2023-10-30 NOTE — TELEPHONE ENCOUNTER
After proper verification, patient stated that she needs PT for right leg and hip and prefers [facility] at [facility]. Referral teed up  Susie Salgado LPN on 10/30/2023 at 4:48 PM

## 2023-11-06 ENCOUNTER — THERAPY VISIT (OUTPATIENT)
Dept: PHYSICAL THERAPY | Facility: OTHER | Age: 88
End: 2023-11-06
Attending: INTERNAL MEDICINE
Payer: MEDICARE

## 2023-11-06 DIAGNOSIS — M54.50 CHRONIC RIGHT-SIDED LOW BACK PAIN, UNSPECIFIED WHETHER SCIATICA PRESENT: ICD-10-CM

## 2023-11-06 DIAGNOSIS — M79.661 PAIN OF RIGHT LOWER LEG: ICD-10-CM

## 2023-11-06 DIAGNOSIS — G89.29 CHRONIC RIGHT-SIDED LOW BACK PAIN, UNSPECIFIED WHETHER SCIATICA PRESENT: ICD-10-CM

## 2023-11-06 DIAGNOSIS — M25.551 HIP PAIN, RIGHT: ICD-10-CM

## 2023-11-06 DIAGNOSIS — M48.061 SPINAL STENOSIS OF LUMBAR REGION WITHOUT NEUROGENIC CLAUDICATION: ICD-10-CM

## 2023-11-06 PROCEDURE — 97140 MANUAL THERAPY 1/> REGIONS: CPT | Mod: GP

## 2023-11-06 PROCEDURE — 97161 PT EVAL LOW COMPLEX 20 MIN: CPT | Mod: GP

## 2023-11-06 PROCEDURE — 97112 NEUROMUSCULAR REEDUCATION: CPT | Mod: GP

## 2023-11-06 NOTE — PROGRESS NOTES
PHYSICAL THERAPY EVALUATION  Type of Visit: Evaluation    See electronic medical record for Abuse and Falls Screening details.    Subjective       Presenting condition or subjective complaint:  Patient referred back to PT with increased low back pain, right leg pain that interfers with walking, static stance, sleep at night. History of spinal fusion T10-sacrum. Rods are broken in L4 L5 area. This is not new. Patient has been seen in PT before for assistance with symtpom control and it has been helpful in the past. Patient has not been seen since 2023. Patient attends [employer] but is sore for the next three days, not muscle soreness but back pain. Patient walks with walking stick or walker. No recent falls. Takes tylenol frequently as this is all she can take for pain. Also uses heat. Cold damp weather is making pain worse. Feels she is having a harder time standing and having conversations and needs to sit in a short amount of time, 5 minutes. Commonly, patient wakes in the night with bladder pain that radiates from back to right leg. After voiding, her pain lessens and she is able to return to sleep.   Date of onset: 10/31/23    Relevant medical history:   see EHR  Dates & types of surgery:  see EHR    Prior Level of Function  Transfers: Independent  Ambulation: Independent, Assistive equipment  ADL: Independent      Patient goals for therapy:  reduce pain, increase tolerance for standing and walking    Pain assessment: Pain present  Location: low back, right leg, mid back/Ratin-8/10     Objective   LUMBAR SPINE EVALUATION  PAIN: Pain is Exacerbated By: standing 5 minutes, walking, full bladder    POSTURE:  increased kyphosis, noted scoliotic curve concave right in t spine, left in lumbar spine  GAIT:   Weightbearing Status: WBAT  Gait Deviations: Antalgic  PELVIC/SI SCREEN:  FFT + right, right inferior pubic shear  STRENGTH: to be assessed at later date  MYOTOMES: WNL    Assessment & Plan   CLINICAL  IMPRESSIONS  Medical Diagnosis: M48.061 (ICD-10-CM) - Spinal stenosis of lumbar region without neurogenic claudication  M54.50, G89.29 (ICD-10-CM) - Chronic right-sided low back pain, unspecified whether sciatica present  M25.551 (ICD-10-CM) - Hip pain, right  M79.661 (ICD-10-CM) - Pain of right lower leg    Treatment Diagnosis: back pain, myofascial tightness   Impression/Assessment: Patient is a 88 year old female with back and right leg pain complaints.  The following significant findings have been identified: Pain, Impaired gait, and Impaired muscle performance. These impairments interfere with their ability to perform self care tasks, household chores, household mobility, and community mobility as compared to previous level of function.     Clinical Decision Making (Complexity):  Clinical Presentation: Evolving/Changing  Clinical Presentation Rationale: based on medical and personal factors listed in PT evaluation  Clinical Decision Making (Complexity): Low complexity    PLAN OF CARE  Treatment Interventions:  Modalities: Hot Pack  Interventions: Manual Therapy, Neuromuscular Re-education, Therapeutic Activity, Therapeutic Exercise, Self-Care/Home Management    Long Term Goals     PT Goal 1  Goal Identifier: standing  Goal Description: Patient to tolerate standing 10 minutes to have conversations in community.  Target Date: 02/03/24  PT Goal 2  Goal Identifier: pain  Goal Description: Patient to report reduced low back and right leg pain to less than 5/10 at most to allow greater ease with walking.  Target Date: 02/03/24      Frequency of Treatment: 1-2 times per week  Duration of Treatment: 3-6 months    Recommended Referrals to Other Professionals:  NA  Education Assessment:   Learner/Method: Patient;No Barriers to Learning    Risks and benefits of evaluation/treatment have been explained.   Patient/Family/caregiver agrees with Plan of Care.     Evaluation Time:     PT Eval, Low Complexity Minutes (67882):  15       Signing Clinician: Karen Duarte, PT      [facility]                                                                                   OUTPATIENT PHYSICAL THERAPY      PLAN OF TREATMENT FOR OUTPATIENT REHABILITATION   Patient's Last Name, First Name, Vicki Soto YOB: 1935   Provider's Name   [facility]   Medical Record No.  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 claudication  M54.50, G89.29 (ICD-10-CM) - Chronic right-sided low back pain, unspecified whether sciatica present  M25.551 (ICD-10-CM) - Hip pain, right  M79.661 (ICD-10-CM) - Pain of right lower leg      PT Treatment Diagnosis:  back pain, myofascial tightness Plan of Treatment  Frequency/Duration: 1-2 times per week/ 3-6 months    Certification date from 11/06/23 to 02/03/24         See note for plan of treatment details and functional goals     Karen Duarte, PT                         I CERTIFY THE NEED FOR THESE SERVICES FURNISHED UNDER        THIS PLAN OF TREATMENT AND WHILE UNDER MY CARE     (Physician attestation of this document indicates review and certification of the therapy plan).                Referring Provider:  Kirsten Guevara      Initial Assessment  See Epic Evaluation- Start of Care Date: 11/06/23

## 2023-11-07 ENCOUNTER — ALLIED HEALTH/NURSE VISIT (OUTPATIENT)
Dept: FAMILY MEDICINE | Facility: OTHER | Age: 88
End: 2023-11-07
Attending: INTERNAL MEDICINE
Payer: MEDICARE

## 2023-11-07 ENCOUNTER — HOSPITAL ENCOUNTER (OUTPATIENT)
Dept: BONE DENSITY | Facility: OTHER | Age: 88
Discharge: HOME | End: 2023-11-07
Attending: INTERNAL MEDICINE
Payer: MEDICARE

## 2023-11-07 DIAGNOSIS — M85.80 OSTEOPENIA, UNSPECIFIED LOCATION: ICD-10-CM

## 2023-11-07 DIAGNOSIS — Z23 HIGH PRIORITY FOR 2019-NCOV VACCINE: Primary | ICD-10-CM

## 2023-11-07 DIAGNOSIS — Z78.0 ASYMPTOMATIC MENOPAUSAL STATE: ICD-10-CM

## 2023-11-07 PROCEDURE — 91320 SARSCV2 VAC 30MCG TRS-SUC IM: CPT

## 2023-11-07 PROCEDURE — 77080 DXA BONE DENSITY AXIAL: CPT

## 2023-11-07 NOTE — PROGRESS NOTES
Immunization Documentation  Verified patient's first and last name, and . Stated reason for visit today is to receive Covid vaccine.  Denied any concerns with previous immunizations. Allergies reviewed.Vaccine prepared and administered per standing order. Administration documented in IMMUNIZATIONS (see flowsheet and order for further information).      Rosa Beatty RN ....................  2023   12:53 PM

## 2023-11-08 NOTE — PROGRESS NOTES
DISCHARGE  Reason for Discharge: Patient chooses to discontinue therapy.    Equipment Issued: NA    Discharge Plan: Patient to continue home program.    Referring Provider:  Kirsten Guevara         07/06/23 0500   Appointment Info   Signing clinician's name / credentials Karen Duarte, PT, CLT, CAPP   Visits Used 38   Medical Diagnosis pain in right leg   PT Tx Diagnosis back pain, right leg pain, muscle weakness   Quick Adds Certification   Progress Note/Certification   Start of Care Date 10/05/22   Onset of illness/injury or Date of Surgery 08/20/22   Therapy Frequency 1-2 times per month as needed   Predicted Duration 6-12 months   Certification date from 07/01/23   Certification date to 09/25/23   GOALS   PT Goals 2;3   PT Goal 1   Goal Identifier walking   Goal Description Patient to report ability to walk 1000 feet without muscle fatigue to allow greater ease of shopping with or without assistive dievice.   Target Date 01/05/22   Date Met 07/19/22   PT Goal 2   Goal Identifier bed mobility   Goal Description Patient to tolerate rolling in bed without pain.   Target Date 01/05/22   Date Met 07/19/22   PT Goal 3   Goal Identifier pain   Goal Description Patient to report reduction in pain to 5/10 or less during functional activities.   Goal Progress Pain more consistently present as of late, traveling down right leg   Target Date 01/05/22   Subjective Report   Subjective Report Right side low back and thigh pain really bothering her at night. Worse when she lays down, must turn on left side to sleep. Had to get up twice last night. Pain now without bladder filling, pain down leg. LImits her ability to fall asleep. this is a change. Has a trip to [place] in one week. Pain at night 3-4/10, agrrevating.   Objective Measure 1   Objective Measure postural loading   Details general listening to right anterior pelvis; local listening to right ureter   Objective Measure 2   Objective Measure myofascial   Details right  ureter, right renal fascia, right internal iliac art, right femoral artery   Objective Measure 3   Objective Measure joint   Details pain with palpation to right L5.   Neuromuscular Re-education   Skilled Intervention muscle energy technique(MET) to restore function and reduce pain   Manual Therapy   Manual Therapy: Mobilization, MFR, MLD, friction massage minutes ([id number]) 45   Skilled Intervention MFR, organ specific fascial mobilization (OSFM) to restore function and reduce pain   Patient Response/Progress pain in to right leg continues.   Treatment Detail MFR/OSFM- right ureter, right renal fascia, right external iliac art<>femoral artery, right internal iliac artery   Plan   Plan for next session continue as needed   Total Session Time   Timed Code Treatment Minutes 45   Total Treatment Time (sum of timed and untimed services) 45   Ortho Goal 3   Goal Identifier pain   Goal Description Patient to report reduction in pain to 5/10 or less during functional activities.   Goal Progress Pain level is 0/10 at rest. Pain is 2/10 when up and walking. Still at a 7/10 at night when her bladder is full. Reduces after voiding.   Target Date 01/05/22   Ortho Goal 1   Goal Identifier walking   Goal Description Patient to report ability to walk 1000 feet without muscle fatigue to allow greater ease of shopping with or without assistive dievice.   Goal Progress humidity has affected her arthritis. Able to shop, no issues   Target Date 01/05/22   Date Met 07/19/22   Ortho Goal 2   Goal Identifier bed moibility   Goal Description Patient to tolerate rolling in bed without pain.   Goal Progress bladder will wake her as it refers pain to back.   Target Date 01/05/22   Date Met 07/19/22

## 2023-11-13 ENCOUNTER — THERAPY VISIT (OUTPATIENT)
Dept: PHYSICAL THERAPY | Facility: OTHER | Age: 88
End: 2023-11-13
Attending: INTERNAL MEDICINE
Payer: MEDICARE

## 2023-11-13 DIAGNOSIS — M48.061 SPINAL STENOSIS OF LUMBAR REGION WITHOUT NEUROGENIC CLAUDICATION: Primary | ICD-10-CM

## 2023-11-13 PROCEDURE — 97112 NEUROMUSCULAR REEDUCATION: CPT | Mod: GP

## 2023-11-13 PROCEDURE — 97140 MANUAL THERAPY 1/> REGIONS: CPT | Mod: GP

## 2023-11-27 ENCOUNTER — THERAPY VISIT (OUTPATIENT)
Dept: PHYSICAL THERAPY | Facility: OTHER | Age: 88
End: 2023-11-27
Attending: INTERNAL MEDICINE
Payer: MEDICARE

## 2023-11-27 DIAGNOSIS — M48.061 SPINAL STENOSIS OF LUMBAR REGION WITHOUT NEUROGENIC CLAUDICATION: Primary | ICD-10-CM

## 2023-11-27 PROCEDURE — 97112 NEUROMUSCULAR REEDUCATION: CPT | Mod: GP

## 2023-11-27 PROCEDURE — 97140 MANUAL THERAPY 1/> REGIONS: CPT | Mod: GP

## 2023-12-08 ENCOUNTER — TRANSFERRED RECORDS (OUTPATIENT)
Dept: HEALTH INFORMATION MANAGEMENT | Facility: CLINIC | Age: 88
End: 2023-12-08

## 2023-12-12 ENCOUNTER — THERAPY VISIT (OUTPATIENT)
Dept: PHYSICAL THERAPY | Facility: OTHER | Age: 88
End: 2023-12-12
Attending: INTERNAL MEDICINE
Payer: MEDICARE

## 2023-12-12 DIAGNOSIS — M48.061 SPINAL STENOSIS OF LUMBAR REGION WITHOUT NEUROGENIC CLAUDICATION: Primary | ICD-10-CM

## 2023-12-12 PROCEDURE — 97112 NEUROMUSCULAR REEDUCATION: CPT | Mod: GP

## 2023-12-12 PROCEDURE — 97140 MANUAL THERAPY 1/> REGIONS: CPT | Mod: GP

## 2024-01-08 ENCOUNTER — OFFICE VISIT (OUTPATIENT)
Dept: INTERNAL MEDICINE | Facility: OTHER | Age: 89
End: 2024-01-08
Attending: INTERNAL MEDICINE
Payer: COMMERCIAL

## 2024-01-08 VITALS
BODY MASS INDEX: 31.79 KG/M2 | OXYGEN SATURATION: 96 % | WEIGHT: 171 LBS | HEART RATE: 76 BPM | RESPIRATION RATE: 18 BRPM | TEMPERATURE: 98.7 F | SYSTOLIC BLOOD PRESSURE: 136 MMHG | DIASTOLIC BLOOD PRESSURE: 82 MMHG

## 2024-01-08 DIAGNOSIS — R06.02 SHORTNESS OF BREATH: ICD-10-CM

## 2024-01-08 DIAGNOSIS — N18.32 STAGE 3B CHRONIC KIDNEY DISEASE (H): Primary | ICD-10-CM

## 2024-01-08 DIAGNOSIS — I10 ESSENTIAL HYPERTENSION: ICD-10-CM

## 2024-01-08 DIAGNOSIS — D64.9 ANEMIA, UNSPECIFIED TYPE: ICD-10-CM

## 2024-01-08 DIAGNOSIS — Z29.11 NEED FOR RSV VACCINATION: ICD-10-CM

## 2024-01-08 PROBLEM — J44.9 CHRONIC OBSTRUCTIVE PULMONARY DISEASE, UNSPECIFIED COPD TYPE (H): Status: RESOLVED | Noted: 2020-12-08 | Resolved: 2024-01-08

## 2024-01-08 PROBLEM — J44.9 CHRONIC OBSTRUCTIVE PULMONARY DISEASE, UNSPECIFIED COPD TYPE (H): Status: ACTIVE | Noted: 2020-12-08

## 2024-01-08 LAB
ALBUMIN SERPL BCG-MCNC: 4.4 G/DL (ref 3.5–5.2)
ANION GAP SERPL CALCULATED.3IONS-SCNC: 12 MMOL/L (ref 7–15)
BASOPHILS # BLD AUTO: 0 10E3/UL (ref 0–0.2)
BASOPHILS NFR BLD AUTO: 0 %
BUN SERPL-MCNC: 44.4 MG/DL (ref 8–23)
CALCIUM SERPL-MCNC: 10.6 MG/DL (ref 8.8–10.2)
CHLORIDE SERPL-SCNC: 103 MMOL/L (ref 98–107)
CREAT SERPL-MCNC: 1.51 MG/DL (ref 0.51–0.95)
CREAT UR-MCNC: 126.7 MG/DL
DEPRECATED HCO3 PLAS-SCNC: 24 MMOL/L (ref 22–29)
EGFRCR SERPLBLD CKD-EPI 2021: 33 ML/MIN/1.73M2
EOSINOPHIL # BLD AUTO: 0.3 10E3/UL (ref 0–0.7)
EOSINOPHIL NFR BLD AUTO: 5 %
ERYTHROCYTE [DISTWIDTH] IN BLOOD BY AUTOMATED COUNT: 15 % (ref 10–15)
GLUCOSE SERPL-MCNC: 109 MG/DL (ref 70–99)
HCT VFR BLD AUTO: 35.3 % (ref 35–47)
HGB BLD-MCNC: 11.7 G/DL (ref 11.7–15.7)
IMM GRANULOCYTES # BLD: 0 10E3/UL
IMM GRANULOCYTES NFR BLD: 0 %
LYMPHOCYTES # BLD AUTO: 2 10E3/UL (ref 0.8–5.3)
LYMPHOCYTES NFR BLD AUTO: 28 %
MCH RBC QN AUTO: 32.4 PG (ref 26.5–33)
MCHC RBC AUTO-ENTMCNC: 33.1 G/DL (ref 31.5–36.5)
MCV RBC AUTO: 98 FL (ref 78–100)
MICROALBUMIN UR-MCNC: 22.4 MG/L
MICROALBUMIN/CREAT UR: 17.68 MG/G CR (ref 0–25)
MONOCYTES # BLD AUTO: 0.7 10E3/UL (ref 0–1.3)
MONOCYTES NFR BLD AUTO: 10 %
NEUTROPHILS # BLD AUTO: 4.1 10E3/UL (ref 1.6–8.3)
NEUTROPHILS NFR BLD AUTO: 57 %
NRBC # BLD AUTO: 0 10E3/UL
NRBC BLD AUTO-RTO: 0 /100
PHOSPHATE SERPL-MCNC: 4.1 MG/DL (ref 2.5–4.5)
PLATELET # BLD AUTO: 169 10E3/UL (ref 150–450)
POTASSIUM SERPL-SCNC: 4.6 MMOL/L (ref 3.4–5.3)
RBC # BLD AUTO: 3.61 10E6/UL (ref 3.8–5.2)
SODIUM SERPL-SCNC: 139 MMOL/L (ref 135–145)
WBC # BLD AUTO: 7.3 10E3/UL (ref 4–11)

## 2024-01-08 PROCEDURE — 85025 COMPLETE CBC W/AUTO DIFF WBC: CPT | Mod: ZL | Performed by: INTERNAL MEDICINE

## 2024-01-08 PROCEDURE — G0463 HOSPITAL OUTPT CLINIC VISIT: HCPCS | Performed by: INTERNAL MEDICINE

## 2024-01-08 PROCEDURE — 36415 COLL VENOUS BLD VENIPUNCTURE: CPT | Mod: ZL | Performed by: INTERNAL MEDICINE

## 2024-01-08 PROCEDURE — 80069 RENAL FUNCTION PANEL: CPT | Mod: ZL | Performed by: INTERNAL MEDICINE

## 2024-01-08 PROCEDURE — 99214 OFFICE O/P EST MOD 30 MIN: CPT | Performed by: INTERNAL MEDICINE

## 2024-01-08 PROCEDURE — 82043 UR ALBUMIN QUANTITATIVE: CPT | Mod: ZL | Performed by: INTERNAL MEDICINE

## 2024-01-08 RX ORDER — RESPIRATORY SYNCYTIAL VIRUS VACCINE 120MCG/0.5
0.5 KIT INTRAMUSCULAR ONCE
Qty: 0.5 ML | Refills: 0 | Status: SHIPPED | OUTPATIENT
Start: 2024-01-08 | End: 2024-01-08

## 2024-01-08 ASSESSMENT — PAIN SCALES - GENERAL: PAINLEVEL: NO PAIN (0)

## 2024-01-08 NOTE — NURSING NOTE
"Chief Complaint   Patient presents with    RECHECK     Recheck BP, Kidney concerns, and HGB       Initial BP (!) 162/84   Pulse 88   Temp 98.7  F (37.1  C)   Resp 18   Wt 77.6 kg (171 lb)   LMP 06/01/1993 (Approximate)   SpO2 96%   BMI 31.79 kg/m   Estimated body mass index is 31.79 kg/m  as calculated from the following:    Height as of 8/29/23: 1.562 m (5' 1.5\").    Weight as of this encounter: 77.6 kg (171 lb).  Medication Review: complete    The next two questions are to help us understand your food security.  If you are feeling you need any assistance in this area, we have resources available to support you today.          1/8/2024   SDOH- Food Insecurity   Within the past 12 months, did you worry that your food would run out before you got money to buy more? N   Within the past 12 months, did the food you bought just not last and you didn t have money to get more? N         Health Care Directive:  Patient has a Health Care Directive on file      Tasia Pascual LPN      "

## 2024-01-08 NOTE — PROGRESS NOTES
"  Assessment & Plan     Stage 3b chronic kidney disease (H)  Plan for recheck today.  Encouraged to increase water intake.  Additional information provided.  - Renal Function Panel  - Albumin Random Urine Quantitative with Creat Ratio    Essential hypertension  Overall controlled given age and comorbidities.  Continue current medication.  Continue to monitor.  - Renal Function Panel    Anemia, unspecified type  Recheck hemoglobin today.  If it is on the low side we may need to consider iron supplementation or additional testing.  - CBC and Differential    Need for RSV vaccination  Rx given  - respiratory syncytial virus vaccine, bivalent (ABRYSVO) injection; Inject 0.5 mLs into the muscle once for 1 dose    Shortness of breath  Exact etiology is unknown.  Recheck hemoglobin today.  If this is normal we will consider echocardiogram and if that is normal consider CT of the chest for further evaluation.  She is to call if she has any new or changing symptoms.       BMI:   Estimated body mass index is 31.79 kg/m  as calculated from the following:    Height as of 8/29/23: 1.562 m (5' 1.5\").    Weight as of this encounter: 77.6 kg (171 lb).   Weight management plan: Discussed healthy diet and exercise guidelines      Return in about 6 months (around 7/8/2024) for Annual Review with renewal of all medications.    Kirsten Guevara, [facility]    [facility]   Vicki is a 88 year old, presenting for the following health issues:  RECHECK (Recheck BP, Kidney concerns, and HGB)        1/8/2024    10:45 AM   Additional Questions   Roomed by Aaron Pascual LPN       History of Present Illness       CKD: She uses over the counter pain medication, including tylenhol, two times daily.    Hypertension: She presents for follow up of hypertension.  She does check blood pressure  regularly outside of the clinic. Outside blood pressures have been over 140/90. She does not follow a low salt diet.     Reason for " visit:  Blood check    She eats 4 or more servings of fruits and vegetables daily.She consumes 1 sweetened beverage(s) daily.She exercises with enough effort to increase her heart rate 9 or less minutes per day.  She exercises with enough effort to increase her heart rate 3 or less days per week.   She is taking medications regularly.     Patient brings in her blood pressure readings today.  They have ranged between 112/67 and 147/66.  She has had rare ones that have been higher but primarily seems to be in the 120-130 range systolic.    She has continued to have some breathing issues off and on.  She was seen by pulmonology who did not feel that she had any significant COPD.  They did feel perhaps her anemia was contributing to some shortness of breath.  They recommended consideration for an echocardiogram or a CT of the chest.  She did have cardiac testing done in 2021 that was all normal.  She is due for repeat hemoglobin today.    She has known chronic kidney disease stage IIIb.  She is due for recheck at this time.  She denies any new issues.  Her blood pressure medication has been stable and she denies any side effects from her losartan/hydrochlorothiazide.    She is interested in considering the RSV vaccine although would like to think about it longer.      Review of Systems   Denies any fevers, chills, chest pain, increased lower extremity edema, changes in bowel or bladder, no blood in stool or other concerns.  She has been tired, falling asleep after lunch        Objective    /82   Pulse 76   Temp 98.7  F (37.1  C)   Resp 18   Wt 77.6 kg (171 lb)   LMP 06/01/1993 (Approximate)   SpO2 96%   BMI 31.79 kg/m    Body mass index is 31.79 kg/m .  Physical Exam   GEN: Vitals reviewed. Healthy appearing. Patient is in no acute distress. Cooperative with exam.  HEENT: Normocephalic atraumatic.  Eyes grossly normal to inspection.  No discharge or erythema, or obvious scleral/conjunctival abnormalities.    CV: Heart regular in rate and rhythm with no murmur.    LUNGS: No audible wheeze, cough, or visible cyanosis.  No visible retractions or increased work of breathing.  Lungs clear to auscultation bilaterally.    ABD:  nondistended  SKIN: Warm and dry to touch.  Visible skin clear. No significant rash, abnormal pigmentation or lesions.  EXT: No clubbing or cyanosis.  Trace lower extremity peripheral edema.

## 2024-01-15 ENCOUNTER — HOSPITAL ENCOUNTER (OUTPATIENT)
Dept: CARDIOLOGY | Facility: OTHER | Age: 89
Discharge: HOME | End: 2024-01-15
Attending: INTERNAL MEDICINE | Admitting: INTERNAL MEDICINE
Payer: MEDICARE

## 2024-01-15 DIAGNOSIS — R06.02 SHORTNESS OF BREATH: ICD-10-CM

## 2024-01-15 LAB — LVEF ECHO: NORMAL

## 2024-01-15 PROCEDURE — 93306 TTE W/DOPPLER COMPLETE: CPT | Mod: 26 | Performed by: STUDENT IN AN ORGANIZED HEALTH CARE EDUCATION/TRAINING PROGRAM

## 2024-01-15 PROCEDURE — 93306 TTE W/DOPPLER COMPLETE: CPT

## 2024-01-19 ENCOUNTER — MYC MEDICAL ADVICE (OUTPATIENT)
Dept: INTERNAL MEDICINE | Facility: OTHER | Age: 89
End: 2024-01-19
Payer: COMMERCIAL

## 2024-01-19 DIAGNOSIS — R06.02 SHORTNESS OF BREATH: Primary | ICD-10-CM

## 2024-01-25 ENCOUNTER — THERAPY VISIT (OUTPATIENT)
Dept: PHYSICAL THERAPY | Facility: OTHER | Age: 89
End: 2024-01-25
Attending: INTERNAL MEDICINE
Payer: MEDICARE

## 2024-01-25 DIAGNOSIS — M48.061 SPINAL STENOSIS OF LUMBAR REGION WITHOUT NEUROGENIC CLAUDICATION: Primary | ICD-10-CM

## 2024-01-25 PROCEDURE — 97112 NEUROMUSCULAR REEDUCATION: CPT | Mod: GP

## 2024-01-25 PROCEDURE — 97140 MANUAL THERAPY 1/> REGIONS: CPT | Mod: GP

## 2024-02-01 ENCOUNTER — HOSPITAL ENCOUNTER (OUTPATIENT)
Dept: CT IMAGING | Facility: OTHER | Age: 89
Discharge: HOME | End: 2024-02-01
Attending: INTERNAL MEDICINE | Admitting: INTERNAL MEDICINE
Payer: MEDICARE

## 2024-02-01 DIAGNOSIS — R06.02 SHORTNESS OF BREATH: ICD-10-CM

## 2024-02-01 PROCEDURE — 250N000011 HC RX IP 250 OP 636: Performed by: INTERNAL MEDICINE

## 2024-02-01 PROCEDURE — 71260 CT THORAX DX C+: CPT | Mod: ME

## 2024-02-01 RX ORDER — IOPAMIDOL 755 MG/ML
90 INJECTION, SOLUTION INTRAVASCULAR ONCE
Status: COMPLETED | OUTPATIENT
Start: 2024-02-01 | End: 2024-02-01

## 2024-02-01 RX ADMIN — IOPAMIDOL 90 ML: 755 INJECTION, SOLUTION INTRAVENOUS at 14:30

## 2024-03-05 ENCOUNTER — NURSE TRIAGE (OUTPATIENT)
Dept: INTERNAL MEDICINE | Facility: OTHER | Age: 89
End: 2024-03-05
Payer: COMMERCIAL

## 2024-03-05 ENCOUNTER — MYC MEDICAL ADVICE (OUTPATIENT)
Dept: INTERNAL MEDICINE | Facility: OTHER | Age: 89
End: 2024-03-05
Payer: COMMERCIAL

## 2024-03-05 NOTE — TELEPHONE ENCOUNTER
[email or web address] Message from patient titled:  Chest Discomfort.    Message from patient:     I would like an appointment to discuss this with you.  No appointments available until May at scheduling desk.        Turned [email or web address] message into traige encounter.      Routed to Unit 1 Care Team RN for triage pool.    Updated patient on [email or web address].      Omaira Cleaning RN on 3/5/2024 at 9:59 AM

## 2024-03-05 NOTE — TELEPHONE ENCOUNTER
Turned [employer] message into traige encounter.      Routed to Unit 1 Care Team RN for triage pool.    Updated patient on [employer].      Omaira Cleaning RN on 3/5/2024 at 10:01 AM

## 2024-03-05 NOTE — TELEPHONE ENCOUNTER
Reached out to triage RN who is going to try calling patient again.     Omaira Cleaning RN on 3/5/2024 at 4:38 PM

## 2024-03-05 NOTE — TELEPHONE ENCOUNTER
"Dr. Guevara,   Patient would like to see you for chronic, transient chest pressure which occurs with physical activity and eating. Patient states you are already familiar with this issue. She could come in any time after 10:00 AM on 3/6/24 if you can work her in. Please advise.   Mary Gant RN on 3/5/2024 at 4:44 PM    Protocol was not appropriate for the reported symptom. Advised patient to call/present to ER if symptoms suddenly worsen or new symptoms develop.   Answer Assessment - Initial Assessment Questions  1. LOCATION: \"Where does it hurt?\"        Chest pressure, chronic ongoing transient, usually happens in the morning, after she eats, gets tired sometimes, not an acute pain but more like bothersome     2. RADIATION: \"Does the pain go anywhere else?\" (e.g., into neck, jaw, arms, back)      Heaviness from the neck down     3. ONSET: \"When did the chest pain begin?\" (Minutes, hours or days)       Has already discussed this with Dr. Guevara     4. PATTERN: \"Does the pain come and go, or has it been constant since it started?\"  \"Does it get worse with exertion?\"       Transient     5. DURATION: \"How long does it last\" (e.g., seconds, minutes, hours)      Had it yesterday, no problem with it today until she gets up to move around or eat     6. SEVERITY: \"How bad is the pain?\"  (e.g., Scale 1-10; mild, moderate, or severe)     - MILD (1-3): doesn't interfere with normal activities      - MODERATE (4-7): interferes with normal activities or awakens from sleep     - SEVERE (8-10): excruciating pain, unable to do any normal activities           7. CARDIAC RISK FACTORS: \"Do you have any history of heart problems or risk factors for heart disease?\" (e.g., angina, prior heart attack; diabetes, high blood pressure, high cholesterol, smoker, or strong family history of heart disease)      Denies     8. PULMONARY RISK FACTORS: \"Do you have any history of lung disease?\"  (e.g., blood clots in lung, asthma, emphysema, birth " "control pills)      Denies     9. CAUSE: \"What do you think is causing the chest pain?\"      Unsure     10. OTHER SYMPTOMS: \"Do you have any other symptoms?\" (e.g., dizziness, nausea, vomiting, sweating, fever, difficulty breathing, cough)        Denies     11. PREGNANCY: \"Is there any chance you are pregnant?\" \"When was your last menstrual period?\"        no    Protocols used: Chest Pain-A-[place]    "

## 2024-03-06 ENCOUNTER — OFFICE VISIT (OUTPATIENT)
Dept: INTERNAL MEDICINE | Facility: OTHER | Age: 89
End: 2024-03-06
Attending: INTERNAL MEDICINE
Payer: COMMERCIAL

## 2024-03-06 VITALS
OXYGEN SATURATION: 98 % | RESPIRATION RATE: 18 BRPM | HEART RATE: 72 BPM | BODY MASS INDEX: 28.44 KG/M2 | TEMPERATURE: 96.7 F | WEIGHT: 166.6 LBS | HEIGHT: 64 IN

## 2024-03-06 DIAGNOSIS — R13.10 PILL DYSPHAGIA: ICD-10-CM

## 2024-03-06 DIAGNOSIS — R07.9 CHEST PAIN, UNSPECIFIED TYPE: Primary | ICD-10-CM

## 2024-03-06 DIAGNOSIS — D64.9 ANEMIA, UNSPECIFIED TYPE: ICD-10-CM

## 2024-03-06 LAB
ALBUMIN SERPL BCG-MCNC: 4.5 G/DL (ref 3.5–5.2)
ALP SERPL-CCNC: 45 U/L (ref 40–150)
ALT SERPL W P-5'-P-CCNC: 18 U/L (ref 0–50)
ANION GAP SERPL CALCULATED.3IONS-SCNC: 13 MMOL/L (ref 7–15)
AST SERPL W P-5'-P-CCNC: 26 U/L (ref 0–45)
BILIRUB SERPL-MCNC: 0.2 MG/DL
BUN SERPL-MCNC: 50 MG/DL (ref 8–23)
CALCIUM SERPL-MCNC: 10.5 MG/DL (ref 8.8–10.2)
CHLORIDE SERPL-SCNC: 101 MMOL/L (ref 98–107)
CREAT SERPL-MCNC: 1.85 MG/DL (ref 0.51–0.95)
DEPRECATED HCO3 PLAS-SCNC: 23 MMOL/L (ref 22–29)
EGFRCR SERPLBLD CKD-EPI 2021: 26 ML/MIN/1.73M2
ERYTHROCYTE [DISTWIDTH] IN BLOOD BY AUTOMATED COUNT: 13.5 % (ref 10–15)
GLUCOSE SERPL-MCNC: 130 MG/DL (ref 70–99)
HCT VFR BLD AUTO: 35.3 % (ref 35–47)
HGB BLD-MCNC: 11.3 G/DL (ref 11.7–15.7)
IRON BINDING CAPACITY (ROCHE): 272 UG/DL (ref 240–430)
IRON SATN MFR SERPL: 28 % (ref 15–46)
IRON SERPL-MCNC: 75 UG/DL (ref 37–145)
MCH RBC QN AUTO: 31.6 PG (ref 26.5–33)
MCHC RBC AUTO-ENTMCNC: 32 G/DL (ref 31.5–36.5)
MCV RBC AUTO: 99 FL (ref 78–100)
PLATELET # BLD AUTO: 184 10E3/UL (ref 150–450)
POTASSIUM SERPL-SCNC: 5 MMOL/L (ref 3.4–5.3)
PROT SERPL-MCNC: 7.8 G/DL (ref 6.4–8.3)
RBC # BLD AUTO: 3.58 10E6/UL (ref 3.8–5.2)
SODIUM SERPL-SCNC: 137 MMOL/L (ref 135–145)
WBC # BLD AUTO: 6.3 10E3/UL (ref 4–11)

## 2024-03-06 PROCEDURE — G0463 HOSPITAL OUTPT CLINIC VISIT: HCPCS | Performed by: INTERNAL MEDICINE

## 2024-03-06 PROCEDURE — 36415 COLL VENOUS BLD VENIPUNCTURE: CPT | Mod: ZL | Performed by: INTERNAL MEDICINE

## 2024-03-06 PROCEDURE — 82040 ASSAY OF SERUM ALBUMIN: CPT | Mod: ZL | Performed by: INTERNAL MEDICINE

## 2024-03-06 PROCEDURE — 99214 OFFICE O/P EST MOD 30 MIN: CPT | Performed by: INTERNAL MEDICINE

## 2024-03-06 PROCEDURE — 83550 IRON BINDING TEST: CPT | Mod: ZL | Performed by: INTERNAL MEDICINE

## 2024-03-06 PROCEDURE — 85027 COMPLETE CBC AUTOMATED: CPT | Mod: ZL | Performed by: INTERNAL MEDICINE

## 2024-03-06 PROCEDURE — 83540 ASSAY OF IRON: CPT | Mod: ZL | Performed by: INTERNAL MEDICINE

## 2024-03-06 RX ORDER — SUCRALFATE 1 G/1
1 TABLET ORAL 4 TIMES DAILY PRN
Qty: 60 TABLET | Refills: 1 | Status: SHIPPED | OUTPATIENT
Start: 2024-03-06 | End: 2024-08-08

## 2024-03-06 ASSESSMENT — PAIN SCALES - GENERAL: PAINLEVEL: NO PAIN (0)

## 2024-03-06 NOTE — PROGRESS NOTES
Assessment & Plan     Chest pain, unspecified type  -Suspect her chest pain is secondary to esophageal issues.  Lab studies are overall stable from prior.  Recommend trial of sucralfate as below along with esophagram.  Given age and comorbidities I am hesitant to pursue EGD however we will continue to discuss this after testing.  - Comprehensive metabolic panel  - CBC with platelets    Anemia, unspecified type  Iron studies are all normal.  Hemoglobin is stable from prior.  Continue to monitor periodically  - Iron & Iron Binding Capacity    Pill dysphagia  Plan to obtain esophagram.  Trial sucralfate slurry to see if this helps with her symptoms.  Follow-up in 1 month.  - XR Esophagram; Future  - sucralfate (CARAFATE) 1 GM tablet; Take 1 tablet (1 g) by mouth 4 times daily as needed (chest pain/reflux)    Follow-up in 1 month after testing and trial of medication.    Moses Cohen is a 88 year old, presenting for the following health issues:  Chest Discomfort      3/6/2024     4:25 PM   Additional Questions   Roomed by Aaron Pascual LPN     History of Present Illness       Reason for visit:  Chest discomfort    She eats 2-3 servings of fruits and vegetables daily.She exercises with enough effort to increase her heart rate 10 to 19 minutes per day.    She is taking medications regularly.    She has had off and on chest discomfort since her back surgery in 2019.  The last 2 days she has had increased discomfort. She is only drinking 1 cup of coffee daily. The pain seems to be worse after eating but is variable. She has not had any choking episodes. No nausea or vomiting. No blood in the stool or black stools.  She sometimes has SOB with this and this is worse with exertion although the chest pain is not worse with exertion.  She tolerates regular exercise. She has not had any dysphagia but has had some pills get stuck, most recently 2 weeks ago.     She does have a history of anemia off and on and after her  "back surgery had GI bleed secondary to ulceration.  She has not seen any blood in the stool or black stools recently.    She has had cardiac testing since this time including stress echo that was normal and CT angiogram that showed widely patent coronary arteries.  Additionally echocardiogram was recently obtained and overall looks okay.        Objective    Pulse 72   Temp (!) 96.7  F (35.9  C)   Resp 18   Ht 1.613 m (5' 3.5\")   Wt 75.6 kg (166 lb 9.6 oz)   LMP 06/01/1993 (Approximate)   SpO2 98%   BMI 29.05 kg/m    Body mass index is 29.05 kg/m .  Physical Exam   GEN: Vitals reviewed. Healthy appearing. Patient is in no acute distress. Cooperative with exam.  HEENT: Normocephalic atraumatic.  Eyes grossly normal to inspection.  No discharge or erythema, or obvious scleral/conjunctival abnormalities.   CV: Heart regular in rate and rhythm with no murmur.    LUNGS: No audible wheeze, cough, or visible cyanosis.  No visible retractions or increased work of breathing.  Lungs clear to auscultation bilaterally.    ABD:  nondistended  SKIN: Warm and dry to touch.  Visible skin clear. No significant rash, abnormal pigmentation or lesions.  EXT: No clubbing or cyanosis.  No peripheral edema.            Signed Electronically by: Kirsten Guevara,     "

## 2024-03-06 NOTE — TELEPHONE ENCOUNTER
Called and informed patient and patient verbalized understanding.  Scheduling notified.  Fadumo Dobson RN on 3/6/2024 at 8:29 AM

## 2024-03-06 NOTE — NURSING NOTE
"Chief Complaint   Patient presents with    Chest Discomfort       Initial Pulse 72   Temp (!) 96.7  F (35.9  C)   Resp 18   Ht 1.613 m (5' 3.5\")   Wt 75.6 kg (166 lb 9.6 oz)   LMP 06/01/1993 (Approximate)   SpO2 98%   BMI 29.05 kg/m   Estimated body mass index is 29.05 kg/m  as calculated from the following:    Height as of this encounter: 1.613 m (5' 3.5\").    Weight as of this encounter: 75.6 kg (166 lb 9.6 oz).  Medication Review: complete    The next two questions are to help us understand your food security.  If you are feeling you need any assistance in this area, we have resources available to support you today.          1/8/2024   SDOH- Food Insecurity   Within the past 12 months, did you worry that your food would run out before you got money to buy more? N   Within the past 12 months, did the food you bought just not last and you didn t have money to get more? N         Health Care Directive:  Patient has a Health Care Directive on file      Tasia Pascual LPN      "

## 2024-03-07 ENCOUNTER — THERAPY VISIT (OUTPATIENT)
Dept: PHYSICAL THERAPY | Facility: OTHER | Age: 89
End: 2024-03-07
Attending: INTERNAL MEDICINE
Payer: MEDICARE

## 2024-03-07 DIAGNOSIS — M25.551 HIP PAIN, RIGHT: ICD-10-CM

## 2024-03-07 DIAGNOSIS — M54.50 CHRONIC RIGHT-SIDED LOW BACK PAIN, UNSPECIFIED WHETHER SCIATICA PRESENT: ICD-10-CM

## 2024-03-07 DIAGNOSIS — M79.661 PAIN OF RIGHT LOWER LEG: ICD-10-CM

## 2024-03-07 DIAGNOSIS — M48.061 SPINAL STENOSIS OF LUMBAR REGION WITHOUT NEUROGENIC CLAUDICATION: Primary | ICD-10-CM

## 2024-03-07 DIAGNOSIS — G89.29 CHRONIC RIGHT-SIDED LOW BACK PAIN, UNSPECIFIED WHETHER SCIATICA PRESENT: ICD-10-CM

## 2024-03-07 PROCEDURE — 97112 NEUROMUSCULAR REEDUCATION: CPT | Mod: GP

## 2024-03-07 NOTE — PROGRESS NOTES
[facility]                                                                                   OUTPATIENT PHYSICAL THERAPY    PLAN OF TREATMENT FOR OUTPATIENT REHABILITATION   Patient's Last Name, First Name, Vicki Soto YOB: 1935   Provider's Name   [facility]   Medical Record No.  [id number]     Onset Date: 10/31/23  Start of Care Date: 11/06/23     Medical Diagnosis:  M48.061 (ICD-10-CM) - Spinal stenosis of lumbar region without neurogenic claudication  M54.50, G89.29 (ICD-10-CM) - Chronic right-sided low back pain, unspecified whether sciatica present  M25.551 (ICD-10-CM) - Hip pain, right  M79.661 (ICD-10-CM) - Pain of right lower leg      PT Treatment Diagnosis:  back pain, myofascial tightness Plan of Treatment  Frequency/Duration: 1-2 times per week/ 3-6 months    Certification date from (P) 02/04/24 to (P) 04/26/24         See note for plan of treatment details and functional goals     Karen Duarte, PT                         I CERTIFY THE NEED FOR THESE SERVICES FURNISHED UNDER        THIS PLAN OF TREATMENT AND WHILE UNDER MY CARE     (Physician attestation of this document indicates review and certification of the therapy plan).              Referring Provider:  Kirsten Guevara    Initial Assessment  See Epic Evaluation- Start of Care Date: 11/06/23 03/07/24 0500   Appointment Info   Signing clinician's name / credentials Karen Duarte, PT, CLT, CAPP   Visits Used 6   Medical Diagnosis M48.061 (ICD-10-CM) - Spinal stenosis of lumbar region without neurogenic claudication  M54.50, G89.29 (ICD-10-CM) - Chronic right-sided low back pain, unspecified whether sciatica present  M25.551 (ICD-10-CM) - Hip pain, right  M79.661 (ICD-10-CM) - Pain of right lower leg   PT Tx Diagnosis back pain, myofascial tightness   Quick Adds Certification   Progress Note/Certification   Start of Care Date 11/06/23   Onset  of illness/injury or Date of Surgery 10/31/23   Therapy Frequency 1-2 times per week   Predicted Duration 3-6 months   Certification date from 02/04/24   Certification date to 04/26/24   GOALS   PT Goals 2;3   PT Goal 1   Goal Identifier standing   Goal Description Patient to tolerate standing 10 minutes to have conversations in community.   Target Date 02/03/24   Date Met 02/03/24   PT Goal 2   Goal Identifier pain   Goal Description Patient to report reduced low back and right leg pain to less than 5/10 at most to allow greater ease with walking.   Goal Progress varies, low back pain still limits walking at times. Main pain is while sleeping at night, full bladder wakes her and it radiates back pain to right leg and hip.   Target Date 02/03/24   Subjective Report   Subjective Report Still is woken up at [facility] when her bladder is full, radiates in to right buttock and leg. Improved when she voids. Patient feels that PT continues to help with control of her pain and keep her more functional.   Objective Measures   Objective Measures Objective Measure 1   Objective Measure 1   Objective Measure joint   Details -FFT, - seated FFT   Treatment Interventions (PT)   Interventions Manual Therapy;Neuromuscular Re-education   Neuromuscular Re-education   Neuromuscular re-ed of mvmt, balance, coord, kinesthetic sense, posture, proprioception minutes ([id number]) 30   Neuro Re-ed 1 neuromengingeal manipulation (NMM), muscle energy technique (MET) to restore function and reduce pain   Neuro Re-ed 1 - Details NMM- right superior gluteal nerve, inferior gluteal nerve, right pudendal nerve, right sciatic nerve   Manual Therapy   Manual Therapy 1 MFR to restore function and reduce pain   Education   Learner/Method Patient;No Barriers to Learning   Plan   Home program exercise, heat   Plan for next session continue as needed

## 2024-03-12 ENCOUNTER — HOSPITAL ENCOUNTER (OUTPATIENT)
Dept: GENERAL RADIOLOGY | Facility: OTHER | Age: 89
Discharge: HOME | End: 2024-03-12
Attending: INTERNAL MEDICINE | Admitting: INTERNAL MEDICINE
Payer: MEDICARE

## 2024-03-12 DIAGNOSIS — R13.10 PILL DYSPHAGIA: ICD-10-CM

## 2024-03-12 PROCEDURE — 250N000013 HC RX MED GY IP 250 OP 250 PS 637: Performed by: INTERNAL MEDICINE

## 2024-03-12 PROCEDURE — 74221 X-RAY XM ESOPHAGUS 2CNTRST: CPT

## 2024-03-12 RX ADMIN — ANTACID/ANTIFLATULENT 4 G: 380; 550; 10; 10 GRANULE, EFFERVESCENT ORAL at 12:18

## 2024-03-20 NOTE — PROGRESS NOTES
Otolaryngology Consultation    Patient: Vicki Mckeon  : 1935    Patient presents with:  Ear Problem: [facility]    Referral:  Marely Mitchell    HPI:  Vicki Mckeon is a 88 year old female seen today for evaluation of hearing loss.    She has noticed a slow decline in her hearing for several years.  She had presented to audiology on  due to worsening left hearing loss which she attributed to her hearing aid not working properly.  She denies fluctuating hearing loss bothersome tinnitus vertigo or aural fullness.    No history of occupational or recreational noise exposure    Audiogram dated 2023 shows a left worse than right asymmetrical moderate sloping to severe sensorineural hearing loss left ear SRT 70 dB with 20% discrimination right ear SRT 60 dB 80% discrimination type a tympanograms bilaterally    She denies facial weakness facial numbness or dysphagia      Current Outpatient Rx   Medication Sig Dispense Refill    acetaminophen (TYLENOL) 650 MG CR tablet Take 2 tablets (1,300 mg) by mouth every 8 hours as needed for mild pain or fever      Cholecalciferol (VITAMIN D3) 1000 UNITS CAPS Take 1,000 Units by mouth daily      hydroquinone (DAVIS) 4 % external cream APPLY A SMALL AMOUNT TO THE DARK SPOTS ON THE FACE AND HANDS UNTIL CLEAR. Strength: 4 % 28.35 g 3    losartan-hydrochlorothiazide (HYZAAR) 100-25 MG tablet TAKE 1 TABLET BY MOUTH DAILY 90 tablet 4    sucralfate (CARAFATE) 1 GM tablet Take 1 tablet (1 g) by mouth 4 times daily as needed (chest pain/reflux) 60 tablet 1    vitamin B-12 (CYANOCOBALAMIN) 1000 MCG CR tablet Take 1,000 mcg by mouth every other day         Allergies: Amlodipine, Fosamax [alendronate], and Sulfa antibiotics     Past Medical History:   Diagnosis Date    Anemia 05/15/2013    CKD (chronic kidney disease) stage 3, GFR 30-59 ml/min (H) 2014    Stable. First noted when patient was taking NSAIDs regularly due to back pain.    Essential (primary)  hypertension     Nonrheumatic mitral valve prolapse 2008    with normal echo and no residual prolapse    Nontoxic single thyroid nodule 2012    Patient with multinodular goiter. Negative cytology and stable ultrasound findings.    Osteopenia     Other fracture of unspecified lower leg, initial encounter for closed fracture     Pain in left hip 2017    Pregnancy      2, para 2 with 2 spontaneous vaginal deliveries    Pure hypercholesterolemia     Scoliosis     Spinal stenosis     with nerve root impingement    Squamous cell carcinoma        Past Surgical History:   Procedure Laterality Date    BACK SURGERY  10/2011    Back surgery, Dr. Linda, [facility]    BACK SURGERY  2019    [facility]    BACK SURGERY  10/2011    BIOPSY BREAST Left     1970's, benign    CLOSED REDUCTION ANKLE  2001    ORIF left ankle w/ later removal of hardware     COLONOSCOPY      COLONOSCOPY      ESOPHAGOGASTRODUODENOSCOPY  2019    normal exam, no duodenal ulcers seen    ESOPHAGOSCOPY, GASTROSCOPY, DUODENOSCOPY (EGD), COMBINED N/A 2019    Procedure: ESOPHAGOGASTRODUODENOSCOPY (EGD);  Surgeon: Akash Meléndez MD;  Location: [facility] OR    EXTRACAPSULAR CATARACT EXTRATION WITH INTRAOCULAR LENS IMPLANT Bilateral     LAPAROSCOPY DIAGNOSTIC (GYN)      Laparoscopy for pelvic pain which was negative       ENT family history reviewed    Social History     Tobacco Use    Smoking status: Former     Types: Cigarettes     Quit date: 1968     Years since quittin.2     Passive exposure: Past    Smokeless tobacco: Never   Vaping Use    Vaping Use: Never used   Substance Use Topics    Alcohol use: Yes     Comment: Occasionally    Drug use: No     Comment:         Review of Systems  ROS: 10 point ROS neg other than the symptoms noted above in the HPI and shortness of breath on exertion nasal congestion, hair loss    Physical Exam  BP (!) 146/62 (BP Location: Left arm, Patient Position:  "Sitting, Cuff Size: Adult Regular)   Pulse 98   Temp 98.1  F (36.7  C) (Tympanic)   Resp 18   Ht 1.613 m (5' 3.5\")   Wt 74.8 kg (165 lb)   LMP 06/01/1993 (Approximate)   SpO2 97%   BMI 28.77 kg/m    General - The patient is well nourished and well developed, and appears to have good nutritional status.  Alert and oriented to person and place, answers questions and cooperates with examination appropriately.   Head and Face - Normocephalic and atraumatic, with no gross asymmetry noted.  The facial nerve is intact, with strong symmetric movements.  Grade 1 out of 6 bilaterally  Shoulder shrug symmetric  Tongue midline  Voice and Breathing - The patient was breathing comfortably without the use of accessory muscles. There was no wheezing, stridor, or stertor.  The patients voice was clear and strong, and had appropriate pitch and quality.  No gt peripheral digital clubbing or cyanosis   Ears - examined under microscopy bilaterally scant cerumen removed with a loop.  The external auditory canals are patent, the tympanic membranes are intact without effusion, retraction or mass.  Bony landmarks are intact.  Eyes - Extraocular movements intact, and the pupils were reactive to light.  Sclera were not icteric or injected, conjunctiva were pink and moist.  Mouth - Examination of the oral cavity showed pink, healthy oral mucosa. No lesions or ulcerations noted.  The tongue was mobile and midline, and the dentition were in good condition.    Throat - The walls of the oropharynx were smooth, pink, moist, symmetric, and had no lesions or ulcerations.  The tonsillar pillars and soft palate were symmetric.  The uvula was midline on elevation.    Neck - No palpable enlarged fixed cervical lymph nodes.  No neck cysts or unusual tenderness to palpation.   No palpable fixed thyroid nodules or concerning goiter.  The trachea is grossly midline.   Nose - External contour is symmetric, no gross deflection or scars.  Nasal " mucosa is pink and moist with no abnormal mucus.  The septum and turbinates were evaluated.  No polyps, masses, or purulence noted on examination.      Impression and Plan- Vicki Mckeon is a 88 year old female with:    ICD-10-CM    1. ASNHL (asymmetrical sensorineural hearing loss)  H90.3 MR Internal Auditory Canal wo&w Contrast          I also discussed the indication for MRI of the Brain and Internal Auditory Canals.    The possibility of acoustic neuroma causing asymmetrical nerve hearing loss was discussed.  The patient was told acoustic neuromas are rare, benign, and generally treated by observation only.    As long as the MRI is negative she is medically cleared for an updated OROURKE or CROS, defer to Xiang.      Emmy Mccoy D.O.  Otolaryngology/Head and Neck Surgery  Allergy

## 2024-03-21 ENCOUNTER — OFFICE VISIT (OUTPATIENT)
Dept: OTOLARYNGOLOGY | Facility: OTHER | Age: 89
End: 2024-03-21
Attending: OTOLARYNGOLOGY
Payer: COMMERCIAL

## 2024-03-21 VITALS
HEIGHT: 64 IN | RESPIRATION RATE: 18 BRPM | DIASTOLIC BLOOD PRESSURE: 62 MMHG | TEMPERATURE: 98.1 F | HEART RATE: 98 BPM | OXYGEN SATURATION: 97 % | WEIGHT: 165 LBS | BODY MASS INDEX: 28.17 KG/M2 | SYSTOLIC BLOOD PRESSURE: 146 MMHG

## 2024-03-21 DIAGNOSIS — H90.3 ASNHL (ASYMMETRICAL SENSORINEURAL HEARING LOSS): Primary | ICD-10-CM

## 2024-03-21 PROCEDURE — G0463 HOSPITAL OUTPT CLINIC VISIT: HCPCS

## 2024-03-21 PROCEDURE — 92504 EAR MICROSCOPY EXAMINATION: CPT | Performed by: OTOLARYNGOLOGY

## 2024-03-21 PROCEDURE — 99203 OFFICE O/P NEW LOW 30 MIN: CPT | Mod: 25 | Performed by: OTOLARYNGOLOGY

## 2024-03-21 ASSESSMENT — PAIN SCALES - GENERAL: PAINLEVEL: NO PAIN (1)

## 2024-03-21 NOTE — PATIENT INSTRUCTIONS
Thank you for allowing Dr. Mccoy and our ENT team to participate in your care.  If your medications are too expensive, please give the nurse a call.  We can possibly change this medication.  If you have a scheduling or an appointment question please contact our Health Unit Coordinator at their direct line [phone].   ALL nursing questions or concerns can be directed to your ENT nurse, Casey, at: [phone]    Complete MRI I[facility] in [place]    I also discussed the indication for MRI of the Brain and Internal Auditory Canals.    The possibility of acoustic neuroma causing asymmetrical nerve hearing loss was discussed.  The patient was told acoustic neuromas are rare, benign, and generally treated by observation only.    No indication for ear surgery  Continue hearing aid use per Xiang

## 2024-03-21 NOTE — LETTER
3/21/2024         RE: Vicki Mckeon  [address] [address]        Dear Colleague,    Thank you for referring your patient, Vicki Mckeon, to the [facility]. Please see a copy of my visit note below.        Otolaryngology Consultation    Patient: Vicki Mckeon  : 1935    Patient presents with:  Ear Problem: ASN    Referral:  Marely Mitchell    HPI:  Vicki Mckeon is a 88 year old female seen today for evaluation of hearing loss.    She has noticed a slow decline in her hearing for several years.  She had presented to audiology on  due to worsening left hearing loss which she attributed to her hearing aid not working properly.  She denies fluctuating hearing loss bothersome tinnitus vertigo or aural fullness.    No history of occupational or recreational noise exposure    Audiogram dated 2023 shows a left worse than right asymmetrical moderate sloping to severe sensorineural hearing loss left ear SRT 70 dB with 20% discrimination right ear SRT 60 dB 80% discrimination type a tympanograms bilaterally    She denies facial weakness facial numbness or dysphagia      Current Outpatient Rx   Medication Sig Dispense Refill     acetaminophen (TYLENOL) 650 MG CR tablet Take 2 tablets (1,300 mg) by mouth every 8 hours as needed for mild pain or fever       Cholecalciferol (VITAMIN D3) 1000 UNITS CAPS Take 1,000 Units by mouth daily       hydroquinone (DAVIS) 4 % external cream APPLY A SMALL AMOUNT TO THE DARK SPOTS ON THE FACE AND HANDS UNTIL CLEAR. Strength: 4 % 28.35 g 3     losartan-hydrochlorothiazide (HYZAAR) 100-25 MG tablet TAKE 1 TABLET BY MOUTH DAILY 90 tablet 4     sucralfate (CARAFATE) 1 GM tablet Take 1 tablet (1 g) by mouth 4 times daily as needed (chest pain/reflux) 60 tablet 1     vitamin B-12 (CYANOCOBALAMIN) 1000 MCG CR tablet Take 1,000 mcg by mouth every other day         Allergies: Amlodipine, Fosamax [alendronate], and Sulfa antibiotics      Past Medical History:   Diagnosis Date     Anemia 05/15/2013     CKD (chronic kidney disease) stage 3, GFR 30-59 ml/min (H) 2014    Stable. First noted when patient was taking NSAIDs regularly due to back pain.     Essential (primary) hypertension      Nonrheumatic mitral valve prolapse 2008    with normal echo and no residual prolapse     Nontoxic single thyroid nodule 2012    Patient with multinodular goiter. Negative cytology and stable ultrasound findings.     Osteopenia      Other fracture of unspecified lower leg, initial encounter for closed fracture      Pain in left hip 2017     Pregnancy      2, para 2 with 2 spontaneous vaginal deliveries     Pure hypercholesterolemia      Scoliosis      Spinal stenosis     with nerve root impingement     Squamous cell carcinoma        Past Surgical History:   Procedure Laterality Date     BACK SURGERY  10/2011    Back surgery, Dr. Linda, [facility]     BACK SURGERY  2019    [place] [facility]     BACK SURGERY  10/2011     BIOPSY BREAST Left     1970's, benign     CLOSED REDUCTION ANKLE  2001    ORIF left ankle w/ later removal of hardware      COLONOSCOPY       COLONOSCOPY       ESOPHAGOGASTRODUODENOSCOPY  2019    normal exam, no duodenal ulcers seen     ESOPHAGOSCOPY, GASTROSCOPY, DUODENOSCOPY (EGD), COMBINED N/A 2019    Procedure: ESOPHAGOGASTRODUODENOSCOPY (EGD);  Surgeon: Akash Meléndez MD;  Location: [facility] OR     EXTRACAPSULAR CATARACT EXTRATION WITH INTRAOCULAR LENS IMPLANT Bilateral      LAPAROSCOPY DIAGNOSTIC (GYN)      Laparoscopy for pelvic pain which was negative       ENT family history reviewed    Social History     Tobacco Use     Smoking status: Former     Types: Cigarettes     Quit date: 1968     Years since quittin.2     Passive exposure: Past     Smokeless tobacco: Never   Vaping Use     Vaping Use: Never used   Substance Use Topics     Alcohol use: Yes     Comment:  "Occasionally     Drug use: No     Comment:         Review of Systems  ROS: 10 point ROS neg other than the symptoms noted above in the HPI and shortness of breath on exertion nasal congestion, hair loss    Physical Exam  BP (!) 146/62 (BP Location: Left arm, Patient Position: Sitting, Cuff Size: Adult Regular)   Pulse 98   Temp 98.1  F (36.7  C) (Tympanic)   Resp 18   Ht 1.613 m (5' 3.5\")   Wt 74.8 kg (165 lb)   LMP 06/01/1993 (Approximate)   SpO2 97%   BMI 28.77 kg/m    General - The patient is well nourished and well developed, and appears to have good nutritional status.  Alert and oriented to person and place, answers questions and cooperates with examination appropriately.   Head and Face - Normocephalic and atraumatic, with no gross asymmetry noted.  The facial nerve is intact, with strong symmetric movements.  Grade 1 out of 6 bilaterally  Shoulder shrug symmetric  Tongue midline  Voice and Breathing - The patient was breathing comfortably without the use of accessory muscles. There was no wheezing, stridor, or stertor.  The patients voice was clear and strong, and had appropriate pitch and quality.  No gt peripheral digital clubbing or cyanosis   Ears - examined under microscopy bilaterally scant cerumen removed with a loop.  The external auditory canals are patent, the tympanic membranes are intact without effusion, retraction or mass.  Bony landmarks are intact.  Eyes - Extraocular movements intact, and the pupils were reactive to light.  Sclera were not icteric or injected, conjunctiva were pink and moist.  Mouth - Examination of the oral cavity showed pink, healthy oral mucosa. No lesions or ulcerations noted.  The tongue was mobile and midline, and the dentition were in good condition.    Throat - The walls of the oropharynx were smooth, pink, moist, symmetric, and had no lesions or ulcerations.  The tonsillar pillars and soft palate were symmetric.  The uvula was midline on elevation.  "   Neck - No palpable enlarged fixed cervical lymph nodes.  No neck cysts or unusual tenderness to palpation.   No palpable fixed thyroid nodules or concerning goiter.  The trachea is grossly midline.   Nose - External contour is symmetric, no gross deflection or scars.  Nasal mucosa is pink and moist with no abnormal mucus.  The septum and turbinates were evaluated.  No polyps, masses, or purulence noted on examination.      Impression and Plan- Vicki Mckeon is a 88 year old female with:    ICD-10-CM    1. ASNHL (asymmetrical sensorineural hearing loss)  H90.3 MR Internal Auditory Canal wo&w Contrast          I also discussed the indication for MRI of the Brain and Internal Auditory Canals.    The possibility of acoustic neuroma causing asymmetrical nerve hearing loss was discussed.  The patient was told acoustic neuromas are rare, benign, and generally treated by observation only.    As long as the MRI is negative she is medically cleared for an updated OROURKE or CROS, defer to Xiang.      Emmy Mccoy D.O.  Otolaryngology/Head and Neck Surgery  Allergy      Again, thank you for allowing me to participate in the care of your patient.        Sincerely,        Emmy Mccoy MD

## 2024-04-08 ENCOUNTER — THERAPY VISIT (OUTPATIENT)
Dept: PHYSICAL THERAPY | Facility: OTHER | Age: 89
End: 2024-04-08
Attending: INTERNAL MEDICINE
Payer: MEDICARE

## 2024-04-08 DIAGNOSIS — M48.061 SPINAL STENOSIS OF LUMBAR REGION WITHOUT NEUROGENIC CLAUDICATION: Primary | ICD-10-CM

## 2024-04-08 PROCEDURE — 97112 NEUROMUSCULAR REEDUCATION: CPT | Mod: GP

## 2024-04-08 PROCEDURE — 97140 MANUAL THERAPY 1/> REGIONS: CPT | Mod: GP

## 2024-04-17 ENCOUNTER — OFFICE VISIT (OUTPATIENT)
Dept: INTERNAL MEDICINE | Facility: OTHER | Age: 89
End: 2024-04-17
Attending: INTERNAL MEDICINE
Payer: COMMERCIAL

## 2024-04-17 VITALS
HEART RATE: 68 BPM | SYSTOLIC BLOOD PRESSURE: 138 MMHG | BODY MASS INDEX: 28.94 KG/M2 | RESPIRATION RATE: 18 BRPM | DIASTOLIC BLOOD PRESSURE: 82 MMHG | OXYGEN SATURATION: 100 % | TEMPERATURE: 98.7 F | WEIGHT: 166 LBS

## 2024-04-17 DIAGNOSIS — G89.29 CHRONIC RIGHT-SIDED LOW BACK PAIN, UNSPECIFIED WHETHER SCIATICA PRESENT: ICD-10-CM

## 2024-04-17 DIAGNOSIS — N18.32 STAGE 3B CHRONIC KIDNEY DISEASE (H): Primary | ICD-10-CM

## 2024-04-17 DIAGNOSIS — M54.50 CHRONIC RIGHT-SIDED LOW BACK PAIN, UNSPECIFIED WHETHER SCIATICA PRESENT: ICD-10-CM

## 2024-04-17 DIAGNOSIS — I10 ESSENTIAL HYPERTENSION: ICD-10-CM

## 2024-04-17 DIAGNOSIS — D64.9 ANEMIA, UNSPECIFIED TYPE: ICD-10-CM

## 2024-04-17 DIAGNOSIS — R06.09 DYSPNEA ON EXERTION: ICD-10-CM

## 2024-04-17 LAB
ALBUMIN SERPL BCG-MCNC: 4.2 G/DL (ref 3.5–5.2)
ANION GAP SERPL CALCULATED.3IONS-SCNC: 13 MMOL/L (ref 7–15)
BUN SERPL-MCNC: 42.8 MG/DL (ref 8–23)
CALCIUM SERPL-MCNC: 10.5 MG/DL (ref 8.8–10.2)
CHLORIDE SERPL-SCNC: 102 MMOL/L (ref 98–107)
CREAT SERPL-MCNC: 1.61 MG/DL (ref 0.51–0.95)
DEPRECATED HCO3 PLAS-SCNC: 24 MMOL/L (ref 22–29)
EGFRCR SERPLBLD CKD-EPI 2021: 31 ML/MIN/1.73M2
GLUCOSE SERPL-MCNC: 107 MG/DL (ref 70–99)
HGB BLD-MCNC: 11.3 G/DL (ref 11.7–15.7)
PHOSPHATE SERPL-MCNC: 4 MG/DL (ref 2.5–4.5)
POTASSIUM SERPL-SCNC: 4.6 MMOL/L (ref 3.4–5.3)
SODIUM SERPL-SCNC: 139 MMOL/L (ref 135–145)

## 2024-04-17 PROCEDURE — 36415 COLL VENOUS BLD VENIPUNCTURE: CPT | Mod: ZL | Performed by: INTERNAL MEDICINE

## 2024-04-17 PROCEDURE — 85018 HEMOGLOBIN: CPT | Mod: ZL | Performed by: INTERNAL MEDICINE

## 2024-04-17 PROCEDURE — 99214 OFFICE O/P EST MOD 30 MIN: CPT | Performed by: INTERNAL MEDICINE

## 2024-04-17 PROCEDURE — G0463 HOSPITAL OUTPT CLINIC VISIT: HCPCS

## 2024-04-17 PROCEDURE — 80069 RENAL FUNCTION PANEL: CPT | Mod: ZL | Performed by: INTERNAL MEDICINE

## 2024-04-17 ASSESSMENT — PAIN SCALES - GENERAL: PAINLEVEL: MILD PAIN (2)

## 2024-04-17 NOTE — NURSING NOTE
"Chief Complaint   Patient presents with    Results       Initial BP (!) 180/118   Pulse 68   Temp 98.7  F (37.1  C)   Resp 18   Wt 75.3 kg (166 lb)   LMP 06/01/1993 (Approximate)   SpO2 100%   Breastfeeding No   BMI 28.94 kg/m   Estimated body mass index is 28.94 kg/m  as calculated from the following:    Height as of 3/21/24: 1.613 m (5' 3.5\").    Weight as of this encounter: 75.3 kg (166 lb).  Medication Review: complete    The next two questions are to help us understand your food security.  If you are feeling you need any assistance in this area, we have resources available to support you today.          1/8/2024   SDOH- Food Insecurity   Within the past 12 months, did you worry that your food would run out before you got money to buy more? N   Within the past 12 months, did the food you bought just not last and you didn t have money to get more? N         Health Care Directive:  Patient has a Health Care Directive on file      Tasia Pascual LPN      "

## 2024-04-17 NOTE — PROGRESS NOTES
Assessment & Plan     Stage 3b chronic kidney disease (H)  Recheck of her kidney function today shows improvement from prior.  Continue to monitor periodically.  - Renal Function Panel    Anemia, unspecified type  Hemoglobin is stable from prior.  Continue to monitor periodically.  - Hemoglobin    Essential hypertension  Blood pressure was initially elevated.  Recheck was improved.  Continue to monitor at home.    Chronic right-sided low back pain, unspecified whether sciatica present  Continue with physical therapy exercises.    Dyspnea on exertion  We discussed options including repeating cardiac testing.  At this time she would like to monitor her symptoms and will let us know if she would like any additional evaluation.    Return in about 4 months (around 8/17/2024) for Annual Review with renewal of all medications, as scheduled.    Moses Cohen is a 88 year old, presenting for the following health issues:  Results        4/17/2024     3:16 PM   Additional Questions   Roomed by Aaron Pascual LPN     History of Present Illness       Reason for visit:  Testing    She eats 2-3 servings of fruits and vegetables daily.She consumes 0 sweetened beverage(s) daily.She exercises with enough effort to increase her heart rate 10 to 19 minutes per day.  She exercises with enough effort to increase her heart rate 4 days per week.   She is taking medications regularly.       She has been doing exercises that came from after her prior surgery and thinks this is helping.     She has been feeling better.  She has not needed much Sucralfate. She does feel it helps when she uses it.    She is trying to eat smaller portions.  This has helped overall with her stomach.  We reviewed her esophagram which did show some esophageal dysmotility.  We discussed this.    No new symptoms.      She does get winded with exercises. She had cardiac testing 3 years ago that was reviewed today and normal.      ROS:  Denies any fevers,  chills, chest pain, increased lower extremity edema, changes in bowel or bladder, blood in the stool or black stool or other concerns.         Objective    /82   Pulse 68   Temp 98.7  F (37.1  C)   Resp 18   Wt 75.3 kg (166 lb)   LMP 06/01/1993 (Approximate)   SpO2 100%   Breastfeeding No   BMI 28.94 kg/m    Body mass index is 28.94 kg/m .  Physical Exam   GEN: Vitals reviewed. Healthy appearing. Patient is in no acute distress. Cooperative with exam.  HEENT: Normocephalic atraumatic.  Eyes grossly normal to inspection.  No discharge or erythema, or obvious scleral/conjunctival abnormalities.   CV: Heart regular in rate and rhythm with no murmur.    LUNGS: No audible wheeze, cough, or visible cyanosis.  No visible retractions or increased work of breathing.  Lungs clear to auscultation bilaterally.    ABD:  nondistended  SKIN: Warm and dry to touch.  Visible skin clear. No significant rash, abnormal pigmentation or lesions.  EXT: No clubbing or cyanosis.  No peripheral edema.          Signed Electronically by: Kirsten Guevara DO

## 2024-04-23 ENCOUNTER — THERAPY VISIT (OUTPATIENT)
Dept: PHYSICAL THERAPY | Facility: OTHER | Age: 89
End: 2024-04-23
Attending: INTERNAL MEDICINE
Payer: MEDICARE

## 2024-04-23 DIAGNOSIS — M48.061 SPINAL STENOSIS OF LUMBAR REGION WITHOUT NEUROGENIC CLAUDICATION: Primary | ICD-10-CM

## 2024-04-23 DIAGNOSIS — M25.551 HIP PAIN, RIGHT: ICD-10-CM

## 2024-04-23 DIAGNOSIS — M54.50 CHRONIC RIGHT-SIDED LOW BACK PAIN, UNSPECIFIED WHETHER SCIATICA PRESENT: ICD-10-CM

## 2024-04-23 DIAGNOSIS — G89.29 CHRONIC RIGHT-SIDED LOW BACK PAIN, UNSPECIFIED WHETHER SCIATICA PRESENT: ICD-10-CM

## 2024-04-23 PROCEDURE — 97112 NEUROMUSCULAR REEDUCATION: CPT | Mod: GP

## 2024-04-23 PROCEDURE — 97140 MANUAL THERAPY 1/> REGIONS: CPT | Mod: GP

## 2024-04-23 NOTE — PROGRESS NOTES
[facility]                                                                                   OUTPATIENT PHYSICAL THERAPY    PLAN OF TREATMENT FOR OUTPATIENT REHABILITATION   Patient's Last Name, First Name, Vicki Soto YOB: 1935   Provider's Name   [facility]   Medical Record No.  [id number]     Onset Date: 10/31/23  Start of Care Date: 11/06/23     Medical Diagnosis:  M48.061 (ICD-10-CM) - Spinal stenosis of lumbar region without neurogenic claudication  M54.50, G89.29 (ICD-10-CM) - Chronic right-sided low back pain, unspecified whether sciatica present  M25.551 (ICD-10-CM) - Hip pain, right  M79.661 (ICD-10-CM) - Pain of right lower leg      PT Treatment Diagnosis:  back pain, myofascial tightness Plan of Treatment  Frequency/Duration: 1 time per week as needed/ 12 weeks    Certification date from 04/27/24 to 07/19/24         See note for plan of treatment details and functional goals     Karen Duarte, PT                         I CERTIFY THE NEED FOR THESE SERVICES FURNISHED UNDER        THIS PLAN OF TREATMENT AND WHILE UNDER MY CARE     (Physician attestation of this document indicates review and certification of the therapy plan).              Referring Provider:  Kirsten Guevara    Initial Assessment  See Epic Evaluation- Start of Care Date: 11/06/23 04/23/24 0500   Appointment Info   Signing clinician's name / credentials Karen Duarte, PT, CLT, CAPP   Visits Used 8   Medical Diagnosis M48.061 (ICD-10-CM) - Spinal stenosis of lumbar region without neurogenic claudication  M54.50, G89.29 (ICD-10-CM) - Chronic right-sided low back pain, unspecified whether sciatica present  M25.551 (ICD-10-CM) - Hip pain, right  M79.661 (ICD-10-CM) - Pain of right lower leg   PT Tx Diagnosis back pain, myofascial tightness   Quick Adds Certification   Progress Note/Certification   Start of Care Date 11/06/23   Onset of  illness/injury or Date of Surgery 10/31/23   Therapy Frequency 1 time per week as needed   Predicted Duration 12 weeks   Certification date from 04/27/24   Certification date to 07/19/24   GOALS   PT Goals 2;3   PT Goal 1   Goal Identifier standing   Goal Description Patient to tolerate standing 10 minutes to have conversations in community.   Target Date 02/03/24   Date Met 02/03/24   PT Goal 2   Goal Identifier pain   Goal Description Patient to report reduced low back and right leg pain to less than 5/10 at most to allow greater ease with walking.   Goal Progress PT continues to aid patient in maintaining her mobility in home and in the community. PT continues to help with pain control as patient is unable to take medication due to other health issues. Pain varies by day, as well as effects on mobility.   Target Date 07/19/24   Subjective Report   Subjective Report Had a lousy day yesterday but doing better today, upper back and neck, low back. Not sure why. Humidity has been low and this is usually better for her. Sleep is still interupted by a full bladder that then leads to radiating pain in to low back and right leg.   Objective Measures   Objective Measures Objective Measure 1   Objective Measure 1   Objective Measure joint   Details - FFT, FRS right T2, ERS right C7   Treatment Interventions (PT)   Interventions Manual Therapy;Neuromuscular Re-education   Neuromuscular Re-education   Neuromuscular re-ed of mvmt, balance, coord, kinesthetic sense, posture, proprioception minutes (64384) 20   Neuro Re-ed 1 neuromengingeal manipulation (NMM), muscle energy technique (MET) to restore function and reduce pain   Neuro Re-ed 1 - Details MET- FRS right T2, ERS right C7; NMM- right L5 nerve root<>sciatic nerve   Patient Response/Progress reduced upper back and neck pain, right hip pain   Manual Therapy   Manual Therapy: Mobilization, MFR, MLD, friction massage minutes (35455) 10   Manual Therapy 1 MFR to restore  function and reduce pain   Manual Therapy 1 - Details MFR- bladder wall   Education   Learner/Method Patient;No Barriers to Learning   Plan   Home program exercise, heat   Plan for next session continue as needed   Total Session Time   Timed Code Treatment Minutes 30   Total Treatment Time (sum of timed and untimed services) 30

## 2024-05-20 ENCOUNTER — THERAPY VISIT (OUTPATIENT)
Dept: PHYSICAL THERAPY | Facility: OTHER | Age: 89
End: 2024-05-20
Attending: INTERNAL MEDICINE
Payer: MEDICARE

## 2024-05-20 DIAGNOSIS — M48.061 SPINAL STENOSIS OF LUMBAR REGION WITHOUT NEUROGENIC CLAUDICATION: Primary | ICD-10-CM

## 2024-05-20 DIAGNOSIS — G89.29 CHRONIC RIGHT-SIDED LOW BACK PAIN, UNSPECIFIED WHETHER SCIATICA PRESENT: ICD-10-CM

## 2024-05-20 DIAGNOSIS — M54.50 CHRONIC RIGHT-SIDED LOW BACK PAIN, UNSPECIFIED WHETHER SCIATICA PRESENT: ICD-10-CM

## 2024-05-20 DIAGNOSIS — M25.551 HIP PAIN, RIGHT: ICD-10-CM

## 2024-05-20 PROCEDURE — 97112 NEUROMUSCULAR REEDUCATION: CPT | Mod: GP

## 2024-05-20 PROCEDURE — 97140 MANUAL THERAPY 1/> REGIONS: CPT | Mod: GP

## 2024-06-06 DIAGNOSIS — I10 ESSENTIAL HYPERTENSION: ICD-10-CM

## 2024-06-11 RX ORDER — LOSARTAN POTASSIUM AND HYDROCHLOROTHIAZIDE 25; 100 MG/1; MG/1
1 TABLET ORAL DAILY
Qty: 90 TABLET | Refills: 4 | Status: SHIPPED | OUTPATIENT
Start: 2024-06-11

## 2024-06-11 NOTE — TELEPHONE ENCOUNTER
[facility] sent Rx request for the following:      Requested Prescriptions   Pending Prescriptions Disp Refills    losartan-hydrochlorothiazide (HYZAAR) 100-25 MG tablet [Pharmacy Med Name: LOSARTAN/HCTZ 100MG-25MG TAB] 90 tablet 4     Sig: TAKE 1 TABLET BY MOUTH DAILY       Angiotensin-II Receptors Failed - 6/11/2024  9:50 AM   Last Prescription Date:   6/15/23  Last Fill Qty/Refills:         90, R-4    Last Office Visit:              4/17/24   Future Office visit:             Next 5 appointments (look out 90 days)      Aug 08, 2024  1:20 PM  (Arrive by 1:05 PM)  PHYSICAL with Kirsten Guevara, [facility] ([facility] ) [address]  [place] [id number] [phone]           Lisbeth Alicia RN on 6/11/2024 at 9:53 AM

## 2024-06-18 ENCOUNTER — THERAPY VISIT (OUTPATIENT)
Dept: PHYSICAL THERAPY | Facility: OTHER | Age: 89
End: 2024-06-18
Attending: INTERNAL MEDICINE
Payer: MEDICARE

## 2024-06-18 DIAGNOSIS — M48.061 SPINAL STENOSIS OF LUMBAR REGION WITHOUT NEUROGENIC CLAUDICATION: Primary | ICD-10-CM

## 2024-06-18 DIAGNOSIS — M54.50 CHRONIC RIGHT-SIDED LOW BACK PAIN, UNSPECIFIED WHETHER SCIATICA PRESENT: ICD-10-CM

## 2024-06-18 DIAGNOSIS — G89.29 CHRONIC RIGHT-SIDED LOW BACK PAIN, UNSPECIFIED WHETHER SCIATICA PRESENT: ICD-10-CM

## 2024-06-18 DIAGNOSIS — M25.551 HIP PAIN, RIGHT: ICD-10-CM

## 2024-06-18 PROCEDURE — 97140 MANUAL THERAPY 1/> REGIONS: CPT | Mod: GP

## 2024-06-18 PROCEDURE — 97112 NEUROMUSCULAR REEDUCATION: CPT | Mod: GP

## 2024-07-18 ENCOUNTER — THERAPY VISIT (OUTPATIENT)
Dept: PHYSICAL THERAPY | Facility: OTHER | Age: 89
End: 2024-07-18
Attending: INTERNAL MEDICINE
Payer: MEDICARE

## 2024-07-18 DIAGNOSIS — M25.551 HIP PAIN, RIGHT: ICD-10-CM

## 2024-07-18 DIAGNOSIS — G89.29 CHRONIC RIGHT-SIDED LOW BACK PAIN, UNSPECIFIED WHETHER SCIATICA PRESENT: ICD-10-CM

## 2024-07-18 DIAGNOSIS — M79.661 PAIN OF RIGHT LOWER LEG: ICD-10-CM

## 2024-07-18 DIAGNOSIS — M54.50 CHRONIC RIGHT-SIDED LOW BACK PAIN, UNSPECIFIED WHETHER SCIATICA PRESENT: ICD-10-CM

## 2024-07-18 DIAGNOSIS — M48.061 SPINAL STENOSIS OF LUMBAR REGION WITHOUT NEUROGENIC CLAUDICATION: Primary | ICD-10-CM

## 2024-07-18 PROCEDURE — 97112 NEUROMUSCULAR REEDUCATION: CPT | Mod: GP

## 2024-07-18 PROCEDURE — 97140 MANUAL THERAPY 1/> REGIONS: CPT | Mod: GP

## 2024-07-18 NOTE — PROGRESS NOTES
[facility]                                                                                   OUTPATIENT PHYSICAL THERAPY    PLAN OF TREATMENT FOR OUTPATIENT REHABILITATION   Patient's Last Name, First Name, Vicki Soto YOB: 1935   Provider's Name   [facility]   Medical Record No.  [id number]     Onset Date: 10/31/23  Start of Care Date: 11/06/23     Medical Diagnosis:  M48.061 (ICD-10-CM) - Spinal stenosis of lumbar region without neurogenic claudication  M54.50, G89.29 (ICD-10-CM) - Chronic right-sided low back pain, unspecified whether sciatica present  M25.551 (ICD-10-CM) - Hip pain, right  M79.661 (ICD-10-CM) - Pain of right lower leg      PT Treatment Diagnosis:  back pain, myofascial tightness Plan of Treatment  Frequency/Duration: 1 time per week as needed/ 12 weeks    Certification date from 07/20/24 to 10/11/24         See note for plan of treatment details and functional goals     Karen Duarte, PT                         I CERTIFY THE NEED FOR THESE SERVICES FURNISHED UNDER        THIS PLAN OF TREATMENT AND WHILE UNDER MY CARE     (Physician attestation of this document indicates review and certification of the therapy plan).              Referring Provider:  Kirsten Guevara    Initial Assessment  See Epic Evaluation- Start of Care Date: 11/06/23 07/18/24 0500   Appointment Info   Signing clinician's name / credentials Karen Duarte, PT, CLT, CAPP   Visits Used 11   Medical Diagnosis M48.061 (ICD-10-CM) - Spinal stenosis of lumbar region without neurogenic claudication  M54.50, G89.29 (ICD-10-CM) - Chronic right-sided low back pain, unspecified whether sciatica present  M25.551 (ICD-10-CM) - Hip pain, right  M79.661 (ICD-10-CM) - Pain of right lower leg   PT Tx Diagnosis back pain, myofascial tightness   Quick Adds Certification   Progress Note/Certification   Start of Care Date 11/06/23   Onset of  illness/injury or Date of Surgery 10/31/23   Therapy Frequency 1 time per week as needed   Predicted Duration 12 weeks   Certification date from 07/20/24   Certification date to 10/11/24   GOALS   PT Goals 2;3   PT Goal 1   Goal Identifier standing   Goal Description Patient to tolerate standing 10 minutes to have conversations in community.   Target Date 02/03/24   Date Met 02/03/24   PT Goal 2   Goal Identifier pain   Goal Description Patient to report reduced low back and right leg pain to less than 5/10 at most to allow greater ease with walking.   Goal Progress PT continues to aid patient in maintaining her mobility in home and in the community. PT continues to help with pain control as patient is unable to take medication due to other health issues. Pain varies by day, as well as effects on mobility.   Target Date 10/11/24   Subjective Report   Subjective Report Pain up and down; more issues with neck lately. Upper back as well. Feels fine when she wakes but issues worsen the more she is up moving around and working on tasks. Still notes increased pain at night when bladder fills, reduces with void. New: starting to notice increased pain if she goes a day without bowel movement.   Objective Measures   Objective Measures Objective Measure 1;Objective Measure 2   Objective Measure 1   Objective Measure joint   Details FRS right T6, FRS right C7, FRS right T1   Objective Measure 2   Objective Measure myofascial   Treatment Interventions (PT)   Interventions Manual Therapy;Neuromuscular Re-education   Neuromuscular Re-education   Neuromuscular re-ed of mvmt, balance, coord, kinesthetic sense, posture, proprioception minutes (95918) 15   Neuro Re-ed 1 neuromengingeal manipulation (NMM), muscle energy technique (MET) to restore function and reduce pain   Neuro Re-ed 1 - Details MET- FRS right T6, FRS right C7, FRS right T1   Patient Response/Progress improved mobility upper t spine   Manual Therapy   Manual Therapy:  Mobilization, MFR, MLD, friction massage minutes ([id number]) 25   Manual Therapy 1 MFR to restore function and reduce pain   Manual Therapy 1 - Details MFR- vesical arteries<>internal iliac artery right, thoracic paraspinals   Patient Response/Progress discussed the ongoing strain to upper back and neck due to kyphosis, broken rods in lumbar spine.   Education   Learner/Method Patient;No Barriers to Learning   Plan   Home program exercise, heat   Plan for next session PT will continue as it provides pain reduction for patient, patient unable to have surgery for spine.   Total Session Time   Timed Code Treatment Minutes 40   Total Treatment Time (sum of timed and untimed services) 40

## 2024-07-31 RX ORDER — LOSARTAN POTASSIUM AND HYDROCHLOROTHIAZIDE 25; 100 MG/1; MG/1
1 TABLET ORAL DAILY
Qty: 90 TABLET | Refills: 4 | Status: CANCELLED | OUTPATIENT
Start: 2024-07-31

## 2024-08-01 PROBLEM — M41.9 SCOLIOSIS, UNSPECIFIED: Status: ACTIVE | Noted: 2019-10-03

## 2024-08-01 PROBLEM — M62.521: Status: ACTIVE | Noted: 2019-10-04

## 2024-08-01 PROBLEM — K20.90 ESOPHAGITIS: Status: ACTIVE | Noted: 2019-10-03

## 2024-08-01 PROBLEM — M85.80 OTHER SPECIFIED DISORDERS OF BONE DENSITY AND STRUCTURE, UNSPECIFIED SITE: Status: ACTIVE | Noted: 2019-10-03

## 2024-08-01 PROBLEM — K26.9 DUODENAL ULCER, UNSPECIFIED AS ACUTE OR CHRONIC, WITHOUT HEMORRHAGE OR PERFORATION: Status: ACTIVE | Noted: 2019-10-03

## 2024-08-01 PROBLEM — R53.1 WEAKNESS: Status: ACTIVE | Noted: 2019-10-04

## 2024-08-01 PROBLEM — Z98.1 ARTHRODESIS STATUS: Status: ACTIVE | Noted: 2019-10-03

## 2024-08-08 ENCOUNTER — OFFICE VISIT (OUTPATIENT)
Dept: INTERNAL MEDICINE | Facility: OTHER | Age: 89
End: 2024-08-08
Attending: INTERNAL MEDICINE
Payer: COMMERCIAL

## 2024-08-08 VITALS
WEIGHT: 167.2 LBS | OXYGEN SATURATION: 100 % | RESPIRATION RATE: 16 BRPM | TEMPERATURE: 97.1 F | SYSTOLIC BLOOD PRESSURE: 158 MMHG | HEART RATE: 68 BPM | DIASTOLIC BLOOD PRESSURE: 62 MMHG | HEIGHT: 61 IN | BODY MASS INDEX: 31.57 KG/M2

## 2024-08-08 DIAGNOSIS — Z00.00 ENCOUNTER FOR MEDICARE ANNUAL WELLNESS EXAM: Primary | ICD-10-CM

## 2024-08-08 DIAGNOSIS — Z71.85 VACCINE COUNSELING: ICD-10-CM

## 2024-08-08 DIAGNOSIS — E83.52 HYPERCALCEMIA: ICD-10-CM

## 2024-08-08 DIAGNOSIS — Z13.220 SCREENING FOR HYPERLIPIDEMIA: ICD-10-CM

## 2024-08-08 DIAGNOSIS — R06.09 DYSPNEA ON EXERTION: ICD-10-CM

## 2024-08-08 DIAGNOSIS — L57.0 ACTINIC KERATOSIS: ICD-10-CM

## 2024-08-08 DIAGNOSIS — Z86.19 HISTORY OF INFECTION: ICD-10-CM

## 2024-08-08 DIAGNOSIS — N18.32 STAGE 3B CHRONIC KIDNEY DISEASE (H): ICD-10-CM

## 2024-08-08 DIAGNOSIS — I10 ESSENTIAL HYPERTENSION: ICD-10-CM

## 2024-08-08 DIAGNOSIS — R73.9 ELEVATED BLOOD SUGAR: ICD-10-CM

## 2024-08-08 DIAGNOSIS — M85.80 OSTEOPENIA, UNSPECIFIED LOCATION: ICD-10-CM

## 2024-08-08 PROBLEM — R53.1 WEAKNESS: Status: RESOLVED | Noted: 2019-10-04 | Resolved: 2024-08-08

## 2024-08-08 LAB
ALBUMIN SERPL BCG-MCNC: 4.5 G/DL (ref 3.5–5.2)
ALP SERPL-CCNC: 78 U/L (ref 40–150)
ALT SERPL W P-5'-P-CCNC: 15 U/L (ref 0–50)
ANION GAP SERPL CALCULATED.3IONS-SCNC: 11 MMOL/L (ref 7–15)
AST SERPL W P-5'-P-CCNC: 27 U/L (ref 0–45)
BILIRUB SERPL-MCNC: 0.3 MG/DL
BUN SERPL-MCNC: 47.4 MG/DL (ref 8–23)
CALCIUM SERPL-MCNC: 11.4 MG/DL (ref 8.8–10.4)
CHLORIDE SERPL-SCNC: 103 MMOL/L (ref 98–107)
CHOLEST SERPL-MCNC: 224 MG/DL
CREAT SERPL-MCNC: 1.63 MG/DL (ref 0.51–0.95)
CREAT UR-MCNC: 145.7 MG/DL
EGFRCR SERPLBLD CKD-EPI 2021: 30 ML/MIN/1.73M2
ERYTHROCYTE [DISTWIDTH] IN BLOOD BY AUTOMATED COUNT: 13.9 % (ref 10–15)
FASTING STATUS PATIENT QL REPORTED: NO
FASTING STATUS PATIENT QL REPORTED: NO
GLUCOSE SERPL-MCNC: 109 MG/DL (ref 70–99)
HBA1C MFR BLD: 5.6 % (ref 4–6.2)
HCO3 SERPL-SCNC: 25 MMOL/L (ref 22–29)
HCT VFR BLD AUTO: 36 % (ref 35–47)
HDLC SERPL-MCNC: 94 MG/DL
HGB BLD-MCNC: 11.6 G/DL (ref 11.7–15.7)
IRON BINDING CAPACITY (ROCHE): 316 UG/DL (ref 240–430)
IRON SATN MFR SERPL: 29 % (ref 15–46)
IRON SERPL-MCNC: 91 UG/DL (ref 37–145)
LDLC SERPL CALC-MCNC: 113 MG/DL
MCH RBC QN AUTO: 32.1 PG (ref 26.5–33)
MCHC RBC AUTO-ENTMCNC: 32.2 G/DL (ref 31.5–36.5)
MCV RBC AUTO: 100 FL (ref 78–100)
MICROALBUMIN UR-MCNC: 44.8 MG/L
MICROALBUMIN/CREAT UR: 30.75 MG/G CR (ref 0–25)
NONHDLC SERPL-MCNC: 130 MG/DL
PLATELET # BLD AUTO: 187 10E3/UL (ref 150–450)
POTASSIUM SERPL-SCNC: 4.8 MMOL/L (ref 3.4–5.3)
PROT SERPL-MCNC: 8.2 G/DL (ref 6.4–8.3)
RBC # BLD AUTO: 3.61 10E6/UL (ref 3.8–5.2)
SODIUM SERPL-SCNC: 139 MMOL/L (ref 135–145)
TRIGL SERPL-MCNC: 85 MG/DL
WBC # BLD AUTO: 8.7 10E3/UL (ref 4–11)

## 2024-08-08 PROCEDURE — 83550 IRON BINDING TEST: CPT | Mod: ZL | Performed by: INTERNAL MEDICINE

## 2024-08-08 PROCEDURE — 17000 DESTRUCT PREMALG LESION: CPT | Mod: XU | Performed by: INTERNAL MEDICINE

## 2024-08-08 PROCEDURE — 93005 ELECTROCARDIOGRAM TRACING: CPT | Performed by: INTERNAL MEDICINE

## 2024-08-08 PROCEDURE — 99214 OFFICE O/P EST MOD 30 MIN: CPT | Mod: 25 | Performed by: INTERNAL MEDICINE

## 2024-08-08 PROCEDURE — 36415 COLL VENOUS BLD VENIPUNCTURE: CPT | Mod: ZL | Performed by: INTERNAL MEDICINE

## 2024-08-08 PROCEDURE — 82043 UR ALBUMIN QUANTITATIVE: CPT | Mod: ZL | Performed by: INTERNAL MEDICINE

## 2024-08-08 PROCEDURE — 82306 VITAMIN D 25 HYDROXY: CPT | Mod: ZL | Performed by: INTERNAL MEDICINE

## 2024-08-08 PROCEDURE — G0463 HOSPITAL OUTPT CLINIC VISIT: HCPCS | Mod: 25 | Performed by: INTERNAL MEDICINE

## 2024-08-08 PROCEDURE — G0439 PPPS, SUBSEQ VISIT: HCPCS | Performed by: INTERNAL MEDICINE

## 2024-08-08 PROCEDURE — 85041 AUTOMATED RBC COUNT: CPT | Mod: ZL | Performed by: INTERNAL MEDICINE

## 2024-08-08 PROCEDURE — 93010 ELECTROCARDIOGRAM REPORT: CPT | Performed by: INTERNAL MEDICINE

## 2024-08-08 PROCEDURE — 80053 COMPREHEN METABOLIC PANEL: CPT | Mod: ZL | Performed by: INTERNAL MEDICINE

## 2024-08-08 PROCEDURE — 83036 HEMOGLOBIN GLYCOSYLATED A1C: CPT | Mod: ZL | Performed by: INTERNAL MEDICINE

## 2024-08-08 PROCEDURE — 82465 ASSAY BLD/SERUM CHOLESTEROL: CPT | Mod: ZL | Performed by: INTERNAL MEDICINE

## 2024-08-08 RX ORDER — HYDROQUINONE 40 MG/G
CREAM TOPICAL
Qty: 28.35 G | Refills: 3 | Status: SHIPPED | OUTPATIENT
Start: 2024-08-08

## 2024-08-08 RX ORDER — VITAMIN E (DL,TOCOPHERYL ACET) 180 MG
1 CAPSULE ORAL DAILY
COMMUNITY

## 2024-08-08 RX ORDER — BIOTIN 1 MG
1000 TABLET ORAL DAILY
COMMUNITY

## 2024-08-08 SDOH — HEALTH STABILITY: PHYSICAL HEALTH: ON AVERAGE, HOW MANY DAYS PER WEEK DO YOU ENGAGE IN MODERATE TO STRENUOUS EXERCISE (LIKE A BRISK WALK)?: 0 DAYS

## 2024-08-08 ASSESSMENT — SOCIAL DETERMINANTS OF HEALTH (SDOH): HOW OFTEN DO YOU GET TOGETHER WITH FRIENDS OR RELATIVES?: THREE TIMES A WEEK

## 2024-08-08 ASSESSMENT — PAIN SCALES - GENERAL: PAINLEVEL: NO PAIN (0)

## 2024-08-08 NOTE — PATIENT INSTRUCTIONS
Patient Education   Preventive Care Advice   This is general advice given by our system to help you stay healthy. However, your care team may have specific advice just for you. Please talk to your care team about your preventive care needs.  Nutrition  Eat 5 or more servings of fruits and vegetables each day.  Try wheat bread, brown rice and whole grain pasta (instead of white bread, rice, and pasta).  Get enough calcium and vitamin D. Check the label on foods and aim for 100% of the RDA (recommended daily allowance).  Lifestyle  Exercise at least 150 minutes each week  (30 minutes a day, 5 days a week).  Do muscle strengthening activities 2 days a week. These help control your weight and prevent disease.  No smoking.  Wear sunscreen to prevent skin cancer.  Have a dental exam and cleaning every 6 months.  Yearly exams  See your health care team every year to talk about:  Any changes in your health.  Any medicines your care team has prescribed.  Preventive care, family planning, and ways to prevent chronic diseases.  Shots (vaccines)   HPV shots (up to age 26), if you've never had them before.  Hepatitis B shots (up to age 59), if you've never had them before.  COVID-19 shot: Get this shot when it's due.  Flu shot: Get a flu shot every year.  Tetanus shot: Get a tetanus shot every 10 years.  Pneumococcal, hepatitis A, and RSV shots: Ask your care team if you need these based on your risk.  Shingles shot (for age 50 and up)  General health tests  Diabetes screening:  Starting at age 35, Get screened for diabetes at least every 3 years.  If you are younger than age 35, ask your care team if you should be screened for diabetes.  Cholesterol test: At age 39, start having a cholesterol test every 5 years, or more often if advised.  Bone density scan (DEXA): At age 50, ask your care team if you should have this scan for osteoporosis (brittle bones).  Hepatitis C: Get tested at least once in your life.  STIs (sexually  transmitted infections)  Before age 24: Ask your care team if you should be screened for STIs.  After age 24: Get screened for STIs if you're at risk. You are at risk for STIs (including HIV) if:  You are sexually active with more than one person.  You don't use condoms every time.  You or a partner was diagnosed with a sexually transmitted infection.  If you are at risk for HIV, ask about PrEP medicine to prevent HIV.  Get tested for HIV at least once in your life, whether you are at risk for HIV or not.  Cancer screening tests  Cervical cancer screening: If you have a cervix, begin getting regular cervical cancer screening tests starting at age 21.  Breast cancer scan (mammogram): If you've ever had breasts, begin having regular mammograms starting at age 40. This is a scan to check for breast cancer.  Colon cancer screening: It is important to start screening for colon cancer at age 45.  Have a colonoscopy test every 10 years (or more often if you're at risk) Or, ask your provider about stool tests like a FIT test every year or Cologuard test every 3 years.  To learn more about your testing options, visit:   .  For help making a decision, visit:   https://bit.ly/jy27671.  Prostate cancer screening test: If you have a prostate, ask your care team if a prostate cancer screening test (PSA) at age 55 is right for you.  Lung cancer screening: If you are a current or former smoker ages 50 to 80, ask your care team if ongoing lung cancer screenings are right for you.  For informational purposes only. Not to replace the advice of your health care provider. Copyright   2023 [facility]. All rights reserved. Clinically reviewed by the [facility] Transitions Program. [employer] [id number] - REV 01/24.  Preventing Falls: Care Instructions  Injuries and health problems such as trouble walking or poor eyesight can increase your risk of falling. So can some medicines. But there are things you can do to help  "prevent falls. You can exercise to get stronger. You can also arrange your home to make it safer.    Talk to your doctor about the medicines you take. Ask if any of them increase the risk of falls and whether they can be changed or stopped.   Try to exercise regularly. It can help improve your strength and balance. This can help lower your risk of falling.     Practice fall safety and prevention.    Wear low-heeled shoes that fit well and give your feet good support. Talk to your doctor if you have foot problems that make this hard.  Carry a cellphone or wear a medical alert device that you can use to call for help.  Use stepladders instead of chairs to reach high objects. Don't climb if you're at risk for falls. Ask for help, if needed.  Wear the correct eyeglasses, if you need them.    Make your home safer.    Remove rugs, cords, clutter, and furniture from walkways.  Keep your house well lit. Use night-lights in hallways and bathrooms.  Install and use sturdy handrails on stairways.  Wear nonskid footwear, even inside. Don't walk barefoot or in socks without shoes.    Be safe outside.    Use handrails, curb cuts, and ramps whenever possible.  Keep your hands free by using a shoulder bag or backpack.  Try to walk in well-lit areas. Watch out for uneven ground, changes in pavement, and debris.  Be careful in the winter. Walk on the grass or gravel when sidewalks are slippery. Use de-icer on steps and walkways. Add non-slip devices to shoes.    Put grab bars and nonskid mats in your shower or tub and near the toilet. Try to use a shower chair or bath bench when bathing.   Get into a tub or shower by putting in your weaker leg first. Get out with your strong side first. Have a phone or medical alert device in the bathroom with you.   Where can you learn more?  Go to https://www.[employer].net/patiented  Enter G117 in the search box to learn more about \"Preventing Falls: Care Instructions.\"  Current as of: July 17, " 2023               Content Version: 14.0    7329-5671 [employer].   Care instructions adapted under license by your healthcare professional. If you have questions about a medical condition or this instruction, always ask your healthcare professional. [employer] disclaims any warranty or liability for your use of this information.      Hearing Loss: Care Instructions  Overview     Hearing loss is a sudden or slow decrease in how well you hear. It can range from slight to profound. Permanent hearing loss can occur with aging. It also can happen when you are exposed long-term to loud noise. Examples include listening to loud music, riding motorcycles, or being around other loud machines.  Hearing loss can affect your work and home life. It can make you feel lonely or depressed. You may feel that you have lost your independence. But hearing aids and other devices can help you hear better and feel connected to others.  Follow-up care is a key part of your treatment and safety. Be sure to make and go to all appointments, and call your doctor if you are having problems. It's also a good idea to know your test results and keep a list of the medicines you take.  How can you care for yourself at home?  Avoid loud noises whenever possible. This helps keep your hearing from getting worse.  Always wear hearing protection around loud noises.  Wear a hearing aid as directed.  A professional can help you pick a hearing aid that will work best for you.  You can also get hearing aids over the counter for mild to moderate hearing loss.  Have hearing tests as your doctor suggests. They can show whether your hearing has changed. Your hearing aid may need to be adjusted.  Use other devices as needed. These may include:  Telephone amplifiers and hearing aids that can connect to a television, stereo, radio, or microphone.  Devices that use lights or vibrations. These alert you to the doorbell, a ringing  "telephone, or a baby monitor.  Television closed-captioning. This shows the words at the bottom of the screen. Most new TVs can do this.  TTY (text telephone). This lets you type messages back and forth on the telephone instead of talking or listening. These devices are also called TDD. When messages are typed on the keyboard, they are sent over the phone line to a receiving TTY. The message is shown on a monitor.  Use text messaging, social media, and email if it is hard for you to communicate by telephone.  Try to learn a listening technique called speechreading. It is not lipreading. You pay attention to people's gestures, expressions, posture, and tone of voice. These clues can help you understand what a person is saying. Face the person you are talking to, and have them face you. Make sure the lighting is good. You need to see the other person's face clearly.  Think about counseling if you need help to adjust to your hearing loss.  When should you call for help?  Watch closely for changes in your health, and be sure to contact your doctor if:    You think your hearing is getting worse.     You have new symptoms, such as dizziness or nausea.   Where can you learn more?  Go to https://www.[employer].net/patiented  Enter R798 in the search box to learn more about \"Hearing Loss: Care Instructions.\"  Current as of: September 27, 2023               Content Version: 14.0    3969-5357 [employer].   Care instructions adapted under license by your healthcare professional. If you have questions about a medical condition or this instruction, always ask your healthcare professional. [employer] disclaims any warranty or liability for your use of this information.      Learning About Sleeping Well  What does sleeping well mean?     Sleeping well means getting enough sleep to feel good and stay healthy. How much sleep is enough varies among people.  The number of hours you sleep and how you feel " when you wake up are both important. If you do not feel refreshed, you probably need more sleep. Another sign of not getting enough sleep is feeling tired during the day.  Experts recommend that adults get at least 7 or more hours of sleep per day. Children and older adults need more sleep.  Why is getting enough sleep important?  Getting enough quality sleep is a basic part of good health. When your sleep suffers, your physical health, mood, and your thoughts can suffer too. You may find yourself feeling more grumpy or stressed. Not getting enough sleep also can lead to serious problems, including injury, accidents, anxiety, and depression.  What might cause poor sleeping?  Many things can cause sleep problems, including:  Changes to your sleep schedule.  Stress. Stress can be caused by fear about a single event, such as giving a speech. Or you may have ongoing stress, such as worry about work or school.  Depression, anxiety, and other mental or emotional conditions.  Changes in your sleep habits or surroundings. This includes changes that happen where you sleep, such as noise, light, or sleeping in a different bed. It also includes changes in your sleep pattern, such as having jet lag or working a late shift.  Health problems, such as pain, breathing problems, and restless legs syndrome.  Lack of regular exercise.  Using alcohol, nicotine, or caffeine before bed.  How can you help yourself?  Here are some tips that may help you sleep more soundly and wake up feeling more refreshed.  Your sleeping area   Use your bedroom only for sleeping and sex. A bit of light reading may help you fall asleep. But if it doesn't, do your reading elsewhere in the house. Try not to use your TV, computer, smartphone, or tablet while you are in bed.  Be sure your bed is big enough to stretch out comfortably, especially if you have a sleep partner.  Keep your bedroom quiet, dark, and cool. Use curtains, blinds, or a sleep mask to block  "out light. To block out noise, use earplugs, soothing music, or a \"white noise\" machine.  Your evening and bedtime routine   Create a relaxing bedtime routine. You might want to take a warm shower or bath, or listen to soothing music.  Go to bed at the same time every night. And get up at the same time every morning, even if you feel tired.  What to avoid   Limit caffeine (coffee, tea, caffeinated sodas) during the day, and don't have any for at least 6 hours before bedtime.  Avoid drinking alcohol before bedtime. Alcohol can cause you to wake up more often during the night.  Try not to smoke or use tobacco, especially in the evening. Nicotine can keep you awake.  Limit naps during the day, especially close to bedtime.  Avoid lying in bed awake for too long. If you can't fall asleep or if you wake up in the middle of the night and can't get back to sleep within about 20 minutes, get out of bed and go to another room until you feel sleepy.  Avoid taking medicine right before bed that may keep you awake or make you feel hyper or energized. Your doctor can tell you if your medicine may do this and if you can take it earlier in the day.  If you can't sleep   Imagine yourself in a peaceful, pleasant scene. Focus on the details and feelings of being in a place that is relaxing.  Get up and do a quiet or boring activity until you feel sleepy.  Avoid drinking any liquids before going to bed to help prevent waking up often to use the bathroom.  Where can you learn more?  Go to https://www.[employer].net/patiented  Enter J942 in the search box to learn more about \"Learning About Sleeping Well.\"  Current as of: July 10, 2023  Content Version: 14.1 2006-2024 [employer].   Care instructions adapted under license by your healthcare professional. If you have questions about a medical condition or this instruction, always ask your healthcare professional. [employer] disclaims any warranty or liability " for your use of this information.

## 2024-08-08 NOTE — PROGRESS NOTES
Assessment & Plan     Screening for hyperlipidemia  - Lipid Profile  - CBC with platelets    Stage 3b chronic kidney disease (H)  Recheck of renal function is stable today.   - Albumin Random Urine Quantitative with Creat Ratio  - Comprehensive Metabolic Panel  - Iron & Iron Binding Capacity    Essential hypertension  Blood pressure is elevated today.  She is encouraged to check this at home.  Given age and comorbidities goal is less than 150/90 however with possibly active angina recommend slightly better control in the 130/80 range.    Actinic keratosis  Liquid nitrogen applied to 1 area on the right jawline with 3 freeze thaw cycles and no immediate complications.  Continue to use topical cream as needed.  - hydroquinone (DAVIS) 4 % external cream; APPLY A SMALL AMOUNT TO THE DARK SPOTS ON THE FACE AND HANDS UNTIL CLEAR. Strength: 4 %  - DESTRUCT PREMALIGNANT LESION, FIRST    Vaccine counseling  Encouraged to get RSV vaccine in the fall  - RSV vaccine, bivalent, ABRYSVO, injection; Inject 0.5 mLs into the muscle once for 1 dose    Osteopenia, unspecified location  Last DEXA scan done in 2023.  Currently on Prolia.  Continue with medication at this time.  Will discuss further DEXA scan over the next 1 to 2 years.      Dyspnea on exertion  Exact etiology is unknown.  EKG obtained today shows Premature beats however no significant ST or T wave abnormality per my interpretation.  We discussed the pros and cons of additional testing given her age and comorbidities.  At this time she is interested in pursuing stress testing.  Lexiscan orders placed.  She is encouraged to come in through the ER should she have any new, changing or concerning symptoms.  - NM MPI with Lexiscan; Future  - EKG 12-lead, tracing only    History of infection  Umbilicus inspected today with no obvious infection.  Recommend gentle cleaning of the area on a regular basis.  She is to call with any new or changing  symptoms.    Hypercalcemia  Calcium is elevated today.  Exact etiology is unknown.  Vitamin D level is adequately replaced but not high.  Renal function is stable.  Plan for repeat labs in 2 to 4 weeks.  - Vitamin D Total    Elevated blood sugar  Blood sugar slightly elevated today.  Add on A1c within normal range.  Continue to monitor periodically.  - Hemoglobin A1c    Patient has been advised of split billing requirements and indicates understanding: Yes        Counseling  Appropriate preventive services were addressed with this patient via screening, questionnaire, or discussion as appropriate for fall prevention, nutrition, physical activity, Tobacco-use cessation, weight loss and cognition.  Checklist reviewing preventive services available has been given to the patient.  Reviewed patient's diet, addressing concerns and/or questions.   She is at risk for psychosocial distress and has been provided with information to reduce risk.   Discussed possible causes of fatigue. The patient was provided with written information regarding signs of hearing loss.       Return in about 6 months (around 2/8/2025) for BP Recheck, kidneys.    Moses Cohen is a 89 year old, presenting for the following health issues:  Medicare Visit      8/8/2024     1:11 PM   Additional Questions   Roomed by SANJAY Silva   Accompanied by RADHA     History of Present Illness       Hypertension: She presents for follow up of hypertension.  She does check blood pressure  regularly outside of the clinic. Outpatient blood pressures have not been over 140/90. She does not follow a low salt diet.      Patient presents for annual review of medications.  She has several concerns today that she would like to discuss.  She reports that she has continued to have dyspnea on exertion off and on.  She does not necessarily feel this has been worse recently.  She reports that at times that she gets a sensation in her chest with this and has to sit down  "although denies any outright chest pain.    She has a history of infection of her umbilicus.  She would like this looked at today as she has had some discomfort in the area.    She has a lesion on her right jawline that she would like sprayed with liquid nitrogen.    She continues to manage her back pain.  This has been ongoing for years.  She is currently doing some physical therapy over the last month.  She is curious what options are available for pain control.    She has a history of chronic kidney disease.  She has a history of hypertension.  Her blood pressure today is elevated.  She is due for labs.    ROS:  CONSTITUTIONAL: Negative for fever, chills, night sweats, significant change in weight  INTEGUMENTARY/SKIN/LYMPH: Negative for worrisome rashes, moles or lesions; swollen lymph nodes - 1 lesion on the right jawline  EYES: Negative for significant vision changes or irritation  ENT: Negative for additional ear, mouth and throat problems  RESP: Negative for significant cough or SOB  CV: Negative for chest pain, palpitations or increased peripheral edema  GI: Negative for abdominal pain, or change in bowel habits or blood in the stools/black stools  : Negative for unusual urinary or vaginal symptoms. No vaginal bleeding.  MUSCULOSKELETAL: Negative for new or changing joint pain or significant swelling - chronic back and right hip pain  NEURO: Negative for new headaches, weakness or paraesthesias  PSYCHIATRIC: Negative for changes in mood or affect; significant anxiety or depression        Objective    BP (!) 158/62 (BP Location: Right arm, Patient Position: Sitting, Cuff Size: Adult Large)   Pulse 68   Temp 97.1  F (36.2  C) (Temporal)   Resp 16   Ht 1.549 m (5' 1\")   Wt 75.8 kg (167 lb 3.2 oz)   LMP 06/01/1993 (Approximate)   SpO2 100%   Breastfeeding No   BMI 31.59 kg/m    Body mass index is 31.59 kg/m .  Physical Exam   GEN: Vitals reviewed. Healthy appearing. Patient is in no acute distress. " Cooperative with exam.  HEENT: Normocephalic atraumatic.  Eyes grossly normal to inspection.  No discharge or erythema, or obvious scleral/conjunctival abnormalities. Oropharynx with no erythema or exudates.   NECK: Supple; no thyromegaly or masses noted.  No cervical or supraclavicular lymphadenopathy.  CV: Heart regular in rate and rhythm with no murmur.    LUNGS: No audible wheeze, cough, or visible cyanosis.  No visible retractions or increased work of breathing.  Lungs clear to auscultation bilaterally.    ABD:  Obese, Nondistended  SKIN: Warm and dry to touch.  Visible skin clear. No significant rash, abnormal pigmentation or lesions.  EXT: No clubbing or cyanosis.  Trace peripheral edema.  NEURO: Alert and oriented to person, place, and time.  Cranial nerves II-XII grossly intact with no focal or lateralizing deficits.  Muscle tone normal.  Gait normal. No tremor.   MSK: ROM of upper and lower ext symmetric and full.  PSYCH: Mood is good.  Mentation appears normal, affect normal/bright, judgement and insight intact, normal speech and appearance well-groomed.      Signed Electronically by: Kirsten Guevara DO

## 2024-08-08 NOTE — PROGRESS NOTES
"Preventive Care Visit  [facility]  Kirsten Guevara DO, Internal Medicine  Aug 8, 2024      Assessment & Plan     Encounter for Medicare annual wellness exam    -Mammo done 6/15/20 (impression: Negative). Follow-up no longer indicated.     -DEXA done 11/7/23 (impression: osteopenia; most negative T-score of -1.3 at left forearm).      -Colon cancer screening done via colonoscopy on 2014 (impression: WNL). Follow-up no longer indicated.     -Immunizations: Pt is due for COVID vaccine and due for RSV vaccine, however advised pt to complete RSV vaccine at local pharmacy given Medicare insurance. -patient will consider the RSV vaccine.     -Education: Pt education provided on Home Safety and Fall Prevention  -Derm: Does patient regularly see dermatologist? Yes, yearly.   -Refills pended for requested medications.  -Labs pended.   -ADL's: uses a cane and a walker when needed. Does PT 1x/monthly. Does bone builders in the fall.       BMI  Estimated body mass index is 31.59 kg/m  as calculated from the following:    Height as of this encounter: 1.549 m (5' 1\").    Weight as of this encounter: 75.8 kg (167 lb 3.2 oz).   Weight management plan: Discussed healthy diet and exercise guidelines    Counseling  Appropriate preventive services were addressed with this patient via screening, questionnaire, or discussion as appropriate for fall prevention, nutrition, physical activity, Tobacco-use cessation, weight loss and cognition.  Checklist reviewing preventive services available has been given to the patient.  Reviewed patient's diet, addressing concerns and/or questions.   She is at risk for psychosocial distress and has been provided with information to reduce risk.   Discussed possible causes of fatigue. The patient was provided with written information regarding signs of hearing loss.       Work on weight loss  Regular exercise    Return in about 6 months (around 2/8/2025) for BP Recheck, " kidneys.    Moses Cohen is a 89 year old, presenting for the following:  Medicare Visit        8/8/2024     1:11 PM   Additional Questions   Roomed by SANJAY Silva   Accompanied by RADHA         Health Care Directive  Patient has a Health Care Directive on file  Advance care planning document is on file and is current.          8/8/2024   General Health   How would you rate your overall physical health? Good   Feel stress (tense, anxious, or unable to sleep) Only a little      (!) STRESS CONCERN      8/8/2024   Nutrition   Diet: Regular (no restrictions)            8/8/2024   Exercise   Days per week of moderate/strenous exercise 0 days      (!) EXERCISE CONCERN      8/8/2024   Social Factors   Frequency of gathering with friends or relatives Three times a week   Worry food won't last until get money to buy more No   Food not last or not have enough money for food? No   Do you have housing? (Housing is defined as stable permanent housing and does not include staying ouside in a car, in a tent, in an abandoned building, in an overnight shelter, or couch-surfing.) Yes   Are you worried about losing your housing? No   Lack of transportation? No   Unable to get utilities (heat,electricity)? No            8/8/2024   Fall Risk   Fallen 2 or more times in the past year? No    No   Trouble with walking or balance? Yes    Yes   Gait Speed Test Interpretation Greater than 5.01 seconds - ABNORMAL       Multiple values from one day are sorted in reverse-chronological order           8/8/2024   Activities of Daily Living- Home Safety   Needs help with the following daily activites None of the above   Safety concerns in the home None of the above            8/8/2024   Dental   Dentist two times every year? Yes            8/8/2024   Hearing Screening   Hearing concerns? (!) IT'S HARD TO FOLLOW A CONVERSATION IN A NOISY RESTAURANT OR CROWDED ROOM.    (!) TROUBLE FOLLOWING DIALOGUE IN THE THEATHER.       Multiple values from one  day are sorted in reverse-chronological order         2024   Driving Risk Screening   Patient/family members have concerns about driving No            2024   General Alertness/Fatigue Screening   Have you been more tired than usual lately? (!) YES            2024   Urinary Incontinence Screening   Bothered by leaking urine in past 6 months No            2024   TB Screening   Were you born outside of the [place]? No        Today's PHQ-2 Score:       2024    12:56 PM   PHQ-2 (  [employer])   Q1: Little interest or pleasure in doing things 0   Q2: Feeling down, depressed or hopeless 0   PHQ-2 Score 0   Q1: Little interest or pleasure in doing things Not at all   Q2: Feeling down, depressed or hopeless Not at all   PHQ-2 Score 0           2024   Substance Use   Alcohol more than 3/day or more than 7/wk No   Do you have a current opioid prescription? No   How severe/bad is pain from 1 to 10? 2/10   Do you use any other substances recreationally? No        Social History     Tobacco Use    Smoking status: Former     Current packs/day: 0.00     Types: Cigarettes     Quit date: 1968     Years since quittin.6     Passive exposure: Past    Smokeless tobacco: Never   Vaping Use    Vaping status: Never Used   Substance Use Topics    Alcohol use: Yes     Comment: Occasionally    Drug use: No     Comment:                    Reviewed and updated as needed this visit by Provider   Tobacco  Allergies  Meds  Problems  Med Hx  Surg Hx  Fam Hx  Soc   Hx Sexual Activity            Current providers sharing in care for this patient include:  Patient Care Team:  Kirsten Guevara DO as PCP - General  Oliva Hays APRN CNP as Nurse Practitioner (Internal Medicine)  Kirsten Guevara DO as Assigned PCP  Emmy Mccoy MD as Assigned Surgical Provider    The following health maintenance items are reviewed in Epic and correct as of today:  Health Maintenance   Topic Date Due    RSV VACCINE  "(Pregnancy & 60+) (1 - 1-dose 60+ series) Never done    COVID-19 Vaccine (6 - 2023-24 season) 10/01/2024 (Originally 3/7/2024)    INFLUENZA VACCINE (1) 09/01/2024    MICROALBUMIN  02/08/2025    MEDICARE ANNUAL WELLNESS VISIT  08/08/2025    BMP  08/08/2025    LIPID  08/08/2025    FALL RISK ASSESSMENT  08/08/2025    HEMOGLOBIN  08/08/2025    ADVANCE CARE PLANNING  08/08/2029    DTAP/TDAP/TD IMMUNIZATION (3 - Td or Tdap) 07/01/2031    DEXA  11/07/2038    PARATHYROID  Completed    PHOSPHORUS  Completed    PHQ-2 (once per calendar year)  Completed    Pneumococcal Vaccine: 65+ Years  Completed    URINALYSIS  Completed    ALK PHOS  Completed    ZOSTER IMMUNIZATION  Completed    IPV IMMUNIZATION  Aged Out    HPV IMMUNIZATION  Aged Out    MENINGITIS IMMUNIZATION  Aged Out    RSV MONOCLONAL ANTIBODY  Aged Out    MAMMO SCREENING  Discontinued            Objective    Exam  BP (!) 158/62 (BP Location: Right arm, Patient Position: Sitting, Cuff Size: Adult Large)   Pulse 68   Temp 97.1  F (36.2  C) (Temporal)   Resp 16   Ht 1.549 m (5' 1\")   Wt 75.8 kg (167 lb 3.2 oz)   LMP 06/01/1993 (Approximate)   SpO2 100%   Breastfeeding No   BMI 31.59 kg/m     Estimated body mass index is 31.59 kg/m  as calculated from the following:    Height as of this encounter: 1.549 m (5' 1\").    Weight as of this encounter: 75.8 kg (167 lb 3.2 oz).            8/8/2024   Mini Cog   Clock Draw Score 0 Abnormal   3 Item Recall 3 objects recalled   Mini Cog Total Score 3            Signed Electronically by: Kirsten Guevara, DO    Answers submitted by the patient for this visit:  Hypertension Visit (Submitted on 8/8/2024)  Chief Complaint: Chronic problems general questions HPI Form  Do you check your blood pressure regularly outside of the clinic?: Yes  Are your blood pressures ever more than 140 on the top number (systolic) OR more than 90 on the bottom number (diastolic)? (For example, greater than 140/90): No  Are you following a low salt diet?: " No

## 2024-08-09 ENCOUNTER — TELEPHONE (OUTPATIENT)
Dept: CARDIOLOGY | Facility: OTHER | Age: 89
End: 2024-08-09
Payer: COMMERCIAL

## 2024-08-09 LAB — VIT D+METAB SERPL-MCNC: 50 NG/ML (ref 20–50)

## 2024-08-09 NOTE — TELEPHONE ENCOUNTER
Patient verified . Appointment made and instructions given for stress test on 8/15/24.  Emphasis on NO caffeine for 12 hours before the test. Patient verbalized understanding.

## 2024-08-12 LAB
ATRIAL RATE - MUSE: 63 BPM
DIASTOLIC BLOOD PRESSURE - MUSE: NORMAL MMHG
INTERPRETATION ECG - MUSE: NORMAL
P AXIS - MUSE: 83 DEGREES
PR INTERVAL - MUSE: 182 MS
QRS DURATION - MUSE: 78 MS
QT - MUSE: 396 MS
QTC - MUSE: 405 MS
R AXIS - MUSE: -3 DEGREES
SYSTOLIC BLOOD PRESSURE - MUSE: NORMAL MMHG
T AXIS - MUSE: 49 DEGREES
VENTRICULAR RATE- MUSE: 63 BPM

## 2024-08-14 ENCOUNTER — MYC MEDICAL ADVICE (OUTPATIENT)
Dept: INTERNAL MEDICINE | Facility: OTHER | Age: 89
End: 2024-08-14
Payer: COMMERCIAL

## 2024-08-14 NOTE — TELEPHONE ENCOUNTER
Kirsten,    Physical with you on 8/8/24.      Patient is wondering if there is a medication that she can take for her arthritis.      ___________________________________________________________________    8/8/24  LOV with Ismael for MWV.     Osteopenia, unspecified location  Last DEXA scan done in 2023.  Currently on Prolia.  Continue with medication at this time.  Will discuss further DEXA scan over the next 1 to 2 years.  Exact etiology is unknown.  EKG obtained today shows    Patient update on Hang w/hart.    Omaira Cleaning RN on 8/14/2024 at 4:26 PM

## 2024-08-19 ENCOUNTER — HOSPITAL ENCOUNTER (OUTPATIENT)
Dept: NUCLEAR MEDICINE | Facility: OTHER | Age: 89
Discharge: HOME | End: 2024-08-19
Attending: INTERNAL MEDICINE
Payer: MEDICARE

## 2024-08-19 VITALS — WEIGHT: 167 LBS | HEIGHT: 61 IN | BODY MASS INDEX: 31.53 KG/M2

## 2024-08-19 DIAGNOSIS — R06.09 DYSPNEA ON EXERTION: ICD-10-CM

## 2024-08-19 PROCEDURE — 343N000001 HC RX 343: Performed by: INTERNAL MEDICINE

## 2024-08-19 PROCEDURE — 78451 HT MUSCLE IMAGE SPECT SING: CPT | Mod: ME

## 2024-08-19 PROCEDURE — A9500 TC99M SESTAMIBI: HCPCS | Performed by: INTERNAL MEDICINE

## 2024-08-19 RX ORDER — REGADENOSON 0.08 MG/ML
0.4 INJECTION, SOLUTION INTRAVENOUS ONCE
Status: DISCONTINUED | OUTPATIENT
Start: 2024-08-19 | End: 2024-08-20 | Stop reason: HOSPADM

## 2024-08-19 RX ADMIN — KIT FOR THE PREPARATION OF TECHNETIUM TC99M SESTAMIBI 8.83 MILLICURIE: 1 INJECTION, POWDER, LYOPHILIZED, FOR SOLUTION PARENTERAL at 11:00

## 2024-08-19 NOTE — PROGRESS NOTES
1055 The patient arrived for a [employer] Cardiolite stress test.  The procedure, risks, and benefits were discussed with the patient ,and the consent was signed.  A saline lock was started,and the Cardiolite was injected by Nuclear Medicine.  The patient admits to coffee this morning at 0700,  The resting pictures will be done today and the patient will return tomorrow for the rest of the stress test  Explained to patient and she agrees to return tomorrow

## 2024-08-19 NOTE — TELEPHONE ENCOUNTER
We typically continue Prolia ongoing - every 6 months.     For arthritis I recommend Acetaminophen 8 hour extended release (Tylenol Arthritis) 650mg - 1300mg 2-3 times a day as needed; Maximum of 3000mg daily

## 2024-08-20 ENCOUNTER — HOSPITAL ENCOUNTER (OUTPATIENT)
Dept: NUCLEAR MEDICINE | Facility: OTHER | Age: 89
Discharge: HOME | End: 2024-08-20
Attending: INTERNAL MEDICINE | Admitting: INTERNAL MEDICINE
Payer: MEDICARE

## 2024-08-20 VITALS — WEIGHT: 167 LBS | HEIGHT: 61 IN | BODY MASS INDEX: 31.53 KG/M2

## 2024-08-20 DIAGNOSIS — R06.09 DOE (DYSPNEA ON EXERTION): ICD-10-CM

## 2024-08-20 LAB
CV BLOOD PRESSURE: 79 MMHG
STRESS ECHO TARGET HR: 133

## 2024-08-20 PROCEDURE — A9500 TC99M SESTAMIBI: HCPCS | Performed by: INTERNAL MEDICINE

## 2024-08-20 PROCEDURE — 343N000001 HC RX 343: Performed by: INTERNAL MEDICINE

## 2024-08-20 PROCEDURE — 250N000011 HC RX IP 250 OP 636: Performed by: STUDENT IN AN ORGANIZED HEALTH CARE EDUCATION/TRAINING PROGRAM

## 2024-08-20 PROCEDURE — 93017 CV STRESS TEST TRACING ONLY: CPT

## 2024-08-20 PROCEDURE — 93018 CV STRESS TEST I&R ONLY: CPT | Performed by: STUDENT IN AN ORGANIZED HEALTH CARE EDUCATION/TRAINING PROGRAM

## 2024-08-20 PROCEDURE — 78451 HT MUSCLE IMAGE SPECT SING: CPT

## 2024-08-20 RX ORDER — REGADENOSON 0.08 MG/ML
0.4 INJECTION, SOLUTION INTRAVENOUS ONCE
Status: COMPLETED | OUTPATIENT
Start: 2024-08-20 | End: 2024-08-20

## 2024-08-20 RX ADMIN — KIT FOR THE PREPARATION OF TECHNETIUM TC99M SESTAMIBI 30.5 MILLICURIE: 1 INJECTION, POWDER, LYOPHILIZED, FOR SOLUTION PARENTERAL at 07:20

## 2024-08-20 RX ADMIN — REGADENOSON 0.4 MG: 0.08 INJECTION, SOLUTION INTRAVENOUS at 07:17

## 2024-08-20 NOTE — PROGRESS NOTES
0650 The patient arrived for a Lexiscan Cardiolite stress test.  The procedure, risks, and benefits were discussed with the patient ,and the consent was signed.  A saline lock was started,and   Sabrina Robert RN arrived, and the patient was administered the Lexiscan per procedure.  The patient tolerated the test.  She was given a snack and taken to Nuclear Medicine in stable condition for stress images.  The saline lock will be removed by Nuclear Medicine for proper disposal.  The patient was instructed that the ordering MD will call with results in one to two days.  Please see the chart for the complete test results.

## 2024-08-22 LAB
CV BLOOD PRESSURE: 81 MMHG
CV STRESS MAX HR HE: 96
NUC STRESS EJECTION FRACTION: 69 %
RATE PRESSURE PRODUCT: NORMAL
STRESS ECHO BASELINE DIASTOLIC HE: 72
STRESS ECHO BASELINE HR: 75 BPM
STRESS ECHO BASELINE SYSTOLIC BP: 166
STRESS ECHO CALCULATED PERCENT HR: 72 %
STRESS ECHO LAST STRESS DIASTOLIC BP: 76
STRESS ECHO LAST STRESS SYSTOLIC BP: 148
STRESS ECHO POST ESTIMATED WORKLOAD: 1 METS
STRESS ECHO TARGET HR: 133

## 2024-08-26 ENCOUNTER — ALLIED HEALTH/NURSE VISIT (OUTPATIENT)
Dept: FAMILY MEDICINE | Facility: OTHER | Age: 89
End: 2024-08-26
Attending: INTERNAL MEDICINE
Payer: MEDICARE

## 2024-08-26 DIAGNOSIS — E83.52 HYPERCALCEMIA: ICD-10-CM

## 2024-08-26 DIAGNOSIS — M85.80 OSTEOPENIA, UNSPECIFIED LOCATION: Primary | ICD-10-CM

## 2024-08-26 LAB
ALBUMIN SERPL BCG-MCNC: 4.5 G/DL (ref 3.5–5.2)
ANION GAP SERPL CALCULATED.3IONS-SCNC: 13 MMOL/L (ref 7–15)
BUN SERPL-MCNC: 47.4 MG/DL (ref 8–23)
CALCIUM SERPL-MCNC: 11.2 MG/DL (ref 8.8–10.4)
CALCIUM SERPL-MCNC: 11.2 MG/DL (ref 8.8–10.4)
CHLORIDE SERPL-SCNC: 104 MMOL/L (ref 98–107)
CREAT SERPL-MCNC: 1.77 MG/DL (ref 0.51–0.95)
EGFRCR SERPLBLD CKD-EPI 2021: 27 ML/MIN/1.73M2
GLUCOSE SERPL-MCNC: 111 MG/DL (ref 70–99)
HCO3 SERPL-SCNC: 24 MMOL/L (ref 22–29)
PHOSPHATE SERPL-MCNC: 3.8 MG/DL (ref 2.5–4.5)
POTASSIUM SERPL-SCNC: 4.5 MMOL/L (ref 3.4–5.3)
PTH-INTACT SERPL-MCNC: 80 PG/ML (ref 15–65)
SODIUM SERPL-SCNC: 141 MMOL/L (ref 135–145)

## 2024-08-26 PROCEDURE — 36415 COLL VENOUS BLD VENIPUNCTURE: CPT | Mod: ZL | Performed by: INTERNAL MEDICINE

## 2024-08-26 PROCEDURE — 82310 ASSAY OF CALCIUM: CPT | Mod: ZL | Performed by: INTERNAL MEDICINE

## 2024-08-26 PROCEDURE — 83970 ASSAY OF PARATHORMONE: CPT | Mod: ZL

## 2024-08-26 PROCEDURE — 96372 THER/PROPH/DIAG INJ SC/IM: CPT | Performed by: INTERNAL MEDICINE

## 2024-08-26 PROCEDURE — 250N000011 HC RX IP 250 OP 636: Mod: JZ | Performed by: INTERNAL MEDICINE

## 2024-08-26 RX ADMIN — DENOSUMAB 60 MG: 60 INJECTION SUBCUTANEOUS at 11:32

## 2024-08-26 NOTE — PROGRESS NOTES
Clinic Administered Medication Documentation      Prolia Documentation    Indication: Prolia  (denosumab) is a prescription medicine used to treat osteoporosis in patients who:   Are at high risk for fracture, meaning patients who have had a fracture related to osteoporosis, or who have multiple risk factors for fracture.  Cannot use another osteoporosis medicine or other osteoporosis medicines did not work well.  The timeline for early/late injections would be 4 weeks early and any time after the 6 month hermes. If a patient receives their injection late, then the subsequent injection would be 6 months from the date that they actually received the injection.    When was the last injection?  2023  Was the last injection at least 6 months ago? Yes  Has the prior authorization been completed?  Yes  Is there an active order (written within the past 365 days, with administrations remaining, not ) in the chart?  Yes   GFR Estimate   Date Value Ref Range Status   2024 27 (L) >60 mL/min/1.73m2 Final     Comment:     eGFR calculated using  CKD-EPI equation.   2021 45 (L) >60 mL/min/[1.73_m2] Final     Has patient had a GFR within the last 12 months? Yes   Is GFR under 30, or patient has a diagnosis of CKD4 or CKD5? Yes   Calcium   Date Value Ref Range Status   2024 11.2 (H) 8.8 - 10.4 mg/dL Final     Comment:     Reference intervals for this test were updated on 2024 to reflect our healthy population more accurately. There may be differences in the flagging of prior results with similar values performed with this method. Those prior results can be interpreted in the context of the updated reference intervals.   2024 11.2 (H) 8.8 - 10.4 mg/dL Final     Comment:     Reference intervals for this test were updated on 2024 to reflect our healthy population more accurately. There may be differences in the flagging of prior results with similar values performed with this method.  Those prior results can be interpreted in the context of the updated reference intervals.   06/29/2021 9.5 8.6 - 10.3 mg/dL Final     Is calcium result 8.5 or above? Yes   Was the calcium done after last Prolia injection? Yes   Is there a calcium order for 1 month post Prolia injection? Yes. Schedule patient for Calcium lab in next month.   Patient denies gastric bypass or parathyroid surgery in past 6 months? Yes - patient denies.   Patient denies dental work in the past two months involving drilling into the bone, such as implants/extractions, oral surgery or a tooth extraction that has not healed yet?  Yes  Patient denies plans for an emergency tooth extraction within the next week? Yes    The following steps were completed to comply with the REMS program for Prolia:  Reviewed information in the Medication Guide, including the serious risks of Prolia  and the symptoms of each risk.  Advised patient to seek prompt medical attention if they have signs or symptoms of any of the serious risks.  Provided each patient a copy of the Medication Guide and Patient Guide.    Prior to injection, verified patient identity using patient's name and date of birth. Medication was administered. Please see MAR and medication order for additional information. Patient instructed to remain in clinic for 15 minutes and report any adverse reaction to staff immediately.    Vial/Syringe: Syringe  Was this medication supplied by the patient? No  Verified that the patient has refills remaining in their prescription.    Rommel Crhistine RN on 8/26/2024 at 11:32 AM

## 2024-08-26 NOTE — PROGRESS NOTES
I resigned the orders however it does not show up on my side that this occurred so please let me know if anything else is needed

## 2024-09-03 ENCOUNTER — THERAPY VISIT (OUTPATIENT)
Dept: PHYSICAL THERAPY | Facility: OTHER | Age: 89
End: 2024-09-03
Attending: INTERNAL MEDICINE
Payer: MEDICARE

## 2024-09-03 DIAGNOSIS — G89.29 CHRONIC RIGHT-SIDED LOW BACK PAIN, UNSPECIFIED WHETHER SCIATICA PRESENT: ICD-10-CM

## 2024-09-03 DIAGNOSIS — M25.551 HIP PAIN, RIGHT: ICD-10-CM

## 2024-09-03 DIAGNOSIS — M54.50 CHRONIC RIGHT-SIDED LOW BACK PAIN, UNSPECIFIED WHETHER SCIATICA PRESENT: ICD-10-CM

## 2024-09-03 DIAGNOSIS — M79.661 PAIN OF RIGHT LOWER LEG: ICD-10-CM

## 2024-09-03 DIAGNOSIS — M48.061 SPINAL STENOSIS OF LUMBAR REGION WITHOUT NEUROGENIC CLAUDICATION: Primary | ICD-10-CM

## 2024-09-03 PROCEDURE — 97112 NEUROMUSCULAR REEDUCATION: CPT | Mod: GP

## 2024-09-04 ENCOUNTER — TELEPHONE (OUTPATIENT)
Dept: INTERNAL MEDICINE | Facility: OTHER | Age: 89
End: 2024-09-04
Payer: COMMERCIAL

## 2024-09-04 NOTE — TELEPHONE ENCOUNTER
Patient called to talk to Dr. Guevara's nurse. She states she just looked on MyChart and Dr. Guevara was going to call her tomorrow. She is going out of town and would like to arrange a different time. Please call.    Lina Robins on 9/4/2024 at 3:08 PM

## 2024-09-04 NOTE — TELEPHONE ENCOUNTER
It looks like you wanted to do a phone appt at the end of the day on 9/5 to discuss her lab results.  Is there another time that would work as the pt will be out of town then?  Tasia Pascual LPN on 9/4/2024 at 4:50 PM

## 2024-09-05 NOTE — TELEPHONE ENCOUNTER
Unable to contact the pt on both her home phone and cell phone.  Tasia Pascual LPN on 9/5/2024 at 1:04 PM

## 2024-09-05 NOTE — TELEPHONE ENCOUNTER
I attempted to santiago Vicki.  There was no answer.  I was unable to leave a message.  Tasia Pascual LPN on 9/5/2024 at 8:36 AM

## 2024-09-06 NOTE — TELEPHONE ENCOUNTER
Attempted to call pt again.  Still no answer on either her home phone or cell phone numbers.  Multiple attempts havve been made to reach pt without success.  If she needs to be seen at some point, we will work with her at that time.    Tasia Pascual LPN on 9/6/2024 at 8:52 AM

## 2024-09-09 ENCOUNTER — TELEPHONE (OUTPATIENT)
Dept: INTERNAL MEDICINE | Facility: OTHER | Age: 89
End: 2024-09-09
Payer: COMMERCIAL

## 2024-09-09 NOTE — TELEPHONE ENCOUNTER
States she needs an appointment for her thyroid.  She was out of town last time but is here to talk now . Please advise .

## 2024-09-09 NOTE — TELEPHONE ENCOUNTER
Please call pt to schedule an appt with Salma Allen NP or FRANK Gomes LPN on 9/9/2024 at 3:55 PM

## 2024-09-10 NOTE — TELEPHONE ENCOUNTER
Patient was contacted and an appointment was made with Salma Allen for 9.11.2024.  Iona Woody on 9/10/2024 at 10:06 AM

## 2024-09-11 ENCOUNTER — OFFICE VISIT (OUTPATIENT)
Dept: INTERNAL MEDICINE | Facility: OTHER | Age: 89
End: 2024-09-11
Payer: COMMERCIAL

## 2024-09-11 VITALS
DIASTOLIC BLOOD PRESSURE: 60 MMHG | OXYGEN SATURATION: 99 % | RESPIRATION RATE: 16 BRPM | TEMPERATURE: 97.2 F | HEART RATE: 81 BPM | WEIGHT: 170.2 LBS | SYSTOLIC BLOOD PRESSURE: 128 MMHG | BODY MASS INDEX: 32.16 KG/M2

## 2024-09-11 DIAGNOSIS — I10 ESSENTIAL HYPERTENSION: Chronic | ICD-10-CM

## 2024-09-11 DIAGNOSIS — E21.3 HYPERPARATHYROIDISM (H): Primary | ICD-10-CM

## 2024-09-11 DIAGNOSIS — N18.30 STAGE 3 CHRONIC KIDNEY DISEASE, UNSPECIFIED WHETHER STAGE 3A OR 3B CKD (H): ICD-10-CM

## 2024-09-11 DIAGNOSIS — M85.80 OSTEOPENIA, UNSPECIFIED LOCATION: ICD-10-CM

## 2024-09-11 LAB
ALBUMIN SERPL BCG-MCNC: 4.2 G/DL (ref 3.5–5.2)
ANION GAP SERPL CALCULATED.3IONS-SCNC: 10 MMOL/L (ref 7–15)
BUN SERPL-MCNC: 35.4 MG/DL (ref 8–23)
CALCIUM SERPL-MCNC: 9.6 MG/DL (ref 8.8–10.4)
CHLORIDE SERPL-SCNC: 106 MMOL/L (ref 98–107)
CREAT SERPL-MCNC: 1.29 MG/DL (ref 0.51–0.95)
EGFRCR SERPLBLD CKD-EPI 2021: 40 ML/MIN/1.73M2
GLUCOSE SERPL-MCNC: 94 MG/DL (ref 70–99)
HCO3 SERPL-SCNC: 23 MMOL/L (ref 22–29)
PHOSPHATE SERPL-MCNC: 2.8 MG/DL (ref 2.5–4.5)
POTASSIUM SERPL-SCNC: 5.4 MMOL/L (ref 3.4–5.3)
PTH-INTACT SERPL-MCNC: 373 PG/ML (ref 15–65)
SODIUM SERPL-SCNC: 139 MMOL/L (ref 135–145)
TSH SERPL DL<=0.005 MIU/L-ACNC: 2.78 UIU/ML (ref 0.3–4.2)

## 2024-09-11 PROCEDURE — 99214 OFFICE O/P EST MOD 30 MIN: CPT

## 2024-09-11 PROCEDURE — 83970 ASSAY OF PARATHORMONE: CPT | Mod: ZL

## 2024-09-11 PROCEDURE — G2211 COMPLEX E/M VISIT ADD ON: HCPCS

## 2024-09-11 PROCEDURE — 84443 ASSAY THYROID STIM HORMONE: CPT | Mod: ZL

## 2024-09-11 PROCEDURE — 36415 COLL VENOUS BLD VENIPUNCTURE: CPT | Mod: ZL

## 2024-09-11 PROCEDURE — 80069 RENAL FUNCTION PANEL: CPT | Mod: ZL

## 2024-09-11 PROCEDURE — G0463 HOSPITAL OUTPT CLINIC VISIT: HCPCS

## 2024-09-11 ASSESSMENT — PAIN SCALES - GENERAL: PAINLEVEL: NO PAIN (0)

## 2024-09-11 ASSESSMENT — ENCOUNTER SYMPTOMS: FATIGUE: 1

## 2024-09-11 NOTE — NURSING NOTE
"Chief Complaint   Patient presents with    RECHECK     thyroid   Patient presents to the clinic today for follow up for thyroid    Initial LMP 06/01/1993 (Approximate)  Estimated body mass index is 31.55 kg/m  as calculated from the following:    Height as of 8/20/24: 1.549 m (5' 1\").    Weight as of 8/20/24: 75.8 kg (167 lb).  Meds Reconciled: complete      Susie Salgado LPN,LPN on 9/11/2024 at 9:42 AM  Ext. [phone]        Susie Salgado LPN  "

## 2024-09-11 NOTE — PROGRESS NOTES
Assessment & Plan     ICD-10-CM    1. Hyperparathyroidism (H24)  E21.3 Renal Function Panel     TSH Reflex GH     PTH, Intact     Renal Function Panel     TSH Reflex GH     PTH, Intact     Renal Function Panel     CANCELED: Adult ENT  Referral      2. Essential hypertension  I10 TSH Reflex GH     TSH Reflex GH     Renal Function Panel      3. Stage 3 chronic kidney disease, unspecified whether stage 3a or 3b CKD (H)  N18.30       4. Osteopenia, unspecified location  M85.80          We discussed hyperparathyroidism in combination with hypercalcemia, chronic kidney disease, recent Prolia injection, HCTZ use.  We discussed surgical options for hyperparathyroidism which patient would like to avoid if at all possible.  We repeated lab work today which shows resolution of hypercalcemia with a calcium level of 9.6.  Creatinine and GFR have also improved.  Suspect this is related to her recently increasing her water intake along with recent Prolia injection. We discussed considering discontinuing her hydrochlorothiazide to avoid worsening hypercalcemia. However, with normal calcium level today we will wait to do this. Her potassium is just slightly elevated at 5.4. I recommend patient return for a lab only visit in 6 weeks.     The longitudinal plan of care for the diagnosis(es)/condition(s) as documented were addressed during this visit. Due to the added complexity in care, I will continue to support Vicki in the subsequent management and with ongoing continuity of care.                    No follow-ups on file.      LEONARDA Almaraz CNP  [facility]    Review of Systems   Constitutional:  Positive for fatigue.   All other systems reviewed and are negative.          Subjective   Vicki is a 89 year old, presenting for the following health issues:  RECHECK (thyroid)    Patient presents to clinic with granddaughter for follow-up on hypercalcemia secondary to likely  hyperparathyroidism noted in her last lab work.  Overall, patient states to feel well.  She does have some fatigue, denies any additional bothersome symptoms.  She did have a recent Prolia injection for osteoporosis and has been trying to increase her water intake.      History of Present Illness       Reason for visit:  Hypercalcemia    She eats 2-3 servings of fruits and vegetables daily.She consumes 0 sweetened beverage(s) daily.She exercises with enough effort to increase her heart rate 9 or less minutes per day.  She exercises with enough effort to increase her heart rate 3 or less days per week.   She is taking medications regularly.                     Objective    /60 (BP Location: Right arm, Patient Position: Sitting, Cuff Size: Adult Regular)   Pulse 81   Temp 97.2  F (36.2  C) (Temporal)   Resp 16   Wt 77.2 kg (170 lb 3.2 oz)   LMP 06/01/1993 (Approximate)   SpO2 99%   BMI 32.16 kg/m    Body mass index is 32.16 kg/m .  Physical Exam  Vitals reviewed.   Constitutional:       General: She is not in acute distress.     Appearance: She is well-developed.   HENT:      Head: Normocephalic and atraumatic.      Nose: Nose normal.      Mouth/Throat:      Pharynx: No oropharyngeal exudate.   Eyes:      General: No scleral icterus.     Conjunctiva/sclera: Conjunctivae normal.      Pupils: Pupils are equal, round, and reactive to light.   Neck:      Thyroid: No thyromegaly.   Musculoskeletal:         General: No tenderness or deformity. Normal range of motion.   Skin:     General: Skin is warm and dry.      Findings: No rash.   Neurological:      Mental Status: She is alert and oriented to person, place, and time.      Cranial Nerves: No cranial nerve deficit.      Coordination: Coordination normal.   Psychiatric:         Behavior: Behavior normal.         Thought Content: Thought content normal.         Judgment: Judgment normal.        Results for orders placed or performed in visit on 09/11/24   Renal  Function Panel     Status: Abnormal   Result Value Ref Range    Sodium 139 135 - 145 mmol/L    Potassium 5.4 (H) 3.4 - 5.3 mmol/L    Chloride 106 98 - 107 mmol/L    Carbon Dioxide (CO2) 23 22 - 29 mmol/L    Anion Gap 10 7 - 15 mmol/L    Glucose 94 70 - 99 mg/dL    Urea Nitrogen 35.4 (H) 8.0 - 23.0 mg/dL    Creatinine 1.29 (H) 0.51 - 0.95 mg/dL    GFR Estimate 40 (L) >60 mL/min/1.73m2    Calcium 9.6 8.8 - 10.4 mg/dL    Albumin 4.2 3.5 - 5.2 g/dL    Phosphorus 2.8 2.5 - 4.5 mg/dL   TSH Reflex GH     Status: Normal   Result Value Ref Range    TSH 2.78 0.30 - 4.20 uIU/mL                   Signed Electronically by: LEONARDA Almaraz CNP

## 2024-09-13 ENCOUNTER — MYC MEDICAL ADVICE (OUTPATIENT)
Dept: INTERNAL MEDICINE | Facility: OTHER | Age: 89
End: 2024-09-13
Payer: COMMERCIAL

## 2024-09-13 NOTE — TELEPHONE ENCOUNTER
It is elevated as expected due to hyperparathyroidism- we will continue to monitor calcium as this is what we need to be closely monitoring. No further action needed.

## 2024-09-13 NOTE — TELEPHONE ENCOUNTER
9/11/24  Parathyroid Hormone 373    No comments yet.     Omaira Cleaning RN on 9/13/2024 at 10:19 AM

## 2024-10-07 ENCOUNTER — THERAPY VISIT (OUTPATIENT)
Dept: PHYSICAL THERAPY | Facility: OTHER | Age: 89
End: 2024-10-07
Attending: INTERNAL MEDICINE
Payer: MEDICARE

## 2024-10-07 DIAGNOSIS — G89.29 CHRONIC RIGHT-SIDED LOW BACK PAIN, UNSPECIFIED WHETHER SCIATICA PRESENT: ICD-10-CM

## 2024-10-07 DIAGNOSIS — M54.50 CHRONIC RIGHT-SIDED LOW BACK PAIN, UNSPECIFIED WHETHER SCIATICA PRESENT: ICD-10-CM

## 2024-10-07 DIAGNOSIS — M79.661 PAIN OF RIGHT LOWER LEG: ICD-10-CM

## 2024-10-07 DIAGNOSIS — M25.551 HIP PAIN, RIGHT: ICD-10-CM

## 2024-10-07 DIAGNOSIS — M48.061 SPINAL STENOSIS OF LUMBAR REGION WITHOUT NEUROGENIC CLAUDICATION: Primary | ICD-10-CM

## 2024-10-07 PROCEDURE — 97112 NEUROMUSCULAR REEDUCATION: CPT | Mod: GP

## 2024-10-07 PROCEDURE — 97140 MANUAL THERAPY 1/> REGIONS: CPT | Mod: GP

## 2024-10-07 NOTE — PROGRESS NOTES
[facility]                                                                                   OUTPATIENT PHYSICAL THERAPY    PLAN OF TREATMENT FOR OUTPATIENT REHABILITATION   Patient's Last Name, First Name, Vicki Soto YOB: 1935   Provider's Name   [facility]   Medical Record No.  [id number]     Onset Date: 10/31/23  Start of Care Date: 11/06/23     Medical Diagnosis:  M48.061 (ICD-10-CM) - Spinal stenosis of lumbar region without neurogenic claudication  M54.50, G89.29 (ICD-10-CM) - Chronic right-sided low back pain, unspecified whether sciatica present  M25.551 (ICD-10-CM) - Hip pain, right  M79.661 (ICD-10-CM) - Pain of right lower leg      PT Treatment Diagnosis:  back pain, myofascial tightness Plan of Treatment  Frequency/Duration: 1 time per week as needed/ 12 weeks    Certification date from (P) 10/12/24 to (P) 12/26/24         See note for plan of treatment details and functional goals     Karen Duarte, PT                         I CERTIFY THE NEED FOR THESE SERVICES FURNISHED UNDER        THIS PLAN OF TREATMENT AND WHILE UNDER MY CARE     (Physician attestation of this document indicates review and certification of the therapy plan).              Referring Provider:  Kirsten Guevara    Initial Assessment  See Epic Evaluation- Start of Care Date: 11/06/23           10/07/24 0500   Appointment Info   Signing clinician's name / credentials Karen Duarte PT, CLT, CAPP   Visits Used 13   Medical Diagnosis M48.061 (ICD-10-CM) - Spinal stenosis of lumbar region without neurogenic claudication  M54.50, G89.29 (ICD-10-CM) - Chronic right-sided low back pain, unspecified whether sciatica present  M25.551 (ICD-10-CM) - Hip pain, right  M79.661 (ICD-10-CM) - Pain of right lower leg   PT Tx Diagnosis back pain, myofascial tightness   Progress Note/Certification   Start of Care Date 11/06/23   Onset of illness/injury or Date  of Surgery 10/31/23   Therapy Frequency 1 time per week as needed   Predicted Duration 12 weeks   Certification date from 10/12/24   Certification date to 12/26/24   GOALS   PT Goals 2;3   PT Goal 1   Goal Identifier standing   Goal Description Patient to tolerate standing 10 minutes to have conversations in community.   Target Date 02/03/24   Date Met 02/03/24   PT Goal 2   Goal Identifier pain   Goal Description Patient to report reduced low back and right leg pain to less than 5/10 at most to allow greater ease with walking.   Goal Progress PT continues to aid patient in maintaining her mobility in home and in the community. PT continues to help with pain control as patient is unable to take medication due to other health issues. Pain varies by day, as well as effects on mobility. Patient has been having more mid thoracic spine pain with walking. 6/10 after being on feet for 10 minutes   Target Date 12/26/24   Subjective Report   Subjective Report Mid back has been more painful, 6/10, after being on feet for 10 minutes, pain travels up in to her neck, feels it mostly when up walking and standing. Sitting makes her feel better. Low back on right side remains an issue like usual. Working on cleaning out gardens for fall, takes frequency breaks. Will still wake with increased back pain and need to urinate, back pain lessens after urination.   Objective Measures   Objective Measures Objective Measure 1;Objective Measure 2   Objective Measure 1   Objective Measure joint   Details ERS right T7, FRS left T9   Objective Measure 2   Objective Measure myofascial   Details thoracic paraspinals, internal iliac artery right.   Treatment Interventions (PT)   Interventions Manual Therapy;Neuromuscular Re-education   Neuromuscular Re-education   Neuromuscular re-ed of mvmt, balance, coord, kinesthetic sense, posture, proprioception minutes ([id number]) 15   Neuro Re-ed 1 neuromengingeal manipulation (NMM), muscle energy technique  (MET) to restore function and reduce pain   Neuro Re-ed 1 - Details MET- ERS right T7, FRS left T9   Patient Response/Progress reduced discomfort noted with upper and lower back   Manual Therapy   Manual Therapy: Mobilization, MFR, MLD, friction massage minutes (05745) 15   Manual Therapy 1 MFR to restore function and reduce pain   Manual Therapy 1 - Details MFR/OSFM- thoracic parapsinals, right internal iliac artery<>common iliac artery with inducation   Education   Learner/Method Patient;No Barriers to Learning   Plan   Home program exercise, heat   Plan for next session PT will continue as it provides pain reduction for patient, patient unable to have surgery for spine.

## 2024-10-09 ENCOUNTER — TELEPHONE (OUTPATIENT)
Dept: INTERNAL MEDICINE | Facility: OTHER | Age: 89
End: 2024-10-09
Payer: COMMERCIAL

## 2024-10-09 NOTE — TELEPHONE ENCOUNTER
Pt has an appt set for 10/24 for labs. She's not sure what labs are needing to be done, just that they need to be the same as last time. She would like to check to see if there are orders for those labs

## 2024-10-29 ENCOUNTER — LAB (OUTPATIENT)
Dept: LAB | Facility: OTHER | Age: 89
End: 2024-10-29
Payer: MEDICARE

## 2024-10-29 DIAGNOSIS — E21.3 HYPERPARATHYROIDISM (H): ICD-10-CM

## 2024-10-29 DIAGNOSIS — I10 ESSENTIAL HYPERTENSION: Chronic | ICD-10-CM

## 2024-10-29 LAB
ALBUMIN SERPL BCG-MCNC: 4.6 G/DL (ref 3.5–5.2)
ANION GAP SERPL CALCULATED.3IONS-SCNC: 13 MMOL/L (ref 7–15)
BUN SERPL-MCNC: 43.9 MG/DL (ref 8–23)
CALCIUM SERPL-MCNC: 10.2 MG/DL (ref 8.8–10.4)
CHLORIDE SERPL-SCNC: 103 MMOL/L (ref 98–107)
CREAT SERPL-MCNC: 1.49 MG/DL (ref 0.51–0.95)
EGFRCR SERPLBLD CKD-EPI 2021: 34 ML/MIN/1.73M2
GLUCOSE SERPL-MCNC: 119 MG/DL (ref 70–99)
HCO3 SERPL-SCNC: 22 MMOL/L (ref 22–29)
PHOSPHATE SERPL-MCNC: 2.9 MG/DL (ref 2.5–4.5)
POTASSIUM SERPL-SCNC: 4.7 MMOL/L (ref 3.4–5.3)
SODIUM SERPL-SCNC: 138 MMOL/L (ref 135–145)

## 2024-10-29 PROCEDURE — 36415 COLL VENOUS BLD VENIPUNCTURE: CPT | Mod: ZL

## 2024-10-29 PROCEDURE — 82947 ASSAY GLUCOSE BLOOD QUANT: CPT | Mod: ZL

## 2024-11-07 ENCOUNTER — THERAPY VISIT (OUTPATIENT)
Dept: PHYSICAL THERAPY | Facility: OTHER | Age: 89
End: 2024-11-07
Attending: INTERNAL MEDICINE
Payer: COMMERCIAL

## 2024-11-07 DIAGNOSIS — M54.50 CHRONIC RIGHT-SIDED LOW BACK PAIN, UNSPECIFIED WHETHER SCIATICA PRESENT: ICD-10-CM

## 2024-11-07 DIAGNOSIS — M48.061 SPINAL STENOSIS OF LUMBAR REGION WITHOUT NEUROGENIC CLAUDICATION: Primary | ICD-10-CM

## 2024-11-07 DIAGNOSIS — M79.661 PAIN OF RIGHT LOWER LEG: ICD-10-CM

## 2024-11-07 DIAGNOSIS — G89.29 CHRONIC RIGHT-SIDED LOW BACK PAIN, UNSPECIFIED WHETHER SCIATICA PRESENT: ICD-10-CM

## 2024-11-07 DIAGNOSIS — M25.551 HIP PAIN, RIGHT: ICD-10-CM

## 2024-11-21 ENCOUNTER — THERAPY VISIT (OUTPATIENT)
Dept: PHYSICAL THERAPY | Facility: OTHER | Age: 89
End: 2024-11-21
Attending: INTERNAL MEDICINE
Payer: MEDICARE

## 2024-11-21 DIAGNOSIS — M25.551 HIP PAIN, RIGHT: ICD-10-CM

## 2024-11-21 DIAGNOSIS — M54.50 CHRONIC RIGHT-SIDED LOW BACK PAIN, UNSPECIFIED WHETHER SCIATICA PRESENT: ICD-10-CM

## 2024-11-21 DIAGNOSIS — G89.29 CHRONIC RIGHT-SIDED LOW BACK PAIN, UNSPECIFIED WHETHER SCIATICA PRESENT: ICD-10-CM

## 2024-11-21 DIAGNOSIS — M48.061 SPINAL STENOSIS OF LUMBAR REGION WITHOUT NEUROGENIC CLAUDICATION: Primary | ICD-10-CM

## 2024-12-05 ENCOUNTER — THERAPY VISIT (OUTPATIENT)
Dept: PHYSICAL THERAPY | Facility: OTHER | Age: 89
End: 2024-12-05
Attending: INTERNAL MEDICINE
Payer: MEDICARE

## 2024-12-05 DIAGNOSIS — M48.061 SPINAL STENOSIS OF LUMBAR REGION WITHOUT NEUROGENIC CLAUDICATION: Primary | ICD-10-CM

## 2024-12-05 DIAGNOSIS — M25.551 HIP PAIN, RIGHT: ICD-10-CM

## 2024-12-05 DIAGNOSIS — M54.50 CHRONIC RIGHT-SIDED LOW BACK PAIN, UNSPECIFIED WHETHER SCIATICA PRESENT: ICD-10-CM

## 2024-12-05 DIAGNOSIS — M79.661 PAIN OF RIGHT LOWER LEG: ICD-10-CM

## 2024-12-05 DIAGNOSIS — G89.29 CHRONIC RIGHT-SIDED LOW BACK PAIN, UNSPECIFIED WHETHER SCIATICA PRESENT: ICD-10-CM

## 2025-01-28 ENCOUNTER — THERAPY VISIT (OUTPATIENT)
Dept: PHYSICAL THERAPY | Facility: OTHER | Age: 89
End: 2025-01-28
Attending: INTERNAL MEDICINE
Payer: MEDICARE

## 2025-01-28 DIAGNOSIS — M48.061 SPINAL STENOSIS OF LUMBAR REGION WITHOUT NEUROGENIC CLAUDICATION: Primary | ICD-10-CM

## 2025-01-28 DIAGNOSIS — G89.29 CHRONIC RIGHT-SIDED LOW BACK PAIN, UNSPECIFIED WHETHER SCIATICA PRESENT: ICD-10-CM

## 2025-01-28 DIAGNOSIS — M54.50 CHRONIC RIGHT-SIDED LOW BACK PAIN, UNSPECIFIED WHETHER SCIATICA PRESENT: ICD-10-CM

## 2025-01-28 PROCEDURE — 97112 NEUROMUSCULAR REEDUCATION: CPT | Mod: GP

## 2025-01-28 PROCEDURE — 97140 MANUAL THERAPY 1/> REGIONS: CPT | Mod: GP

## 2025-01-28 NOTE — PROGRESS NOTES
[facility]                                                                                   OUTPATIENT PHYSICAL THERAPY    PLAN OF TREATMENT FOR OUTPATIENT REHABILITATION   Patient's Last Name, First Name, Vicki Soto YOB: 1935   Provider's Name   [facility]   Medical Record No.  [id number]     Onset Date: 10/31/23  Start of Care Date: 11/06/23     Medical Diagnosis:  M48.061 (ICD-10-CM) - Spinal stenosis of lumbar region without neurogenic claudication  M54.50, G89.29 (ICD-10-CM) - Chronic right-sided low back pain, unspecified whether sciatica present  M25.551 (ICD-10-CM) - Hip pain, right  M79.661 (ICD-10-CM) - Pain of right lower leg      PT Treatment Diagnosis:  back pain, myofascial tightness Plan of Treatment  Frequency/Duration: 1-2 times per month as needed/ 12 weeks    Certification date from 12/27/24 to 03/20/25         See note for plan of treatment details and functional goals     Karen Duarte PT                         I CERTIFY THE NEED FOR THESE SERVICES FURNISHED UNDER        THIS PLAN OF TREATMENT AND WHILE UNDER MY CARE     (Physician attestation of this document indicates review and certification of the therapy plan).              Referring Provider:  Kirsten Guevara    Initial Assessment  See Epic Evaluation- Start of Care Date: 11/06/23        Patient continues to benefit from physical therapy by improving her mobility and tolerance for standing and walking due to her scoliosis, broken fusion hardware in lumbar spine; She is unable to undergo further surgery due to age and cardiac issues.        01/28/25 0500   Appointment Info   Signing clinician's name / credentials Karen Duarte, PT, CLT, CAPP   Visits Used 17   Medical Diagnosis M48.061 (ICD-10-CM) - Spinal stenosis of lumbar region without neurogenic claudication  M54.50, G89.29 (ICD-10-CM) - Chronic right-sided low back pain, unspecified  whether sciatica present  M25.551 (ICD-10-CM) - Hip pain, right  M79.661 (ICD-10-CM) - Pain of right lower leg   PT Tx Diagnosis back pain, myofascial tightness   Progress Note/Certification   Start of Care Date 11/06/23   Onset of illness/injury or Date of Surgery 10/31/23   Therapy Frequency 1 time per week as needed   Predicted Duration 12 weeks   Certification date from 12/27/24   Certification date to 03/20/25   GOALS   PT Goals 2;3   PT Goal 1   Goal Identifier standing   Goal Description Patient to tolerate standing 10 minutes to have conversations in community.   Target Date 02/03/24   Date Met 02/03/24   PT Goal 2   Goal Identifier pain   Goal Description Patient to report reduced low back and right leg pain to less than 5/10 at most to allow greater ease with walking.   Goal Progress PT continues to aid patient in maintaining her mobility in home and in the community. PT continues to help with pain control as patient is unable to take medication due to other health issues. Pain varies by day, as well as effects on mobility. Patient has been having more mid thoracic spine pain with walking. 6/10 after being on feet for 10 minutes   Target Date 12/26/24   Subjective Report   Subjective Report Right hip area has been painful. Most nights she still wakes with pain when she need to void. Subsided after voiding. Walking will add to pain but it has been tolerable as of late. Still walking with walking stick. Working on exercises, stretching.  Feels this has helped a little. Pain has been 6/10 lately on average.   Objective Measures   Objective Measures Objective Measure 1;Objective Measure 2   Objective Measure 1   Objective Measure joint   Details ERS right T8, FRS left T7   Objective Measure 2   Objective Measure myofascial   Details bladder fascia,   Treatment Interventions (PT)   Interventions Manual Therapy;Neuromuscular Re-education   Neuromuscular Re-education   Neuro Re-ed 1 neuromengingeal manipulation  (NMM), muscle energy technique (MET) to restore function and reduce pain   Neuro Re-ed 1 - Details MET- ERS right T8, FRS left T7, right superior pubic shear   Patient Response/Progress level pubes, - FFT right   Manual Therapy   Manual Therapy 1 MFR to restore function and reduce pain   Manual Therapy 1 - Details MFR/OSFM- bladder fascia, lumbar/thoracic/cervcial paraspinals   Education   Learner/Method Patient;No Barriers to Learning   Plan   Home program exercise, heat   Plan for next session PT will continue as it provides pain reduction for patient, patient unable to have surgery for spine and cannot take pain medication due to cardiac issues.

## 2025-02-21 ENCOUNTER — TELEPHONE (OUTPATIENT)
Dept: INTERNAL MEDICINE | Facility: OTHER | Age: 89
End: 2025-02-21
Payer: COMMERCIAL

## 2025-02-21 DIAGNOSIS — Z13.29 SCREENING FOR THYROID DISORDER: ICD-10-CM

## 2025-02-21 DIAGNOSIS — E21.3 HYPERPARATHYROIDISM: Primary | ICD-10-CM

## 2025-02-21 DIAGNOSIS — I10 ESSENTIAL HYPERTENSION: ICD-10-CM

## 2025-02-21 NOTE — TELEPHONE ENCOUNTER
The patient's prolia shot is right after appointment, cutting it close.  Wonders also if that time will work.

## 2025-02-21 NOTE — TELEPHONE ENCOUNTER
Patient is scheduled for follow up on blood pressure on 02/26/25 at 10:40 and prolia injection at 11:00. She will be coming in earlier to get labs done prior to the prolia injection and would like to get any additional labs Dr. Guevara would need at that time too. Is there any additional labs patient would need ordered?    Mona Toledo LPN on 2/21/2025 at 4:26 PM

## 2025-02-24 ENCOUNTER — THERAPY VISIT (OUTPATIENT)
Dept: PHYSICAL THERAPY | Facility: OTHER | Age: 89
End: 2025-02-24
Attending: INTERNAL MEDICINE
Payer: MEDICARE

## 2025-02-24 ENCOUNTER — LAB (OUTPATIENT)
Dept: LAB | Facility: OTHER | Age: 89
End: 2025-02-24
Payer: MEDICARE

## 2025-02-24 DIAGNOSIS — K20.90 ESOPHAGITIS: ICD-10-CM

## 2025-02-24 DIAGNOSIS — M48.061 SPINAL STENOSIS OF LUMBAR REGION WITHOUT NEUROGENIC CLAUDICATION: Primary | ICD-10-CM

## 2025-02-24 DIAGNOSIS — D50.0 IRON DEFICIENCY ANEMIA DUE TO CHRONIC BLOOD LOSS: ICD-10-CM

## 2025-02-24 DIAGNOSIS — M54.50 CHRONIC RIGHT-SIDED LOW BACK PAIN, UNSPECIFIED WHETHER SCIATICA PRESENT: ICD-10-CM

## 2025-02-24 DIAGNOSIS — E21.3 HYPERPARATHYROIDISM: ICD-10-CM

## 2025-02-24 DIAGNOSIS — I10 ESSENTIAL HYPERTENSION: ICD-10-CM

## 2025-02-24 DIAGNOSIS — Z13.29 SCREENING FOR THYROID DISORDER: ICD-10-CM

## 2025-02-24 DIAGNOSIS — N18.32 STAGE 3B CHRONIC KIDNEY DISEASE (H): ICD-10-CM

## 2025-02-24 DIAGNOSIS — M25.551 HIP PAIN, RIGHT: ICD-10-CM

## 2025-02-24 DIAGNOSIS — G89.29 CHRONIC RIGHT-SIDED LOW BACK PAIN, UNSPECIFIED WHETHER SCIATICA PRESENT: ICD-10-CM

## 2025-02-24 LAB
ALBUMIN SERPL BCG-MCNC: 4.2 G/DL (ref 3.5–5.2)
ALP SERPL-CCNC: 49 U/L (ref 40–150)
ALT SERPL W P-5'-P-CCNC: 17 U/L (ref 0–50)
ANION GAP SERPL CALCULATED.3IONS-SCNC: 12 MMOL/L (ref 7–15)
AST SERPL W P-5'-P-CCNC: 32 U/L (ref 0–45)
BILIRUB SERPL-MCNC: 0.4 MG/DL
BUN SERPL-MCNC: 40.7 MG/DL (ref 8–23)
CALCIUM SERPL-MCNC: 9.9 MG/DL (ref 8.8–10.4)
CALCIUM SERPL-MCNC: 9.9 MG/DL (ref 8.8–10.4)
CHLORIDE SERPL-SCNC: 106 MMOL/L (ref 98–107)
CREAT SERPL-MCNC: 1.6 MG/DL (ref 0.51–0.95)
EGFRCR SERPLBLD CKD-EPI 2021: 31 ML/MIN/1.73M2
ERYTHROCYTE [DISTWIDTH] IN BLOOD BY AUTOMATED COUNT: 14.4 % (ref 10–15)
GLUCOSE SERPL-MCNC: 103 MG/DL (ref 70–99)
HCO3 SERPL-SCNC: 20 MMOL/L (ref 22–29)
HCT VFR BLD AUTO: 35.1 % (ref 35–47)
HGB BLD-MCNC: 11.5 G/DL (ref 11.7–15.7)
MCH RBC QN AUTO: 32 PG (ref 26.5–33)
MCHC RBC AUTO-ENTMCNC: 32.8 G/DL (ref 31.5–36.5)
MCV RBC AUTO: 98 FL (ref 78–100)
PLATELET # BLD AUTO: 199 10E3/UL (ref 150–450)
POTASSIUM SERPL-SCNC: 5.1 MMOL/L (ref 3.4–5.3)
PROT SERPL-MCNC: 7.7 G/DL (ref 6.4–8.3)
RBC # BLD AUTO: 3.59 10E6/UL (ref 3.8–5.2)
SODIUM SERPL-SCNC: 138 MMOL/L (ref 135–145)
TSH SERPL DL<=0.005 MIU/L-ACNC: 2.64 UIU/ML (ref 0.3–4.2)
WBC # BLD AUTO: 7.8 10E3/UL (ref 4–11)

## 2025-02-24 PROCEDURE — 97112 NEUROMUSCULAR REEDUCATION: CPT | Mod: GP

## 2025-02-24 PROCEDURE — 83970 ASSAY OF PARATHORMONE: CPT | Mod: ZL

## 2025-02-24 PROCEDURE — 36415 COLL VENOUS BLD VENIPUNCTURE: CPT | Mod: ZL

## 2025-02-24 PROCEDURE — 85018 HEMOGLOBIN: CPT | Mod: ZL

## 2025-02-24 PROCEDURE — 82310 ASSAY OF CALCIUM: CPT | Mod: ZL

## 2025-02-24 PROCEDURE — 97140 MANUAL THERAPY 1/> REGIONS: CPT | Mod: GP

## 2025-02-24 PROCEDURE — 84155 ASSAY OF PROTEIN SERUM: CPT | Mod: ZL

## 2025-02-24 PROCEDURE — 84443 ASSAY THYROID STIM HORMONE: CPT | Mod: ZL

## 2025-02-24 PROCEDURE — 83550 IRON BINDING TEST: CPT | Mod: ZL

## 2025-02-24 NOTE — TELEPHONE ENCOUNTER
Discussed lab orders with the pt and also advised her to make a lab appt for this as there is nothing currently scheduled.  She was transferred to the scheduling line.  Tasia Pascual LPN on 2/24/2025 at 9:16 AM

## 2025-02-26 ENCOUNTER — OFFICE VISIT (OUTPATIENT)
Dept: INTERNAL MEDICINE | Facility: OTHER | Age: 89
End: 2025-02-26
Attending: INTERNAL MEDICINE
Payer: MEDICARE

## 2025-02-26 ENCOUNTER — ALLIED HEALTH/NURSE VISIT (OUTPATIENT)
Dept: FAMILY MEDICINE | Facility: OTHER | Age: 89
End: 2025-02-26
Payer: MEDICARE

## 2025-02-26 VITALS
DIASTOLIC BLOOD PRESSURE: 82 MMHG | BODY MASS INDEX: 31.14 KG/M2 | OXYGEN SATURATION: 98 % | RESPIRATION RATE: 18 BRPM | SYSTOLIC BLOOD PRESSURE: 162 MMHG | HEART RATE: 74 BPM | WEIGHT: 164.8 LBS

## 2025-02-26 DIAGNOSIS — D50.0 IRON DEFICIENCY ANEMIA DUE TO CHRONIC BLOOD LOSS: ICD-10-CM

## 2025-02-26 DIAGNOSIS — E21.3 HYPERPARATHYROIDISM: ICD-10-CM

## 2025-02-26 DIAGNOSIS — K20.90 ESOPHAGITIS: ICD-10-CM

## 2025-02-26 DIAGNOSIS — N18.32 STAGE 3B CHRONIC KIDNEY DISEASE (H): ICD-10-CM

## 2025-02-26 DIAGNOSIS — M85.80 OSTEOPENIA, UNSPECIFIED LOCATION: Primary | ICD-10-CM

## 2025-02-26 DIAGNOSIS — N18.30 STAGE 3 CHRONIC KIDNEY DISEASE, UNSPECIFIED WHETHER STAGE 3A OR 3B CKD (H): ICD-10-CM

## 2025-02-26 DIAGNOSIS — Z13.220 SCREENING FOR HYPERLIPIDEMIA: ICD-10-CM

## 2025-02-26 DIAGNOSIS — E21.3 HYPERPARATHYROIDISM: Primary | ICD-10-CM

## 2025-02-26 DIAGNOSIS — I10 ESSENTIAL HYPERTENSION: Primary | ICD-10-CM

## 2025-02-26 LAB
IRON BINDING CAPACITY (ROCHE): 277 UG/DL (ref 240–430)
IRON SATN MFR SERPL: 22 % (ref 15–46)
IRON SERPL-MCNC: 62 UG/DL (ref 37–145)
PTH-INTACT SERPL-MCNC: 125 PG/ML (ref 15–65)

## 2025-02-26 PROCEDURE — 96372 THER/PROPH/DIAG INJ SC/IM: CPT | Performed by: INTERNAL MEDICINE

## 2025-02-26 PROCEDURE — 250N000011 HC RX IP 250 OP 636: Mod: JZ | Performed by: INTERNAL MEDICINE

## 2025-02-26 PROCEDURE — G0463 HOSPITAL OUTPT CLINIC VISIT: HCPCS

## 2025-02-26 RX ORDER — FAMOTIDINE 40 MG/1
40 TABLET, FILM COATED ORAL 2 TIMES DAILY
Qty: 180 TABLET | Refills: 1 | Status: SHIPPED | OUTPATIENT
Start: 2025-02-26

## 2025-02-26 RX ADMIN — DENOSUMAB 60 MG: 60 INJECTION SUBCUTANEOUS at 11:16

## 2025-02-26 NOTE — PROGRESS NOTES
Clinic Administered Medication Documentation      Prolia Documentation    Indication: Prolia  (denosumab) is a prescription medicine used to treat osteoporosis in patients who:   Are at high risk for fracture, meaning patients who have had a fracture related to osteoporosis, or who have multiple risk factors for fracture.  Cannot use another osteoporosis medicine or other osteoporosis medicines did not work well.  The timeline for early/late injections would be 4 weeks early and any time after the 6 month hermes. If a patient receives their injection late, then the subsequent injection would be 6 months from the date that they actually received the injection.    When was the last injection?  24  Was the last injection at least 6 months ago? Yes  Has the prior authorization been completed?  Yes  Is there an active order (written within the past 365 days, with administrations remaining, not ) in the chart?  Yes   GFR Estimate   Date Value Ref Range Status   2025 31 (L) >60 mL/min/1.73m2 Final     Comment:     eGFR calculated using  CKD-EPI equation.   2021 45 (L) >60 mL/min/[1.73_m2] Final     Has patient had a GFR within the last 12 months? Yes   Is GFR under 30, or patient has a diagnosis of CKD4 or CKD5? Yes   Calcium   Date Value Ref Range Status   2025 9.9 8.8 - 10.4 mg/dL Final   2025 9.9 8.8 - 10.4 mg/dL Final   2021 9.5 8.6 - 10.3 mg/dL Final     Is the calcium results 8.8 or above? Yes   Was the calcium done after last Prolia injection? Yes   Is there a calcium order for 1 month post Prolia injection? Yes. Schedule patient for Calcium lab in next month.   Patient denies gastric bypass or parathyroid surgery in past 6 months? Yes - patient denies.   Patient denies undergoing any dental procedures involving drilling into the bone, such as implants, extractions, or oral surgery, within the past two months that have not yet healed?  Yes - patient denies  Patient denies  plans for an emergency tooth extraction within the next week? Yes    The following steps were completed to comply with the REMS program for Prolia:  Reviewed information in the Medication Guide, including the serious risks of Prolia  and the symptoms of each risk.  Advised patient to seek prompt medical attention if they have signs or symptoms of any of the serious risks.  Provided each patient a copy of the Medication Guide and Patient Guide.    Prior to injection, verified patient identity using patient's name and date of birth. Medication was administered. Please see MAR and medication order for additional information. Patient instructed to remain in clinic for 15 minutes and report any adverse reaction to staff immediately.    Vial/Syringe: Syringe  Was this medication supplied by the patient? No  Verified that the patient has administrations remaining in their prescription.     Rosey Dorman RN on 2/26/2025 at 11:30 AM

## 2025-02-26 NOTE — NURSING NOTE
"Chief Complaint   Patient presents with    Hypertension       Initial BP (!) 182/90   Pulse 74   Resp 18   Wt 74.8 kg (164 lb 12.8 oz)   LMP 06/01/1993 (Approximate)   SpO2 98%   Breastfeeding No   BMI 31.14 kg/m   Estimated body mass index is 31.14 kg/m  as calculated from the following:    Height as of 8/20/24: 1.549 m (5' 1\").    Weight as of this encounter: 74.8 kg (164 lb 12.8 oz).  Medication Review: complete    The next two questions are to help us understand your food security.  If you are feeling you need any assistance in this area, we have resources available to support you today.          8/8/2024   SDOH- Food Insecurity   Within the past 12 months, did you worry that your food would run out before you got money to buy more? N   Within the past 12 months, did the food you bought just not last and you didn t have money to get more? N         Health Care Directive:  Patient has a Health Care Directive on file      Tasia Pascual LPN      "

## 2025-02-26 NOTE — PROGRESS NOTES
{PROVIDER CHARTING PREFERENCE:757536}    Moses Cohen is a 89 year old, presenting for the following health issues:  Hypertension      2/26/2025    10:42 AM   Additional Questions   Roomed by OG Walker     History of Present Illness       CKD: She is not using over the counter pain medicine.     She eats 2-3 servings of fruits and vegetables daily.She consumes 0 sweetened beverage(s) daily.She exercises with enough effort to increase her heart rate 9 or less minutes per day.  She exercises with enough effort to increase her heart rate 3 or less days per week.   She is taking medications regularly.       {MA/LPN/RN Pre-Provider Visit Orders- hCG/UA/Strep (Optional):768125}  {SUPERLIST (Optional):409383}  {additonal problems for provider to add (Optional):626229}    {ROS Picklists (Optional):084183}      Objective    BP (!) 182/90   Pulse 74   Resp 18   Wt 74.8 kg (164 lb 12.8 oz)   LMP 06/01/1993 (Approximate)   SpO2 98%   Breastfeeding No   BMI 31.14 kg/m    Body mass index is 31.14 kg/m .  Physical Exam   {Exam List (Optional):235622}    {Diagnostic Test Results (Optional):563636}        Signed Electronically by: Kirsten Guevara DO  {Email feedback regarding this note to [email or web address]   :359602}   5/26/2025) for With lab work and in 6 months for, Annual Review with renewal of all medications, & as needed sooner.    Moses Cohen is a 89 year old, presenting for the following health issues:  Hypertension        2/26/2025    10:42 AM   Additional Questions   Roomed by OG Walker     History of Present Illness       CKD: She is not using over the counter pain medicine.     She eats 2-3 servings of fruits and vegetables daily.She consumes 0 sweetened beverage(s) daily.She exercises with enough effort to increase her heart rate 9 or less minutes per day.  She exercises with enough effort to increase her heart rate 3 or less days per week.   She is taking medications regularly.     Patient returns today for follow-up of hypertension.  She reports that her blood pressure at home has been controlled.  It is significantly elevated initially here.  On recheck it did show improvement.  She does have underlining stage IIIb chronic kidney disease.      She has been diagnosed with hyperparathyroidism.  We discussed this in depth today.  She is not taking any calcium currently.  Most recently her parathyroid hormone improved from prior and her calcium level was within normal limits.  Her peak calcium level tested previously was 11.4.  She has been on Prolia for osteoporosis.    She has continued to have acid reflux symptoms.  She most recently has been on over-the-counter Pepcid.  We discussed increasing the dose.  She previously was on sucralfate however has not been taking this recently.    She has been having some issues with hair loss.  We discussed the various etiologies that can contribute to this.        Objective    BP (!) 162/82   Pulse 74   Resp 18   Wt 74.8 kg (164 lb 12.8 oz)   LMP 06/01/1993 (Approximate)   SpO2 98%   Breastfeeding No   BMI 31.14 kg/m    Body mass index is 31.14 kg/m .  Physical Exam   GEN: Vitals reviewed. Healthy appearing. Patient is in no acute distress. Cooperative with  exam.  HEENT: Normocephalic atraumatic.  Eyes grossly normal to inspection.  No discharge or erythema, or obvious scleral/conjunctival abnormalities.   LUNGS: No audible wheeze, cough, or visible cyanosis.  No visible retractions or increased work of breathing.    ABD:  nondistended  SKIN: Warm and dry to touch.  Visible skin clear. No significant rash, abnormal pigmentation or lesions.        The longitudinal plan of care for the diagnosis(es)/condition(s) as documented were addressed during this visit. Due to the added complexity in care, I will continue to support Vicki in the subsequent management and with ongoing continuity of care.    Signed Electronically by: Kirsten Guevara DO

## 2025-03-18 ENCOUNTER — THERAPY VISIT (OUTPATIENT)
Dept: PHYSICAL THERAPY | Facility: OTHER | Age: 89
End: 2025-03-18
Attending: INTERNAL MEDICINE
Payer: COMMERCIAL

## 2025-03-18 DIAGNOSIS — M48.061 SPINAL STENOSIS OF LUMBAR REGION WITHOUT NEUROGENIC CLAUDICATION: Primary | ICD-10-CM

## 2025-03-18 DIAGNOSIS — M25.551 HIP PAIN, RIGHT: ICD-10-CM

## 2025-03-18 DIAGNOSIS — M54.50 CHRONIC RIGHT-SIDED LOW BACK PAIN, UNSPECIFIED WHETHER SCIATICA PRESENT: ICD-10-CM

## 2025-03-18 DIAGNOSIS — G89.29 CHRONIC RIGHT-SIDED LOW BACK PAIN, UNSPECIFIED WHETHER SCIATICA PRESENT: ICD-10-CM

## 2025-03-18 DIAGNOSIS — M79.661 PAIN OF RIGHT LOWER LEG: ICD-10-CM

## 2025-03-19 NOTE — PROGRESS NOTES
[facility]                                                                                   OUTPATIENT PHYSICAL THERAPY    PLAN OF TREATMENT FOR OUTPATIENT REHABILITATION   Patient's Last Name, First Name, Vicki Soto YOB: 1935   Provider's Name   [facility]   Medical Record No.  [id number]     Onset Date: 10/31/23  Start of Care Date: 11/06/23     Medical Diagnosis:  M48.061 (ICD-10-CM) - Spinal stenosis of lumbar region without neurogenic claudication  M54.50, G89.29 (ICD-10-CM) - Chronic right-sided low back pain, unspecified whether sciatica present  M25.551 (ICD-10-CM) - Hip pain, right  M79.661 (ICD-10-CM) - Pain of right lower leg      PT Treatment Diagnosis:  back pain, myofascial tightness Plan of Treatment  Frequency/Duration: 1 time per week as needed/ 12 weeks    Certification date from (P) 03/21/25 to (P) 06/09/25         See note for plan of treatment details and functional goals     Karen Duarte, PT                         I CERTIFY THE NEED FOR THESE SERVICES FURNISHED UNDER        THIS PLAN OF TREATMENT AND WHILE UNDER MY CARE     (Physician attestation of this document indicates review and certification of the therapy plan).              Referring Provider:  Kirsten Guevara    Initial Assessment  See Epic Evaluation- Start of Care Date: 11/06/23 03/18/25 0500   Appointment Info   Signing clinician's name / credentials Karen Duarte PT, CLT, CAPP   Visits Used 19   Medical Diagnosis M48.061 (ICD-10-CM) - Spinal stenosis of lumbar region without neurogenic claudication  M54.50, G89.29 (ICD-10-CM) - Chronic right-sided low back pain, unspecified whether sciatica present  M25.551 (ICD-10-CM) - Hip pain, right  M79.661 (ICD-10-CM) - Pain of right lower leg   PT Tx Diagnosis back pain, myofascial tightness   Progress Note/Certification   Start of Care Date 11/06/23   Onset of illness/injury or Date  of Surgery 10/31/23   Therapy Frequency 1 time per week as needed   Predicted Duration 12 weeks   Certification date from 03/21/25   Certification date to 06/09/25   GOALS   PT Goals 2;3   PT Goal 1   Goal Identifier standing   Goal Description Patient to tolerate standing 10 minutes to have conversations in community.   Target Date 02/03/24   Date Met 02/03/24   PT Goal 2   Goal Identifier pain   Goal Description Patient to report reduced low back and right leg pain to less than 5/10 at most to allow greater ease with walking.   Goal Progress PT continues to aid patient in maintaining her mobility in home and in the community. PT continues to help with pain control as patient is unable to take medication due to other health issues. Pain varies by day, as well as effects on mobility. Patient continues to have thoracic spine pain. This also varies by that day. Patient still reporting that PT interventions are the most successful for managing her pain and keeping her mobile.   Target Date 12/26/24   Subjective Report   Subjective Report Having more night time pain when she needs to void.   Objective Measures   Objective Measures Objective Measure 1;Objective Measure 2   Objective Measure 1   Objective Measure joint   Details clear   Objective Measure 2   Objective Measure myofascial   Details right internal iliac artery, right vesical arteries, bladder fascia   Treatment Interventions (PT)   Interventions Manual Therapy;Neuromuscular Re-education   Neuromuscular Re-education   Neuro Re-ed 1 neuromengingeal manipulation (NMM), muscle energy technique (MET) to restore function and reduce pain   Patient Response/Progress level pubes, - FFT right   Manual Therapy   Manual Therapy: Mobilization, MFR, MLD, friction massage minutes (34810) 40   Manual Therapy 1 MFR to restore function and reduce pain   Manual Therapy 1 - Details MFR/OSFM- right internal iliac artery<>right vesical arteries with induction, bladder motility,  lumbar/thoracic paraspinals   Patient Response/Progress decreased tenderness at right pubic bone noted by patient.   Education   Learner/Method Patient;No Barriers to Learning   Plan   Home program exercise, heat   Plan for next session PT will continue as it provides pain reduction for patient, patient unable to have surgery for spine and cannot take pain medication due to cardiac issues.   Total Session Time   Timed Code Treatment Minutes 40   Total Treatment Time (sum of timed and untimed services) 40

## 2025-04-01 ENCOUNTER — THERAPY VISIT (OUTPATIENT)
Dept: PHYSICAL THERAPY | Facility: OTHER | Age: 89
End: 2025-04-01
Attending: INTERNAL MEDICINE
Payer: COMMERCIAL

## 2025-04-01 DIAGNOSIS — G89.29 CHRONIC RIGHT-SIDED LOW BACK PAIN, UNSPECIFIED WHETHER SCIATICA PRESENT: ICD-10-CM

## 2025-04-01 DIAGNOSIS — M48.061 SPINAL STENOSIS OF LUMBAR REGION WITHOUT NEUROGENIC CLAUDICATION: Primary | ICD-10-CM

## 2025-04-01 DIAGNOSIS — M54.50 CHRONIC RIGHT-SIDED LOW BACK PAIN, UNSPECIFIED WHETHER SCIATICA PRESENT: ICD-10-CM

## 2025-04-01 DIAGNOSIS — M25.551 HIP PAIN, RIGHT: ICD-10-CM

## 2025-04-01 DIAGNOSIS — M79.661 PAIN OF RIGHT LOWER LEG: ICD-10-CM

## 2025-04-08 ENCOUNTER — THERAPY VISIT (OUTPATIENT)
Dept: PHYSICAL THERAPY | Facility: OTHER | Age: 89
End: 2025-04-08
Attending: INTERNAL MEDICINE
Payer: MEDICARE

## 2025-04-08 DIAGNOSIS — M79.661 PAIN OF RIGHT LOWER LEG: ICD-10-CM

## 2025-04-08 DIAGNOSIS — M54.50 CHRONIC RIGHT-SIDED LOW BACK PAIN, UNSPECIFIED WHETHER SCIATICA PRESENT: ICD-10-CM

## 2025-04-08 DIAGNOSIS — M25.551 HIP PAIN, RIGHT: ICD-10-CM

## 2025-04-08 DIAGNOSIS — M48.061 SPINAL STENOSIS OF LUMBAR REGION WITHOUT NEUROGENIC CLAUDICATION: Primary | ICD-10-CM

## 2025-04-08 DIAGNOSIS — G89.29 CHRONIC RIGHT-SIDED LOW BACK PAIN, UNSPECIFIED WHETHER SCIATICA PRESENT: ICD-10-CM

## 2025-04-14 ENCOUNTER — TELEPHONE (OUTPATIENT)
Dept: INTERNAL MEDICINE | Facility: OTHER | Age: 89
End: 2025-04-14
Payer: COMMERCIAL

## 2025-04-14 DIAGNOSIS — K20.90 ESOPHAGITIS: Primary | ICD-10-CM

## 2025-04-14 NOTE — TELEPHONE ENCOUNTER
Patient stated that she would like the endoscopy done and would like a referral put in. Please call patient with any questions.    Harini Hoffmann on 4/14/2025 at 8:57 AM

## 2025-04-14 NOTE — TELEPHONE ENCOUNTER
On 2/26, pt's esophagitis was discussed.  She would like to proceed with the scope at this time.  Order placed for signature.  Tasia Pascual LPN on 4/14/2025 at 9:09 AM

## 2025-04-15 ENCOUNTER — TELEPHONE (OUTPATIENT)
Dept: SURGERY | Facility: OTHER | Age: 89
End: 2025-04-15
Payer: COMMERCIAL

## 2025-04-15 NOTE — TELEPHONE ENCOUNTER
[facility] Diagnostic Referral    Patient has a referral for an EGD with a diagnosis of Esophagitis.    Please advise.    Thank you,  Dian Mcclendon on 4/15/2025 at 3:25 PM'

## 2025-04-21 ENCOUNTER — THERAPY VISIT (OUTPATIENT)
Dept: PHYSICAL THERAPY | Facility: OTHER | Age: 89
End: 2025-04-21
Attending: INTERNAL MEDICINE
Payer: MEDICARE

## 2025-04-21 DIAGNOSIS — M79.661 PAIN OF RIGHT LOWER LEG: ICD-10-CM

## 2025-04-21 DIAGNOSIS — M25.551 HIP PAIN, RIGHT: ICD-10-CM

## 2025-04-21 DIAGNOSIS — M48.061 SPINAL STENOSIS OF LUMBAR REGION WITHOUT NEUROGENIC CLAUDICATION: Primary | ICD-10-CM

## 2025-04-21 DIAGNOSIS — G89.29 CHRONIC RIGHT-SIDED LOW BACK PAIN, UNSPECIFIED WHETHER SCIATICA PRESENT: ICD-10-CM

## 2025-04-21 DIAGNOSIS — M54.50 CHRONIC RIGHT-SIDED LOW BACK PAIN, UNSPECIFIED WHETHER SCIATICA PRESENT: ICD-10-CM

## 2025-05-07 ENCOUNTER — ANESTHESIA (OUTPATIENT)
Dept: SURGERY | Facility: OTHER | Age: 89
End: 2025-05-07
Payer: MEDICARE

## 2025-05-07 ENCOUNTER — ANESTHESIA EVENT (OUTPATIENT)
Dept: SURGERY | Facility: OTHER | Age: 89
End: 2025-05-07
Payer: MEDICARE

## 2025-05-07 ENCOUNTER — HOSPITAL ENCOUNTER (OUTPATIENT)
Facility: OTHER | Age: 89
Discharge: HOME | End: 2025-05-07
Attending: SURGERY | Admitting: SURGERY
Payer: MEDICARE

## 2025-05-07 VITALS
HEART RATE: 71 BPM | RESPIRATION RATE: 16 BRPM | OXYGEN SATURATION: 97 % | TEMPERATURE: 97.1 F | DIASTOLIC BLOOD PRESSURE: 54 MMHG | SYSTOLIC BLOOD PRESSURE: 163 MMHG

## 2025-05-07 DIAGNOSIS — K44.9 HIATAL HERNIA: ICD-10-CM

## 2025-05-07 DIAGNOSIS — K29.60 BILE REFLUX GASTRITIS: Primary | ICD-10-CM

## 2025-05-07 PROCEDURE — 999N000010 HC STATISTIC ANES STAT CODE-CRNA PER MINUTE: Performed by: SURGERY

## 2025-05-07 PROCEDURE — 250N000009 HC RX 250: Performed by: NURSE ANESTHETIST, CERTIFIED REGISTERED

## 2025-05-07 PROCEDURE — 258N000003 HC RX IP 258 OP 636: Performed by: SURGERY

## 2025-05-07 PROCEDURE — 250N000011 HC RX IP 250 OP 636: Performed by: NURSE ANESTHETIST, CERTIFIED REGISTERED

## 2025-05-07 PROCEDURE — 43239 EGD BIOPSY SINGLE/MULTIPLE: CPT | Performed by: SURGERY

## 2025-05-07 PROCEDURE — 43235 EGD DIAGNOSTIC BRUSH WASH: CPT | Performed by: SURGERY

## 2025-05-07 RX ORDER — NALOXONE HYDROCHLORIDE 0.4 MG/ML
0.4 INJECTION, SOLUTION INTRAMUSCULAR; INTRAVENOUS; SUBCUTANEOUS
Status: DISCONTINUED | OUTPATIENT
Start: 2025-05-07 | End: 2025-05-07 | Stop reason: HOSPADM

## 2025-05-07 RX ORDER — FLUMAZENIL 0.1 MG/ML
0.2 INJECTION, SOLUTION INTRAVENOUS
Status: DISCONTINUED | OUTPATIENT
Start: 2025-05-07 | End: 2025-05-07 | Stop reason: HOSPADM

## 2025-05-07 RX ORDER — SODIUM CHLORIDE, SODIUM LACTATE, POTASSIUM CHLORIDE, CALCIUM CHLORIDE 600; 310; 30; 20 MG/100ML; MG/100ML; MG/100ML; MG/100ML
INJECTION, SOLUTION INTRAVENOUS CONTINUOUS
Status: DISCONTINUED | OUTPATIENT
Start: 2025-05-07 | End: 2025-05-07 | Stop reason: HOSPADM

## 2025-05-07 RX ORDER — NALOXONE HYDROCHLORIDE 0.4 MG/ML
0.2 INJECTION, SOLUTION INTRAMUSCULAR; INTRAVENOUS; SUBCUTANEOUS
Status: DISCONTINUED | OUTPATIENT
Start: 2025-05-07 | End: 2025-05-07 | Stop reason: HOSPADM

## 2025-05-07 RX ORDER — PROPOFOL 10 MG/ML
INJECTION, EMULSION INTRAVENOUS PRN
Status: DISCONTINUED | OUTPATIENT
Start: 2025-05-07 | End: 2025-05-07

## 2025-05-07 RX ORDER — LIDOCAINE HYDROCHLORIDE 20 MG/ML
INJECTION, SOLUTION INFILTRATION; PERINEURAL PRN
Status: DISCONTINUED | OUTPATIENT
Start: 2025-05-07 | End: 2025-05-07

## 2025-05-07 RX ORDER — ONDANSETRON 2 MG/ML
4 INJECTION INTRAMUSCULAR; INTRAVENOUS EVERY 6 HOURS PRN
Status: DISCONTINUED | OUTPATIENT
Start: 2025-05-07 | End: 2025-05-07 | Stop reason: HOSPADM

## 2025-05-07 RX ORDER — PROCHLORPERAZINE MALEATE 5 MG/1
5 TABLET ORAL EVERY 6 HOURS PRN
Status: DISCONTINUED | OUTPATIENT
Start: 2025-05-07 | End: 2025-05-07 | Stop reason: HOSPADM

## 2025-05-07 RX ORDER — ONDANSETRON 2 MG/ML
4 INJECTION INTRAMUSCULAR; INTRAVENOUS
Status: DISCONTINUED | OUTPATIENT
Start: 2025-05-07 | End: 2025-05-07 | Stop reason: HOSPADM

## 2025-05-07 RX ORDER — ONDANSETRON 4 MG/1
4 TABLET, ORALLY DISINTEGRATING ORAL EVERY 6 HOURS PRN
Status: DISCONTINUED | OUTPATIENT
Start: 2025-05-07 | End: 2025-05-07 | Stop reason: HOSPADM

## 2025-05-07 RX ORDER — PROPOFOL 10 MG/ML
INJECTION, EMULSION INTRAVENOUS CONTINUOUS PRN
Status: DISCONTINUED | OUTPATIENT
Start: 2025-05-07 | End: 2025-05-07

## 2025-05-07 RX ORDER — LIDOCAINE 40 MG/G
CREAM TOPICAL
Status: DISCONTINUED | OUTPATIENT
Start: 2025-05-07 | End: 2025-05-07 | Stop reason: HOSPADM

## 2025-05-07 RX ADMIN — PROPOFOL 50 MG: 10 INJECTION, EMULSION INTRAVENOUS at 08:58

## 2025-05-07 RX ADMIN — LIDOCAINE HYDROCHLORIDE 40 MG: 20 INJECTION, SOLUTION INFILTRATION; PERINEURAL at 08:58

## 2025-05-07 RX ADMIN — PROPOFOL 130 MCG/KG/MIN: 10 INJECTION, EMULSION INTRAVENOUS at 08:58

## 2025-05-07 RX ADMIN — SODIUM CHLORIDE, SODIUM LACTATE, POTASSIUM CHLORIDE, AND CALCIUM CHLORIDE: .6; .31; .03; .02 INJECTION, SOLUTION INTRAVENOUS at 08:21

## 2025-05-07 RX ADMIN — PROPOFOL 50 MG: 10 INJECTION, EMULSION INTRAVENOUS at 09:01

## 2025-05-07 ASSESSMENT — ACTIVITIES OF DAILY LIVING (ADL)
ADLS_ACUITY_SCORE: 21
ADLS_ACUITY_SCORE: 41
ADLS_ACUITY_SCORE: 21

## 2025-05-07 NOTE — OP NOTE
PROCEDURE NOTE    SURGEON: Sabrina Quarles MD.    PRE-OP DIAGNOSIS:   dysphagia and reflux      POST-OP DIAGNOSIS: small hiatal hernia, bile reflux gastritis    PROCEDURE PLANNED:   Diagnostic EGD, flexible        PROCEDURE PERFORMED:  EGD with biopsy, flexible    SPECIMEN:  duodenum, antrum, GEJ and mid esophageal biopsies    ANESTHESIA: See anesthesia record, Coverage requested due to:age more than 72 and ASA class 3    ESTIMATED BLOOD LOSS: None    COMPLICATIONS:  None    INDICATION FORTHE PROCEDURE: The patient is a 89 year oldfemale. The patient presents with dysphagia and reflux. I explained to the patient the risks, benefits and alternatives to diagnostic EGD for evaluating her complaints. We specifically discussed the risks of bleeding, infection, perforation and the risks of sedation. The patient's questions were answered and the patient wished to proceed. Informed consent paperwork was completed.    PROCEDURE: The patient was taken to the endoscopy suite. Appropriate monitors were attached. The patient was placed in the left lateral decubitus position. Bite block was positioned.Timeout was performed confirming the patient's identity and procedure to be performed. After appropriate sedation was confirmed, the flexible endoscope was advanced into the oropharynx. The posterior oropharynx appeared grossly normal. The scope was advanced into the proximal esophagus. The esophagus was insufflated with air. The scope was advanced under direct visualization. No acute abnormalities of the esophagus were noted. The scope was advanced into the stomach. Bile reflux and mild gastritis were noted. The scope was advanced through the pylorus into the duodenal bulb. The bulb and distal duodenum appeared grossly normal. Biopsies were taken. The scope was withdrawn back into the stomach. Antral biopsy was obtained and sent to pathology. The scope was retroflexed and the GE junction inspected. A small hiatal hernia was noted. The  scope was returned to a neutral position and the stomach was decompressed. The scope was withdrawn to the GE junction and biopsy obtained. The mucosa of the esophagus was inspected while withdrawing the scope. Scattered small white plaques were noted. Biopsies were taken. The scope was withdrawn from the patient. The bite block was removed. The patient tolerated the procedure with no immediately apparent complication. The patient was taken to recovery in stable condition.    FOLLOW UP:  RECOMMENDATIONS Will call with pathology results.

## 2025-05-07 NOTE — ANESTHESIA CARE TRANSFER NOTE
Patient: Vicki Mckeon    Procedure: Procedure(s):  Esophagoscopy, gastroscopy, duodenoscopy (EGD), combined WITH BIOPSIES       Diagnosis: Esophagitis [K20.90]  Diagnosis Additional Information: No value filed.    Anesthesia Type:   No value filed.     Note:    Oropharynx: oropharynx clear of all foreign objects  Level of Consciousness: awake  Oxygen Supplementation: room air    Independent Airway: airway patency satisfactory and stable  Dentition: dentition unchanged  Vital Signs Stable: post-procedure vital signs reviewed and stable  Report to RN Given: handoff report given  Patient transferred to: Phase II    Handoff Report: Identifed the Patient, Identified the Reponsible Provider, Reviewed the pertinent medical history, Discussed the surgical course, Reviewed Intra-OP anesthesia mangement and issues during anesthesia, Set expectations for post-procedure period and Allowed opportunity for questions and acknowledgement of understanding      Vitals:  Vitals Value Taken Time   BP     Temp     Pulse     Resp     SpO2         Electronically Signed By: LEONARDA SON CRNA  May 7, 2025  9:14 AM

## 2025-05-07 NOTE — OR NURSING
Patient was discharged home via w/c to car.   Discharge instructions were reviewed with patient and . And they verbalized understanding.   Prescriptions: none

## 2025-05-07 NOTE — DISCHARGE INSTRUCTIONS
Procedure you had done: EGD with biopsies  Your health care provider is:  Kirsten Guevara  Your surgeon is Dr. Sabrina Quarles.   Please call your health care provider or surgeon at [phone] if:    - you feel you are getting worse or having an increase in problems    - fever greater than 101 degrees  - increasing shortness of breath or chest pain  - any signs of infection (increasing redness, swelling, tenderness, warmth, change in appearance, or  increased drainage)  - blood in your urine or stool  - coughing or vomiting blood  - nausea (upset stomach) and vomiting and/or diarrhea that will not stop  - severe pain that is not relieved by medicine, rest or ice  You have had medications for sedation. Please be aware that this can cause drowsiness and impaired judgment for up to 24 hours after your procedure. Do not drive, operate power tools or drink alcohol for 24 hours.  If samples were taken-you will get a phone call and a letter with your results in the next 7-10 days. If you don't get results, please call and let us know!

## 2025-05-07 NOTE — H&P
CHIEF COMPLAINT / REASON FOR PROCEDURE:  dysphagia and reflux    PERTINENT HISTORY   Patient complains of dysaphagia. Previous EGD 2019-. No ulceration, no biopsies done.    Past Medical History:   Diagnosis Date    Anemia 05/15/2013    CKD (chronic kidney disease) stage 3, GFR 30-59 ml/min (H) 2014    Stable. First noted when patient was taking NSAIDs regularly due to back pain.    Essential (primary) hypertension     Nonrheumatic mitral valve prolapse 2008    with normal echo and no residual prolapse    Nontoxic single thyroid nodule 2012    Patient with multinodular goiter. Negative cytology and stable ultrasound findings.    Osteopenia     Other fracture of unspecified lower leg, initial encounter for closed fracture     Pain in left hip 2017    Pregnancy      2, para 2 with 2 spontaneous vaginal deliveries    Pure hypercholesterolemia     Scoliosis     Spinal stenosis     with nerve root impingement    Squamous cell carcinoma      Past Surgical History:   Procedure Laterality Date    BACK SURGERY  10/2011    Back surgery, Dr. Linda, [facility]    BACK SURGERY  2019    [place] [facility]    BACK SURGERY  10/2011    BIOPSY BREAST Left     s, benign    CLOSED REDUCTION ANKLE  2001    ORIF left ankle w/ later removal of hardware     COLONOSCOPY      COLONOSCOPY      ESOPHAGOGASTRODUODENOSCOPY  2019    normal exam, no duodenal ulcers seen    ESOPHAGOSCOPY, GASTROSCOPY, DUODENOSCOPY (EGD), COMBINED N/A 2019    Procedure: ESOPHAGOGASTRODUODENOSCOPY (EGD);  Surgeon: Akash Meléndez MD;  Location: [facility] OR    EXTRACAPSULAR CATARACT EXTRATION WITH INTRAOCULAR LENS IMPLANT Bilateral     LAPAROSCOPY DIAGNOSTIC (GYN)      Laparoscopy for pelvic pain which was negative     Other:  None  Bleeding tendencies:  No  Relevant Family History:  none    Relevant Social History:  none    A relevant review of systems was performed and wasNegative. See  HPI.    ALLERGIES/SENSITIVITIES:   Allergies   Allergen Reactions    Amlodipine Swelling     LE edema    Fosamax [Alendronate]      palpitations    Sulfa Antibiotics Other (See Comments)     Doesn't remember        CURRENT MEDICATIONS:   Current Facility-Administered Medications   Medication Dose Route Frequency Provider Last Rate Last Admin    lactated ringers infusion   Intravenous Continuous Sabrina Quarles MD 30 mL/hr at 05/07/25 0821 New Bag at 05/07/25 0821    lidocaine (LMX4) cream   Topical Q1H PRN Sabrina Quarles MD        lidocaine 1 % 0.1-1 mL  0.1-1 mL Other Q1H PRN Sabrina Quarles MD        ondansetron (ZOFRAN) injection 4 mg  4 mg Intravenous Once PRN Sabrina Quarles MD        sodium chloride (PF) 0.9% PF flush 3 mL  3 mL Intracatheter Q8H Sabrina Quarles MD        sodium chloride (PF) 0.9% PF flush 3 mL  3 mL Intracatheter q1 min prn Sabrina Quarles MD             PRE-SEDATION ASSESSMENT:   BP (!) 164/70   Temp 97.7  F (36.5  C)   Resp 16   LMP 06/01/1993 (Approximate)   SpO2 98%   Lung Exam:Normal  Heart Exam:  Normal    Comment(s):      IMPRESSION:  dysphagia and reflux.    PLAN:  I discussed diagnostic EGD with the patient. Anesthesia requested due to: age more than 72 and ASA class 3

## 2025-05-07 NOTE — ANESTHESIA POSTPROCEDURE EVALUATION
Patient: Vicki Mckeon    Procedure: Procedure(s):  Esophagoscopy, gastroscopy, duodenoscopy (EGD), combined WITH BIOPSIES       Anesthesia Type:  No value filed.    Note:  Disposition: Outpatient   Postop Pain Control: Uneventful            Sign Out: Well controlled pain   PONV: No   Neuro/Psych: Uneventful            Sign Out: Acceptable/Baseline neuro status   Airway/Respiratory: Uneventful            Sign Out: Acceptable/Baseline resp. status   CV/Hemodynamics: Uneventful            Sign Out: Acceptable CV status; No obvious hypovolemia; No obvious fluid overload   Other NRE: NONE   DID A NON-ROUTINE EVENT OCCUR? No           Last vitals:  Vitals Value Taken Time   /54 05/07/25 09:30   Temp 97.1  F (36.2  C) 05/07/25 09:17   Pulse 71 05/07/25 09:30   Resp 16 05/07/25 09:30   SpO2 97 % 05/07/25 09:30       Electronically Signed By: LEONARDA Rowe CRNA  May 7, 2025  10:50 AM

## 2025-05-07 NOTE — ANESTHESIA PREPROCEDURE EVALUATION
Anesthesia Pre-Procedure Evaluation    Patient: Vicki Mckeon   MRN: [id number] : 1935          Procedure : Procedure(s):  Esophagoscopy, gastroscopy, duodenoscopy (EGD), combined         Past Medical History:   Diagnosis Date    Anemia 05/15/2013    CKD (chronic kidney disease) stage 3, GFR 30-59 ml/min (H) 2014    Stable. First noted when patient was taking NSAIDs regularly due to back pain.    Essential (primary) hypertension     Nonrheumatic mitral valve prolapse 2008    with normal echo and no residual prolapse    Nontoxic single thyroid nodule 2012    Patient with multinodular goiter. Negative cytology and stable ultrasound findings.    Osteopenia     Other fracture of unspecified lower leg, initial encounter for closed fracture     Pain in left hip 2017    Pregnancy      2, para 2 with 2 spontaneous vaginal deliveries    Pure hypercholesterolemia     Scoliosis     Spinal stenosis     with nerve root impingement    Squamous cell carcinoma       Past Surgical History:   Procedure Laterality Date    BACK SURGERY  10/2011    Back surgery, Dr. Linda, [facility]    BACK SURGERY  2019    [place] [facility]    BACK SURGERY  10/2011    BIOPSY BREAST Left     1970's, benign    CLOSED REDUCTION ANKLE  2001    ORIF left ankle w/ later removal of hardware     COLONOSCOPY      COLONOSCOPY      ESOPHAGOGASTRODUODENOSCOPY  2019    normal exam, no duodenal ulcers seen    ESOPHAGOSCOPY, GASTROSCOPY, DUODENOSCOPY (EGD), COMBINED N/A 2019    Procedure: ESOPHAGOGASTRODUODENOSCOPY (EGD);  Surgeon: Akash Meléndez MD;  Location: [facility] OR    EXTRACAPSULAR CATARACT EXTRATION WITH INTRAOCULAR LENS IMPLANT Bilateral     LAPAROSCOPY DIAGNOSTIC (GYN)      Laparoscopy for pelvic pain which was negative      Allergies   Allergen Reactions    Amlodipine Swelling     LE edema    Fosamax [Alendronate]      palpitations    Sulfa Antibiotics Other (See Comments)      Doesn't remember      Social History     Tobacco Use    Smoking status: Former     Current packs/day: 0.00     Types: Cigarettes     Quit date: 1968     Years since quittin.3     Passive exposure: Past    Smokeless tobacco: Never   Substance Use Topics    Alcohol use: Yes     Comment: Occasionally      Wt Readings from Last 1 Encounters:   25 74.8 kg (164 lb 12.8 oz)        Anesthesia Evaluation            ROS/MED HX  ENT/Pulmonary:       Neurologic:       Cardiovascular:     (+) Dyslipidemia hypertension- -   -  - -           GAINES.                           METS/Exercise Tolerance: 3 - Able to walk 1-2 blocks without stopping    Hematologic:       Musculoskeletal:       GI/Hepatic:     (+) GERD, Asymptomatic on medication,                  Renal/Genitourinary:     (+) renal disease,  Pt does not require dialysis,           Endo:     (+)          thyroid problem,            Psychiatric/Substance Use:       Infectious Disease:  - neg infectious disease ROS     Malignancy:  - neg malignancy ROS     Other:              Physical Exam  Airway  Mallampati: III  TM distance: >3 FB  Neck ROM: full  Mouth opening: < 4 cm    Cardiovascular   Rhythm: regular  Rate: normal rate     Dental   (+) Modest Abnormalities - crowns, retainers, 1 or 2 missing teeth  Comments: Upper denture in place.   dental implants, bridges or caps present   Pulmonary - normal exam      Neurological - normal exam  She appears awake, alert and oriented x3.    Other Findings       OUTSIDE LABS:  CBC:   Lab Results   Component Value Date    WBC 7.8 2025    WBC 8.7 2024    HGB 11.5 (L) 2025    HGB 11.6 (L) 2024    HCT 35.1 2025    HCT 36.0 2024     2025     2024     BMP:   Lab Results   Component Value Date     2025     10/29/2024    POTASSIUM 5.1 2025    POTASSIUM 4.7 10/29/2024    CHLORIDE 106 2025    CHLORIDE 103 10/29/2024    CO2 20 (L) 2025  "   CO2 22 10/29/2024    BUN 40.7 (H) 02/24/2025    BUN 43.9 (H) 10/29/2024    CR 1.60 (H) 02/24/2025    CR 1.49 (H) 10/29/2024     (H) 02/24/2025     (H) 10/29/2024     COAGS:   Lab Results   Component Value Date    PTT 31 08/29/2019     POC: No results found for: \"BGM\", \"HCG\", \"HCGS\"  HEPATIC:   Lab Results   Component Value Date    ALBUMIN 4.2 02/24/2025    PROTTOTAL 7.7 02/24/2025    ALT 17 02/24/2025    AST 32 02/24/2025    ALKPHOS 49 02/24/2025    BILITOTAL 0.4 02/24/2025     OTHER:   Lab Results   Component Value Date    A1C 5.6 08/08/2024    WEN 9.9 02/24/2025    WEN 9.9 02/24/2025    PHOS 2.9 10/29/2024    MAG 1.8 07/05/2023    TSH 2.64 02/24/2025    T4 1.00 11/28/2014       Anesthesia Plan    ASA Status:  3    Anesthesia Type: MAC.   Induction: intravenous.   Techniques and Equipment:       - Monitoring Plan: standard ASA monitoring.     Consents    Anesthesia Plan(s) and associated risks, benefits, and realistic alternatives discussed. Questions answered and patient/representative(s) expressed understanding.     - Discussed: CRNA     - Discussed with:  Patient            Postoperative Care    Pain management: multimodal analgesia.        Comments:                 LEONARDA Rowe CRNA    I have reviewed the pertinent notes and labs in the chart from the past 30 days and (re)examined the patient.  Any updates or changes from those notes are reflected in this note.    Clinically Significant Risk Factors Present on Admission                   # Hypertension: Noted on problem list                            "

## 2025-05-09 LAB
PATH REPORT.COMMENTS IMP SPEC: NORMAL
PATH REPORT.FINAL DX SPEC: NORMAL
PATH REPORT.RELEVANT HX SPEC: NORMAL
PHOTO IMAGE: NORMAL

## 2025-05-13 ENCOUNTER — RESULTS FOLLOW-UP (OUTPATIENT)
Dept: SURGERY | Facility: OTHER | Age: 89
End: 2025-05-13
Payer: COMMERCIAL

## 2025-05-13 DIAGNOSIS — K31.9 GASTROPATHY: Primary | ICD-10-CM

## 2025-05-13 RX ORDER — SUCRALFATE 1 G/1
1 TABLET ORAL 3 TIMES DAILY
Qty: 120 TABLET | Refills: 1 | Status: SHIPPED | OUTPATIENT
Start: 2025-05-13

## 2025-05-15 ENCOUNTER — THERAPY VISIT (OUTPATIENT)
Dept: PHYSICAL THERAPY | Facility: OTHER | Age: 89
End: 2025-05-15
Attending: INTERNAL MEDICINE
Payer: COMMERCIAL

## 2025-05-15 DIAGNOSIS — M25.551 HIP PAIN, RIGHT: ICD-10-CM

## 2025-05-15 DIAGNOSIS — G89.29 CHRONIC RIGHT-SIDED LOW BACK PAIN, UNSPECIFIED WHETHER SCIATICA PRESENT: ICD-10-CM

## 2025-05-15 DIAGNOSIS — M48.061 SPINAL STENOSIS OF LUMBAR REGION WITHOUT NEUROGENIC CLAUDICATION: Primary | ICD-10-CM

## 2025-05-15 DIAGNOSIS — M54.50 CHRONIC RIGHT-SIDED LOW BACK PAIN, UNSPECIFIED WHETHER SCIATICA PRESENT: ICD-10-CM

## 2025-05-19 ENCOUNTER — TELEPHONE (OUTPATIENT)
Dept: SURGERY | Facility: OTHER | Age: 89
End: 2025-05-19
Payer: COMMERCIAL

## 2025-05-19 ENCOUNTER — TELEPHONE (OUTPATIENT)
Dept: INTERNAL MEDICINE | Facility: OTHER | Age: 89
End: 2025-05-19
Payer: COMMERCIAL

## 2025-05-20 NOTE — TELEPHONE ENCOUNTER
Called patient back and she stated that she was having trouble swallowing the pill.  She did figure out that if she cut it in half she could swallow it better.  She denied any other reaction.  She had no further questions or concerns at this time. She did think that it was helping her stomach feel better.   Vy Oswald LPN .......5/20/2025 12:37 PM

## 2025-05-27 ENCOUNTER — LAB (OUTPATIENT)
Dept: LAB | Facility: OTHER | Age: 89
End: 2025-05-27
Attending: INTERNAL MEDICINE
Payer: MEDICARE

## 2025-05-27 DIAGNOSIS — E21.3 HYPERPARATHYROIDISM: ICD-10-CM

## 2025-05-27 LAB
ALBUMIN SERPL BCG-MCNC: 4.4 G/DL (ref 3.5–5.2)
ANION GAP SERPL CALCULATED.3IONS-SCNC: 12 MMOL/L (ref 7–15)
BUN SERPL-MCNC: 40.8 MG/DL (ref 8–23)
CALCIUM SERPL-MCNC: 10.5 MG/DL (ref 8.8–10.4)
CHLORIDE SERPL-SCNC: 105 MMOL/L (ref 98–107)
CREAT SERPL-MCNC: 1.5 MG/DL (ref 0.51–0.95)
EGFRCR SERPLBLD CKD-EPI 2021: 33 ML/MIN/1.73M2
GLUCOSE SERPL-MCNC: 81 MG/DL (ref 70–99)
HCO3 SERPL-SCNC: 23 MMOL/L (ref 22–29)
HOLD SPECIMEN: NORMAL
PHOSPHATE SERPL-MCNC: 3.4 MG/DL (ref 2.5–4.5)
POTASSIUM SERPL-SCNC: 4.7 MMOL/L (ref 3.4–5.3)
PTH-INTACT SERPL-MCNC: 128 PG/ML (ref 15–65)
SODIUM SERPL-SCNC: 140 MMOL/L (ref 135–145)

## 2025-05-27 PROCEDURE — 84132 ASSAY OF SERUM POTASSIUM: CPT | Mod: ZL

## 2025-05-27 PROCEDURE — 83970 ASSAY OF PARATHORMONE: CPT | Mod: ZL

## 2025-05-27 PROCEDURE — 36415 COLL VENOUS BLD VENIPUNCTURE: CPT | Mod: ZL

## 2025-05-29 ENCOUNTER — THERAPY VISIT (OUTPATIENT)
Dept: PHYSICAL THERAPY | Facility: OTHER | Age: 89
End: 2025-05-29
Attending: INTERNAL MEDICINE
Payer: MEDICARE

## 2025-05-29 DIAGNOSIS — M54.50 CHRONIC RIGHT-SIDED LOW BACK PAIN, UNSPECIFIED WHETHER SCIATICA PRESENT: ICD-10-CM

## 2025-05-29 DIAGNOSIS — M48.061 SPINAL STENOSIS OF LUMBAR REGION WITHOUT NEUROGENIC CLAUDICATION: Primary | ICD-10-CM

## 2025-05-29 DIAGNOSIS — M25.551 HIP PAIN, RIGHT: ICD-10-CM

## 2025-05-29 DIAGNOSIS — G89.29 CHRONIC RIGHT-SIDED LOW BACK PAIN, UNSPECIFIED WHETHER SCIATICA PRESENT: ICD-10-CM

## 2025-06-07 DIAGNOSIS — I10 ESSENTIAL HYPERTENSION: ICD-10-CM

## 2025-06-08 ENCOUNTER — MYC MEDICAL ADVICE (OUTPATIENT)
Dept: INTERNAL MEDICINE | Facility: OTHER | Age: 89
End: 2025-06-08
Payer: COMMERCIAL

## 2025-06-08 DIAGNOSIS — E21.3 HYPERPARATHYROIDISM: Primary | ICD-10-CM

## 2025-06-09 RX ORDER — LOSARTAN POTASSIUM AND HYDROCHLOROTHIAZIDE 25; 100 MG/1; MG/1
1 TABLET ORAL DAILY
Qty: 90 TABLET | Refills: 0 | OUTPATIENT
Start: 2025-06-09

## 2025-06-09 NOTE — TELEPHONE ENCOUNTER
Pt would like referral to see Endocrinology. Due to fatigue, cold, hair loss, hoarse voice, and loss of concentration.     Per OV from 2/26/25:  Hyperparathyroidism  At this time we discussed the pros and cons of being referred to endocrinology versus ENT versus watchful waiting given her age and comorbidities.  At this time we will start with a lab recheck in approximately 3 months.  - TSH with free T4 reflex; Future  - PTH, Intact; Future    Josue'd up referral.     Routing to provider to review and respond.  Erica Salas RN on 6/9/2025 at 4:01 PM

## 2025-06-13 DIAGNOSIS — I10 ESSENTIAL HYPERTENSION: ICD-10-CM

## 2025-06-17 ENCOUNTER — THERAPY VISIT (OUTPATIENT)
Dept: PHYSICAL THERAPY | Facility: OTHER | Age: 89
End: 2025-06-17
Attending: INTERNAL MEDICINE
Payer: COMMERCIAL

## 2025-06-17 DIAGNOSIS — M79.661 PAIN OF RIGHT LOWER LEG: ICD-10-CM

## 2025-06-17 DIAGNOSIS — M48.061 SPINAL STENOSIS OF LUMBAR REGION WITHOUT NEUROGENIC CLAUDICATION: Primary | ICD-10-CM

## 2025-06-17 DIAGNOSIS — M25.551 HIP PAIN, RIGHT: ICD-10-CM

## 2025-06-17 DIAGNOSIS — G89.29 CHRONIC RIGHT-SIDED LOW BACK PAIN, UNSPECIFIED WHETHER SCIATICA PRESENT: ICD-10-CM

## 2025-06-17 DIAGNOSIS — M54.50 CHRONIC RIGHT-SIDED LOW BACK PAIN, UNSPECIFIED WHETHER SCIATICA PRESENT: ICD-10-CM

## 2025-06-17 RX ORDER — LOSARTAN POTASSIUM AND HYDROCHLOROTHIAZIDE 25; 100 MG/1; MG/1
1 TABLET ORAL DAILY
Qty: 90 TABLET | Refills: 0 | OUTPATIENT
Start: 2025-06-17

## 2025-06-17 NOTE — PROGRESS NOTES
[facility]                                                                                   OUTPATIENT PHYSICAL THERAPY    PLAN OF TREATMENT FOR OUTPATIENT REHABILITATION   Patient's Last Name, First Name, Vicki Soto YOB: 1935   Provider's Name   [facility]   Medical Record No.  [id number]     Onset Date: 10/31/23  Start of Care Date: 11/06/23     Medical Diagnosis:  M48.061 (ICD-10-CM) - Spinal stenosis of lumbar region without neurogenic claudication  M54.50, G89.29 (ICD-10-CM) - Chronic right-sided low back pain, unspecified whether sciatica present  M25.551 (ICD-10-CM) - Hip pain, right  M79.661 (ICD-10-CM) - Pain of right lower leg      PT Treatment Diagnosis:  back pain, myofascial tightness Plan of Treatment  Frequency/Duration: 1 time per week as needed/ 12 weeks    Certification date from 06/10/25 to 09/01/25         See note for plan of treatment details and functional goals     Karen Duarte, PT                         I CERTIFY THE NEED FOR THESE SERVICES FURNISHED UNDER        THIS PLAN OF TREATMENT AND WHILE UNDER MY CARE     (Physician attestation of this document indicates review and certification of the therapy plan).              Referring Provider:  Kirsten Guevara    Initial Assessment  See Epic Evaluation- Start of Care Date: 11/06/23 06/17/25 0500   Appointment Info   Signing clinician's name / credentials Karen Duarte, PT, CLT, CAPP   Visits Used 25   Medical Diagnosis M48.061 (ICD-10-CM) - Spinal stenosis of lumbar region without neurogenic claudication  M54.50, G89.29 (ICD-10-CM) - Chronic right-sided low back pain, unspecified whether sciatica present  M25.551 (ICD-10-CM) - Hip pain, right  M79.661 (ICD-10-CM) - Pain of right lower leg   PT Tx Diagnosis back pain, myofascial tightness   Progress Note/Certification   Start of Care Date 11/06/23   Onset of illness/injury or Date of  Surgery 10/31/23   Therapy Frequency 1 time per week as needed   Predicted Duration 12 weeks   Certification date from 06/10/25   Certification date to 09/01/25   GOALS   PT Goals 2;3   PT Goal 1   Goal Identifier standing   Goal Description Patient to tolerate standing 10 minutes to have conversations in community.   Goal Progress able to complete this task   Target Date 02/03/24   Date Met 06/17/25   PT Goal 2   Goal Identifier pain   Goal Description Patient to report reduced low back and right leg pain to less than 5/10 at most to allow greater ease with walking.   Goal Progress PT continues to aid patient in maintaining her mobility in home and in the community. PT continues to help with pain control as patient is unable to take medication due to other health issues. Pain varies by day, as well as effects on mobility. Patient continues to have thoracic spine pain. This also varies by that day. Patient still reporting that PT interventions are the most successful for managing her pain and keeping her mobile.   Target Date 12/26/25   Subjective Report   Subjective Report Bought a new pillow, thinks it is helping her neck. Noticing more low back discomfort instead of mid back. Notices the low back more at night when she gets up. Will use heating pad to help things feel better. Lower back soreness is different that the usual, more achy.   Objective Measures   Objective Measures Objective Measure 3   Objective Measure 1   Objective Measure postural loading   Details limited loading bilateral shoulders; general listening to lumbar spine; local listening to L5   Objective Measure 3   Objective Measure joint   Details unable to mobilize lumbar spine due to hardware and broken hardware in this area   Treatment Interventions (PT)   Interventions Manual Therapy;Neuromuscular Re-education   Neuromuscular Re-education   Neuro Re-ed 1 neuromengingeal manipulation (NMM), muscle energy technique (MET) to restore function and  reduce pain   Manual Therapy   Manual Therapy: Mobilization, MFR, MLD, friction massage minutes ([id number]) 30   Manual Therapy 1 MFR to restore function and reduce pain   Manual Therapy 1 - Details MFR/OSFM-STM lower lumbar paraspinals, gluteals, bladder fascia,, thoracic paraspinals   Education   Learner/Method Patient;No Barriers to Learning   Plan   Home program exercise, heat   Plan for next session Will recheck in 3-4 weeks. PT will continue as it provides pain reduction for patient, patient unable to have surgery for spine and cannot take pain medication due to cardiac issues.   Total Session Time   Timed Code Treatment Minutes 30   Total Treatment Time (sum of timed and untimed services) 30

## 2025-07-09 ENCOUNTER — THERAPY VISIT (OUTPATIENT)
Dept: PHYSICAL THERAPY | Facility: OTHER | Age: 89
End: 2025-07-09
Attending: INTERNAL MEDICINE
Payer: MEDICARE

## 2025-07-09 DIAGNOSIS — M25.551 HIP PAIN, RIGHT: ICD-10-CM

## 2025-07-09 DIAGNOSIS — M54.50 CHRONIC RIGHT-SIDED LOW BACK PAIN, UNSPECIFIED WHETHER SCIATICA PRESENT: ICD-10-CM

## 2025-07-09 DIAGNOSIS — G89.29 CHRONIC RIGHT-SIDED LOW BACK PAIN, UNSPECIFIED WHETHER SCIATICA PRESENT: ICD-10-CM

## 2025-07-09 DIAGNOSIS — M79.661 PAIN OF RIGHT LOWER LEG: ICD-10-CM

## 2025-07-09 DIAGNOSIS — M48.061 SPINAL STENOSIS OF LUMBAR REGION WITHOUT NEUROGENIC CLAUDICATION: Primary | ICD-10-CM

## 2025-07-24 ENCOUNTER — THERAPY VISIT (OUTPATIENT)
Dept: PHYSICAL THERAPY | Facility: OTHER | Age: 89
End: 2025-07-24
Attending: INTERNAL MEDICINE
Payer: MEDICARE

## 2025-07-24 DIAGNOSIS — M25.551 HIP PAIN, RIGHT: ICD-10-CM

## 2025-07-24 DIAGNOSIS — M79.661 PAIN OF RIGHT LOWER LEG: ICD-10-CM

## 2025-07-24 DIAGNOSIS — M48.061 SPINAL STENOSIS OF LUMBAR REGION WITHOUT NEUROGENIC CLAUDICATION: Primary | ICD-10-CM

## 2025-07-24 DIAGNOSIS — M54.50 CHRONIC RIGHT-SIDED LOW BACK PAIN, UNSPECIFIED WHETHER SCIATICA PRESENT: ICD-10-CM

## 2025-07-24 DIAGNOSIS — G89.29 CHRONIC RIGHT-SIDED LOW BACK PAIN, UNSPECIFIED WHETHER SCIATICA PRESENT: ICD-10-CM

## 2025-08-07 ENCOUNTER — THERAPY VISIT (OUTPATIENT)
Dept: PHYSICAL THERAPY | Facility: OTHER | Age: 89
End: 2025-08-07
Attending: INTERNAL MEDICINE
Payer: MEDICARE

## 2025-08-07 DIAGNOSIS — G89.29 CHRONIC RIGHT-SIDED LOW BACK PAIN, UNSPECIFIED WHETHER SCIATICA PRESENT: ICD-10-CM

## 2025-08-07 DIAGNOSIS — M54.50 CHRONIC RIGHT-SIDED LOW BACK PAIN, UNSPECIFIED WHETHER SCIATICA PRESENT: ICD-10-CM

## 2025-08-07 DIAGNOSIS — M25.551 HIP PAIN, RIGHT: ICD-10-CM

## 2025-08-07 DIAGNOSIS — M79.661 PAIN OF RIGHT LOWER LEG: ICD-10-CM

## 2025-08-07 DIAGNOSIS — M48.061 SPINAL STENOSIS OF LUMBAR REGION WITHOUT NEUROGENIC CLAUDICATION: Primary | ICD-10-CM

## 2025-08-25 ENCOUNTER — LAB (OUTPATIENT)
Dept: LAB | Facility: OTHER | Age: 89
End: 2025-08-25
Payer: MEDICARE

## 2025-08-27 ENCOUNTER — ALLIED HEALTH/NURSE VISIT (OUTPATIENT)
Dept: FAMILY MEDICINE | Facility: OTHER | Age: 89
End: 2025-08-27
Attending: INTERNAL MEDICINE
Payer: MEDICARE

## 2025-08-27 ENCOUNTER — OFFICE VISIT (OUTPATIENT)
Dept: INTERNAL MEDICINE | Facility: OTHER | Age: 89
End: 2025-08-27
Attending: INTERNAL MEDICINE
Payer: MEDICARE

## 2025-08-27 DIAGNOSIS — M85.80 OSTEOPENIA, UNSPECIFIED LOCATION: Primary | ICD-10-CM

## 2025-08-27 PROBLEM — M79.604 RIGHT LEG PAIN: Status: RESOLVED | Noted: 2023-06-08 | Resolved: 2025-08-27

## 2025-08-27 PROBLEM — M41.9 SCOLIOSIS, UNSPECIFIED: Status: RESOLVED | Noted: 2019-10-03 | Resolved: 2025-08-27

## 2025-08-27 PROBLEM — M62.521: Status: RESOLVED | Noted: 2019-10-04 | Resolved: 2025-08-27

## 2025-08-27 PROBLEM — Z13.220 SCREENING FOR HYPERLIPIDEMIA: Status: RESOLVED | Noted: 2022-06-23 | Resolved: 2025-08-27

## 2025-08-27 PROBLEM — K26.9 DUODENAL ULCER, UNSPECIFIED AS ACUTE OR CHRONIC, WITHOUT HEMORRHAGE OR PERFORATION: Status: RESOLVED | Noted: 2019-10-03 | Resolved: 2025-08-27

## 2025-08-27 PROBLEM — R06.09 DYSPNEA ON EXERTION: Status: RESOLVED | Noted: 2022-06-23 | Resolved: 2025-08-27

## 2025-08-27 PROCEDURE — 250N000011 HC RX IP 250 OP 636: Mod: JZ | Performed by: INTERNAL MEDICINE

## 2025-08-27 PROCEDURE — 96372 THER/PROPH/DIAG INJ SC/IM: CPT | Performed by: INTERNAL MEDICINE

## 2025-08-27 RX ADMIN — DENOSUMAB 60 MG: 60 INJECTION SUBCUTANEOUS at 11:39

## 2025-08-27 SDOH — HEALTH STABILITY: PHYSICAL HEALTH: ON AVERAGE, HOW MANY DAYS PER WEEK DO YOU ENGAGE IN MODERATE TO STRENUOUS EXERCISE (LIKE A BRISK WALK)?: 3 DAYS

## 2025-09-02 ENCOUNTER — HOSPITAL ENCOUNTER (OUTPATIENT)
Dept: MRI IMAGING | Facility: OTHER | Age: 89
Discharge: HOME | End: 2025-09-02
Attending: INTERNAL MEDICINE
Payer: MEDICARE

## 2025-09-02 DIAGNOSIS — M48.061 SPINAL STENOSIS OF LUMBAR REGION WITHOUT NEUROGENIC CLAUDICATION: ICD-10-CM

## 2025-09-02 DIAGNOSIS — G89.29 CHRONIC RIGHT-SIDED LOW BACK PAIN, UNSPECIFIED WHETHER SCIATICA PRESENT: ICD-10-CM

## 2025-09-02 DIAGNOSIS — M54.50 CHRONIC RIGHT-SIDED LOW BACK PAIN, UNSPECIFIED WHETHER SCIATICA PRESENT: ICD-10-CM

## 2025-09-02 PROCEDURE — 72148 MRI LUMBAR SPINE W/O DYE: CPT

## 2025-09-02 PROCEDURE — 72148 MRI LUMBAR SPINE W/O DYE: CPT | Mod: 26 | Performed by: RADIOLOGY

## (undated) DEVICE — Device

## (undated) DEVICE — SUCTION MANIFOLD NEPTUNE 2 SYS 4 PORT 0702-020-000

## (undated) DEVICE — TUBING SUCTION 10'X3/16" N510

## (undated) DEVICE — HEMOCLIP QUICKCLIP PRO OLYMPUS 230CM HX-202UR.B

## (undated) DEVICE — SYR 50ML LL W/O NDL 309653

## (undated) DEVICE — SOL WATER 1500ML

## (undated) DEVICE — ENDO FORCEP ENDOJAW BIOPSY 2.8MMX230CM FB-220U

## (undated) DEVICE — ENDO KIT COMPLIANCE DYKENDOCMPLY

## (undated) DEVICE — ENDO BITE BLOCK 60 MAXI LF 00712804

## (undated) RX ORDER — DEXAMETHASONE SODIUM PHOSPHATE 10 MG/ML
INJECTION, SOLUTION INTRAMUSCULAR; INTRAVENOUS
Status: DISPENSED
Start: 2019-01-08

## (undated) RX ORDER — KETOROLAC TROMETHAMINE 30 MG/ML
INJECTION, SOLUTION INTRAMUSCULAR; INTRAVENOUS
Status: DISPENSED
Start: 2021-08-20

## (undated) RX ORDER — DEXAMETHASONE SODIUM PHOSPHATE 10 MG/ML
INJECTION, SOLUTION INTRAMUSCULAR; INTRAVENOUS
Status: DISPENSED
Start: 2021-09-10

## (undated) RX ORDER — LIDOCAINE HYDROCHLORIDE 10 MG/ML
INJECTION, SOLUTION INFILTRATION; PERINEURAL
Status: DISPENSED
Start: 2021-09-10

## (undated) RX ORDER — REGADENOSON 0.08 MG/ML
INJECTION, SOLUTION INTRAVENOUS
Status: DISPENSED
Start: 2024-08-20

## (undated) RX ORDER — TRIAMCINOLONE ACETONIDE 40 MG/ML
INJECTION, SUSPENSION INTRA-ARTICULAR; INTRAMUSCULAR
Status: DISPENSED
Start: 2019-04-30

## (undated) RX ORDER — LIDOCAINE HYDROCHLORIDE 10 MG/ML
INJECTION, SOLUTION EPIDURAL; INFILTRATION; INTRACAUDAL; PERINEURAL
Status: DISPENSED
Start: 2019-01-08

## (undated) RX ORDER — BUPIVACAINE HYDROCHLORIDE 5 MG/ML
INJECTION, SOLUTION EPIDURAL; INTRACAUDAL
Status: DISPENSED
Start: 2020-09-22

## (undated) RX ORDER — BUPIVACAINE HYDROCHLORIDE 5 MG/ML
INJECTION, SOLUTION EPIDURAL; INTRACAUDAL
Status: DISPENSED
Start: 2019-04-30

## (undated) RX ORDER — PROPOFOL 10 MG/ML
INJECTION, EMULSION INTRAVENOUS
Status: DISPENSED
Start: 2019-12-11

## (undated) RX ORDER — LIDOCAINE HYDROCHLORIDE 10 MG/ML
INJECTION, SOLUTION EPIDURAL; INFILTRATION; INTRACAUDAL; PERINEURAL
Status: DISPENSED
Start: 2021-09-10

## (undated) RX ORDER — LIDOCAINE HYDROCHLORIDE 10 MG/ML
INJECTION, SOLUTION INFILTRATION; PERINEURAL
Status: DISPENSED
Start: 2019-11-15

## (undated) RX ORDER — BUPIVACAINE HYDROCHLORIDE 5 MG/ML
INJECTION, SOLUTION EPIDURAL; INTRACAUDAL
Status: DISPENSED
Start: 2019-07-03

## (undated) RX ORDER — TRIAMCINOLONE ACETONIDE 40 MG/ML
INJECTION, SUSPENSION INTRA-ARTICULAR; INTRAMUSCULAR
Status: DISPENSED
Start: 2019-11-15

## (undated) RX ORDER — LIDOCAINE HCL/EPINEPHRINE/PF 2%-1:200K
VIAL (ML) INJECTION
Status: DISPENSED
Start: 2021-04-30

## (undated) RX ORDER — TRIAMCINOLONE ACETONIDE 40 MG/ML
INJECTION, SUSPENSION INTRA-ARTICULAR; INTRAMUSCULAR
Status: DISPENSED
Start: 2020-09-22

## (undated) RX ORDER — METHYLPREDNISOLONE ACETATE 80 MG/ML
INJECTION, SUSPENSION INTRA-ARTICULAR; INTRALESIONAL; INTRAMUSCULAR; SOFT TISSUE
Status: DISPENSED
Start: 2019-03-19

## (undated) RX ORDER — LIDOCAINE HYDROCHLORIDE 10 MG/ML
INJECTION, SOLUTION INFILTRATION; PERINEURAL
Status: DISPENSED
Start: 2019-07-03

## (undated) RX ORDER — LIDOCAINE HYDROCHLORIDE 10 MG/ML
INJECTION, SOLUTION INFILTRATION; PERINEURAL
Status: DISPENSED
Start: 2020-09-22

## (undated) RX ORDER — LIDOCAINE HYDROCHLORIDE 10 MG/ML
INJECTION, SOLUTION INFILTRATION; PERINEURAL
Status: DISPENSED
Start: 2019-01-08

## (undated) RX ORDER — PROPOFOL 10 MG/ML
INJECTION, EMULSION INTRAVENOUS
Status: DISPENSED
Start: 2025-05-07

## (undated) RX ORDER — TRIAMCINOLONE ACETONIDE 40 MG/ML
INJECTION, SUSPENSION INTRA-ARTICULAR; INTRAMUSCULAR
Status: DISPENSED
Start: 2019-07-03

## (undated) RX ORDER — LIDOCAINE HYDROCHLORIDE 10 MG/ML
INJECTION, SOLUTION EPIDURAL; INFILTRATION; INTRACAUDAL; PERINEURAL
Status: DISPENSED
Start: 2019-03-19

## (undated) RX ORDER — SODIUM CHLORIDE FOR INHALATION 0.9 %
VIAL, NEBULIZER (ML) INHALATION
Status: DISPENSED
Start: 2023-05-23

## (undated) RX ORDER — BUPIVACAINE HYDROCHLORIDE 5 MG/ML
INJECTION, SOLUTION EPIDURAL; INTRACAUDAL
Status: DISPENSED
Start: 2019-11-15

## (undated) RX ORDER — LIDOCAINE HYDROCHLORIDE 10 MG/ML
INJECTION, SOLUTION INFILTRATION; PERINEURAL
Status: DISPENSED
Start: 2019-04-30

## (undated) RX ORDER — METHYLPREDNISOLONE ACETATE 80 MG/ML
INJECTION, SUSPENSION INTRA-ARTICULAR; INTRALESIONAL; INTRAMUSCULAR; SOFT TISSUE
Status: DISPENSED
Start: 2019-07-03

## (undated) RX ORDER — LIDOCAINE HYDROCHLORIDE 10 MG/ML
INJECTION, SOLUTION INFILTRATION; PERINEURAL
Status: DISPENSED
Start: 2019-03-19

## (undated) RX ORDER — LIDOCAINE HYDROCHLORIDE 10 MG/ML
INJECTION, SOLUTION EPIDURAL; INFILTRATION; INTRACAUDAL; PERINEURAL
Status: DISPENSED
Start: 2019-07-03